# Patient Record
Sex: FEMALE | Race: WHITE | Employment: OTHER | URBAN - METROPOLITAN AREA
[De-identification: names, ages, dates, MRNs, and addresses within clinical notes are randomized per-mention and may not be internally consistent; named-entity substitution may affect disease eponyms.]

---

## 2017-01-30 ENCOUNTER — OFFICE VISIT (OUTPATIENT)
Dept: CARDIOLOGY CLINIC | Age: 82
End: 2017-01-30

## 2017-01-30 VITALS
WEIGHT: 174 LBS | HEART RATE: 70 BPM | DIASTOLIC BLOOD PRESSURE: 74 MMHG | BODY MASS INDEX: 27.97 KG/M2 | SYSTOLIC BLOOD PRESSURE: 140 MMHG | HEIGHT: 66 IN | OXYGEN SATURATION: 97 %

## 2017-01-30 DIAGNOSIS — M79.604 LOW BACK PAIN RADIATING TO BOTH LEGS: ICD-10-CM

## 2017-01-30 DIAGNOSIS — I21.19 ACUTE INFERIOR MYOCARDIAL INFARCTION (HCC): ICD-10-CM

## 2017-01-30 DIAGNOSIS — N93.9 VAGINAL BLEEDING: ICD-10-CM

## 2017-01-30 DIAGNOSIS — M54.50 LOW BACK PAIN RADIATING TO BOTH LEGS: ICD-10-CM

## 2017-01-30 DIAGNOSIS — I10 ESSENTIAL HYPERTENSION: ICD-10-CM

## 2017-01-30 DIAGNOSIS — E78.5 HYPERLIPIDEMIA LDL GOAL <70: ICD-10-CM

## 2017-01-30 DIAGNOSIS — I21.19 ACUTE INFERIOR MYOCARDIAL INFARCTION (HCC): Primary | ICD-10-CM

## 2017-01-30 DIAGNOSIS — R58 RETROPERITONEAL HEMORRHAGE: ICD-10-CM

## 2017-01-30 DIAGNOSIS — M79.605 LOW BACK PAIN RADIATING TO BOTH LEGS: ICD-10-CM

## 2017-01-30 LAB
BASOPHILS ABSOLUTE: 0 K/UL (ref 0–0.2)
BASOPHILS RELATIVE PERCENT: 0.7 %
EOSINOPHILS ABSOLUTE: 0.1 K/UL (ref 0–0.6)
EOSINOPHILS RELATIVE PERCENT: 2.1 %
HCT VFR BLD CALC: 32.6 % (ref 36–48)
HEMOGLOBIN: 10.9 G/DL (ref 12–16)
LYMPHOCYTES ABSOLUTE: 1.4 K/UL (ref 1–5.1)
LYMPHOCYTES RELATIVE PERCENT: 20.4 %
MCH RBC QN AUTO: 29 PG (ref 26–34)
MCHC RBC AUTO-ENTMCNC: 33.4 G/DL (ref 31–36)
MCV RBC AUTO: 86.8 FL (ref 80–100)
MONOCYTES ABSOLUTE: 0.5 K/UL (ref 0–1.3)
MONOCYTES RELATIVE PERCENT: 7.7 %
NEUTROPHILS ABSOLUTE: 4.6 K/UL (ref 1.7–7.7)
NEUTROPHILS RELATIVE PERCENT: 69.1 %
PDW BLD-RTO: 13.2 % (ref 12.4–15.4)
PLATELET # BLD: 286 K/UL (ref 135–450)
PMV BLD AUTO: 9.2 FL (ref 5–10.5)
RBC # BLD: 3.76 M/UL (ref 4–5.2)
WBC # BLD: 6.7 K/UL (ref 4–11)

## 2017-01-30 PROCEDURE — 93000 ELECTROCARDIOGRAM COMPLETE: CPT | Performed by: NURSE PRACTITIONER

## 2017-01-30 PROCEDURE — 99214 OFFICE O/P EST MOD 30 MIN: CPT | Performed by: NURSE PRACTITIONER

## 2017-02-06 ENCOUNTER — OFFICE VISIT (OUTPATIENT)
Dept: CARDIOLOGY CLINIC | Age: 82
End: 2017-02-06

## 2017-02-06 VITALS
SYSTOLIC BLOOD PRESSURE: 122 MMHG | BODY MASS INDEX: 28.99 KG/M2 | OXYGEN SATURATION: 98 % | HEIGHT: 65 IN | HEART RATE: 72 BPM | WEIGHT: 174 LBS | DIASTOLIC BLOOD PRESSURE: 70 MMHG

## 2017-02-06 DIAGNOSIS — R58 RETROPERITONEAL HEMORRHAGE: ICD-10-CM

## 2017-02-06 DIAGNOSIS — E78.5 HYPERLIPIDEMIA LDL GOAL <70: ICD-10-CM

## 2017-02-06 DIAGNOSIS — I10 ESSENTIAL HYPERTENSION: ICD-10-CM

## 2017-02-06 DIAGNOSIS — D64.9 ANEMIA, UNSPECIFIED TYPE: ICD-10-CM

## 2017-02-06 DIAGNOSIS — I21.19 ACUTE INFERIOR MYOCARDIAL INFARCTION (HCC): Primary | ICD-10-CM

## 2017-02-06 PROCEDURE — 99213 OFFICE O/P EST LOW 20 MIN: CPT | Performed by: NURSE PRACTITIONER

## 2017-02-15 ENCOUNTER — OFFICE VISIT (OUTPATIENT)
Dept: ORTHOPEDIC SURGERY | Age: 82
End: 2017-02-15

## 2017-02-15 VITALS
HEIGHT: 65 IN | SYSTOLIC BLOOD PRESSURE: 142 MMHG | BODY MASS INDEX: 29.49 KG/M2 | WEIGHT: 177 LBS | HEART RATE: 68 BPM | DIASTOLIC BLOOD PRESSURE: 68 MMHG

## 2017-02-15 DIAGNOSIS — M17.11 ARTHRITIS OF RIGHT KNEE: Primary | ICD-10-CM

## 2017-02-15 PROCEDURE — 20610 DRAIN/INJ JOINT/BURSA W/O US: CPT | Performed by: PHYSICIAN ASSISTANT

## 2017-02-16 ENCOUNTER — TELEPHONE (OUTPATIENT)
Dept: FAMILY MEDICINE CLINIC | Age: 82
End: 2017-02-16

## 2017-02-16 DIAGNOSIS — E03.4 HYPOTHYROIDISM DUE TO ACQUIRED ATROPHY OF THYROID: Primary | ICD-10-CM

## 2017-02-28 ENCOUNTER — NURSE ONLY (OUTPATIENT)
Dept: FAMILY MEDICINE CLINIC | Age: 82
End: 2017-02-28

## 2017-02-28 DIAGNOSIS — Z00.00 EXAMINATION, MEDICAL, GENERAL: Primary | ICD-10-CM

## 2017-02-28 LAB — TSH SERPL DL<=0.05 MIU/L-ACNC: 1.96 UIU/ML (ref 0.27–4.2)

## 2017-02-28 PROCEDURE — 36415 COLL VENOUS BLD VENIPUNCTURE: CPT | Performed by: FAMILY MEDICINE

## 2017-03-02 ENCOUNTER — OFFICE VISIT (OUTPATIENT)
Dept: FAMILY MEDICINE CLINIC | Age: 82
End: 2017-03-02

## 2017-03-02 VITALS
HEIGHT: 66 IN | DIASTOLIC BLOOD PRESSURE: 72 MMHG | SYSTOLIC BLOOD PRESSURE: 138 MMHG | BODY MASS INDEX: 26.36 KG/M2 | WEIGHT: 164 LBS

## 2017-03-02 DIAGNOSIS — Z00.00 WELL ADULT EXAM: Primary | ICD-10-CM

## 2017-03-02 PROCEDURE — 99397 PER PM REEVAL EST PAT 65+ YR: CPT | Performed by: FAMILY MEDICINE

## 2017-03-02 RX ORDER — TIZANIDINE 4 MG/1
4 TABLET ORAL 2 TIMES DAILY PRN
Qty: 40 TABLET | Refills: 2 | Status: SHIPPED | OUTPATIENT
Start: 2017-03-02 | End: 2017-05-16

## 2017-03-02 RX ORDER — LEVOTHYROXINE AND LIOTHYRONINE 19; 4.5 UG/1; UG/1
TABLET ORAL
Qty: 90 TABLET | Refills: 1 | Status: SHIPPED | OUTPATIENT
Start: 2017-03-02 | End: 2017-09-05 | Stop reason: SDUPTHER

## 2017-03-02 ASSESSMENT — ENCOUNTER SYMPTOMS
RESPIRATORY NEGATIVE: 1
GASTROINTESTINAL NEGATIVE: 1

## 2017-05-16 ENCOUNTER — OFFICE VISIT (OUTPATIENT)
Dept: CARDIOLOGY CLINIC | Age: 82
End: 2017-05-16

## 2017-05-16 VITALS
WEIGHT: 162 LBS | BODY MASS INDEX: 26.99 KG/M2 | HEART RATE: 72 BPM | SYSTOLIC BLOOD PRESSURE: 134 MMHG | OXYGEN SATURATION: 96 % | HEIGHT: 65 IN | DIASTOLIC BLOOD PRESSURE: 78 MMHG

## 2017-05-16 DIAGNOSIS — D50.0 IRON DEFICIENCY ANEMIA DUE TO CHRONIC BLOOD LOSS: ICD-10-CM

## 2017-05-16 DIAGNOSIS — Z01.810 PREOPERATIVE CARDIOVASCULAR EXAMINATION: Primary | ICD-10-CM

## 2017-05-16 DIAGNOSIS — I25.10 CORONARY ARTERY DISEASE INVOLVING NATIVE CORONARY ARTERY OF NATIVE HEART WITHOUT ANGINA PECTORIS: ICD-10-CM

## 2017-05-16 DIAGNOSIS — I10 ESSENTIAL HYPERTENSION: ICD-10-CM

## 2017-05-16 DIAGNOSIS — E78.2 MIXED HYPERLIPIDEMIA: ICD-10-CM

## 2017-05-16 PROCEDURE — 99214 OFFICE O/P EST MOD 30 MIN: CPT | Performed by: INTERNAL MEDICINE

## 2017-05-16 RX ORDER — ATENOLOL 25 MG/1
12.5 TABLET ORAL DAILY
Qty: 30 TABLET | Refills: 6 | Status: SHIPPED | OUTPATIENT
Start: 2017-05-16 | End: 2017-09-05 | Stop reason: SDUPTHER

## 2017-05-16 RX ORDER — AMLODIPINE BESYLATE 2.5 MG/1
2.5 TABLET ORAL DAILY
COMMUNITY
End: 2017-05-16 | Stop reason: ALTCHOICE

## 2017-05-17 ENCOUNTER — TELEPHONE (OUTPATIENT)
Dept: ORTHOPEDIC SURGERY | Age: 82
End: 2017-05-17

## 2017-05-17 DIAGNOSIS — D50.0 IRON DEFICIENCY ANEMIA DUE TO CHRONIC BLOOD LOSS: ICD-10-CM

## 2017-05-17 DIAGNOSIS — I25.10 CORONARY ARTERY DISEASE INVOLVING NATIVE CORONARY ARTERY OF NATIVE HEART WITHOUT ANGINA PECTORIS: ICD-10-CM

## 2017-05-17 DIAGNOSIS — E78.2 MIXED HYPERLIPIDEMIA: ICD-10-CM

## 2017-05-17 LAB
ALBUMIN SERPL-MCNC: 4.8 G/DL (ref 3.4–5)
ALP BLD-CCNC: 82 U/L (ref 40–129)
ALT SERPL-CCNC: 25 U/L (ref 10–40)
AST SERPL-CCNC: 23 U/L (ref 15–37)
BILIRUB SERPL-MCNC: 0.7 MG/DL (ref 0–1)
BILIRUBIN DIRECT: <0.2 MG/DL (ref 0–0.3)
BILIRUBIN, INDIRECT: NORMAL MG/DL (ref 0–1)
CHOLESTEROL, TOTAL: 153 MG/DL (ref 0–199)
HCT VFR BLD CALC: 40.2 % (ref 36–48)
HDLC SERPL-MCNC: 62 MG/DL (ref 40–60)
HEMOGLOBIN: 13.1 G/DL (ref 12–16)
IRON SATURATION: 30 % (ref 15–50)
IRON: 113 UG/DL (ref 37–145)
LDL CHOLESTEROL CALCULATED: 72 MG/DL
MCH RBC QN AUTO: 28.8 PG (ref 26–34)
MCHC RBC AUTO-ENTMCNC: 32.5 G/DL (ref 31–36)
MCV RBC AUTO: 88.6 FL (ref 80–100)
PDW BLD-RTO: 14.3 % (ref 12.4–15.4)
PLATELET # BLD: 248 K/UL (ref 135–450)
PMV BLD AUTO: 8.7 FL (ref 5–10.5)
RBC # BLD: 4.54 M/UL (ref 4–5.2)
TOTAL IRON BINDING CAPACITY: 379 UG/DL (ref 260–445)
TOTAL PROTEIN: 6.9 G/DL (ref 6.4–8.2)
TRIGL SERPL-MCNC: 96 MG/DL (ref 0–150)
VLDLC SERPL CALC-MCNC: 19 MG/DL
WBC # BLD: 4 K/UL (ref 4–11)

## 2017-05-18 ENCOUNTER — OFFICE VISIT (OUTPATIENT)
Dept: ORTHOPEDIC SURGERY | Age: 82
End: 2017-05-18

## 2017-05-18 VITALS
SYSTOLIC BLOOD PRESSURE: 172 MMHG | HEART RATE: 76 BPM | DIASTOLIC BLOOD PRESSURE: 75 MMHG | BODY MASS INDEX: 26.03 KG/M2 | HEIGHT: 66 IN | WEIGHT: 162 LBS

## 2017-05-18 DIAGNOSIS — M17.11 ARTHRITIS OF RIGHT KNEE: Primary | ICD-10-CM

## 2017-05-18 PROCEDURE — 99212 OFFICE O/P EST SF 10 MIN: CPT | Performed by: PHYSICIAN ASSISTANT

## 2017-05-18 PROCEDURE — 20610 DRAIN/INJ JOINT/BURSA W/O US: CPT | Performed by: PHYSICIAN ASSISTANT

## 2017-08-17 RX ORDER — ATORVASTATIN CALCIUM 40 MG/1
TABLET, FILM COATED ORAL
Qty: 30 TABLET | Refills: 4 | Status: SHIPPED | OUTPATIENT
Start: 2017-08-17 | End: 2017-09-05 | Stop reason: SDUPTHER

## 2017-08-22 ENCOUNTER — OFFICE VISIT (OUTPATIENT)
Dept: ORTHOPEDIC SURGERY | Age: 82
End: 2017-08-22

## 2017-08-22 VITALS
DIASTOLIC BLOOD PRESSURE: 75 MMHG | BODY MASS INDEX: 26.03 KG/M2 | SYSTOLIC BLOOD PRESSURE: 182 MMHG | WEIGHT: 162 LBS | HEIGHT: 66 IN | TEMPERATURE: 99 F | HEART RATE: 68 BPM

## 2017-08-22 DIAGNOSIS — M17.11 ARTHRITIS OF RIGHT KNEE: Primary | ICD-10-CM

## 2017-08-22 PROCEDURE — 20610 DRAIN/INJ JOINT/BURSA W/O US: CPT | Performed by: PHYSICIAN ASSISTANT

## 2017-09-05 ENCOUNTER — OFFICE VISIT (OUTPATIENT)
Dept: FAMILY MEDICINE CLINIC | Age: 82
End: 2017-09-05

## 2017-09-05 VITALS
DIASTOLIC BLOOD PRESSURE: 70 MMHG | HEIGHT: 65 IN | BODY MASS INDEX: 25.66 KG/M2 | WEIGHT: 154 LBS | SYSTOLIC BLOOD PRESSURE: 138 MMHG

## 2017-09-05 DIAGNOSIS — E78.5 HYPERLIPIDEMIA LDL GOAL <70: ICD-10-CM

## 2017-09-05 DIAGNOSIS — I25.10 CORONARY ARTERY DISEASE INVOLVING NATIVE CORONARY ARTERY OF NATIVE HEART WITHOUT ANGINA PECTORIS: ICD-10-CM

## 2017-09-05 DIAGNOSIS — I10 ESSENTIAL HYPERTENSION: Primary | ICD-10-CM

## 2017-09-05 DIAGNOSIS — E03.4 HYPOTHYROIDISM DUE TO ACQUIRED ATROPHY OF THYROID: ICD-10-CM

## 2017-09-05 PROCEDURE — 99213 OFFICE O/P EST LOW 20 MIN: CPT | Performed by: FAMILY MEDICINE

## 2017-09-05 PROCEDURE — G0009 ADMIN PNEUMOCOCCAL VACCINE: HCPCS | Performed by: FAMILY MEDICINE

## 2017-09-05 PROCEDURE — 90732 PPSV23 VACC 2 YRS+ SUBQ/IM: CPT | Performed by: FAMILY MEDICINE

## 2017-09-05 RX ORDER — ATENOLOL 25 MG/1
12.5 TABLET ORAL DAILY
Qty: 90 TABLET | Refills: 3 | Status: SHIPPED | OUTPATIENT
Start: 2017-09-05 | End: 2018-02-06 | Stop reason: SINTOL

## 2017-09-05 RX ORDER — LEVOTHYROXINE AND LIOTHYRONINE 19; 4.5 UG/1; UG/1
TABLET ORAL
Qty: 90 TABLET | Refills: 1 | Status: SHIPPED | OUTPATIENT
Start: 2017-09-05 | End: 2017-11-13 | Stop reason: SDUPTHER

## 2017-09-05 RX ORDER — ATORVASTATIN CALCIUM 40 MG/1
TABLET, FILM COATED ORAL
Qty: 90 TABLET | Refills: 3 | Status: SHIPPED | OUTPATIENT
Start: 2017-09-05 | End: 2018-10-01 | Stop reason: SDUPTHER

## 2017-09-05 ASSESSMENT — ENCOUNTER SYMPTOMS
BLURRED VISION: 0
ORTHOPNEA: 0
SHORTNESS OF BREATH: 0

## 2017-09-20 ENCOUNTER — TELEPHONE (OUTPATIENT)
Dept: CARDIOLOGY CLINIC | Age: 82
End: 2017-09-20

## 2017-09-20 DIAGNOSIS — I21.19 ACUTE INFERIOR MYOCARDIAL INFARCTION (HCC): ICD-10-CM

## 2017-10-25 RX ORDER — THYROID 30 MG/1
TABLET ORAL
Qty: 90 TABLET | Refills: 1 | Status: SHIPPED | OUTPATIENT
Start: 2017-10-25 | End: 2018-05-21 | Stop reason: SDUPTHER

## 2017-11-13 ENCOUNTER — OFFICE VISIT (OUTPATIENT)
Dept: CARDIOLOGY CLINIC | Age: 82
End: 2017-11-13

## 2017-11-13 VITALS
HEART RATE: 73 BPM | OXYGEN SATURATION: 96 % | SYSTOLIC BLOOD PRESSURE: 126 MMHG | HEIGHT: 65 IN | BODY MASS INDEX: 25.49 KG/M2 | WEIGHT: 153 LBS | DIASTOLIC BLOOD PRESSURE: 78 MMHG

## 2017-11-13 DIAGNOSIS — I25.10 CORONARY ARTERY DISEASE INVOLVING NATIVE CORONARY ARTERY OF NATIVE HEART WITHOUT ANGINA PECTORIS: ICD-10-CM

## 2017-11-13 DIAGNOSIS — Z01.810 PREOPERATIVE CARDIOVASCULAR EXAMINATION: Primary | ICD-10-CM

## 2017-11-13 DIAGNOSIS — E78.5 HYPERLIPIDEMIA LDL GOAL <70: ICD-10-CM

## 2017-11-13 DIAGNOSIS — I10 ESSENTIAL HYPERTENSION: ICD-10-CM

## 2017-11-13 PROCEDURE — 99215 OFFICE O/P EST HI 40 MIN: CPT | Performed by: INTERNAL MEDICINE

## 2017-11-13 NOTE — PROGRESS NOTES
nosebleeds,nasal congestion. Respiratory: Denies new or change in SOB, PND, orthopnea or cough. Cardiovascular: see HPI  GI: Denies N/V, diarrhea, constipation, abdominal pain, change in bowel habits, melena or hematochezia  : Denies urinary frequency, urgency, incontinence, hematuria or dysuria. Skin: Denies rash, hives, or cyanosis  Musculoskeletal: + chronic bilateral leg and back pain  Neurological: Denies syncope or TIA-like symptoms. Psychiatric: Denies anxiety, insomnia or depression     Past Medical History:   Diagnosis Date    CAD (coronary artery disease)     Inferior STEMI; CATRACHITA to RCA    DDD (degenerative disc disease), lumbosacral 4/10/2013    Hyperlipidemia     Hypertension     Incontinence     resolved    Lumbar disc disease     Pain     legs    Sleep apnea     uses mouth piece; bringing DOS 7/26/13    Thyroid disease     hypo         Social History   Substance Use Topics    Smoking status: Never Smoker    Smokeless tobacco: Never Used      Comment: encouraged to never smoke     Alcohol use No       Allergies   Allergen Reactions    Amoxicillin Hives    Prednisone Itching    Coreg [Carvedilol] Diarrhea     Current Outpatient Prescriptions   Medication Sig Dispense Refill    ARMOUR THYROID 30 MG tablet TAKE 1 TABLET DAILY 90 tablet 1    ticagrelor (BRILINTA) 90 MG TABS tablet Take 1 tablet by mouth 2 times daily 180 tablet 1    atorvastatin (LIPITOR) 40 MG tablet TAKE ONE TABLET BY MOUTH DAILY 90 tablet 3    atenolol (TENORMIN) 25 MG tablet Take 0.5 tablets by mouth daily 90 tablet 3    Acetaminophen (TYLENOL EXTRA STRENGTH PO) Take 2 capsules by mouth 2 times daily Indications: 2 in AM and 2 in PM      aspirin EC 81 MG EC tablet Take 1 tablet by mouth daily 30 tablet 5    travoprost, benzalkonium, (TRAVATAN) 0.004 % ophthalmic solution Place 1 drop into the left eye nightly. No current facility-administered medications for this visit.         Physical Exam:   BP 126/78 (Site: Left Arm, Position: Sitting, Cuff Size: Large Adult)   Pulse 73   Ht 5' 4.5\" (1.638 m)   Wt 153 lb (69.4 kg)   SpO2 96%   BMI 25.86 kg/m²   Wt Readings from Last 2 Encounters:   11/13/17 153 lb (69.4 kg)   09/05/17 154 lb (69.9 kg)     Constitutional: She is oriented to person, place, and time. She appears well-developed and well-nourished. In no acute distress. HEENT: Normocephalic and atraumatic. Sclerae anicteric. No xanthelasmas. Neck: Neck supple. No JVD present. Carotids without bruits. No thyromegaly present. Cardiovascular: RRR, normal S1 and S2; no murmur/gallop or rub  Pulmonary/Chest: Effort normal and breath sounds normal. No respiratory distress. Lungs clear to auscultation. Abdominal: soft, nontender, nondistended. + bowel sounds; no organomegaly or bruits. Extremities: No edema, cyanosis, or clubbing. Pulses are 2+ radial/carotid/dorsalis pedis and posterior tibial bilaterally. Cap refill brisk. Neurological: No cranial nerve deficit. Skin: Skin is warm and dry. There is no rash or diaphoresis. Psychiatric: She has a normal mood and affect. Her speech is normal and behavior is normal.     Lab Review:   Lab Results   Component Value Date    TRIG 96 05/17/2017    HDL 62 05/17/2017    LDLCALC 72 05/17/2017    LABVLDL 19 05/17/2017     Lab Results   Component Value Date     01/24/2017    K 3.6 01/24/2017     01/24/2017    CO2 21 01/24/2017    BUN 13 01/24/2017    CREATININE <0.5 01/24/2017    GLUCOSE 97 01/24/2017    CALCIUM 8.8 01/24/2017      Lab Results   Component Value Date    WBC 4.0 05/17/2017    HGB 13.1 05/17/2017    HCT 40.2 05/17/2017    MCV 88.6 05/17/2017     05/17/2017       ECG 1/30/2017: Sinus rhythm with TW inversions in inferior leads (improved some from prior ECG on 1/23/2017)    Echo 1/23/2017:   Normal left ventricle size, wall thickness, and systolic function with an   estimated ejection fraction of 60-65%.  No regional wall motion abnormalities   are seen.   The right ventricle is normal in size and function.   Trivial mitral and tricuspid regurgitation. 1/23/2017 C:  1. Right dominant coronary arterial system with a 99% mid RCA lesion is the  culprit lesion. This was successfully intervened upon with a 3 mm x 18 mm  Xience Alpine drug-eluting stent dilated to post-dilated to 3.4 mm. This  resulted in CLAUDIA 3 flow and 0% residual stenosis. In the left system, there  is no left main, LAD or circumflex disease appreciated angiographically. 2. Normal left ventricular systolic function with an LV ejection fraction of  55%. There is mild inferior wall hypokinesis. 3. Normal left ventricular end-diastolic pressure. 4. No gradient across the aortic valve on pullback to suggest aortic Stenosis. CT abdomen/pelvis 1/23/2017: Moderate amount of right groin intramuscular/subcutaneous hemorrhage as well   as right retroperitoneal hemorrhage as described above. Assessment:    1) Preoperative Cardiovascular Examination  Will need R TKR once off of DAPT, okay to proceed if scheduled in the next few months as she will have complete 12 months of DAPT in January 2018. She is not endorsing any symptoms suggestive of angina. I have asked her to have Ortho contact our office depending on the plan. 2) CAD of native coronary arteries without angina  No angina   He angina was severe chest pressure and severe fatigue   STEMI and S/P CATRACHITA to RCA on 1/23/2017 (retroperitoneal bleed post procedure)  No other obstructive disease noted  Preserved LV function; LVEF 55% on cath; 60-65% on echo 1/23/2017  On DAPT (aspirin and Brilinta), atenolol and statin therapy  She does not exercise secondary to right knee arthritis-she will need a R TKR after she comes off of the DAPT. 3) Essential hypertension  BP controlled  Did not tolerate Coreg, resumed Amlodipine and then DC. At that time, atenolol 12.5mg nightly for her CAD and hypertension.    She has

## 2017-11-27 ENCOUNTER — OFFICE VISIT (OUTPATIENT)
Dept: ORTHOPEDIC SURGERY | Age: 82
End: 2017-11-27

## 2017-11-27 ENCOUNTER — TELEPHONE (OUTPATIENT)
Dept: CARDIOLOGY CLINIC | Age: 82
End: 2017-11-27

## 2017-11-27 VITALS
DIASTOLIC BLOOD PRESSURE: 79 MMHG | SYSTOLIC BLOOD PRESSURE: 175 MMHG | TEMPERATURE: 98.6 F | BODY MASS INDEX: 25.49 KG/M2 | WEIGHT: 153 LBS | HEIGHT: 65 IN | HEART RATE: 75 BPM

## 2017-11-27 DIAGNOSIS — M17.11 ARTHRITIS OF RIGHT KNEE: Primary | ICD-10-CM

## 2017-11-27 PROCEDURE — 20610 DRAIN/INJ JOINT/BURSA W/O US: CPT | Performed by: PHYSICIAN ASSISTANT

## 2017-11-27 PROCEDURE — 99214 OFFICE O/P EST MOD 30 MIN: CPT | Performed by: PHYSICIAN ASSISTANT

## 2017-11-27 NOTE — LETTER
ALLERGIES:Amoxicillin; Prednisone; and Coreg [carvedilol]                                                                      SURG       3-6                           JET     2-27 __________________________________________________________________  PRE-OP ORDERS:  ? CBC WITH DIFFERENTIAL                                                ? TYPE AND SCREEN                                                            ? HgB A1C                                                                               ? EKG                                                                                        ? NASAL CULUTRE MRSA  ? UAR/if positive repeat UAR on admission  ? BMP           ALBUMIN AND PREALBUMIN           VITAMIN D LEVELS  ? COAG PROFILE  ? SED RATE  ? PT/OT EVAL AND TEACHING  ? INSTRUCT PT TO STOP ALL NSAIDS, ASPIRIN, BLOOD THINNERS 7 DAYS PRIOR SURGERY  DAY OF SURGERY  ? CEFAZOLIN 2 GM IVPB; IF PATIENT WEIGHS > 80 KG AND SERUM CREATININE <2.5 mg/dl, GIVE 2 GM DOSE WITHIN 1 HOUR OF INCISION. ? IF THE PRE-OP NASAL CULTURE FOR MRSA WAS POSITIVE:   REPEAT NASAL SWAB ON ADMISSION AND ADMINISTER VANCOMYCIN 1 GM IVPB, REDUCE THE DOSE OF VANCOMYCIN  MG IVPB IF PT < 55 KG OR SERUM CREATININE > 2mg/dl; also to get Cefazolin 2 GM or wt based  ? All patients will receive preop Cefazolin 2 GM unless patient has a documented anaphylactic response to Cefazolin, if so then vancomycin wt based and clindamycin 900 mg IVPB  ? APPLY KNEE HIGH ANTI-EMBOLIC AND PNEUMO-BOOTS TO UNOPERATIVE  LEG  ? CELEBREX  200 MG  ORALLY  DAY OF SURGERY  ?  ROXICODONE 10MG  ORALLY DAY OF SURGERY  OTHER ORDERS:_______________________________________________________PHYSICIAN SIGNATURE: __ 11/27/17                                                                                                       10:42 AM  ________________________DATE:                          Supplemental Confidentiality & Payment Agreement & Supplemental HIPAA

## 2017-11-29 ENCOUNTER — TELEPHONE (OUTPATIENT)
Dept: ORTHOPEDIC SURGERY | Age: 82
End: 2017-11-29

## 2017-12-12 DIAGNOSIS — I21.19 ACUTE INFERIOR MYOCARDIAL INFARCTION (HCC): ICD-10-CM

## 2017-12-12 NOTE — TELEPHONE ENCOUNTER
Per Dr. Larisa Gold recent [address] note. She may d'c the brilinata after 1/23/18. Send enough refills to cover until then. Continue ASA.

## 2017-12-12 NOTE — TELEPHONE ENCOUNTER
Tried to call pt to let her know. Will not send Rx until our office speaks with her about when to stop Brilinta. Will try back later.

## 2017-12-27 ENCOUNTER — TELEPHONE (OUTPATIENT)
Dept: ORTHOPEDIC SURGERY | Age: 82
End: 2017-12-27

## 2018-02-01 ENCOUNTER — TELEPHONE (OUTPATIENT)
Dept: CARDIOLOGY CLINIC | Age: 83
End: 2018-02-01

## 2018-02-01 NOTE — TELEPHONE ENCOUNTER
Called and advised pt that Dr. Claudette Gentile gave pt clearance in Nov at her last ov. Okay to have R TKR now as pt has come off her DAPT. Pt voiced understanding.

## 2018-02-06 ENCOUNTER — OFFICE VISIT (OUTPATIENT)
Dept: FAMILY MEDICINE CLINIC | Age: 83
End: 2018-02-06

## 2018-02-06 VITALS
DIASTOLIC BLOOD PRESSURE: 82 MMHG | HEART RATE: 66 BPM | OXYGEN SATURATION: 98 % | HEIGHT: 65 IN | WEIGHT: 150 LBS | BODY MASS INDEX: 24.99 KG/M2 | TEMPERATURE: 98.2 F | SYSTOLIC BLOOD PRESSURE: 196 MMHG

## 2018-02-06 DIAGNOSIS — M17.11 PRIMARY OSTEOARTHRITIS OF RIGHT KNEE: ICD-10-CM

## 2018-02-06 DIAGNOSIS — Z01.811 PRE-OP CHEST EXAM: ICD-10-CM

## 2018-02-06 DIAGNOSIS — I10 ESSENTIAL HYPERTENSION: ICD-10-CM

## 2018-02-06 DIAGNOSIS — Z01.818 PREOP EXAMINATION: Primary | ICD-10-CM

## 2018-02-06 DIAGNOSIS — I21.19 ACUTE INFERIOR MYOCARDIAL INFARCTION (HCC): ICD-10-CM

## 2018-02-06 DIAGNOSIS — E03.4 HYPOTHYROIDISM DUE TO ACQUIRED ATROPHY OF THYROID: ICD-10-CM

## 2018-02-06 LAB
A/G RATIO: 2.1 (ref 1.1–2.2)
ALBUMIN SERPL-MCNC: 4.9 G/DL (ref 3.4–5)
ALP BLD-CCNC: 71 U/L (ref 40–129)
ALT SERPL-CCNC: 18 U/L (ref 10–40)
ANION GAP SERPL CALCULATED.3IONS-SCNC: 14 MMOL/L (ref 3–16)
AST SERPL-CCNC: 20 U/L (ref 15–37)
BILIRUB SERPL-MCNC: 0.6 MG/DL (ref 0–1)
BUN BLDV-MCNC: 10 MG/DL (ref 7–20)
CALCIUM SERPL-MCNC: 9.9 MG/DL (ref 8.3–10.6)
CHLORIDE BLD-SCNC: 98 MMOL/L (ref 99–110)
CHOLESTEROL, TOTAL: 168 MG/DL (ref 0–199)
CO2: 24 MMOL/L (ref 21–32)
CREAT SERPL-MCNC: <0.5 MG/DL (ref 0.6–1.2)
GFR AFRICAN AMERICAN: >60
GFR NON-AFRICAN AMERICAN: >60
GLOBULIN: 2.3 G/DL
GLUCOSE BLD-MCNC: 102 MG/DL (ref 70–99)
HCT VFR BLD CALC: 38.9 % (ref 36–48)
HDLC SERPL-MCNC: 75 MG/DL (ref 40–60)
HEMOGLOBIN: 13.1 G/DL (ref 12–16)
INR BLD: 0.96 (ref 0.85–1.15)
LDL CHOLESTEROL CALCULATED: 80 MG/DL
MCH RBC QN AUTO: 29.9 PG (ref 26–34)
MCHC RBC AUTO-ENTMCNC: 33.6 G/DL (ref 31–36)
MCV RBC AUTO: 89 FL (ref 80–100)
PDW BLD-RTO: 13.8 % (ref 12.4–15.4)
PLATELET # BLD: 228 K/UL (ref 135–450)
PMV BLD AUTO: 8.1 FL (ref 5–10.5)
POTASSIUM SERPL-SCNC: 4.9 MMOL/L (ref 3.5–5.1)
PROTHROMBIN TIME: 10.9 SEC (ref 9.6–13)
RBC # BLD: 4.37 M/UL (ref 4–5.2)
SODIUM BLD-SCNC: 136 MMOL/L (ref 136–145)
TOTAL PROTEIN: 7.2 G/DL (ref 6.4–8.2)
TRIGL SERPL-MCNC: 64 MG/DL (ref 0–150)
TSH SERPL DL<=0.05 MIU/L-ACNC: 1.56 UIU/ML (ref 0.27–4.2)
VLDLC SERPL CALC-MCNC: 13 MG/DL
WBC # BLD: 3.7 K/UL (ref 4–11)

## 2018-02-06 PROCEDURE — 99214 OFFICE O/P EST MOD 30 MIN: CPT | Performed by: FAMILY MEDICINE

## 2018-02-06 PROCEDURE — 93000 ELECTROCARDIOGRAM COMPLETE: CPT | Performed by: FAMILY MEDICINE

## 2018-02-06 PROCEDURE — 36415 COLL VENOUS BLD VENIPUNCTURE: CPT | Performed by: FAMILY MEDICINE

## 2018-02-06 ASSESSMENT — ENCOUNTER SYMPTOMS
GASTROINTESTINAL NEGATIVE: 1
RESPIRATORY NEGATIVE: 1

## 2018-02-27 ENCOUNTER — HOSPITAL ENCOUNTER (OUTPATIENT)
Dept: PREADMISSION TESTING | Age: 83
Discharge: HOME | End: 2018-02-27
Attending: ORTHOPAEDIC SURGERY | Admitting: ORTHOPAEDIC SURGERY

## 2018-02-27 DIAGNOSIS — Z01.818 ENCOUNTER FOR PREADMISSION TESTING: ICD-10-CM

## 2018-02-27 LAB
ABO/RH: NORMAL
ALBUMIN SERPL-MCNC: 4.6 G/DL (ref 3.4–5)
ANION GAP SERPL CALCULATED.3IONS-SCNC: 14 MMOL/L (ref 3–16)
ANTIBODY SCREEN: NORMAL
APTT: 32.8 SEC (ref 24.1–34.9)
BASOPHILS ABSOLUTE: 0 K/UL (ref 0–0.2)
BASOPHILS RELATIVE PERCENT: 0.9 %
BILIRUBIN URINE: NEGATIVE
BLOOD, URINE: NEGATIVE
BUN BLDV-MCNC: 10 MG/DL (ref 7–20)
CALCIUM SERPL-MCNC: 9.1 MG/DL (ref 8.3–10.6)
CHLORIDE BLD-SCNC: 99 MMOL/L (ref 99–110)
CLARITY: CLEAR
CO2: 23 MMOL/L (ref 21–32)
COLOR: YELLOW
CREAT SERPL-MCNC: <0.5 MG/DL (ref 0.6–1.2)
EOSINOPHILS ABSOLUTE: 0.1 K/UL (ref 0–0.6)
EOSINOPHILS RELATIVE PERCENT: 1.5 %
ESTIMATED AVERAGE GLUCOSE: 108.3 MG/DL
GFR AFRICAN AMERICAN: >60
GFR NON-AFRICAN AMERICAN: >60
GLUCOSE BLD-MCNC: 98 MG/DL (ref 70–99)
GLUCOSE URINE: NEGATIVE MG/DL
HBA1C MFR BLD: 5.4 %
HCT VFR BLD CALC: 37.8 % (ref 36–48)
HEMOGLOBIN: 12.8 G/DL (ref 12–16)
INR BLD: 0.98 (ref 0.85–1.15)
KETONES, URINE: NEGATIVE MG/DL
LEUKOCYTE ESTERASE, URINE: NEGATIVE
LYMPHOCYTES ABSOLUTE: 1.3 K/UL (ref 1–5.1)
LYMPHOCYTES RELATIVE PERCENT: 30 %
MCH RBC QN AUTO: 30.2 PG (ref 26–34)
MCHC RBC AUTO-ENTMCNC: 33.8 G/DL (ref 31–36)
MCV RBC AUTO: 89.4 FL (ref 80–100)
MICROSCOPIC EXAMINATION: NORMAL
MONOCYTES ABSOLUTE: 0.5 K/UL (ref 0–1.3)
MONOCYTES RELATIVE PERCENT: 11.4 %
NEUTROPHILS ABSOLUTE: 2.5 K/UL (ref 1.7–7.7)
NEUTROPHILS RELATIVE PERCENT: 56.2 %
NITRITE, URINE: NEGATIVE
PDW BLD-RTO: 13.8 % (ref 12.4–15.4)
PH UA: 7.5
PLATELET # BLD: 209 K/UL (ref 135–450)
PMV BLD AUTO: 8.6 FL (ref 5–10.5)
POTASSIUM SERPL-SCNC: 4 MMOL/L (ref 3.5–5.1)
PREALBUMIN: 23 MG/DL (ref 20–40)
PROTEIN UA: NEGATIVE MG/DL
PROTHROMBIN TIME: 11.1 SEC (ref 9.6–13)
RBC # BLD: 4.23 M/UL (ref 4–5.2)
SEDIMENTATION RATE, ERYTHROCYTE: 14 MM/HR (ref 0–30)
SODIUM BLD-SCNC: 136 MMOL/L (ref 136–145)
SPECIFIC GRAVITY UA: 1.01
URINE REFLEX TO CULTURE: NORMAL
URINE TYPE: NORMAL
UROBILINOGEN, URINE: 0.2 E.U./DL
VITAMIN D 25-HYDROXY: 34.2 NG/ML
WBC # BLD: 4.4 K/UL (ref 4–11)

## 2018-02-28 ENCOUNTER — PAT TELEPHONE (OUTPATIENT)
Dept: PREADMISSION TESTING | Age: 83
End: 2018-02-28

## 2018-02-28 ENCOUNTER — SURG/PROC ORDERS (OUTPATIENT)
Dept: ANESTHESIOLOGY | Age: 83
End: 2018-02-28

## 2018-02-28 LAB — MRSA SCREEN RT-PCR: NORMAL

## 2018-02-28 RX ORDER — CELECOXIB 200 MG/1
200 CAPSULE ORAL ONCE
Status: CANCELLED | OUTPATIENT
Start: 2018-02-28

## 2018-02-28 RX ORDER — SODIUM CHLORIDE 0.9 % (FLUSH) 0.9 %
10 SYRINGE (ML) INJECTION PRN
Status: CANCELLED | OUTPATIENT
Start: 2018-02-28

## 2018-02-28 RX ORDER — SODIUM CHLORIDE 9 MG/ML
INJECTION, SOLUTION INTRAVENOUS CONTINUOUS
Status: CANCELLED | OUTPATIENT
Start: 2018-02-28

## 2018-02-28 RX ORDER — SODIUM CHLORIDE 0.9 % (FLUSH) 0.9 %
10 SYRINGE (ML) INJECTION EVERY 12 HOURS SCHEDULED
Status: CANCELLED | OUTPATIENT
Start: 2018-02-28

## 2018-02-28 RX ORDER — UBIDECARENONE 200 MG
1 CAPSULE ORAL DAILY
COMMUNITY
End: 2018-05-22 | Stop reason: ALTCHOICE

## 2018-02-28 RX ORDER — ACETAMINOPHEN 160 MG/5ML
1 SUSPENSION, ORAL (FINAL DOSE FORM) ORAL DAILY
Status: ON HOLD | COMMUNITY
End: 2018-03-06 | Stop reason: HOSPADM

## 2018-02-28 RX ORDER — ATENOLOL 25 MG/1
25 TABLET ORAL DAILY
COMMUNITY
End: 2018-03-09 | Stop reason: SDUPTHER

## 2018-02-28 RX ORDER — MULTIVITAMIN WITH IRON
250 TABLET ORAL 2 TIMES DAILY
COMMUNITY
End: 2018-05-22 | Stop reason: ALTCHOICE

## 2018-02-28 RX ORDER — OXYCODONE HYDROCHLORIDE 5 MG/1
10 TABLET ORAL ONCE
Status: CANCELLED | OUTPATIENT
Start: 2018-02-28

## 2018-02-28 RX ORDER — LATANOPROST 50 UG/ML
1 SOLUTION/ DROPS OPHTHALMIC NIGHTLY
COMMUNITY
Start: 2018-02-01

## 2018-02-28 ASSESSMENT — PAIN DESCRIPTION - DESCRIPTORS: DESCRIPTORS: ACHING;DISCOMFORT

## 2018-02-28 ASSESSMENT — PAIN - FUNCTIONAL ASSESSMENT: PAIN_FUNCTIONAL_ASSESSMENT: 0-10

## 2018-02-28 ASSESSMENT — PAIN SCALES - GENERAL: PAINLEVEL_OUTOF10: 5

## 2018-02-28 ASSESSMENT — PAIN DESCRIPTION - PAIN TYPE: TYPE: CHRONIC PAIN

## 2018-03-01 ENCOUNTER — OFFICE VISIT (OUTPATIENT)
Dept: ORTHOPEDIC SURGERY | Age: 83
End: 2018-03-01

## 2018-03-01 DIAGNOSIS — M17.11 PRIMARY OSTEOARTHRITIS OF RIGHT KNEE: Primary | ICD-10-CM

## 2018-03-01 PROCEDURE — 99999 PR OFFICE/OUTPT VISIT,PROCEDURE ONLY: CPT | Performed by: ORTHOPAEDIC SURGERY

## 2018-03-06 PROBLEM — M17.11 ARTHRITIS OF RIGHT KNEE: Status: ACTIVE | Noted: 2018-03-06

## 2018-03-08 ENCOUNTER — TELEPHONE (OUTPATIENT)
Dept: PHARMACY | Facility: CLINIC | Age: 83
End: 2018-03-08

## 2018-03-08 DIAGNOSIS — M17.11 ARTHRITIS OF RIGHT KNEE: Primary | ICD-10-CM

## 2018-03-08 PROCEDURE — 1111F DSCHRG MED/CURRENT MED MERGE: CPT

## 2018-03-08 NOTE — TELEPHONE ENCOUNTER
Take 250 mg by mouth 2 times daily (Taking as prescribed)    latanoprost (XALATAN) 0.005 % ophthalmic solution (Taking as prescribed)    atenolol (TENORMIN) 25 MG tablet Take 25 mg by mouth daily (Taking as prescribed)    ARMOUR THYROID 30 MG tablet TAKE 1 TABLET DAILY (Taking as prescribed)    atorvastatin (LIPITOR) 40 MG tablet TAKE ONE TABLET BY MOUTH DAILY (Taking as prescribed)       Other Medication Notes:  - Patient uses Fleet Glycerin Suppository at home prn (discussed with pt that constipation is a side effect of oxycodone-APAP, she understands and is getting an OTC stool softener today incase the suppositories do not help)     Meds to Beds:Yes    CrCl cannot be calculated (This lab value cannot be used to calculate CrCl because it is not a number: <0.5). Assessment/Plan:  - Medication reconciliation completed. Number of medications reviewed: 14    - Pt is taking medications as directed by discharging physician. Number of discrepancies: 0. Instructions per discharge list provided except per below documentation. Identified medication discrepancies/issues:   · Category 1 (0):  1. N/A  · Category 2 (0):  1. N/A  · Category 3 (0):  1. N/A  · Category 4 (0):  1.  N/A    - [facility] active medication list updated:  · No changed made- already up-to-date    - Identified Potential Medication Interactions: No clinically significant interactions identified via Lexicomp Interaction Analysis as category D or higher.    - Renal Dosing: No renal adjustments necessary.    - Follow up appointment date (7 days for more severe illness, 14 days for others):  3/19/18 Post-Op with Dr. Evan Tenorio  · Patient reminded of upcoming appointment      Thank you,    Kayli Meehan, PharmD Candidate 1982  [address]   [phone] Option 7      CLINICAL PHARMACY NOTE   POST-DISCHARGE [address] Tracking Only    TCM Call Made?: Yes  [facility] Select Patient?: Yes  Total # of Interventions Recommended: 1 - Updated Order #: 1  Total # Interventions Accepted: 1  Intervention Severity:   - Level 1 Intervention Present?: No   - Level 2 #: 0   - Level 3 #: 0  Outreach Status: Review Complete  Care Coordinator Outreach to Patient?: No  Provider Contacted?: Yes - Note Routed, Routine  Time Spent (min): 15    Additional Documentation:

## 2018-03-09 ENCOUNTER — TELEPHONE (OUTPATIENT)
Dept: ORTHOPEDIC SURGERY | Age: 83
End: 2018-03-09

## 2018-03-09 RX ORDER — ATENOLOL 25 MG/1
25 TABLET ORAL DAILY
Qty: 30 TABLET | Refills: 2 | Status: SHIPPED | OUTPATIENT
Start: 2018-03-09 | End: 2018-03-15 | Stop reason: SDUPTHER

## 2018-03-09 NOTE — TELEPHONE ENCOUNTER
Pt called to let Dr Chana Ocampo know that her knee surgery is complete . She need a new rx for atenolol 25 mg for 90 day supply [phone].

## 2018-03-13 ENCOUNTER — TELEPHONE (OUTPATIENT)
Dept: ORTHOPEDIC SURGERY | Age: 83
End: 2018-03-13

## 2018-03-13 DIAGNOSIS — M17.11 ARTHRITIS OF RIGHT KNEE: ICD-10-CM

## 2018-03-13 NOTE — TELEPHONE ENCOUNTER
Patient states that she is out of the [address] filled 3/9/18 wants to know if this can be sent to her pharmacy ASAP     Preferred [address]- [phone]    Also  patient states that she has a rash on her chest- states is not that bad , but read that both eliquis and Percocet can cause this - wanted to let [facility] know    Patient can be reached at [phone]

## 2018-03-14 ENCOUNTER — OFFICE VISIT (OUTPATIENT)
Dept: ORTHOPEDIC SURGERY | Age: 83
End: 2018-03-14

## 2018-03-14 VITALS — HEIGHT: 65 IN | WEIGHT: 156 LBS | TEMPERATURE: 99.2 F | BODY MASS INDEX: 25.99 KG/M2

## 2018-03-14 DIAGNOSIS — Z96.651 HISTORY OF TOTAL KNEE ARTHROPLASTY, RIGHT: Primary | ICD-10-CM

## 2018-03-14 PROCEDURE — L1830 KO IMMOB CANVAS LONG PRE OTS: HCPCS | Performed by: PHYSICIAN ASSISTANT

## 2018-03-14 PROCEDURE — 99024 POSTOP FOLLOW-UP VISIT: CPT | Performed by: PHYSICIAN ASSISTANT

## 2018-03-14 RX ORDER — OXYCODONE HYDROCHLORIDE AND ACETAMINOPHEN 5; 325 MG/1; MG/1
1-2 TABLET ORAL EVERY 4 HOURS PRN
Qty: 60 TABLET | Refills: 0 | Status: SHIPPED | OUTPATIENT
Start: 2018-03-14 | End: 2018-03-19

## 2018-03-15 RX ORDER — ATENOLOL 25 MG/1
25 TABLET ORAL DAILY
Qty: 90 TABLET | Refills: 2 | Status: SHIPPED | OUTPATIENT
Start: 2018-03-15 | End: 2018-06-05 | Stop reason: DRUGHIGH

## 2018-03-19 ENCOUNTER — OFFICE VISIT (OUTPATIENT)
Dept: ORTHOPEDIC SURGERY | Age: 83
End: 2018-03-19

## 2018-03-19 VITALS
RESPIRATION RATE: 16 BRPM | SYSTOLIC BLOOD PRESSURE: 176 MMHG | DIASTOLIC BLOOD PRESSURE: 70 MMHG | HEART RATE: 66 BPM | TEMPERATURE: 99.4 F

## 2018-03-19 DIAGNOSIS — Z96.651 HISTORY OF TOTAL KNEE ARTHROPLASTY, RIGHT: Primary | ICD-10-CM

## 2018-03-19 PROCEDURE — 99024 POSTOP FOLLOW-UP VISIT: CPT | Performed by: PHYSICIAN ASSISTANT

## 2018-03-19 RX ORDER — HYDROCODONE BITARTRATE AND ACETAMINOPHEN 5; 325 MG/1; MG/1
1 TABLET ORAL EVERY 4 HOURS PRN
Qty: 60 TABLET | Refills: 0 | Status: ON HOLD | OUTPATIENT
Start: 2018-03-19 | End: 2018-03-28 | Stop reason: HOSPADM

## 2018-03-19 NOTE — PROGRESS NOTES
arthroplasty. I'm going to have her discontinue knee immobilizer and begin gentle range of motion exercises  She still having significant soft tissue swelling and several blisters about the anterior lateral aspect of her incision. A new dressing was applied today. A prescription for Paulo De La Rosa was prescribed to her pharmacy. One every 4 hours p.r.n. pain for postop pain control. Controlled substances monitoring: possible medication side effects, risk of tolerance and/or dependence, and alternative treatments discussed and no signs of potential drug abuse or diversion identified. Follow Up: 3 days with Dr Oscar Huron  Call or return to clinic prn if these symptoms worsen or fail to improve as anticipated.

## 2018-03-22 ENCOUNTER — OFFICE VISIT (OUTPATIENT)
Dept: ORTHOPEDIC SURGERY | Age: 83
End: 2018-03-22

## 2018-03-22 VITALS
HEART RATE: 74 BPM | SYSTOLIC BLOOD PRESSURE: 140 MMHG | RESPIRATION RATE: 16 BRPM | HEIGHT: 65 IN | TEMPERATURE: 97 F | BODY MASS INDEX: 25.99 KG/M2 | WEIGHT: 156 LBS | DIASTOLIC BLOOD PRESSURE: 58 MMHG

## 2018-03-22 DIAGNOSIS — Z96.651 HISTORY OF TOTAL KNEE ARTHROPLASTY, RIGHT: Primary | ICD-10-CM

## 2018-03-22 PROCEDURE — 99024 POSTOP FOLLOW-UP VISIT: CPT | Performed by: ORTHOPAEDIC SURGERY

## 2018-03-23 ENCOUNTER — TELEPHONE (OUTPATIENT)
Dept: ORTHOPEDIC SURGERY | Age: 83
End: 2018-03-23

## 2018-03-26 ENCOUNTER — PAT TELEPHONE (OUTPATIENT)
Dept: PREADMISSION TESTING | Age: 83
End: 2018-03-26

## 2018-03-26 ENCOUNTER — OFFICE VISIT (OUTPATIENT)
Dept: ORTHOPEDIC SURGERY | Age: 83
End: 2018-03-26

## 2018-03-26 VITALS
WEIGHT: 156 LBS | TEMPERATURE: 99.7 F | SYSTOLIC BLOOD PRESSURE: 165 MMHG | BODY MASS INDEX: 25.99 KG/M2 | HEIGHT: 65 IN | DIASTOLIC BLOOD PRESSURE: 63 MMHG | HEART RATE: 65 BPM

## 2018-03-26 VITALS — HEIGHT: 65 IN | WEIGHT: 151 LBS | BODY MASS INDEX: 25.16 KG/M2

## 2018-03-26 DIAGNOSIS — Z96.651 HISTORY OF TOTAL KNEE ARTHROPLASTY, RIGHT: Primary | ICD-10-CM

## 2018-03-26 PROCEDURE — 99024 POSTOP FOLLOW-UP VISIT: CPT | Performed by: ORTHOPAEDIC SURGERY

## 2018-03-26 NOTE — PROGRESS NOTES
C-Difficile admission screening and protocol:     * Admitted with diarrhea? YES____    NO__X___     *Prior history of C-Diff. In last 3 months? YES____   NO__X___     *Antibiotic use in the past 6-8 weeks? NO__X____YES______                 If yes which  ANTIBIOTIC AND REASON______     *Prior hospitalization or nursing home in the last month?  YES____   NO_X___

## 2018-03-26 NOTE — PRE-PROCEDURE INSTRUCTIONS
[address] [facility] time____1330________        Surgery time______1600______    Take the following medications with a sip of water:  Thyroid medications    Do not eat or drink anything after 12:00 midnight prior to your surgery. This includes water chewing gum, mints and ice chips. You may brush your teeth and gargle the morning of your surgery, but do not swallow the water     Please see your family doctor/pediatrician for a history and physical and/or concerning medications. Bring any test results/reports from your physicians office. If you are under the care of a heart doctor or specialist doctor, please be aware that you may be asked to them for clearance    You may be asked to stop blood thinners such as Coumadin, Plavix, Fragmin, Lovenox, etc., or any anti-inflammatories such as:  Aspirin, Ibuprofen, Advil, Naproxen prior to your surgery. We also ask that you stop any OTC medications such as fish oil, vitamin E, glucosamine, garlic, Multivitamins, COQ 10, etc.    We ask that you do not smoke 24 hours prior to surgery  We ask that you do not  drink any alcoholic beverages 24 hours prior to surgery     You must make arrangements for a responsible adult to take you home after your surgery. For your safety you will not be allowed to leave alone or drive yourself home. Your surgery will be cancelled if you do not have a ride home. Also for your safety, it is strongly suggested that someone stay with you the first 24 hours after your surgery. A parent or legal guardian must accompany a child scheduled for surgery and plan to stay at the hospital until the child is discharged. Please do not bring other children with you. For your comfort, please wear simple loose fitting clothing to the hospital.  Please do not bring valuables.     Do not wear any make-up or nail polish on your fingers or toes      For your safety, please do not wear any jewelry or body piercing's on the day of surgery. All jewelry must be removed. If you have dentures, they will be removed before going to operating room. For your convenience, we will provide you with a container. If you wear contact lenses or glasses, they will be removed, please bring a case for them. If you have a living will and a durable power of  for healthcare, please bring in a copy. As part of our patient safety program to minimize surgical site infections, we ask you to do the following:    · Please notify your surgeon if you develop any illness between         now and the  day of your surgery. · This includes a cough, cold, fever, sore throat, nausea,         or vomiting, and diarrhea, etc.  ·  Please notify your surgeon if you experience dizziness, shortness         of breath or blurred vision between now and the time of your surgery. Do not shave your operative site 96 hours prior to surgery. For face and neck surgery, men may use an electric razor 48 hours   prior to surgery. You may shower the night before surgery or the morning of   your surgery with an antibacterial soap. You will need to bring a photo ID and insurance card    [facility] has an onsite pharmacy, would you like to utilize our pharmacy     If you will be staying overnight and use a C-pap machine, please bring   your C-pap to hospital     Our goal is to provide you with excellent care, therefore, visitors will be limited to two(2) in the room at a time so that we may focus on providing this care for you. Please contact pre-admission testing if you have any further questions. [facility] phone number:  [address] [facility] fax number:  [phone]  Please note these are generalized instructions for all surgical cases, you may be provided with more specific instructions according to your surgery.

## 2018-03-27 PROBLEM — S80.01XD: Status: ACTIVE | Noted: 2018-03-27

## 2018-03-27 PROBLEM — S80.01XA TRAUMATIC HEMATOMA OF RIGHT KNEE: Status: ACTIVE | Noted: 2018-03-27

## 2018-03-29 ENCOUNTER — CARE COORDINATION (OUTPATIENT)
Dept: CASE MANAGEMENT | Age: 83
End: 2018-03-29

## 2018-03-29 ENCOUNTER — TELEPHONE (OUTPATIENT)
Dept: PHARMACY | Facility: CLINIC | Age: 83
End: 2018-03-29

## 2018-03-29 ENCOUNTER — TELEPHONE (OUTPATIENT)
Dept: INFECTIOUS DISEASES | Age: 83
End: 2018-03-29

## 2018-03-29 ENCOUNTER — OFFICE VISIT (OUTPATIENT)
Dept: ORTHOPEDIC SURGERY | Age: 83
End: 2018-03-29

## 2018-03-29 VITALS — BODY MASS INDEX: 24.83 KG/M2 | TEMPERATURE: 97.5 F | WEIGHT: 149 LBS | HEIGHT: 65 IN

## 2018-03-29 DIAGNOSIS — S80.01XD TRAUMATIC HEMATOMA OF KNEE, RIGHT, SUBSEQUENT ENCOUNTER: Primary | ICD-10-CM

## 2018-03-29 DIAGNOSIS — Z96.651 HISTORY OF TOTAL KNEE ARTHROPLASTY, RIGHT: Primary | ICD-10-CM

## 2018-03-29 PROCEDURE — 99024 POSTOP FOLLOW-UP VISIT: CPT | Performed by: ORTHOPAEDIC SURGERY

## 2018-03-29 PROCEDURE — 1111F DSCHRG MED/CURRENT MED MERGE: CPT

## 2018-03-29 RX ORDER — DOXYCYCLINE HYCLATE 100 MG
100 TABLET ORAL 2 TIMES DAILY
Qty: 28 TABLET | Refills: 0 | Status: SHIPPED | OUTPATIENT
Start: 2018-03-29 | End: 2018-04-12

## 2018-03-29 NOTE — TELEPHONE ENCOUNTER
Agree with PharmD candidate's assessment and plan. Will sign off at this time.      aCnelo Chatman PharmD, [address]  Phone: [phone] or [phone], option 7
Order #: 1  Total # Interventions Accepted: 1  Intervention Severity:   - Level 1 Intervention Present?: No   - Level 2 #: 0   - Level 3 #: 0  Outreach Status: Review Complete  Care Coordinator Outreach to Patient?: No  Provider Contacted?: Yes - Note Routed, Routine  Time Spent (min): 15    Additional Documentation:

## 2018-03-29 NOTE — PROGRESS NOTES
This dictation was done with [employer] dictation and may contain mechanical errors related to translation. Temperature 97.5 °F (36.4 °C), temperature source Temporal, height 5' 4.5\" (1.638 m), weight 149 lb (67.6 kg), not currently breastfeeding.

## 2018-03-30 ENCOUNTER — HOSPITAL ENCOUNTER (OUTPATIENT)
Dept: OTHER | Age: 83
Discharge: HOME | End: 2018-03-30
Attending: INTERNAL MEDICINE | Admitting: INTERNAL MEDICINE

## 2018-03-30 ENCOUNTER — TELEPHONE (OUTPATIENT)
Dept: INFECTIOUS DISEASES | Age: 83
End: 2018-03-30

## 2018-03-30 ENCOUNTER — TELEPHONE (OUTPATIENT)
Dept: ORTHOPEDIC SURGERY | Age: 83
End: 2018-03-30

## 2018-03-30 ENCOUNTER — HOSPITAL ENCOUNTER (OUTPATIENT)
Dept: INFUSION THERAPY | Age: 83
Discharge: HOME | End: 2018-03-31
Admitting: INTERNAL MEDICINE

## 2018-03-30 VITALS
TEMPERATURE: 98 F | RESPIRATION RATE: 17 BRPM | DIASTOLIC BLOOD PRESSURE: 73 MMHG | SYSTOLIC BLOOD PRESSURE: 192 MMHG | HEART RATE: 73 BPM | OXYGEN SATURATION: 99 %

## 2018-03-30 RX ORDER — SODIUM CHLORIDE 0.9 % (FLUSH) 0.9 %
10 SYRINGE (ML) INJECTION PRN
Status: DISCONTINUED | OUTPATIENT
Start: 2018-03-30 | End: 2018-03-31 | Stop reason: HOSPADM

## 2018-03-30 RX ORDER — LIDOCAINE HYDROCHLORIDE 10 MG/ML
5 INJECTION, SOLUTION EPIDURAL; INFILTRATION; INTRACAUDAL; PERINEURAL ONCE
Status: DISCONTINUED | OUTPATIENT
Start: 2018-03-30 | End: 2018-03-31 | Stop reason: HOSPADM

## 2018-03-30 RX ORDER — SODIUM CHLORIDE 0.9 % (FLUSH) 0.9 %
10 SYRINGE (ML) INJECTION EVERY 12 HOURS SCHEDULED
Status: DISCONTINUED | OUTPATIENT
Start: 2018-03-30 | End: 2018-03-31 | Stop reason: HOSPADM

## 2018-04-01 ENCOUNTER — HOSPITAL ENCOUNTER (OUTPATIENT)
Dept: INFUSION THERAPY | Age: 83
Discharge: HOME | End: 2018-04-30
Attending: INTERNAL MEDICINE | Admitting: INTERNAL MEDICINE

## 2018-04-02 ENCOUNTER — TELEPHONE (OUTPATIENT)
Dept: ORTHOPEDIC SURGERY | Age: 83
End: 2018-04-02

## 2018-04-02 LAB
ANION GAP SERPL CALCULATED.3IONS-SCNC: 16 MMOL/L (ref 3–16)
BASOPHILS ABSOLUTE: 0 K/UL (ref 0–0.2)
BASOPHILS RELATIVE PERCENT: 0.7 %
BUN BLDV-MCNC: 9 MG/DL (ref 7–20)
C-REACTIVE PROTEIN: 20.2 MG/L (ref 0–5.1)
CALCIUM SERPL-MCNC: 8.6 MG/DL (ref 8.3–10.6)
CHLORIDE BLD-SCNC: 96 MMOL/L (ref 99–110)
CO2: 23 MMOL/L (ref 21–32)
CREAT SERPL-MCNC: <0.5 MG/DL (ref 0.6–1.2)
EOSINOPHILS ABSOLUTE: 0.2 K/UL (ref 0–0.6)
EOSINOPHILS RELATIVE PERCENT: 3.2 %
GFR AFRICAN AMERICAN: >60
GFR NON-AFRICAN AMERICAN: >60
GLUCOSE BLD-MCNC: 82 MG/DL (ref 70–99)
HCT VFR BLD CALC: 27.7 % (ref 36–48)
HEMOGLOBIN: 9.3 G/DL (ref 12–16)
LYMPHOCYTES ABSOLUTE: 1.3 K/UL (ref 1–5.1)
LYMPHOCYTES RELATIVE PERCENT: 20.3 %
MCH RBC QN AUTO: 28.8 PG (ref 26–34)
MCHC RBC AUTO-ENTMCNC: 33.4 G/DL (ref 31–36)
MCV RBC AUTO: 86.2 FL (ref 80–100)
MONOCYTES ABSOLUTE: 0.8 K/UL (ref 0–1.3)
MONOCYTES RELATIVE PERCENT: 13.2 %
NEUTROPHILS ABSOLUTE: 4 K/UL (ref 1.7–7.7)
NEUTROPHILS RELATIVE PERCENT: 62.6 %
PDW BLD-RTO: 14.9 % (ref 12.4–15.4)
PLATELET # BLD: 270 K/UL (ref 135–450)
PMV BLD AUTO: 8.6 FL (ref 5–10.5)
POTASSIUM SERPL-SCNC: 4.4 MMOL/L (ref 3.5–5.1)
RBC # BLD: 3.21 M/UL (ref 4–5.2)
SEDIMENTATION RATE, ERYTHROCYTE: 33 MM/HR (ref 0–30)
SODIUM BLD-SCNC: 135 MMOL/L (ref 136–145)
VANCOMYCIN TROUGH: 4.8 UG/ML (ref 10–20)
WBC # BLD: 6.4 K/UL (ref 4–11)

## 2018-04-03 ENCOUNTER — CARE COORDINATION (OUTPATIENT)
Dept: CASE MANAGEMENT | Age: 83
End: 2018-04-03

## 2018-04-04 ENCOUNTER — OFFICE VISIT (OUTPATIENT)
Dept: INFECTIOUS DISEASES | Age: 83
End: 2018-04-04

## 2018-04-04 VITALS
HEIGHT: 65 IN | SYSTOLIC BLOOD PRESSURE: 138 MMHG | TEMPERATURE: 98.5 F | OXYGEN SATURATION: 97 % | DIASTOLIC BLOOD PRESSURE: 68 MMHG | HEART RATE: 62 BPM | WEIGHT: 150 LBS | BODY MASS INDEX: 24.99 KG/M2

## 2018-04-04 DIAGNOSIS — B95.2 ENTEROCOCCUS FAECALIS INFECTION: ICD-10-CM

## 2018-04-04 DIAGNOSIS — T84.50XA INFECTION OF PROSTHETIC JOINT, INITIAL ENCOUNTER (HCC): Primary | ICD-10-CM

## 2018-04-04 PROCEDURE — 99204 OFFICE O/P NEW MOD 45 MIN: CPT | Performed by: INTERNAL MEDICINE

## 2018-04-05 ENCOUNTER — OFFICE VISIT (OUTPATIENT)
Dept: ORTHOPEDIC SURGERY | Age: 83
End: 2018-04-05

## 2018-04-05 VITALS — TEMPERATURE: 98.4 F | BODY MASS INDEX: 25.61 KG/M2 | WEIGHT: 150 LBS | RESPIRATION RATE: 16 BRPM | HEIGHT: 64 IN

## 2018-04-05 DIAGNOSIS — Z96.651 TOTAL KNEE REPLACEMENT STATUS, RIGHT: Primary | ICD-10-CM

## 2018-04-05 PROCEDURE — 99024 POSTOP FOLLOW-UP VISIT: CPT | Performed by: ORTHOPAEDIC SURGERY

## 2018-04-06 LAB
ANION GAP SERPL CALCULATED.3IONS-SCNC: 15 MMOL/L (ref 3–16)
BUN BLDV-MCNC: 9 MG/DL (ref 7–20)
CALCIUM SERPL-MCNC: 8.9 MG/DL (ref 8.3–10.6)
CHLORIDE BLD-SCNC: 96 MMOL/L (ref 99–110)
CO2: 23 MMOL/L (ref 21–32)
CREAT SERPL-MCNC: <0.5 MG/DL (ref 0.6–1.2)
GFR AFRICAN AMERICAN: >60
GFR NON-AFRICAN AMERICAN: >60
GLUCOSE BLD-MCNC: 91 MG/DL (ref 70–99)
POTASSIUM SERPL-SCNC: 3.9 MMOL/L (ref 3.5–5.1)
SODIUM BLD-SCNC: 134 MMOL/L (ref 136–145)
VANCOMYCIN TROUGH: 6.9 UG/ML (ref 10–20)

## 2018-04-09 ENCOUNTER — TELEPHONE (OUTPATIENT)
Dept: INFECTIOUS DISEASES | Age: 83
End: 2018-04-09

## 2018-04-09 LAB
ANION GAP SERPL CALCULATED.3IONS-SCNC: 15 MMOL/L (ref 3–16)
BASOPHILS ABSOLUTE: 0.1 K/UL (ref 0–0.2)
BASOPHILS RELATIVE PERCENT: 0.6 %
BUN BLDV-MCNC: 22 MG/DL (ref 7–20)
C-REACTIVE PROTEIN: 92.3 MG/L (ref 0–5.1)
CALCIUM SERPL-MCNC: 8 MG/DL (ref 8.3–10.6)
CHLORIDE BLD-SCNC: 101 MMOL/L (ref 99–110)
CO2: 21 MMOL/L (ref 21–32)
CREAT SERPL-MCNC: 1.8 MG/DL (ref 0.6–1.2)
EOSINOPHILS ABSOLUTE: 0.1 K/UL (ref 0–0.6)
EOSINOPHILS RELATIVE PERCENT: 1.2 %
GFR AFRICAN AMERICAN: 33
GFR NON-AFRICAN AMERICAN: 27
GLUCOSE BLD-MCNC: 146 MG/DL (ref 70–99)
HCT VFR BLD CALC: 29.1 % (ref 36–48)
HEMOGLOBIN: 9.8 G/DL (ref 12–16)
LYMPHOCYTES ABSOLUTE: 0.8 K/UL (ref 1–5.1)
LYMPHOCYTES RELATIVE PERCENT: 7.3 %
MCH RBC QN AUTO: 28.2 PG (ref 26–34)
MCHC RBC AUTO-ENTMCNC: 33.6 G/DL (ref 31–36)
MCV RBC AUTO: 83.8 FL (ref 80–100)
MONOCYTES ABSOLUTE: 0.9 K/UL (ref 0–1.3)
MONOCYTES RELATIVE PERCENT: 8.9 %
NEUTROPHILS ABSOLUTE: 8.6 K/UL (ref 1.7–7.7)
NEUTROPHILS RELATIVE PERCENT: 82 %
PDW BLD-RTO: 14.9 % (ref 12.4–15.4)
PLATELET # BLD: 310 K/UL (ref 135–450)
PMV BLD AUTO: 8.4 FL (ref 5–10.5)
POTASSIUM SERPL-SCNC: 4.3 MMOL/L (ref 3.5–5.1)
RBC # BLD: 3.48 M/UL (ref 4–5.2)
SODIUM BLD-SCNC: 137 MMOL/L (ref 136–145)
VANCOMYCIN TROUGH: 53.5 UG/ML (ref 10–20)
WBC # BLD: 10.4 K/UL (ref 4–11)

## 2018-04-10 ENCOUNTER — TELEPHONE (OUTPATIENT)
Dept: INFECTIOUS DISEASES | Age: 83
End: 2018-04-10

## 2018-04-10 LAB — SEDIMENTATION RATE, ERYTHROCYTE: 58 MM/HR (ref 0–30)

## 2018-04-11 ENCOUNTER — TELEPHONE (OUTPATIENT)
Dept: INFECTIOUS DISEASES | Age: 83
End: 2018-04-11

## 2018-04-11 ENCOUNTER — CARE COORDINATION (OUTPATIENT)
Dept: CASE MANAGEMENT | Age: 83
End: 2018-04-11

## 2018-04-11 LAB
ANION GAP SERPL CALCULATED.3IONS-SCNC: 16 MMOL/L (ref 3–16)
BUN BLDV-MCNC: 27 MG/DL (ref 7–20)
CALCIUM SERPL-MCNC: 8.2 MG/DL (ref 8.3–10.6)
CHLORIDE BLD-SCNC: 98 MMOL/L (ref 99–110)
CO2: 19 MMOL/L (ref 21–32)
CREAT SERPL-MCNC: 2 MG/DL (ref 0.6–1.2)
GFR AFRICAN AMERICAN: 29
GFR NON-AFRICAN AMERICAN: 24
GLUCOSE BLD-MCNC: 161 MG/DL (ref 70–99)
POTASSIUM SERPL-SCNC: 3.6 MMOL/L (ref 3.5–5.1)
SODIUM BLD-SCNC: 133 MMOL/L (ref 136–145)
VANCOMYCIN RANDOM: 36.8 UG/ML

## 2018-04-12 ENCOUNTER — TELEPHONE (OUTPATIENT)
Dept: INFECTIOUS DISEASES | Age: 83
End: 2018-04-12

## 2018-04-12 PROBLEM — T84.50XA PROSTHETIC JOINT INFECTION (HCC): Status: ACTIVE | Noted: 2018-04-12

## 2018-04-12 LAB
ANION GAP SERPL CALCULATED.3IONS-SCNC: 15 MMOL/L (ref 3–16)
BUN BLDV-MCNC: 28 MG/DL (ref 7–20)
CALCIUM SERPL-MCNC: 8 MG/DL (ref 8.3–10.6)
CHLORIDE BLD-SCNC: 99 MMOL/L (ref 99–110)
CO2: 20 MMOL/L (ref 21–32)
CREAT SERPL-MCNC: 1.9 MG/DL (ref 0.6–1.2)
GFR AFRICAN AMERICAN: 31
GFR NON-AFRICAN AMERICAN: 25
GLUCOSE BLD-MCNC: 133 MG/DL (ref 70–99)
POTASSIUM SERPL-SCNC: 3.9 MMOL/L (ref 3.5–5.1)
SODIUM BLD-SCNC: 134 MMOL/L (ref 136–145)
VANCOMYCIN RANDOM: 26.8 UG/ML

## 2018-04-13 ENCOUNTER — TELEPHONE (OUTPATIENT)
Dept: INFECTIOUS DISEASES | Age: 83
End: 2018-04-13

## 2018-04-13 ENCOUNTER — TELEPHONE (OUTPATIENT)
Dept: ORTHOPEDIC SURGERY | Age: 83
End: 2018-04-13

## 2018-04-13 LAB
ANION GAP SERPL CALCULATED.3IONS-SCNC: 14 MMOL/L (ref 3–16)
BUN BLDV-MCNC: 28 MG/DL (ref 7–20)
CALCIUM SERPL-MCNC: 8 MG/DL (ref 8.3–10.6)
CHLORIDE BLD-SCNC: 98 MMOL/L (ref 99–110)
CO2: 21 MMOL/L (ref 21–32)
CREAT SERPL-MCNC: 1.8 MG/DL (ref 0.6–1.2)
GFR AFRICAN AMERICAN: 33
GFR NON-AFRICAN AMERICAN: 27
GLUCOSE BLD-MCNC: 110 MG/DL (ref 70–99)
POTASSIUM SERPL-SCNC: 3.6 MMOL/L (ref 3.5–5.1)
SODIUM BLD-SCNC: 133 MMOL/L (ref 136–145)
VANCOMYCIN RANDOM: 21.9 UG/ML

## 2018-04-16 ENCOUNTER — TELEPHONE (OUTPATIENT)
Dept: FAMILY MEDICINE CLINIC | Age: 83
End: 2018-04-16

## 2018-04-16 DIAGNOSIS — T84.50XD INFECTION OF PROSTHETIC JOINT, SUBSEQUENT ENCOUNTER: Primary | ICD-10-CM

## 2018-04-16 LAB
ANION GAP SERPL CALCULATED.3IONS-SCNC: 16 MMOL/L (ref 3–16)
BASOPHILS ABSOLUTE: 0.1 K/UL (ref 0–0.2)
BASOPHILS RELATIVE PERCENT: 0.9 %
BUN BLDV-MCNC: 23 MG/DL (ref 7–20)
C-REACTIVE PROTEIN: 26.4 MG/L (ref 0–5.1)
CALCIUM SERPL-MCNC: 8.3 MG/DL (ref 8.3–10.6)
CHLORIDE BLD-SCNC: 101 MMOL/L (ref 99–110)
CO2: 21 MMOL/L (ref 21–32)
CREAT SERPL-MCNC: 1.5 MG/DL (ref 0.6–1.2)
EOSINOPHILS ABSOLUTE: 0.3 K/UL (ref 0–0.6)
EOSINOPHILS RELATIVE PERCENT: 4.6 %
GFR AFRICAN AMERICAN: 40
GFR NON-AFRICAN AMERICAN: 33
GLUCOSE BLD-MCNC: 101 MG/DL (ref 70–99)
HCT VFR BLD CALC: 27.2 % (ref 36–48)
HEMOGLOBIN: 9 G/DL (ref 12–16)
LYMPHOCYTES ABSOLUTE: 0.7 K/UL (ref 1–5.1)
LYMPHOCYTES RELATIVE PERCENT: 11.4 %
MCH RBC QN AUTO: 27.5 PG (ref 26–34)
MCHC RBC AUTO-ENTMCNC: 33.2 G/DL (ref 31–36)
MCV RBC AUTO: 82.7 FL (ref 80–100)
MONOCYTES ABSOLUTE: 0.8 K/UL (ref 0–1.3)
MONOCYTES RELATIVE PERCENT: 12.2 %
NEUTROPHILS ABSOLUTE: 4.5 K/UL (ref 1.7–7.7)
NEUTROPHILS RELATIVE PERCENT: 70.9 %
PDW BLD-RTO: 15.8 % (ref 12.4–15.4)
PLATELET # BLD: 311 K/UL (ref 135–450)
PMV BLD AUTO: 8.5 FL (ref 5–10.5)
POTASSIUM SERPL-SCNC: 3.9 MMOL/L (ref 3.5–5.1)
RBC # BLD: 3.29 M/UL (ref 4–5.2)
SEDIMENTATION RATE, ERYTHROCYTE: 35 MM/HR (ref 0–30)
SODIUM BLD-SCNC: 138 MMOL/L (ref 136–145)
VANCOMYCIN TROUGH: 11.5 UG/ML (ref 10–20)
WBC # BLD: 6.4 K/UL (ref 4–11)

## 2018-04-16 RX ORDER — LINEZOLID 600 MG/1
600 TABLET, FILM COATED ORAL 2 TIMES DAILY
Qty: 56 TABLET | Refills: 0 | Status: SHIPPED | OUTPATIENT
Start: 2018-04-16 | End: 2018-05-14

## 2018-04-17 ENCOUNTER — TELEPHONE (OUTPATIENT)
Dept: INFECTIOUS DISEASES | Age: 83
End: 2018-04-17

## 2018-04-19 ENCOUNTER — OFFICE VISIT (OUTPATIENT)
Dept: ORTHOPEDIC SURGERY | Age: 83
End: 2018-04-19

## 2018-04-19 VITALS
SYSTOLIC BLOOD PRESSURE: 174 MMHG | WEIGHT: 150 LBS | TEMPERATURE: 98.4 F | DIASTOLIC BLOOD PRESSURE: 59 MMHG | HEART RATE: 65 BPM | RESPIRATION RATE: 16 BRPM | HEIGHT: 64 IN | BODY MASS INDEX: 25.61 KG/M2

## 2018-04-19 DIAGNOSIS — Z96.651 HISTORY OF TOTAL KNEE ARTHROPLASTY, RIGHT: Primary | ICD-10-CM

## 2018-04-19 PROCEDURE — 99024 POSTOP FOLLOW-UP VISIT: CPT | Performed by: PHYSICIAN ASSISTANT

## 2018-04-19 PROCEDURE — APPSS15 APP SPLIT SHARED TIME 0-15 MINUTES: Performed by: PHYSICIAN ASSISTANT

## 2018-04-20 ENCOUNTER — TELEPHONE (OUTPATIENT)
Dept: INFECTIOUS DISEASES | Age: 83
End: 2018-04-20

## 2018-04-20 DIAGNOSIS — T84.50XD INFECTION OF PROSTHETIC JOINT, SUBSEQUENT ENCOUNTER: Primary | ICD-10-CM

## 2018-04-23 DIAGNOSIS — T84.50XD INFECTION OF PROSTHETIC JOINT, SUBSEQUENT ENCOUNTER: ICD-10-CM

## 2018-04-23 LAB
ANION GAP SERPL CALCULATED.3IONS-SCNC: 24 MMOL/L (ref 3–16)
BASOPHILS ABSOLUTE: 0.2 K/UL (ref 0–0.2)
BASOPHILS RELATIVE PERCENT: 1.4 %
BUN BLDV-MCNC: 17 MG/DL (ref 7–20)
C-REACTIVE PROTEIN: 15.5 MG/L (ref 0–5.1)
CALCIUM SERPL-MCNC: 9.3 MG/DL (ref 8.3–10.6)
CHLORIDE BLD-SCNC: 97 MMOL/L (ref 99–110)
CO2: 19 MMOL/L (ref 21–32)
CREAT SERPL-MCNC: 1.5 MG/DL (ref 0.6–1.2)
EOSINOPHILS ABSOLUTE: 0.2 K/UL (ref 0–0.6)
EOSINOPHILS RELATIVE PERCENT: 1.4 %
GFR AFRICAN AMERICAN: 40
GFR NON-AFRICAN AMERICAN: 33
GLUCOSE BLD-MCNC: 103 MG/DL (ref 70–99)
HCT VFR BLD CALC: 32.7 % (ref 36–48)
HEMOGLOBIN: 11 G/DL (ref 12–16)
LYMPHOCYTES ABSOLUTE: 1.6 K/UL (ref 1–5.1)
LYMPHOCYTES RELATIVE PERCENT: 13.2 %
MCH RBC QN AUTO: 26.8 PG (ref 26–34)
MCHC RBC AUTO-ENTMCNC: 33.6 G/DL (ref 31–36)
MCV RBC AUTO: 79.5 FL (ref 80–100)
MONOCYTES ABSOLUTE: 0.4 K/UL (ref 0–1.3)
MONOCYTES RELATIVE PERCENT: 3.2 %
NEUTROPHILS ABSOLUTE: 9.8 K/UL (ref 1.7–7.7)
NEUTROPHILS RELATIVE PERCENT: 80.8 %
PDW BLD-RTO: 16.6 % (ref 12.4–15.4)
PLATELET # BLD: 312 K/UL (ref 135–450)
PMV BLD AUTO: 7.8 FL (ref 5–10.5)
POTASSIUM SERPL-SCNC: 3.9 MMOL/L (ref 3.5–5.1)
RBC # BLD: 4.11 M/UL (ref 4–5.2)
SEDIMENTATION RATE, ERYTHROCYTE: 22 MM/HR (ref 0–30)
SODIUM BLD-SCNC: 140 MMOL/L (ref 136–145)
WBC # BLD: 12.1 K/UL (ref 4–11)

## 2018-04-27 ENCOUNTER — TELEPHONE (OUTPATIENT)
Dept: ORTHOPEDIC SURGERY | Age: 83
End: 2018-04-27

## 2018-05-02 ENCOUNTER — HOSPITAL ENCOUNTER (OUTPATIENT)
Dept: OTHER | Age: 83
Discharge: HOME | End: 2018-05-09
Attending: ORTHOPAEDIC SURGERY | Admitting: ORTHOPAEDIC SURGERY

## 2018-05-18 ENCOUNTER — TELEPHONE (OUTPATIENT)
Dept: FAMILY MEDICINE CLINIC | Age: 83
End: 2018-05-18

## 2018-05-21 ENCOUNTER — TELEPHONE (OUTPATIENT)
Dept: FAMILY MEDICINE CLINIC | Age: 83
End: 2018-05-21

## 2018-05-21 RX ORDER — LEVOTHYROXINE AND LIOTHYRONINE 19; 4.5 UG/1; UG/1
TABLET ORAL
Qty: 90 TABLET | Refills: 1 | Status: SHIPPED | OUTPATIENT
Start: 2018-05-21 | End: 2018-11-13 | Stop reason: SDUPTHER

## 2018-05-22 ENCOUNTER — OFFICE VISIT (OUTPATIENT)
Dept: FAMILY MEDICINE CLINIC | Age: 83
End: 2018-05-22

## 2018-05-22 VITALS
BODY MASS INDEX: 25.1 KG/M2 | DIASTOLIC BLOOD PRESSURE: 58 MMHG | WEIGHT: 147 LBS | SYSTOLIC BLOOD PRESSURE: 124 MMHG | HEIGHT: 64 IN

## 2018-05-22 DIAGNOSIS — I10 ESSENTIAL HYPERTENSION: ICD-10-CM

## 2018-05-22 DIAGNOSIS — R56.9 SEIZURE (HCC): ICD-10-CM

## 2018-05-22 DIAGNOSIS — R60.9 EDEMA, UNSPECIFIED TYPE: Primary | ICD-10-CM

## 2018-05-22 DIAGNOSIS — R53.83 FATIGUE, UNSPECIFIED TYPE: ICD-10-CM

## 2018-05-22 DIAGNOSIS — M47.816 SPONDYLOSIS OF LUMBAR REGION WITHOUT MYELOPATHY OR RADICULOPATHY: ICD-10-CM

## 2018-05-22 LAB
A/G RATIO: 1.7 (ref 1.1–2.2)
ALBUMIN SERPL-MCNC: 4.3 G/DL (ref 3.4–5)
ALP BLD-CCNC: 89 U/L (ref 40–129)
ALT SERPL-CCNC: 12 U/L (ref 10–40)
ANION GAP SERPL CALCULATED.3IONS-SCNC: 18 MMOL/L (ref 3–16)
AST SERPL-CCNC: 15 U/L (ref 15–37)
BILIRUB SERPL-MCNC: 0.4 MG/DL (ref 0–1)
BUN BLDV-MCNC: 28 MG/DL (ref 7–20)
CALCIUM SERPL-MCNC: 10 MG/DL (ref 8.3–10.6)
CHLORIDE BLD-SCNC: 105 MMOL/L (ref 99–110)
CO2: 23 MMOL/L (ref 21–32)
CREAT SERPL-MCNC: 1.1 MG/DL (ref 0.6–1.2)
GFR AFRICAN AMERICAN: 58
GFR NON-AFRICAN AMERICAN: 48
GLOBULIN: 2.6 G/DL
GLUCOSE BLD-MCNC: 98 MG/DL (ref 70–99)
HCT VFR BLD CALC: 25.8 % (ref 36–48)
HEMOGLOBIN: 8.8 G/DL (ref 12–16)
MCH RBC QN AUTO: 27.5 PG (ref 26–34)
MCHC RBC AUTO-ENTMCNC: 33.9 G/DL (ref 31–36)
MCV RBC AUTO: 81.2 FL (ref 80–100)
PDW BLD-RTO: 22.3 % (ref 12.4–15.4)
PLATELET # BLD: 184 K/UL (ref 135–450)
PMV BLD AUTO: 9 FL (ref 5–10.5)
POTASSIUM SERPL-SCNC: 4.3 MMOL/L (ref 3.5–5.1)
RBC # BLD: 3.18 M/UL (ref 4–5.2)
SODIUM BLD-SCNC: 146 MMOL/L (ref 136–145)
TOTAL PROTEIN: 6.9 G/DL (ref 6.4–8.2)
WBC # BLD: 5.1 K/UL (ref 4–11)

## 2018-05-22 PROCEDURE — 36415 COLL VENOUS BLD VENIPUNCTURE: CPT | Performed by: FAMILY MEDICINE

## 2018-05-22 PROCEDURE — 99214 OFFICE O/P EST MOD 30 MIN: CPT | Performed by: FAMILY MEDICINE

## 2018-05-22 RX ORDER — AMLODIPINE BESYLATE 10 MG/1
10 TABLET ORAL
COMMUNITY
Start: 2018-05-10 | End: 2019-04-09 | Stop reason: ALTCHOICE

## 2018-05-22 RX ORDER — LEVETIRACETAM 500 MG/1
500 TABLET ORAL
COMMUNITY
Start: 2018-05-10 | End: 2018-06-05 | Stop reason: DRUGHIGH

## 2018-05-22 RX ORDER — DOXYCYCLINE HYCLATE 100 MG/1
100 CAPSULE ORAL
COMMUNITY
Start: 2018-05-10 | End: 2018-06-25 | Stop reason: ALTCHOICE

## 2018-05-22 RX ORDER — TERAZOSIN 2 MG/1
CAPSULE ORAL
COMMUNITY
Start: 2018-05-17 | End: 2018-05-30 | Stop reason: ALTCHOICE

## 2018-05-22 ASSESSMENT — PATIENT HEALTH QUESTIONNAIRE - PHQ9
1. LITTLE INTEREST OR PLEASURE IN DOING THINGS: 0
SUM OF ALL RESPONSES TO PHQ QUESTIONS 1-9: 1
SUM OF ALL RESPONSES TO PHQ9 QUESTIONS 1 & 2: 1
2. FEELING DOWN, DEPRESSED OR HOPELESS: 1

## 2018-05-22 ASSESSMENT — ENCOUNTER SYMPTOMS
BOWEL INCONTINENCE: 0
BACK PAIN: 1
ABDOMINAL PAIN: 0

## 2018-05-29 ENCOUNTER — TELEPHONE (OUTPATIENT)
Dept: CARDIOLOGY CLINIC | Age: 83
End: 2018-05-29

## 2018-05-29 ENCOUNTER — OFFICE VISIT (OUTPATIENT)
Dept: ORTHOPEDIC SURGERY | Age: 83
End: 2018-05-29

## 2018-05-29 VITALS — WEIGHT: 147 LBS | TEMPERATURE: 96.6 F | BODY MASS INDEX: 25.1 KG/M2 | RESPIRATION RATE: 16 BRPM | HEIGHT: 64 IN

## 2018-05-29 DIAGNOSIS — Z96.651 HISTORY OF TOTAL KNEE ARTHROPLASTY, RIGHT: Primary | ICD-10-CM

## 2018-05-29 PROCEDURE — APPSS15 APP SPLIT SHARED TIME 0-15 MINUTES: Performed by: PHYSICIAN ASSISTANT

## 2018-05-29 PROCEDURE — 99024 POSTOP FOLLOW-UP VISIT: CPT | Performed by: PHYSICIAN ASSISTANT

## 2018-05-30 ENCOUNTER — TELEPHONE (OUTPATIENT)
Dept: INTERNAL MEDICINE CLINIC | Age: 83
End: 2018-05-30

## 2018-05-30 ENCOUNTER — TELEPHONE (OUTPATIENT)
Dept: CARDIOLOGY CLINIC | Age: 83
End: 2018-05-30

## 2018-05-30 ENCOUNTER — OFFICE VISIT (OUTPATIENT)
Dept: INFECTIOUS DISEASES | Age: 83
End: 2018-05-30

## 2018-05-30 VITALS
TEMPERATURE: 98.3 F | HEIGHT: 64 IN | HEART RATE: 60 BPM | WEIGHT: 153 LBS | BODY MASS INDEX: 26.12 KG/M2 | OXYGEN SATURATION: 98 % | DIASTOLIC BLOOD PRESSURE: 64 MMHG | RESPIRATION RATE: 16 BRPM | SYSTOLIC BLOOD PRESSURE: 128 MMHG

## 2018-05-30 DIAGNOSIS — T84.50XD INFECTION OF PROSTHETIC JOINT, SUBSEQUENT ENCOUNTER: Primary | ICD-10-CM

## 2018-05-30 PROCEDURE — 99213 OFFICE O/P EST LOW 20 MIN: CPT | Performed by: INTERNAL MEDICINE

## 2018-05-31 ENCOUNTER — TELEPHONE (OUTPATIENT)
Dept: FAMILY MEDICINE CLINIC | Age: 83
End: 2018-05-31

## 2018-06-01 NOTE — PATIENT INSTRUCTIONS
Follow-up with your primary care doctor.  You'll also need to follow-up with Dr. Georges  of neurosurgery.  Return to the emergency department for worsening or new one sided weakness, difficulty speaking, or other concern.   Use ice on the affected joint and decrease activity level for 48 hours post injection.

## 2018-06-05 ENCOUNTER — TELEPHONE (OUTPATIENT)
Dept: PHARMACY | Facility: CLINIC | Age: 83
End: 2018-06-05

## 2018-06-05 ENCOUNTER — TELEPHONE (OUTPATIENT)
Dept: FAMILY MEDICINE CLINIC | Age: 83
End: 2018-06-05

## 2018-06-05 DIAGNOSIS — Z71.89 ENCOUNTER FOR MEDICATION REVIEW AND COUNSELING: Primary | ICD-10-CM

## 2018-06-05 PROCEDURE — 1111F DSCHRG MED/CURRENT MED MERGE: CPT | Performed by: PHARMACIST

## 2018-06-05 RX ORDER — HYDROCODONE BITARTRATE AND ACETAMINOPHEN 5; 325 MG/1; MG/1
1 TABLET ORAL EVERY 6 HOURS PRN
COMMUNITY
End: 2019-12-09 | Stop reason: ALTCHOICE

## 2018-06-05 RX ORDER — LEVETIRACETAM 500 MG/1
500 TABLET ORAL 2 TIMES DAILY
Qty: 60 TABLET | Refills: 5 | Status: SHIPPED | OUTPATIENT
Start: 2018-06-05 | End: 2019-05-29 | Stop reason: SDUPTHER

## 2018-06-05 RX ORDER — ATENOLOL 50 MG/1
50 TABLET ORAL DAILY
Qty: 30 TABLET | Refills: 5 | Status: SHIPPED | OUTPATIENT
Start: 2018-06-05 | End: 2018-06-25

## 2018-06-05 RX ORDER — FERROUS SULFATE 325(65) MG
325 TABLET ORAL DAILY
COMMUNITY
End: 2021-08-31 | Stop reason: ALTCHOICE

## 2018-06-05 RX ORDER — TERAZOSIN 2 MG/1
2 CAPSULE ORAL NIGHTLY
COMMUNITY
End: 2018-06-25 | Stop reason: ALTCHOICE

## 2018-06-05 RX ORDER — WITCH HAZEL 50 %
1 PADS, MEDICATED (EA) TOPICAL 2 TIMES DAILY
Status: ON HOLD | COMMUNITY
End: 2022-07-13 | Stop reason: HOSPADM

## 2018-06-07 ENCOUNTER — TELEPHONE (OUTPATIENT)
Dept: FAMILY MEDICINE CLINIC | Age: 83
End: 2018-06-07

## 2018-06-07 RX ORDER — AMLODIPINE BESYLATE 10 MG/1
10 TABLET ORAL DAILY
Qty: 30 TABLET | Refills: 3 | Status: SHIPPED | OUTPATIENT
Start: 2018-06-07 | End: 2018-06-15 | Stop reason: SINTOL

## 2018-06-12 ENCOUNTER — TELEPHONE (OUTPATIENT)
Dept: ORTHOPEDIC SURGERY | Age: 83
End: 2018-06-12

## 2018-06-12 DIAGNOSIS — Z96.651 STATUS POST TOTAL RIGHT KNEE REPLACEMENT: Primary | ICD-10-CM

## 2018-06-15 ENCOUNTER — TELEPHONE (OUTPATIENT)
Dept: FAMILY MEDICINE CLINIC | Age: 83
End: 2018-06-15

## 2018-06-15 DIAGNOSIS — I10 ESSENTIAL HYPERTENSION: Primary | ICD-10-CM

## 2018-06-15 RX ORDER — LISINOPRIL 10 MG/1
10 TABLET ORAL DAILY
Qty: 30 TABLET | Refills: 3 | Status: SHIPPED | OUTPATIENT
Start: 2018-06-15 | End: 2018-06-25 | Stop reason: SDUPTHER

## 2018-06-19 ENCOUNTER — NURSE ONLY (OUTPATIENT)
Dept: FAMILY MEDICINE CLINIC | Age: 83
End: 2018-06-19

## 2018-06-19 DIAGNOSIS — E61.1 IRON DEFICIENCY: Primary | ICD-10-CM

## 2018-06-19 LAB — IRON: 85 UG/DL (ref 37–145)

## 2018-06-19 PROCEDURE — 36415 COLL VENOUS BLD VENIPUNCTURE: CPT | Performed by: FAMILY MEDICINE

## 2018-06-22 ENCOUNTER — TELEPHONE (OUTPATIENT)
Dept: FAMILY MEDICINE CLINIC | Age: 83
End: 2018-06-22

## 2018-06-25 ENCOUNTER — OFFICE VISIT (OUTPATIENT)
Dept: FAMILY MEDICINE CLINIC | Age: 83
End: 2018-06-25

## 2018-06-25 VITALS
HEIGHT: 64 IN | WEIGHT: 149 LBS | SYSTOLIC BLOOD PRESSURE: 168 MMHG | BODY MASS INDEX: 25.44 KG/M2 | DIASTOLIC BLOOD PRESSURE: 70 MMHG

## 2018-06-25 DIAGNOSIS — R60.1 GENERALIZED EDEMA: ICD-10-CM

## 2018-06-25 DIAGNOSIS — I10 ESSENTIAL HYPERTENSION: ICD-10-CM

## 2018-06-25 DIAGNOSIS — I50.9 ACUTE CONGESTIVE HEART FAILURE, UNSPECIFIED CONGESTIVE HEART FAILURE TYPE: ICD-10-CM

## 2018-06-25 DIAGNOSIS — R53.83 FATIGUE, UNSPECIFIED TYPE: ICD-10-CM

## 2018-06-25 DIAGNOSIS — G40.909 SEIZURE DISORDER (HCC): ICD-10-CM

## 2018-06-25 DIAGNOSIS — G40.909 SEIZURE DISORDER (HCC): Primary | ICD-10-CM

## 2018-06-25 LAB
A/G RATIO: 1.6 (ref 1.1–2.2)
ALBUMIN SERPL-MCNC: 4.6 G/DL (ref 3.4–5)
ALP BLD-CCNC: 89 U/L (ref 40–129)
ALT SERPL-CCNC: 17 U/L (ref 10–40)
ANION GAP SERPL CALCULATED.3IONS-SCNC: 16 MMOL/L (ref 3–16)
AST SERPL-CCNC: 24 U/L (ref 15–37)
BILIRUB SERPL-MCNC: 0.5 MG/DL (ref 0–1)
BUN BLDV-MCNC: 25 MG/DL (ref 7–20)
CALCIUM SERPL-MCNC: 9.9 MG/DL (ref 8.3–10.6)
CHLORIDE BLD-SCNC: 106 MMOL/L (ref 99–110)
CO2: 21 MMOL/L (ref 21–32)
CREAT SERPL-MCNC: 0.8 MG/DL (ref 0.6–1.2)
GFR AFRICAN AMERICAN: >60
GFR NON-AFRICAN AMERICAN: >60
GLOBULIN: 2.8 G/DL
GLUCOSE BLD-MCNC: 89 MG/DL (ref 70–99)
HCT VFR BLD CALC: 33.2 % (ref 36–48)
HEMOGLOBIN: 11.2 G/DL (ref 12–16)
MCH RBC QN AUTO: 27.9 PG (ref 26–34)
MCHC RBC AUTO-ENTMCNC: 33.8 G/DL (ref 31–36)
MCV RBC AUTO: 82.7 FL (ref 80–100)
PDW BLD-RTO: 18.4 % (ref 12.4–15.4)
PLATELET # BLD: 204 K/UL (ref 135–450)
PMV BLD AUTO: 8.4 FL (ref 5–10.5)
POTASSIUM SERPL-SCNC: 4.1 MMOL/L (ref 3.5–5.1)
PRO-BNP: 1495 PG/ML (ref 0–449)
RBC # BLD: 4.01 M/UL (ref 4–5.2)
REASON FOR REJECTION: NORMAL
REJECTED TEST: NORMAL
SODIUM BLD-SCNC: 143 MMOL/L (ref 136–145)
TOTAL PROTEIN: 7.4 G/DL (ref 6.4–8.2)
TSH SERPL DL<=0.05 MIU/L-ACNC: 2.59 UIU/ML (ref 0.27–4.2)
WBC # BLD: 6.4 K/UL (ref 4–11)

## 2018-06-25 PROCEDURE — 99214 OFFICE O/P EST MOD 30 MIN: CPT | Performed by: FAMILY MEDICINE

## 2018-06-25 RX ORDER — LISINOPRIL 20 MG/1
20 TABLET ORAL DAILY
Qty: 30 TABLET | Refills: 3 | Status: SHIPPED | OUTPATIENT
Start: 2018-06-25 | End: 2018-10-25 | Stop reason: SDUPTHER

## 2018-06-25 RX ORDER — FUROSEMIDE 20 MG/1
20 TABLET ORAL DAILY
Qty: 30 TABLET | Refills: 3 | Status: SHIPPED | OUTPATIENT
Start: 2018-06-25 | End: 2019-04-09 | Stop reason: ALTCHOICE

## 2018-06-25 RX ORDER — POTASSIUM CHLORIDE 750 MG/1
10 TABLET, EXTENDED RELEASE ORAL DAILY
Qty: 30 TABLET | Refills: 3 | Status: SHIPPED | OUTPATIENT
Start: 2018-06-25 | End: 2018-10-25 | Stop reason: SDUPTHER

## 2018-06-25 ASSESSMENT — ENCOUNTER SYMPTOMS
CHANGE IN BOWEL HABIT: 0
ABDOMINAL PAIN: 0
NAUSEA: 1
COUGH: 0

## 2018-06-26 ENCOUNTER — HOSPITAL ENCOUNTER (OUTPATIENT)
Dept: OTHER | Age: 83
Discharge: HOME | End: 2018-06-30
Attending: ORTHOPAEDIC SURGERY | Admitting: ORTHOPAEDIC SURGERY

## 2018-06-26 ENCOUNTER — TELEPHONE (OUTPATIENT)
Dept: FAMILY MEDICINE CLINIC | Age: 83
End: 2018-06-26

## 2018-06-26 NOTE — FLOWSHEET NOTE
Physical Therapy  Cancellation/No-show Note  Patient Name:  Justice Florez  :  1935   Date:  2018  Cancelled visits to date: 1  No-shows to date: 0    For today's appointment patient:  [x]  Cancelled  []  Rescheduled appointment  []  No-show     Reason given by patient:  []  Patient ill  []  Conflicting appointment  []  No transportation    []  Conflict with work  []  No reason given  [x]  Other:     Comments:  Patient actually came into dept as scheduled for initial PT evaluation and then according to registrar, patient stated \"I don't know why I am here. I don't need therapy. My knee is fine. \" After therapist learned of this, therapist called patient and left message on her voice mail, encouraging her to return and invited her to come back for her appt today. No return call from patient.    Electronically signed by:  Miguel Alonzo, PT

## 2018-07-01 ENCOUNTER — HOSPITAL ENCOUNTER (OUTPATIENT)
Dept: OTHER | Age: 83
Discharge: HOME | End: 2018-07-31
Attending: ORTHOPAEDIC SURGERY | Admitting: ORTHOPAEDIC SURGERY

## 2018-07-24 ENCOUNTER — NURSE ONLY (OUTPATIENT)
Dept: FAMILY MEDICINE CLINIC | Age: 83
End: 2018-07-24

## 2018-07-24 DIAGNOSIS — R79.89 ELEVATED BRAIN NATRIURETIC PEPTIDE (BNP) LEVEL: Primary | ICD-10-CM

## 2018-07-24 DIAGNOSIS — I21.19 ACUTE INFERIOR MYOCARDIAL INFARCTION (HCC): ICD-10-CM

## 2018-07-24 LAB
ANION GAP SERPL CALCULATED.3IONS-SCNC: 14 MMOL/L (ref 3–16)
BUN BLDV-MCNC: 24 MG/DL (ref 7–20)
CALCIUM SERPL-MCNC: 9.9 MG/DL (ref 8.3–10.6)
CHLORIDE BLD-SCNC: 99 MMOL/L (ref 99–110)
CO2: 24 MMOL/L (ref 21–32)
CREAT SERPL-MCNC: 0.9 MG/DL (ref 0.6–1.2)
GFR AFRICAN AMERICAN: >60
GFR NON-AFRICAN AMERICAN: >60
GLUCOSE BLD-MCNC: 94 MG/DL (ref 70–99)
POTASSIUM SERPL-SCNC: 4.7 MMOL/L (ref 3.5–5.1)
PRO-BNP: 428 PG/ML (ref 0–449)
SODIUM BLD-SCNC: 137 MMOL/L (ref 136–145)

## 2018-07-24 PROCEDURE — 36415 COLL VENOUS BLD VENIPUNCTURE: CPT | Performed by: FAMILY MEDICINE

## 2018-10-02 RX ORDER — ATORVASTATIN CALCIUM 40 MG/1
TABLET, FILM COATED ORAL
Qty: 30 TABLET | Refills: 0 | Status: SHIPPED | OUTPATIENT
Start: 2018-10-02 | End: 2018-10-31 | Stop reason: SDUPTHER

## 2018-10-25 DIAGNOSIS — I10 ESSENTIAL HYPERTENSION: ICD-10-CM

## 2018-10-25 DIAGNOSIS — R60.1 GENERALIZED EDEMA: ICD-10-CM

## 2018-10-25 RX ORDER — LISINOPRIL 20 MG/1
TABLET ORAL
Qty: 30 TABLET | Refills: 2 | Status: SHIPPED | OUTPATIENT
Start: 2018-10-25 | End: 2019-02-04 | Stop reason: SDUPTHER

## 2018-10-25 RX ORDER — POTASSIUM CHLORIDE 750 MG/1
TABLET, EXTENDED RELEASE ORAL
Qty: 30 TABLET | Refills: 2 | Status: SHIPPED | OUTPATIENT
Start: 2018-10-25 | End: 2018-12-27 | Stop reason: SDUPTHER

## 2018-11-01 RX ORDER — ATORVASTATIN CALCIUM 40 MG/1
TABLET, FILM COATED ORAL
Qty: 30 TABLET | Refills: 2 | Status: SHIPPED | OUTPATIENT
Start: 2018-11-01 | End: 2019-02-04 | Stop reason: SDUPTHER

## 2018-11-13 RX ORDER — LEVOTHYROXINE, LIOTHYRONINE 19; 4.5 UG/1; UG/1
TABLET ORAL
Qty: 90 TABLET | Refills: 0 | Status: SHIPPED | OUTPATIENT
Start: 2018-11-13 | End: 2019-02-13 | Stop reason: SDUPTHER

## 2018-12-04 ENCOUNTER — OFFICE VISIT (OUTPATIENT)
Dept: CARDIOLOGY CLINIC | Age: 83
End: 2018-12-04

## 2018-12-04 VITALS
BODY MASS INDEX: 27.31 KG/M2 | OXYGEN SATURATION: 93 % | DIASTOLIC BLOOD PRESSURE: 78 MMHG | HEART RATE: 86 BPM | HEIGHT: 64 IN | SYSTOLIC BLOOD PRESSURE: 136 MMHG | WEIGHT: 160 LBS

## 2018-12-04 DIAGNOSIS — I25.10 CORONARY ARTERY DISEASE INVOLVING NATIVE CORONARY ARTERY OF NATIVE HEART WITHOUT ANGINA PECTORIS: Primary | ICD-10-CM

## 2018-12-04 DIAGNOSIS — E78.5 HYPERLIPIDEMIA LDL GOAL <70: ICD-10-CM

## 2018-12-04 DIAGNOSIS — D64.9 ANEMIA, UNSPECIFIED TYPE: ICD-10-CM

## 2018-12-04 DIAGNOSIS — I10 ESSENTIAL HYPERTENSION: ICD-10-CM

## 2018-12-04 PROCEDURE — 99213 OFFICE O/P EST LOW 20 MIN: CPT | Performed by: INTERNAL MEDICINE

## 2018-12-04 PROCEDURE — 93000 ELECTROCARDIOGRAM COMPLETE: CPT | Performed by: INTERNAL MEDICINE

## 2018-12-27 ENCOUNTER — TELEPHONE (OUTPATIENT)
Dept: FAMILY MEDICINE CLINIC | Age: 83
End: 2018-12-27

## 2018-12-27 DIAGNOSIS — R60.1 GENERALIZED EDEMA: ICD-10-CM

## 2018-12-27 RX ORDER — POTASSIUM CHLORIDE 750 MG/1
TABLET, EXTENDED RELEASE ORAL
Qty: 90 TABLET | Refills: 1 | Status: SHIPPED | OUTPATIENT
Start: 2018-12-27 | End: 2019-07-23 | Stop reason: SDUPTHER

## 2019-02-04 ENCOUNTER — TELEPHONE (OUTPATIENT)
Dept: FAMILY MEDICINE CLINIC | Age: 84
End: 2019-02-04

## 2019-02-04 DIAGNOSIS — I10 ESSENTIAL HYPERTENSION: ICD-10-CM

## 2019-02-04 RX ORDER — LISINOPRIL 20 MG/1
TABLET ORAL
Qty: 90 TABLET | Refills: 1 | Status: SHIPPED | OUTPATIENT
Start: 2019-02-04 | End: 2019-07-29 | Stop reason: SDUPTHER

## 2019-02-04 RX ORDER — ATORVASTATIN CALCIUM 40 MG/1
TABLET, FILM COATED ORAL
Qty: 90 TABLET | Refills: 1 | Status: SHIPPED | OUTPATIENT
Start: 2019-02-04 | End: 2019-08-04 | Stop reason: SDUPTHER

## 2019-02-13 RX ORDER — LEVOTHYROXINE, LIOTHYRONINE 19; 4.5 UG/1; UG/1
TABLET ORAL
Qty: 90 TABLET | Refills: 0 | Status: SHIPPED | OUTPATIENT
Start: 2019-02-13 | End: 2019-05-15 | Stop reason: SDUPTHER

## 2019-04-09 ENCOUNTER — OFFICE VISIT (OUTPATIENT)
Dept: FAMILY MEDICINE CLINIC | Age: 84
End: 2019-04-09
Payer: MEDICARE

## 2019-04-09 VITALS
WEIGHT: 175 LBS | SYSTOLIC BLOOD PRESSURE: 156 MMHG | BODY MASS INDEX: 29.88 KG/M2 | HEIGHT: 64 IN | DIASTOLIC BLOOD PRESSURE: 84 MMHG

## 2019-04-09 DIAGNOSIS — R41.0 DISORIENTATION: ICD-10-CM

## 2019-04-09 DIAGNOSIS — R56.9 SEIZURE (HCC): Primary | ICD-10-CM

## 2019-04-09 PROCEDURE — 99214 OFFICE O/P EST MOD 30 MIN: CPT | Performed by: FAMILY MEDICINE

## 2019-04-09 PROCEDURE — G8510 SCR DEP NEG, NO PLAN REQD: HCPCS | Performed by: FAMILY MEDICINE

## 2019-04-09 ASSESSMENT — PATIENT HEALTH QUESTIONNAIRE - PHQ9
SUM OF ALL RESPONSES TO PHQ9 QUESTIONS 1 & 2: 0
1. LITTLE INTEREST OR PLEASURE IN DOING THINGS: 0
2. FEELING DOWN, DEPRESSED OR HOPELESS: 0
SUM OF ALL RESPONSES TO PHQ QUESTIONS 1-9: 0
SUM OF ALL RESPONSES TO PHQ QUESTIONS 1-9: 0

## 2019-04-09 ASSESSMENT — ENCOUNTER SYMPTOMS
GASTROINTESTINAL NEGATIVE: 1
RESPIRATORY NEGATIVE: 1

## 2019-04-09 NOTE — PROGRESS NOTES
Subjective:      Patient ID: Ernesto Miller is a 82 y.o. female. HPI  Memory issues    Pt is here with her niece  She is concerned because she is becoming more confused   Has noticed her short term memory is not as good   She forgets how to get places she has driven to  Some days worse than others  She takes notes on everything and even takes note cards when she goes out  Feels like it all started a year ago when she had one seizure and she was put on Keppra  She feel and hit her head and had a knee infection in her knee replacement   She did go to the neurologist after this   She told her she could stop the medicine if she has a repeat eeg which is normal  She is ready to do this now  Feels this [address] is making her memory worse  Cant think clearly since taking  Wants to discuss how to do this    Review of Systems   Constitutional: Negative. Respiratory: Negative. Cardiovascular: Negative. Gastrointestinal: Negative. Skin: Negative. Neurological: Negative. Psychiatric/Behavioral: Positive for confusion and decreased concentration. YOB: 1935    Date of Visit:  4/9/2019    Allergies   Allergen Reactions    Amoxicillin Hives and Rash    Linezolid Other (See Comments)     While taking linezolid, pt developed progressively altered mentation & ultimately had a witnessed seizure 4/26/18. Uncertain whether these symptoms were d/t linezolid, but possible.     Prednisone Itching    Vancomycin      Rising SCr during 5/2018 admission     Coreg [Carvedilol] Diarrhea    Oxycodone-Acetaminophen Rash       Outpatient Medications Marked as Taking for the 4/9/19 encounter (Office Visit) with Wilfred Larios, DO   Medication Sig Dispense Refill    NP THYROID 30 MG tablet TAKE ONE TABLET BY MOUTH DAILY 90 tablet 0    atorvastatin (LIPITOR) 40 MG tablet TAKE ONE TABLET BY MOUTH DAILY 90 tablet 1    lisinopril (PRINIVIL;ZESTRIL) 20 MG tablet TAKE ONE TABLET BY MOUTH DAILY 90 tablet 1    potassium chloride (KLOR-CON M) 10 MEQ extended release tablet TAKE ONE TABLET BY MOUTH DAILY 90 tablet 1    levETIRAcetam (KEPPRA) 500 MG tablet Take 1 tablet by mouth 2 times daily 60 tablet 5    Lactobacillus (ACIDOPHILUS) TABS Take 1 tablet by mouth 2 times daily      ferrous sulfate 325 (65 Fe) MG tablet Take 325 mg by mouth daily      vitamin D (CHOLECALCIFEROL) 1000 UNIT TABS tablet Take 1,000 Units by mouth daily      Multiple Vitamins-Minerals (DAILY SHERIE MAXIMUM MULTIVITAMIN PO) Take 1 packet by mouth daily      aspirin 81 MG tablet Take 81 mg by mouth daily      Probiotic Product (PROBIOMAX PLUS DF PO) Take 1 capsule by mouth daily      latanoprost (XALATAN) 0.005 % ophthalmic solution Place 1 drop into the left eye nightly          Vitals:    04/09/19 1312   BP: (!) 156/84   Weight: 175 lb (79.4 kg)   Height: 5' 4\" (1.626 m)     Body mass index is 30.04 kg/m². Wt Readings from Last 3 Encounters:   04/09/19 175 lb (79.4 kg)   12/04/18 160 lb (72.6 kg)   06/25/18 149 lb (67.6 kg)     BP Readings from Last 3 Encounters:   04/09/19 (!) 156/84   12/04/18 136/78   06/25/18 (!) 168/70       Objective:   Physical Exam   Constitutional: She is oriented to person, place, and time. She appears well-developed and well-nourished. HENT:   Head: Normocephalic. Neck: No thyromegaly present. Cardiovascular: Normal rate, regular rhythm and normal heart sounds. Pulmonary/Chest: Effort normal and breath sounds normal.   Lymphadenopathy:     She has no cervical adenopathy. Neurological: She is alert and oriented to person, place, and time. Psychiatric: She has a normal mood and affect. Her behavior is normal.   Alert and oriented x3   Nursing note and vitals reviewed. Assessment:      Assessment/plan;  Suleman Daniel was seen today for memory loss and discuss medications. Diagnoses and all orders for this visit:    Seizure ([address]  -     EEG;  Future    Disorientation      Spent 25 minutes with pt and her niece  Will schedule eeg and hold her meds for two weeks before the eeg  If normal can stop the keppra permanently and see how her mental status does off the meds   Pieter Rey DO

## 2019-05-07 ENCOUNTER — HOSPITAL ENCOUNTER (OUTPATIENT)
Dept: NEUROLOGY | Age: 84
Discharge: HOME | End: 2019-05-07
Payer: MEDICARE

## 2019-05-07 PROCEDURE — 95819 EEG AWAKE AND ASLEEP: CPT

## 2019-05-08 NOTE — PROCEDURES
[address] [place], [place] [address]                          ELECTROENCEPHALOGRAM REPORT    PATIENT NAME: Radha Wiley                    :        1935  MED REC NO:   [id number]                          ROOM:  ACCOUNT NO:   [de-identified]                           ADMIT DATE: 2019  PROVIDER:     Sadi Anderson DO    DATE OF EE2019    REFERRING PROVIDER:  Yuliya Soto DO    REASON FOR STUDY:  Seizures. BRIEF HISTORY AND NEUROLOGIC FINDINGS:  The patient is an 62-year-old  female being evaluated for reported history of seizure. MEDICATIONS:  The patient's centrally-acting medications listed include  Keppra. EEG FINDINGS:  This is a 20-channel digital EEG performed utilizing  bipolar and referential montages. Wakefulness and drowsiness were  obtained during the recording. During maximum wakefulness, there is a  moderate-voltage, symmetric, fairly well-regulated and reactive 9-Hz  posterior dominant background rhythm. The anterior background consists  of low-voltage fast frequencies. Drowsiness is manifested by slow  lateral eye movements and attenuation of the waking background rhythms. Occasional left temporal slowing was noted during the recording. There  was also some sharply contoured theta without clear sharp waves noted. Photic stimulation was performed at various flash frequencies and evoked  a symmetric posterior driving response at a few isolated frequencies. Hyperventilation exercise was not performed due to the patient's age. A 1-channel EKG rhythm strip was reviewed and showed occasional PVCs  though the overall rhythm appeared sinus. EEG DIAGNOSIS:  This EEG is mildly abnormal due to the presence of  occasional left temporal slowing and disorganization.     CLINICAL INTERPRETATION:  The focal slowing and disorganization is  nonspecific and consistent with focal neuronal dysfunction in the  involved region. There is no definite evidence of cortical irritability  though this could not be entirely excluded. If seizure remain a  clinical concern, then a repeat routine and/or prolonged ambulatory  electroencephalogram may be of further benefit. Clinical correlation is  advised.         NATHANIEL GLEZ DO    D: 05/07/2019 17:18:48       T: 05/07/2019 20:43:49     [id number]  Job#: [id number]     Doc#: [id number]    CC:  <>

## 2019-05-09 ENCOUNTER — TELEPHONE (OUTPATIENT)
Dept: FAMILY MEDICINE CLINIC | Age: 84
End: 2019-05-09

## 2019-05-09 NOTE — TELEPHONE ENCOUNTER
The eeg was not normal  She should go back on her seizure medicine and recheck the eeg in six months

## 2019-05-09 NOTE — TELEPHONE ENCOUNTER
Pt need a return call about her EEG she had done on 5/7/19. She is off her seizure meds until hearing from Dr Sabine Estes.  Please give pt a call [phone]

## 2019-05-15 RX ORDER — LEVOTHYROXINE, LIOTHYRONINE 19; 4.5 UG/1; UG/1
TABLET ORAL
Qty: 30 TABLET | Refills: 0 | Status: SHIPPED | OUTPATIENT
Start: 2019-05-15 | End: 2019-06-17 | Stop reason: SDUPTHER

## 2019-05-29 ENCOUNTER — OFFICE VISIT (OUTPATIENT)
Dept: FAMILY MEDICINE CLINIC | Age: 84
End: 2019-05-29
Payer: MEDICARE

## 2019-05-29 VITALS
SYSTOLIC BLOOD PRESSURE: 138 MMHG | WEIGHT: 176 LBS | DIASTOLIC BLOOD PRESSURE: 84 MMHG | BODY MASS INDEX: 30.05 KG/M2 | HEIGHT: 64 IN

## 2019-05-29 DIAGNOSIS — G40.909 SEIZURE DISORDER (HCC): Primary | ICD-10-CM

## 2019-05-29 PROCEDURE — 99213 OFFICE O/P EST LOW 20 MIN: CPT | Performed by: FAMILY MEDICINE

## 2019-05-29 RX ORDER — LEVETIRACETAM 500 MG/1
500 TABLET ORAL 2 TIMES DAILY
Qty: 180 TABLET | Refills: 1 | Status: SHIPPED | OUTPATIENT
Start: 2019-05-29 | End: 2019-12-20

## 2019-05-29 ASSESSMENT — ENCOUNTER SYMPTOMS
GASTROINTESTINAL NEGATIVE: 1
RESPIRATORY NEGATIVE: 1

## 2019-05-29 NOTE — PROGRESS NOTES
Subjective:      Patient ID: Melinda Ennis is a 82 y.o. female. HPI  Seizures  She had eeg  Here to discuss if she needs to stay on her Keppra  No side effects with it   She is driving   Admits she does not think her memory is as good since she had the seizure    Review of Systems   Constitutional: Negative. Respiratory: Negative. Cardiovascular: Negative. Gastrointestinal: Negative. Skin: Negative. Neurological: Positive for seizures. YOB: 1935    Date of Visit:  5/29/2019    Allergies   Allergen Reactions    Amoxicillin Hives and Rash    Linezolid Other (See Comments)     While taking linezolid, pt developed progressively altered mentation & ultimately had a witnessed seizure 4/26/18. Uncertain whether these symptoms were d/t linezolid, but possible.     Prednisone Itching    Vancomycin      Rising SCr during 5/2018 admission     Coreg [Carvedilol] Diarrhea    Oxycodone-Acetaminophen Rash       Outpatient Medications Marked as Taking for the 5/29/19 encounter (Office Visit) with Teena Carvalho,    Medication Sig Dispense Refill    levETIRAcetam (KEPPRA) 500 MG tablet Take 1 tablet by mouth 2 times daily 180 tablet 1    NP THYROID 30 MG tablet TAKE ONE TABLET BY MOUTH DAILY 30 tablet 0    atorvastatin (LIPITOR) 40 MG tablet TAKE ONE TABLET BY MOUTH DAILY 90 tablet 1    lisinopril (PRINIVIL;ZESTRIL) 20 MG tablet TAKE ONE TABLET BY MOUTH DAILY 90 tablet 1    potassium chloride (KLOR-CON M) 10 MEQ extended release tablet TAKE ONE TABLET BY MOUTH DAILY 90 tablet 1    Lactobacillus (ACIDOPHILUS) TABS Take 1 tablet by mouth 2 times daily      ferrous sulfate 325 (65 Fe) MG tablet Take 325 mg by mouth daily      vitamin D (CHOLECALCIFEROL) 1000 UNIT TABS tablet Take 1,000 Units by mouth daily      Multiple Vitamins-Minerals (DAILY SHERIE MAXIMUM MULTIVITAMIN PO) Take 1 packet by mouth daily      HYDROcodone-acetaminophen (NORCO) 5-325 MG per tablet Take 1 tablet by mouth every 6 hours as needed for Pain. [place] Stephane aspirin 81 MG tablet Take 81 mg by mouth daily      Probiotic Product (PROBIOMAX PLUS DF PO) Take 1 capsule by mouth daily      latanoprost (XALATAN) 0.005 % ophthalmic solution Place 1 drop into the left eye nightly          Vitals:    05/29/19 1319   BP: 138/84   Weight: 176 lb (79.8 kg)   Height: 5' 4\" (1.626 m)     Body mass index is 30.21 kg/m². Wt Readings from Last 3 Encounters:   05/29/19 176 lb (79.8 kg)   04/09/19 175 lb (79.4 kg)   12/04/18 160 lb (72.6 kg)     BP Readings from Last 3 Encounters:   05/29/19 138/84   04/09/19 (!) 156/84   12/04/18 136/78       Objective:   Physical Exam   Constitutional: She is oriented to person, place, and time. She appears well-developed and well-nourished. No distress. HENT:   Head: Normocephalic. Neurological: She is alert and oriented to person, place, and time. Psychiatric: She has a normal mood and affect. Her behavior is normal. Judgment and thought content normal.       Assessment:       Assessment/plan;  Basil Pacheco was seen today for back pain and 2 week follow-up. Diagnoses and all orders for this visit:    Seizure disorder ([address]    Other orders  -     levETIRAcetam (KEPPRA) 500 MG tablet;  Take 1 tablet by mouth 2 times daily      Discussed with pt the safest option is for her to stay on her meds for the rest of her life  Sent in prescription  [address] [place] [address],

## 2019-06-17 RX ORDER — LEVOTHYROXINE, LIOTHYRONINE 19; 4.5 UG/1; UG/1
TABLET ORAL
Qty: 30 TABLET | Refills: 0 | Status: SHIPPED | OUTPATIENT
Start: 2019-06-17 | End: 2019-07-24 | Stop reason: SDUPTHER

## 2019-07-23 DIAGNOSIS — R60.1 GENERALIZED EDEMA: ICD-10-CM

## 2019-07-23 RX ORDER — POTASSIUM CHLORIDE 750 MG/1
TABLET, EXTENDED RELEASE ORAL
Qty: 90 TABLET | Refills: 0 | Status: SHIPPED | OUTPATIENT
Start: 2019-07-23 | End: 2019-10-21 | Stop reason: SDUPTHER

## 2019-07-29 DIAGNOSIS — I10 ESSENTIAL HYPERTENSION: ICD-10-CM

## 2019-07-30 RX ORDER — LISINOPRIL 20 MG/1
TABLET ORAL
Qty: 90 TABLET | Refills: 0 | Status: SHIPPED | OUTPATIENT
Start: 2019-07-30 | End: 2019-10-28 | Stop reason: SDUPTHER

## 2019-08-04 RX ORDER — ATORVASTATIN CALCIUM 40 MG/1
TABLET, FILM COATED ORAL
Qty: 90 TABLET | Refills: 0 | Status: SHIPPED | OUTPATIENT
Start: 2019-08-04 | End: 2019-10-28 | Stop reason: SDUPTHER

## 2019-10-03 ENCOUNTER — TELEPHONE (OUTPATIENT)
Dept: FAMILY MEDICINE CLINIC | Age: 84
End: 2019-10-03

## 2019-10-03 RX ORDER — DICLOFENAC SODIUM 75 MG/1
75 TABLET, DELAYED RELEASE ORAL 2 TIMES DAILY WITH MEALS
Qty: 60 TABLET | Refills: 3 | Status: SHIPPED | OUTPATIENT
Start: 2019-10-03 | End: 2020-01-07 | Stop reason: DRUGHIGH

## 2019-10-21 DIAGNOSIS — R60.1 GENERALIZED EDEMA: ICD-10-CM

## 2019-10-21 RX ORDER — POTASSIUM CHLORIDE 750 MG/1
TABLET, EXTENDED RELEASE ORAL
Qty: 90 TABLET | Refills: 0 | Status: SHIPPED | OUTPATIENT
Start: 2019-10-21 | End: 2020-01-20

## 2019-10-28 DIAGNOSIS — I10 ESSENTIAL HYPERTENSION: ICD-10-CM

## 2019-10-28 RX ORDER — LEVOTHYROXINE, LIOTHYRONINE 19; 4.5 UG/1; UG/1
TABLET ORAL
Qty: 30 TABLET | Refills: 1 | Status: SHIPPED | OUTPATIENT
Start: 2019-10-28 | End: 2020-01-02

## 2019-10-29 RX ORDER — LISINOPRIL 20 MG/1
TABLET ORAL
Qty: 90 TABLET | Refills: 0 | Status: SHIPPED | OUTPATIENT
Start: 2019-10-29 | End: 2020-01-24

## 2019-10-29 RX ORDER — ATORVASTATIN CALCIUM 40 MG/1
TABLET, FILM COATED ORAL
Qty: 90 TABLET | Refills: 0 | Status: SHIPPED | OUTPATIENT
Start: 2019-10-29 | End: 2020-01-07

## 2019-12-09 ENCOUNTER — OFFICE VISIT (OUTPATIENT)
Dept: FAMILY MEDICINE CLINIC | Age: 84
End: 2019-12-09
Payer: MEDICARE

## 2019-12-09 VITALS
HEIGHT: 64 IN | WEIGHT: 192 LBS | BODY MASS INDEX: 32.78 KG/M2 | DIASTOLIC BLOOD PRESSURE: 60 MMHG | SYSTOLIC BLOOD PRESSURE: 130 MMHG

## 2019-12-09 DIAGNOSIS — G40.909 SEIZURE DISORDER (HCC): ICD-10-CM

## 2019-12-09 DIAGNOSIS — E03.9 HYPOTHYROIDISM, UNSPECIFIED TYPE: ICD-10-CM

## 2019-12-09 DIAGNOSIS — E78.5 HYPERLIPIDEMIA LDL GOAL <70: Primary | ICD-10-CM

## 2019-12-09 DIAGNOSIS — Z23 NEED FOR INFLUENZA VACCINATION: ICD-10-CM

## 2019-12-09 DIAGNOSIS — R60.1 GENERALIZED EDEMA: ICD-10-CM

## 2019-12-09 DIAGNOSIS — M17.11 ARTHRITIS OF RIGHT KNEE: ICD-10-CM

## 2019-12-09 PROCEDURE — 99214 OFFICE O/P EST MOD 30 MIN: CPT | Performed by: FAMILY MEDICINE

## 2019-12-09 ASSESSMENT — ENCOUNTER SYMPTOMS
RESPIRATORY NEGATIVE: 1
GASTROINTESTINAL NEGATIVE: 1

## 2019-12-10 DIAGNOSIS — R60.1 GENERALIZED EDEMA: ICD-10-CM

## 2019-12-10 DIAGNOSIS — E78.5 HYPERLIPIDEMIA LDL GOAL <70: ICD-10-CM

## 2019-12-10 DIAGNOSIS — E03.9 HYPOTHYROIDISM, UNSPECIFIED TYPE: ICD-10-CM

## 2019-12-10 LAB
A/G RATIO: 1.6 (ref 1.1–2.2)
ALBUMIN SERPL-MCNC: 4.6 G/DL (ref 3.4–5)
ALP BLD-CCNC: 85 U/L (ref 40–129)
ALT SERPL-CCNC: 21 U/L (ref 10–40)
ANION GAP SERPL CALCULATED.3IONS-SCNC: 18 MMOL/L (ref 3–16)
AST SERPL-CCNC: 22 U/L (ref 15–37)
BILIRUB SERPL-MCNC: 0.5 MG/DL (ref 0–1)
BUN BLDV-MCNC: 31 MG/DL (ref 7–20)
CALCIUM SERPL-MCNC: 9.6 MG/DL (ref 8.3–10.6)
CHLORIDE BLD-SCNC: 104 MMOL/L (ref 99–110)
CHOLESTEROL, TOTAL: 157 MG/DL (ref 0–199)
CO2: 18 MMOL/L (ref 21–32)
CREAT SERPL-MCNC: 0.9 MG/DL (ref 0.6–1.2)
GFR AFRICAN AMERICAN: >60
GFR NON-AFRICAN AMERICAN: 60
GLOBULIN: 2.9 G/DL
GLUCOSE BLD-MCNC: 101 MG/DL (ref 70–99)
HCT VFR BLD CALC: 36.5 % (ref 36–48)
HDLC SERPL-MCNC: 46 MG/DL (ref 40–60)
HEMOGLOBIN: 12 G/DL (ref 12–16)
LDL CHOLESTEROL CALCULATED: 85 MG/DL
MCH RBC QN AUTO: 27.8 PG (ref 26–34)
MCHC RBC AUTO-ENTMCNC: 32.8 G/DL (ref 31–36)
MCV RBC AUTO: 84.7 FL (ref 80–100)
PDW BLD-RTO: 16.9 % (ref 12.4–15.4)
PLATELET # BLD: 249 K/UL (ref 135–450)
PMV BLD AUTO: 8.7 FL (ref 5–10.5)
POTASSIUM SERPL-SCNC: 5 MMOL/L (ref 3.5–5.1)
RBC # BLD: 4.31 M/UL (ref 4–5.2)
SODIUM BLD-SCNC: 140 MMOL/L (ref 136–145)
TOTAL PROTEIN: 7.5 G/DL (ref 6.4–8.2)
TRIGL SERPL-MCNC: 131 MG/DL (ref 0–150)
TSH SERPL DL<=0.05 MIU/L-ACNC: 2.73 UIU/ML (ref 0.27–4.2)
VLDLC SERPL CALC-MCNC: 26 MG/DL
WBC # BLD: 7.9 K/UL (ref 4–11)

## 2019-12-11 PROCEDURE — G0008 ADMIN INFLUENZA VIRUS VAC: HCPCS | Performed by: FAMILY MEDICINE

## 2019-12-11 PROCEDURE — 90653 IIV ADJUVANT VACCINE IM: CPT | Performed by: FAMILY MEDICINE

## 2019-12-20 RX ORDER — LEVETIRACETAM 500 MG/1
TABLET ORAL
Qty: 180 TABLET | Refills: 0 | Status: SHIPPED | OUTPATIENT
Start: 2019-12-20 | End: 2020-03-18

## 2020-01-07 ENCOUNTER — OFFICE VISIT (OUTPATIENT)
Dept: CARDIOLOGY CLINIC | Age: 85
End: 2020-01-07
Payer: MEDICARE

## 2020-01-07 VITALS
DIASTOLIC BLOOD PRESSURE: 64 MMHG | HEIGHT: 64 IN | HEART RATE: 87 BPM | SYSTOLIC BLOOD PRESSURE: 122 MMHG | WEIGHT: 190 LBS | BODY MASS INDEX: 32.44 KG/M2 | OXYGEN SATURATION: 97 %

## 2020-01-07 PROCEDURE — 99214 OFFICE O/P EST MOD 30 MIN: CPT | Performed by: INTERNAL MEDICINE

## 2020-01-07 PROCEDURE — 93000 ELECTROCARDIOGRAM COMPLETE: CPT | Performed by: INTERNAL MEDICINE

## 2020-01-07 RX ORDER — DICLOFENAC SODIUM 75 MG/1
75 TABLET, DELAYED RELEASE ORAL 2 TIMES DAILY
COMMUNITY
End: 2020-01-20

## 2020-01-07 RX ORDER — ROSUVASTATIN CALCIUM 40 MG/1
40 TABLET, COATED ORAL DAILY
Qty: 90 TABLET | Refills: 1 | Status: SHIPPED | OUTPATIENT
Start: 2020-01-07 | End: 2020-07-01

## 2020-01-07 NOTE — PROGRESS NOTES
[address]  Cardiology Progress Note     Jenne Brunner  1935 January 7, 2020    Reason for referral: CAD  Referring provider: Sindy Velásquez DO    CC: \"Feeling good. \"     HPI:  The patient is 82 y.o. female with a past medical history significant for HTN who recently presented to [facility] with week H/O chest pain. She was found in ED to have an acute inferior MI. She underwent LHC 1/23/2017 resulting in CATRACHITA to her 99% mid RCA. The day after the procedure she was noted to have abdominal pain and R groin pain with a decrease in her Hgb. CT of abd/pelvis showed retroperitoneal bleed with anterior bleeding. She remained stable and was discharged on 1/25/2017 with stable Hgb (9.9). She did not tolerate Coreg secondary to diarrhea. She returns to the office today in follow-up. Since we last saw her she has been \"good. \" She denies any chest pains or exertional symptoms. She reports compliance with her CPAP at night and denies any worsening shortness of breath. She reports compliance with her medications and tolerating. She states she does not follow a formal exercise program and is typically sedentary. She denies any chest pain with rest or exertion and her breathing has been well. She does use a walker to aid in ambulation. She denies any recent falls. She denies any abnormal bruising or bleeding. She reports a seizure several years ago and short term memory loss. Review of Systems:  Constitutional: Denies night sweats or fever. HEENT: Denies new visual changes, nosebleeds,nasal congestion. Respiratory: Denies new or change in SOB, PND, orthopnea or cough. Cardiovascular: see HPI  GI: Denies N/V, diarrhea, constipation, abdominal pain, change in bowel habits, melena or hematochezia  : Denies urinary frequency, urgency, incontinence, hematuria or dysuria.   Skin: Denies rash, hives, or cyanosis  Musculoskeletal: + chronic bilateral leg and back pain  Neurological: UNIT TABS tablet Take 1,000 Units by mouth daily      Multiple Vitamins-Minerals (DAILY SHERIE MAXIMUM MULTIVITAMIN PO) Take 1 packet by mouth daily      latanoprost (XALATAN) 0.005 % ophthalmic solution Place 1 drop into the left eye nightly       aspirin 81 MG tablet Take 81 mg by mouth daily       No current facility-administered medications for this visit. Physical Exam:   /64   Pulse 87   Ht 5' 3.5\" (1.613 m)   Wt 190 lb (86.2 kg)   SpO2 97%   BMI 33.13 kg/m²   Wt Readings from Last 2 Encounters:   01/07/20 190 lb (86.2 kg)   12/09/19 192 lb (87.1 kg)     Constitutional: She is oriented to person, place, and time. She appears well-developed and well-nourished. In no acute distress. Elderly, using a walker. HEENT: Normocephalic and atraumatic. Sclerae anicteric. No xanthelasmas. Neck: Neck supple. No JVD present. Carotids without bruits. No thyromegaly present. Cardiovascular: RRR, normal S1 and S2; no murmur/gallop or rub  Pulmonary/Chest: Effort normal and breath sounds normal. No respiratory distress. Lungs clear to auscultation. Abdominal: soft, nontender, nondistended. + bowel sounds; no organomegaly or bruits. Extremities: No edema, cyanosis, or clubbing. Pulses are 2+ radial/carotid/dorsalis pedis and posterior tibial bilaterally. Cap refill brisk. Neurological: No cranial nerve deficit. Skin: Skin is warm and dry. There is no rash or diaphoresis. Psychiatric: She has a normal mood and affect. Her speech is normal and behavior is normal.     ECG 1/7/20: sinus rhythm     Procedures:     1/23/2017 LHC:  1. Right dominant coronary arterial system with a 99% mid RCA lesion is the  culprit lesion. This was successfully intervened upon with a 3 mm x 18 mm  Xience Alpine drug-eluting stent dilated to post-dilated to 3.4 mm. This  resulted in CLAUDIA 3 flow and 0% residual stenosis.  In the left system, there  is no left main, LAD or circumflex disease appreciated angiographically. 2. Normal left ventricular systolic function with an LV ejection fraction of  55%. There is mild inferior wall hypokinesis. 3. Normal left ventricular end-diastolic pressure. 4. No gradient across the aortic valve on pullback to suggest aortic Stenosis. Imaging:     Echo 1/7/20 Normal sinus rhythm     CT abdomen/pelvis 1/23/2017: Moderate amount of right groin intramuscular/subcutaneous hemorrhage as well   as right retroperitoneal hemorrhage as described above. Lab Review:   Lab Results   Component Value Date    TRIG 131 12/10/2019    HDL 46 12/10/2019    LDLCALC 85 12/10/2019    LABVLDL 26 12/10/2019     Lab Results   Component Value Date     12/10/2019    K 5.0 12/10/2019    K 4.4 03/27/2018     12/10/2019    CO2 18 12/10/2019    BUN 31 12/10/2019    CREATININE 0.9 12/10/2019    GLUCOSE 101 12/10/2019    CALCIUM 9.6 12/10/2019      Lab Results   Component Value Date    WBC 7.9 12/10/2019    HGB 12.0 12/10/2019    HCT 36.5 12/10/2019    MCV 84.7 12/10/2019     12/10/2019     Assessment:  1. CAD of native coronary arteries without angina  2. Essential hypertension  3. Hyperlipidemia LDL goal <70mg/dL  4. Short term memory loss     Plan:   I think that Ms. Ivette George  is entirely stable from a cardiovascular standpoint. Her LDL of 85 not at goal so I would like to switch her to Crestor 40 mg daily. I will have her repeat a fasting lipid profile in 3 months. Her blood pressure is well controlled. I will have her continue her other medications. I have asked her to increase her daily activity and call with any worsening symptoms. I will see her in office for follow up in 1 year. This note was scribed in the presence of Dr Bacilio Quezada MD by Emma Rucker RN. Physician Attestation:  The scribes documentation has been prepared under my direction and personally reviewed by me in its entirety.      I, Dr. Bacilio Quezada personally performed the services described in this documentation as scribed by my RN,  Dwain Baez in my presence, and I confirm that the note above accurately reflects all work, treatment, procedures, and medical decision making performed by me.

## 2020-01-07 NOTE — PATIENT INSTRUCTIONS
sodium\" may still have too much sodium. Be sure to read the label to see how much sodium you are getting. · Buy fresh vegetables, or frozen vegetables without added sauces. Buy low-sodium versions of canned vegetables, soups, and other canned goods. Prepare low-sodium meals  · Cut back on the amount of salt you use in cooking. This will help you adjust to the taste. Do not add salt after cooking. One teaspoon of salt has about 2,300 mg of sodium. · Take the salt shaker off the table. · Flavor your food with garlic, lemon juice, onion, vinegar, herbs, and spices. Do not use soy sauce, lite soy sauce, steak sauce, onion salt, garlic salt, celery salt, mustard, or ketchup on your food. · Use low-sodium salad dressings, sauces, and ketchup. Or make your own salad dressings and sauces without adding salt. · Use less salt (or none) when recipes call for it. You can often use half the salt a recipe calls for without losing flavor. Other foods such as rice, pasta, and grains do not need added salt. · Rinse canned vegetables, and cook them in fresh water. This removes some--but not all--of the salt. · Avoid water that is naturally high in sodium or that has been treated with water softeners, which add sodium. Call your local water company to find out the sodium content of your water supply. If you buy bottled water, read the label and choose a sodium-free brand. Avoid high-sodium foods  · Avoid eating:  ? Smoked, cured, salted, and canned meat, fish, and poultry. ? Ham, vasquez, hot dogs, and luncheon meats. ? Regular, hard, and processed cheese and regular peanut butter. ? Crackers with salted tops, and other salted snack foods such as pretzels, chips, and salted popcorn. ? Frozen prepared meals, unless labeled low-sodium. ? Canned and dried soups, broths, and bouillon, unless labeled sodium-free or low-sodium. ? Canned vegetables, unless labeled sodium-free or low-sodium. ?  [place] fries, pizza, tacos, and

## 2020-01-20 RX ORDER — DICLOFENAC SODIUM 75 MG/1
TABLET, DELAYED RELEASE ORAL
Qty: 180 TABLET | Refills: 2 | Status: SHIPPED | OUTPATIENT
Start: 2020-01-20 | End: 2021-03-31 | Stop reason: SDUPTHER

## 2020-01-20 RX ORDER — POTASSIUM CHLORIDE 750 MG/1
TABLET, EXTENDED RELEASE ORAL
Qty: 90 TABLET | Refills: 0 | Status: SHIPPED | OUTPATIENT
Start: 2020-01-20 | End: 2020-04-16

## 2020-01-24 RX ORDER — LISINOPRIL 20 MG/1
TABLET ORAL
Qty: 90 TABLET | Refills: 0 | Status: SHIPPED | OUTPATIENT
Start: 2020-01-24 | End: 2020-04-23

## 2020-03-18 RX ORDER — LEVETIRACETAM 500 MG/1
TABLET ORAL
Qty: 180 TABLET | Refills: 0 | Status: SHIPPED | OUTPATIENT
Start: 2020-03-18 | End: 2020-03-19

## 2020-03-19 RX ORDER — LEVETIRACETAM 500 MG/1
TABLET ORAL
Qty: 180 TABLET | Refills: 0 | Status: SHIPPED | OUTPATIENT
Start: 2020-03-19 | End: 2020-06-15

## 2020-04-16 RX ORDER — POTASSIUM CHLORIDE 750 MG/1
TABLET, EXTENDED RELEASE ORAL
Qty: 90 TABLET | Refills: 0 | Status: SHIPPED | OUTPATIENT
Start: 2020-04-16 | End: 2020-07-23

## 2020-05-08 RX ORDER — LEVOTHYROXINE, LIOTHYRONINE 19; 4.5 UG/1; UG/1
TABLET ORAL
Qty: 30 TABLET | Refills: 0 | Status: SHIPPED | OUTPATIENT
Start: 2020-05-08 | End: 2020-05-11

## 2020-05-11 RX ORDER — LEVOTHYROXINE, LIOTHYRONINE 19; 4.5 UG/1; UG/1
TABLET ORAL
Qty: 30 TABLET | Refills: 0 | Status: SHIPPED | OUTPATIENT
Start: 2020-05-11 | End: 2020-07-08

## 2020-06-15 RX ORDER — LEVETIRACETAM 500 MG/1
TABLET ORAL
Qty: 180 TABLET | Refills: 0 | Status: SHIPPED | OUTPATIENT
Start: 2020-06-15 | End: 2020-09-13

## 2020-07-01 RX ORDER — ROSUVASTATIN CALCIUM 40 MG/1
TABLET, COATED ORAL
Qty: 90 TABLET | Refills: 0 | Status: SHIPPED | OUTPATIENT
Start: 2020-07-01 | End: 2020-09-28

## 2020-07-08 RX ORDER — LEVOTHYROXINE, LIOTHYRONINE 19; 4.5 UG/1; UG/1
TABLET ORAL
Qty: 30 TABLET | Refills: 0 | Status: SHIPPED | OUTPATIENT
Start: 2020-07-08 | End: 2020-08-07

## 2020-07-23 RX ORDER — LISINOPRIL 20 MG/1
TABLET ORAL
Qty: 90 TABLET | Refills: 0 | Status: SHIPPED | OUTPATIENT
Start: 2020-07-23 | End: 2020-10-27

## 2020-07-23 RX ORDER — POTASSIUM CHLORIDE 750 MG/1
TABLET, EXTENDED RELEASE ORAL
Qty: 90 TABLET | Refills: 0 | Status: SHIPPED | OUTPATIENT
Start: 2020-07-23 | End: 2020-10-27

## 2020-08-16 RX ORDER — LEVOTHYROXINE, LIOTHYRONINE 19; 4.5 UG/1; UG/1
TABLET ORAL
Qty: 30 TABLET | Refills: 0 | Status: SHIPPED | OUTPATIENT
Start: 2020-08-16 | End: 2021-02-19

## 2020-09-13 RX ORDER — LEVETIRACETAM 500 MG/1
TABLET ORAL
Qty: 180 TABLET | Refills: 0 | Status: SHIPPED | OUTPATIENT
Start: 2020-09-13 | End: 2020-12-22

## 2020-09-28 RX ORDER — ROSUVASTATIN CALCIUM 40 MG/1
TABLET, COATED ORAL
Qty: 90 TABLET | Refills: 0 | Status: SHIPPED | OUTPATIENT
Start: 2020-09-28 | End: 2020-12-30

## 2020-10-08 ENCOUNTER — TELEPHONE (OUTPATIENT)
Dept: FAMILY MEDICINE CLINIC | Age: 85
End: 2020-10-08

## 2020-10-08 NOTE — TELEPHONE ENCOUNTER
Pt daughter called to let Dr Low Kirkland know that the pt fell possibly on Wednesday. She has been disoriented, dehydrated, urine brown and right ankle swollen. Per Caprice Vega go to the ER. She states that her medicine log has not been updated since Tuesday so she not have taken her seizure med's.  If any questions call Romie Aragon [phone]

## 2020-10-29 ENCOUNTER — TELEPHONE (OUTPATIENT)
Dept: FAMILY MEDICINE CLINIC | Age: 85
End: 2020-10-29

## 2020-10-29 RX ORDER — LISINOPRIL 20 MG/1
TABLET ORAL
Qty: 90 TABLET | Refills: 0 | Status: SHIPPED | OUTPATIENT
Start: 2020-10-29 | End: 2021-04-20

## 2020-12-22 RX ORDER — LEVETIRACETAM 500 MG/1
TABLET ORAL
Qty: 180 TABLET | Refills: 0 | Status: SHIPPED | OUTPATIENT
Start: 2020-12-22 | End: 2021-03-19

## 2020-12-30 RX ORDER — ROSUVASTATIN CALCIUM 40 MG/1
TABLET, COATED ORAL
Qty: 90 TABLET | Refills: 0 | Status: SHIPPED | OUTPATIENT
Start: 2020-12-30 | End: 2021-04-01

## 2021-01-11 NOTE — PROGRESS NOTES
[address]  Cardiology Progress Note     Prashant Enamorado  1935 January 11, 2021    Reason for referral: CAD  Referring provider: Gaston Mon DO    CC: \"Feeling good. \"     HPI:  The patient is 82 y.o. female with a past medical history significant for HTN who recently presented to [facility] with week H/O chest pain. She was found in ED to have an acute inferior MI. She underwent LHC 1/23/2017 resulting in CATRACHITA to her 99% mid RCA. The day after the procedure she was noted to have abdominal pain and R groin pain with a decrease in her Hgb. CT of abd/pelvis showed retroperitoneal bleed with anterior bleeding. She remained stable and was discharged on 1/25/2017 with stable Hgb (9.9). She did not tolerate Coreg secondary to diarrhea. She returns to the office today in follow-up. Since we last saw her she has been \"good. \" She denies any chest pains or exertional symptoms. She reports compliance with her CPAP at night and denies any worsening shortness of breath. She reports compliance with her medications and tolerating. She states she does not follow a formal exercise program and is typically sedentary. She denies any chest pain with rest or exertion and her breathing has been well. She does use a walker to aid in ambulation. She denies any recent falls. She denies any abnormal bruising or bleeding. She reports a seizure several years ago and short term memory loss. Review of Systems:  Constitutional: Denies night sweats or fever. HEENT: Denies new visual changes, nosebleeds,nasal congestion. Respiratory: Denies new or change in SOB, PND, orthopnea or cough. Cardiovascular: see HPI  GI: Denies N/V, diarrhea, constipation, abdominal pain, change in bowel habits, melena or hematochezia  : Denies urinary frequency, urgency, incontinence, hematuria or dysuria.   Skin: Denies rash, hives, or cyanosis  Musculoskeletal: + chronic bilateral leg and back pain  ferrous sulfate 325 (65 Fe) MG tablet Take 325 mg by mouth daily      vitamin D (CHOLECALCIFEROL) 1000 UNIT TABS tablet Take 1,000 Units by mouth daily      Multiple Vitamins-Minerals (DAILY SHEREI MAXIMUM MULTIVITAMIN PO) Take 1 packet by mouth daily      aspirin 81 MG tablet Take 81 mg by mouth daily      latanoprost (XALATAN) 0.005 % ophthalmic solution Place 1 drop into the left eye nightly        No current facility-administered medications for this visit. Physical Exam:   There were no vitals taken for this visit. Wt Readings from Last 2 Encounters:   01/07/20 190 lb (86.2 kg)   12/09/19 192 lb (87.1 kg)     Constitutional: She is oriented to person, place, and time. She appears well-developed and well-nourished. In no acute distress. Elderly, using a walker. HEENT: Normocephalic and atraumatic. Sclerae anicteric. No xanthelasmas. Neck: Neck supple. No JVD present. Carotids without bruits. No thyromegaly present. Cardiovascular: RRR, normal S1 and S2; no murmur/gallop or rub  Pulmonary/Chest: Effort normal and breath sounds normal. No respiratory distress. Lungs clear to auscultation. Abdominal: soft, nontender, nondistended. + bowel sounds; no organomegaly or bruits. Extremities: No edema, cyanosis, or clubbing. Pulses are 2+ radial/carotid/dorsalis pedis and posterior tibial bilaterally. Cap refill brisk. Neurological: No cranial nerve deficit. Skin: Skin is warm and dry. There is no rash or diaphoresis. Psychiatric: She has a normal mood and affect. Her speech is normal and behavior is normal.     ECG 1/7/20: sinus rhythm       Procedures:     [facility] 1/23/2017   1. Right dominant coronary arterial system with a 99% mid RCA lesion is the  culprit lesion. This was successfully intervened upon with a 3 mm x 18 mm  Xience Alpine drug-eluting stent dilated to post-dilated to 3.4 mm. This  resulted in CLAUDIA 3 flow and 0% residual stenosis.  In the left system, there

## 2021-01-12 ENCOUNTER — OFFICE VISIT (OUTPATIENT)
Dept: CARDIOLOGY CLINIC | Age: 86
End: 2021-01-12
Payer: MEDICARE

## 2021-01-12 VITALS
TEMPERATURE: 96.9 F | DIASTOLIC BLOOD PRESSURE: 78 MMHG | BODY MASS INDEX: 33.49 KG/M2 | WEIGHT: 196.2 LBS | HEIGHT: 64 IN | SYSTOLIC BLOOD PRESSURE: 130 MMHG | OXYGEN SATURATION: 95 % | HEART RATE: 100 BPM

## 2021-01-12 DIAGNOSIS — I25.10 CORONARY ARTERY DISEASE INVOLVING NATIVE CORONARY ARTERY OF NATIVE HEART WITHOUT ANGINA PECTORIS: Primary | ICD-10-CM

## 2021-01-12 DIAGNOSIS — R41.3 SHORT-TERM MEMORY LOSS: ICD-10-CM

## 2021-01-12 DIAGNOSIS — I25.10 CORONARY ARTERY DISEASE INVOLVING NATIVE CORONARY ARTERY OF NATIVE HEART WITHOUT ANGINA PECTORIS: ICD-10-CM

## 2021-01-12 DIAGNOSIS — G47.33 OSA (OBSTRUCTIVE SLEEP APNEA): ICD-10-CM

## 2021-01-12 DIAGNOSIS — E78.5 HYPERLIPIDEMIA LDL GOAL <70: ICD-10-CM

## 2021-01-12 DIAGNOSIS — I10 ESSENTIAL HYPERTENSION: ICD-10-CM

## 2021-01-12 LAB
ALT SERPL-CCNC: 9 U/L (ref 10–40)
AST SERPL-CCNC: 18 U/L (ref 15–37)
CHOLESTEROL, TOTAL: 161 MG/DL (ref 0–199)
HDLC SERPL-MCNC: 37 MG/DL (ref 40–60)
LDL CHOLESTEROL CALCULATED: 77 MG/DL
LDL CHOLESTEROL DIRECT: 84 MG/DL
TRIGL SERPL-MCNC: 233 MG/DL (ref 0–150)
VLDLC SERPL CALC-MCNC: 47 MG/DL

## 2021-01-12 PROCEDURE — 93000 ELECTROCARDIOGRAM COMPLETE: CPT | Performed by: INTERNAL MEDICINE

## 2021-01-12 PROCEDURE — 99214 OFFICE O/P EST MOD 30 MIN: CPT | Performed by: INTERNAL MEDICINE

## 2021-01-12 NOTE — PATIENT INSTRUCTIONS
Patient Education        Learning About Coronary Artery Disease (CAD)  What is coronary artery disease? Coronary artery disease (CAD) occurs when plaque builds up in the arteries that bring oxygen-rich blood to your heart. Plaque is a fatty substance made of cholesterol, calcium, and other substances in the blood. This process is called hardening of the arteries, or atherosclerosis. What happens when you have coronary artery disease? · Plaque may narrow the coronary arteries. Narrowed arteries cause poor blood flow. This can lead to angina symptoms such as chest pain or discomfort. If blood flow is completely blocked, you could have a heart attack. · You can slow CAD and reduce the risk of future problems by making changes in your lifestyle. These include quitting smoking and eating heart-healthy foods. · Treatments for CAD, along with changes in your lifestyle, can help you live a longer and healthier life. How can you prevent coronary artery disease? · Do not smoke. It may be the best thing you can do to prevent heart disease. If you need help quitting, talk to your doctor about stop-smoking programs and medicines. These can increase your chances of quitting for good. · Be active. Get at least 30 minutes of exercise on most days of the week. Walking is a good choice. You also may want to do other activities, such as running, swimming, cycling, or playing tennis or team sports. · Eat heart-healthy foods. Eat more fruits and vegetables and less foods that contain saturated and trans fats. Limit alcohol, sodium, and sweets. · Stay at a healthy weight. Lose weight if you need to. · Manage other health problems such as diabetes, high blood pressure, and high cholesterol. How is coronary artery disease treated? · Your doctor will suggest that you make lifestyle changes. For example, your doctor may ask you to eat healthy foods, quit smoking, lose extra weight, and be more active. · You will have to take medicines. · Your doctor may suggest a procedure to open narrowed or blocked arteries. This is called angioplasty. Or your doctor may suggest using healthy blood vessels to create detours around narrowed or blocked arteries. This is called bypass surgery. Follow-up care is a key part of your treatment and safety. Be sure to make and go to all appointments, and call your doctor if you are having problems. It's also a good idea to know your test results and keep a list of the medicines you take. Where can you learn more? Go to https://[employer]. org and sign in to your [employer] account. Enter ([phone] [phone] in the [facility] box to learn more about \"Learning About Coronary Artery Disease (CAD). \"     If you do not have an account, please click on the \"Sign Up Now\" link. Current as of: December 16, 2019               Content Version: 12.6  © 7194-5539 [employer]. Care instructions adapted under license by [facility] ([facility]). If you have questions about a medical condition or this instruction, always ask your healthcare professional. [place] [address] any warranty or liability for your use of this information.

## 2021-01-12 NOTE — PROGRESS NOTES
[address]  Cardiology Progress Note     Ernestina Tang  1935 January 12, 2021    Reason for referral: CAD  Referring provider: Ilda Munson DO    CC: \"I am hanging in there but my breathing is stable. \"     HPI:  The patient is 82 y.o. female with a past medical history significant for HTN who recently presented to [facility] with week H/O chest pain. She was found in ED to have an acute inferior MI. She underwent LHC 1/23/2017 resulting in CATRACHITA to her 99% mid RCA. The day after the procedure she was noted to have abdominal pain and R groin pain with a decrease in her Hgb. CT of abd/pelvis showed retroperitoneal bleed with anterior bleeding. She remained stable and was discharged on 1/25/2017 with stable Hgb (9.9). She did not tolerate Coreg secondary to diarrhea. She returns to the office today in follow-up. Since we last saw her she denies any new or recurrent cardiac sounding complaints. The patient feels her breathing is stable. She has also quit using her CPAP therapy. She also continues to use a walker for aid. Patient specifically denies exertional chest pain/pressure, dyspnea at rest, [place], PND, orthopnea, palpitations, lightheadedness, weight changes, changes in LE edema, and syncope. She admits to a sedentary lifestyle and is not participating in any exercise program.        She reports a seizure several years ago and short term memory loss. Review of Systems:  Constitutional: Denies night sweats or fever. HEENT: Denies new visual changes, nosebleeds,nasal congestion. Respiratory: Denies new or change in SOB, PND, orthopnea or cough. Cardiovascular: see HPI  GI: Denies N/V, diarrhea, constipation, abdominal pain, change in bowel habits, melena or hematochezia  : Denies urinary frequency, urgency, incontinence, hematuria or dysuria. Skin: Denies rash, hives, or cyanosis  Musculoskeletal: + chronic bilateral leg and back pain, use of walker. Neurological: Denies syncope or TIA-like symptoms. Psychiatric: Denies anxiety, insomnia or depression     Past Medical History:   Diagnosis Date    CAD (coronary artery disease)     Inferior STEMI; CATRACHITA to RCA    DDD (degenerative disc disease), lumbosacral 4/10/2013    Hearing aid worn     bilateral    [place] (hard of hearing)     Hyperlipidemia     Hypertension     MI, old 2017    Neuropathy     bilateral lower extremities    Sleep apnea     uses mouth piece; bringing DOS 3/26/18    Thyroid disease     hypo    Wears glasses          Social History     Tobacco Use    Smoking status: Never Smoker    Smokeless tobacco: Never Used    Tobacco comment: encouraged to never smoke    Substance Use Topics    Alcohol use: No    Drug use: No       Allergies   Allergen Reactions    Amoxicillin Hives and Rash    Linezolid Other (See Comments)     While taking linezolid, pt developed progressively altered mentation & ultimately had a witnessed seizure 4/26/18. Uncertain whether these symptoms were d/t linezolid, but possible.     Prednisone Itching    Vancomycin      Rising SCr during 5/2018 admission     Coreg [Carvedilol] Diarrhea    Oxycodone-Acetaminophen Rash     Current Outpatient Medications   Medication Sig Dispense Refill    rosuvastatin (CRESTOR) 40 MG tablet TAKE ONE TABLET BY MOUTH DAILY 90 tablet 0    levETIRAcetam (KEPPRA) 500 MG tablet TAKE ONE TABLET BY MOUTH TWICE A  tablet 0    lisinopril (PRINIVIL;ZESTRIL) 20 MG tablet TAKE ONE TABLET BY MOUTH DAILY 90 tablet 0    potassium chloride (KLOR-CON M) 10 MEQ extended release tablet TAKE ONE TABLET BY MOUTH DAILY 90 tablet 0    NP THYROID 30 MG tablet TAKE ONE TABLET BY MOUTH DAILY 30 tablet 0    ferrous sulfate 325 (65 Fe) MG tablet Take 325 mg by mouth daily      Multiple Vitamins-Minerals (DAILY SHERIE MAXIMUM MULTIVITAMIN PO) Take 1 packet by mouth daily      aspirin 81 MG tablet Take 81 mg by mouth daily      latanoprost (XALATAN) 0.005 % ophthalmic solution Place 1 drop into the left eye nightly       diclofenac (VOLTAREN) 75 MG EC tablet TAKE ONE TABLET BY MOUTH TWICE A DAY WITH MEALS (Patient not taking: Reported on 1/12/2021) 180 tablet 2    Lactobacillus (ACIDOPHILUS) TABS Take 1 tablet by mouth 2 times daily       No current facility-administered medications for this visit. Physical Exam:   /78   Pulse 100   Temp 96.9 °F (36.1 °C)   Ht 5' 3.5\" (1.613 m)   Wt 196 lb 3.2 oz (89 kg)   SpO2 95%   BMI 34.21 kg/m²   Wt Readings from Last 2 Encounters:   01/12/21 196 lb 3.2 oz (89 kg)   01/07/20 190 lb (86.2 kg)     Constitutional: She is oriented to person, place, and time. She appears well-developed and well-nourished. In no acute distress. Elderly, using a walker. HEENT: Normocephalic and atraumatic. Sclerae anicteric. No xanthelasmas. Neck: Neck supple. No JVD present. Carotids without bruits. No thyromegaly present. Cardiovascular: RRR, normal S1 and S2; no murmur/gallop or rub  Pulmonary/Chest: Effort normal and breath sounds normal. No respiratory distress. Lungs clear to auscultation. Abdominal: soft, nontender, nondistended. + bowel sounds; no organomegaly or bruits. Extremities: No edema, cyanosis, or clubbing. Pulses are 2+ radial/carotid/dorsalis pedis and posterior tibial bilaterally. Cap refill brisk. Neurological: No cranial nerve deficit. Skin: Skin is warm and dry. There is no rash or diaphoresis. Psychiatric: She has a normal mood and affect. Her speech is normal and behavior is normal.     ECG 1/7/20: sinus rhythm   1/12/21: sinus rhythms with PVC's    Procedures:     1/23/2017 [facility]:  1. Right dominant coronary arterial system with a 99% mid RCA lesion is the  culprit lesion. This was successfully intervened upon with a 3 mm x 18 mm  Xience Alpine drug-eluting stent dilated to post-dilated to 3.4 mm. This  resulted in CLAUDIA 3 flow and 0% residual stenosis.  In the left system, there  is no left main, LAD or circumflex disease appreciated angiographically. 2. Normal left ventricular systolic function with an LV ejection fraction of  55%. There is mild inferior wall hypokinesis. 3. Normal left ventricular end-diastolic pressure. 4. No gradient across the aortic valve on pullback to suggest aortic Stenosis. Imaging:     CT abdomen/pelvis 1/23/2017: Moderate amount of right groin intramuscular/subcutaneous hemorrhage as well   as right retroperitoneal hemorrhage as described above. Lab Review:   Lab Results   Component Value Date    TRIG 131 12/10/2019    HDL 46 12/10/2019    LDLCALC 85 12/10/2019    LABVLDL 26 12/10/2019     Lab Results   Component Value Date     12/10/2019    K 5.0 12/10/2019    K 4.4 03/27/2018     12/10/2019    CO2 18 12/10/2019    BUN 31 12/10/2019    CREATININE 0.9 12/10/2019    GLUCOSE 101 12/10/2019    CALCIUM 9.6 12/10/2019      Lab Results   Component Value Date    WBC 7.9 12/10/2019    HGB 12.0 12/10/2019    HCT 36.5 12/10/2019    MCV 84.7 12/10/2019     12/10/2019     Assessment:  1. CAD of native coronary arteries without angina  2. Essential hypertension  3. Hyperlipidemia LDL goal <70mg/dL  4. Short term memory loss   5. PRINCE, untreated    Plan:   I think that Ms. Michela Villatoro  is entirely stable from a cardiovascular standpoint. Her EKG today is SR with PVC's. She did not repeat her lipid profile prior to today's visit after I switched her to Crestor 40 mg daily. Her last LDL of 85 12/2019 was not at goal.  I will have her repeat lipid profile, LDL direct, AST, ALT now. Her blood pressure remains well controlled. I will have her continue her current medical therapy and not pursue any testing at this time. I have again asked her to increase her daily activity and call with any worsening symptoms. We also discussed resuming CPAP therapy and follow up with sleep medicine as needed. I will see her in office for follow up in 1 year. This note was scribed in the presence of Dr. Chidi Rodriguez MD by China Mcginnis, MICHELLE. Physician Attestation:  The scribes documentation has been prepared under my direction and personally reviewed by me in its entirety. I, Dr. Barney Singh personally performed the services described in this documentation as scribed by my RN in my presence, and I confirm that the note above accurately reflects all work, treatment, procedures, and medical decision making performed by me.

## 2021-02-22 DIAGNOSIS — R60.1 GENERALIZED EDEMA: ICD-10-CM

## 2021-02-22 RX ORDER — POTASSIUM CHLORIDE 750 MG/1
TABLET, EXTENDED RELEASE ORAL
Qty: 90 TABLET | Refills: 0 | Status: SHIPPED | OUTPATIENT
Start: 2021-02-22 | End: 2021-04-20

## 2021-03-19 RX ORDER — LEVETIRACETAM 500 MG/1
TABLET ORAL
Qty: 180 TABLET | Refills: 0 | Status: SHIPPED | OUTPATIENT
Start: 2021-03-19 | End: 2021-04-20

## 2021-03-26 ENCOUNTER — TELEPHONE (OUTPATIENT)
Dept: FAMILY MEDICINE CLINIC | Age: 86
End: 2021-03-26

## 2021-03-26 NOTE — TELEPHONE ENCOUNTER
Patient is calling stating she thought  mentioned to her awhile back that she was supposed to have a brain scan I asked her why she needed the scan she said she couldn't remember why.  She seemed a little confused so she wanted me to send a message back to see if  was the doctor that wanted her to have this brain scan please advise

## 2021-03-31 ENCOUNTER — OFFICE VISIT (OUTPATIENT)
Dept: FAMILY MEDICINE CLINIC | Age: 86
End: 2021-03-31
Payer: MEDICARE

## 2021-03-31 VITALS
HEIGHT: 64 IN | DIASTOLIC BLOOD PRESSURE: 70 MMHG | BODY MASS INDEX: 32.47 KG/M2 | WEIGHT: 190.2 LBS | TEMPERATURE: 97.2 F | SYSTOLIC BLOOD PRESSURE: 132 MMHG

## 2021-03-31 DIAGNOSIS — E78.2 MIXED HYPERLIPIDEMIA: ICD-10-CM

## 2021-03-31 DIAGNOSIS — I10 ESSENTIAL HYPERTENSION: ICD-10-CM

## 2021-03-31 DIAGNOSIS — R41.3 MEMORY LOSS: Primary | ICD-10-CM

## 2021-03-31 PROCEDURE — 3288F FALL RISK ASSESSMENT DOCD: CPT | Performed by: FAMILY MEDICINE

## 2021-03-31 PROCEDURE — 99214 OFFICE O/P EST MOD 30 MIN: CPT | Performed by: FAMILY MEDICINE

## 2021-03-31 RX ORDER — DICLOFENAC SODIUM 75 MG/1
TABLET, DELAYED RELEASE ORAL
Qty: 180 TABLET | Refills: 2 | Status: SHIPPED | OUTPATIENT
Start: 2021-03-31 | End: 2021-12-31

## 2021-03-31 ASSESSMENT — ENCOUNTER SYMPTOMS
RESPIRATORY NEGATIVE: 1
GASTROINTESTINAL NEGATIVE: 1

## 2021-03-31 NOTE — PROGRESS NOTES
OUTPATIENT PROGRESS NOTE  Date of Service:  3/31/2021  Address: [address] [place] [address] [address] [facility] [address]  Dept: [phone]  Loc: [phone]    Subjective:      Patient ID:  <[id number]>  Jamison Hooper is a 82 y.o. female     Hypertension  This is a chronic problem. The current episode started more than 1 year ago. The problem is unchanged. The problem is controlled. Associated symptoms include anxiety and malaise/fatigue. Risk factors for coronary artery disease include dyslipidemia and family history. Past treatments include ACE inhibitors. The current treatment provides moderate improvement. There are no compliance problems. Hyperlipidemia  This is a chronic problem. The current episode started more than 1 year ago. The problem is controlled. Current antihyperlipidemic treatment includes diet change, exercise and statins. The current treatment provides mild improvement of lipids. Seizure disorder   After stroke she had a seizure  Has been on meds since   Her memory is worsening   Short term   No driving much anymore   Review of Systems   Constitutional: Positive for malaise/fatigue. HENT: Negative. Respiratory: Negative. Cardiovascular: Negative. Gastrointestinal: Negative. Neurological: Negative. Psychiatric/Behavioral: Positive for confusion and decreased concentration. Objective:   YOB: 1935    Date of Visit:  3/31/2021       Allergies   Allergen Reactions    Amoxicillin Hives and Rash    Linezolid Other (See Comments)     While taking linezolid, pt developed progressively altered mentation & ultimately had a witnessed seizure 4/26/18. Uncertain whether these symptoms were d/t linezolid, but possible.     Prednisone Itching    Vancomycin      Rising SCr during 5/2018 admission     Coreg [Carvedilol] Diarrhea    Oxycodone-Acetaminophen Rash       Outpatient Medications Marked as Taking for the 3/31/21 encounter (Office Visit) with Lazaro Bain, DO   Medication Sig Dispense Refill    levETIRAcetam (KEPPRA) 500 MG tablet TAKE ONE TABLET BY MOUTH TWICE A  tablet 0    potassium chloride (KLOR-CON M) 10 MEQ extended release tablet TAKE ONE TABLET BY MOUTH DAILY 90 tablet 0    NP THYROID 30 MG tablet TAKE ONE TABLET BY MOUTH DAILY 30 tablet 0    rosuvastatin (CRESTOR) 40 MG tablet TAKE ONE TABLET BY MOUTH DAILY 90 tablet 0    lisinopril (PRINIVIL;ZESTRIL) 20 MG tablet TAKE ONE TABLET BY MOUTH DAILY 90 tablet 0    diclofenac (VOLTAREN) 75 MG EC tablet TAKE ONE TABLET BY MOUTH TWICE A DAY WITH MEALS 180 tablet 2    ferrous sulfate 325 (65 Fe) MG tablet Take 325 mg by mouth daily         Vitals:    03/31/21 1140   BP: 132/70   Temp: 97.2 °F (36.2 °C)   Weight: 190 lb 3.2 oz (86.3 kg)   Height: 5' 3.5\" (1.613 m)     Body mass index is 33.16 kg/m². Wt Readings from Last 3 Encounters:   03/31/21 190 lb 3.2 oz (86.3 kg)   01/12/21 196 lb 3.2 oz (89 kg)   01/07/20 190 lb (86.2 kg)     BP Readings from Last 3 Encounters:   03/31/21 132/70   01/12/21 130/78   01/07/20 122/64       Physical Exam  Vitals signs and nursing note reviewed. Constitutional:       Appearance: She is well-developed. HENT:      Head: Normocephalic. Neck:      Thyroid: No thyromegaly. Cardiovascular:      Rate and Rhythm: Normal rate and regular rhythm. Heart sounds: Normal heart sounds. Pulmonary:      Effort: Pulmonary effort is normal.      Breath sounds: Normal breath sounds. Lymphadenopathy:      Cervical: No cervical adenopathy. Neurological:      Mental Status: She is alert. Comments: Pt confused   Poor short term memory   Psychiatric:         Behavior: Behavior normal.         Thought Content: Thought content normal.         Judgment: Judgment normal.            Assessment/Plan       Assessment/plan;  Yves Shaw was seen today for follow-up.     Diagnoses and all orders for this

## 2021-04-01 RX ORDER — ROSUVASTATIN CALCIUM 40 MG/1
TABLET, COATED ORAL
Qty: 90 TABLET | Refills: 3 | Status: SHIPPED | OUTPATIENT
Start: 2021-04-01

## 2021-04-06 ENCOUNTER — HOSPITAL ENCOUNTER (OUTPATIENT)
Dept: CT IMAGING | Age: 86
Discharge: HOME | End: 2021-04-06
Payer: MEDICARE

## 2021-04-06 DIAGNOSIS — R41.3 MEMORY LOSS: ICD-10-CM

## 2021-04-06 LAB
GFR AFRICAN AMERICAN: 52
GFR NON-AFRICAN AMERICAN: 43
PERFORMED ON: ABNORMAL
POC CREATININE: 1.2 MG/DL (ref 0.6–1.2)
POC SAMPLE TYPE: ABNORMAL

## 2021-04-06 PROCEDURE — 82565 ASSAY OF CREATININE: CPT

## 2021-04-06 PROCEDURE — 6360000004 HC RX CONTRAST MEDICATION: Performed by: FAMILY MEDICINE

## 2021-04-06 PROCEDURE — 70470 CT HEAD/BRAIN W/O & W/DYE: CPT

## 2021-04-06 RX ADMIN — IOPAMIDOL 75 ML: 755 INJECTION, SOLUTION INTRAVENOUS at 15:16

## 2021-04-20 DIAGNOSIS — R60.1 GENERALIZED EDEMA: ICD-10-CM

## 2021-04-20 DIAGNOSIS — I10 ESSENTIAL HYPERTENSION: ICD-10-CM

## 2021-04-20 RX ORDER — POTASSIUM CHLORIDE 750 MG/1
TABLET, EXTENDED RELEASE ORAL
Qty: 90 TABLET | Refills: 0 | Status: SHIPPED | OUTPATIENT
Start: 2021-04-20 | End: 2021-09-02

## 2021-04-20 RX ORDER — LEVETIRACETAM 500 MG/1
TABLET ORAL
Qty: 180 TABLET | Refills: 0 | Status: SHIPPED | OUTPATIENT
Start: 2021-04-20 | End: 2021-08-31 | Stop reason: SDUPTHER

## 2021-04-20 RX ORDER — LEVOTHYROXINE, LIOTHYRONINE 19; 4.5 UG/1; UG/1
TABLET ORAL
Qty: 30 TABLET | Refills: 0 | Status: SHIPPED | OUTPATIENT
Start: 2021-04-20 | End: 2021-08-23

## 2021-04-20 RX ORDER — LISINOPRIL 20 MG/1
TABLET ORAL
Qty: 90 TABLET | Refills: 0 | Status: SHIPPED | OUTPATIENT
Start: 2021-04-20 | End: 2021-10-18

## 2021-05-03 ENCOUNTER — TELEPHONE (OUTPATIENT)
Dept: FAMILY MEDICINE CLINIC | Age: 86
End: 2021-05-03

## 2021-05-04 ENCOUNTER — TELEPHONE (OUTPATIENT)
Dept: PRIMARY CARE CLINIC | Age: 86
End: 2021-05-04

## 2021-05-14 ENCOUNTER — OFFICE VISIT (OUTPATIENT)
Dept: FAMILY MEDICINE CLINIC | Age: 86
End: 2021-05-14
Payer: MEDICARE

## 2021-05-14 VITALS
SYSTOLIC BLOOD PRESSURE: 114 MMHG | HEART RATE: 76 BPM | BODY MASS INDEX: 31.82 KG/M2 | WEIGHT: 186.4 LBS | HEIGHT: 64 IN | DIASTOLIC BLOOD PRESSURE: 70 MMHG

## 2021-05-14 DIAGNOSIS — Z00.00 ROUTINE GENERAL MEDICAL EXAMINATION AT A HEALTH CARE FACILITY: Primary | ICD-10-CM

## 2021-05-14 PROCEDURE — G0438 PPPS, INITIAL VISIT: HCPCS | Performed by: FAMILY MEDICINE

## 2021-05-14 ASSESSMENT — PATIENT HEALTH QUESTIONNAIRE - PHQ9
SUM OF ALL RESPONSES TO PHQ QUESTIONS 1-9: 0
1. LITTLE INTEREST OR PLEASURE IN DOING THINGS: 0

## 2021-05-14 NOTE — PROGRESS NOTES
Medicare Annual Wellness Visit  Name: Massiel Arias Date: 2021   MRN: <[id number]> Sex: Female   Age: 82 y.o. Ethnicity: Non-/Non    : 1935 Race: Shahnaz Belle is here for Medicare AWV    Screenings for behavioral, psychosocial and functional/safety risks, and cognitive dysfunction are all negative except as indicated below. These results, as well as other patient data from the [address] form, are documented in Flowsheets linked to this Encounter. Allergies   Allergen Reactions    Amoxicillin Hives and Rash    Linezolid Other (See Comments)     While taking linezolid, pt developed progressively altered mentation & ultimately had a witnessed seizure 18. Uncertain whether these symptoms were d/t linezolid, but possible.  Prednisone Itching    Vancomycin      Rising SCr during 2018 admission     Coreg [Carvedilol] Diarrhea    Oxycodone-Acetaminophen Rash       Prior to Visit Medications    Medication Sig Taking?  Authorizing Provider   NP THYROID 30 MG tablet TAKE ONE TABLET BY MOUTH DAILY  Serge Davenport DO   lisinopril (PRINIVIL;ZESTRIL) 20 MG tablet TAKE ONE TABLET BY MOUTH DAILY  Serge Davenport DO   potassium chloride (KLOR-CON M) 10 MEQ extended release tablet TAKE ONE TABLET BY MOUTH DAILY  Serge Davenport DO   levETIRAcetam (KEPPRA) 500 MG tablet TAKE ONE TABLET BY MOUTH TWICE A DAY  Serge Davenport DO   rosuvastatin (CRESTOR) 40 MG tablet TAKE ONE TABLET BY MOUTH DAILY  Dea Steve MD   diclofenac (VOLTAREN) 75 MG EC tablet TAKE ONE TABLET BY MOUTH TWICE A DAY WITH MEALS  Serge Davenport DO   Lactobacillus (ACIDOPHILUS) TABS Take 1 tablet by mouth 2 times daily  Historical Provider, MD   ferrous sulfate 325 (65 Fe) MG tablet Take 325 mg by mouth daily  Historical Provider, MD   Multiple Vitamins-Minerals (DAILY SHERIE MAXIMUM MULTIVITAMIN PO) Take 1 packet by mouth daily  Historical Provider, MD   aspirin 81 MG tablet Take 81 mg by mouth daily  Historical Provider, MD   latanoprost (XALATAN) 0.005 % ophthalmic solution Place 1 drop into the left eye nightly   Historical Provider, MD       Past Medical History:   Diagnosis Date    CAD (coronary artery disease)     Inferior STEMI; CATRACHITA to RCA    DDD (degenerative disc disease), lumbosacral 4/10/2013    Hearing aid worn     bilateral    [place] (hard of hearing)     Hyperlipidemia     Hypertension     MI, old 2017    Neuropathy     bilateral lower extremities    Sleep apnea     uses mouth piece; bringing DOS 3/26/18    Thyroid disease     hypo    Wears glasses        Past Surgical History:   Procedure Laterality Date    ABSCESS DRAINAGE Right 03/27/2018    evacuation hematoma right knee    APPENDECTOMY  1963    BACK SURGERY  [id number]    MILD PROCEDURE L2-3 BILATERAL     BREAST LUMPECTOMY  1986    BUNIONECTOMY  10/2003    CATARACT REMOVAL  03/23/2010 and 04/14/2010    COLONOSCOPY  6/2014    repeat 2019    CORONARY ANGIOPLASTY WITH STENT PLACEMENT  01/23/2017    INF STEMI with CATRACHITA to RCA    DILATION AND CURETTAGE OF UTERUS      INCONTINENCE SURGERY      JOINT REPLACEMENT Right 2018    right total knee replacment   [facility]    right    TONSILLECTOMY AND ADENOIDECTOMY  1942       Family History   Problem Relation Age of Onset    Cancer Mother     Heart Disease Mother     Arthritis Sister         rheumatoid    Cancer Brother     Cancer Sister     Diabetes Sister     Heart Disease Brother     High Blood Pressure Brother     High Blood Pressure Sister     Diabetes Brother        CareTeam (Including outside providers/suppliers regularly involved in providing care):   Patient Care Team:  Luisa Clark DO as PCP - General  Luisa Clark DO as PCP - [place] Last Provider    Wt Readings from Last 3 Encounters:   05/14/21 186 lb 6.4 oz (84.6 kg)   03/31/21 190 lb 3.2 oz (86.3 kg)   01/12/21 196 lb 3.2 oz (89 kg)     Vitals:    05/14/21 1438 05/14/21 1504 BP: (!) 150/79 114/70   Pulse: 73 76   Weight: 186 lb 6.4 oz (84.6 kg)    Height: 5' 3.5\" (1.613 m)      Body mass index is 32.5 kg/m². Based upon direct observation of the patient, evaluation of cognition reveals global memory impairment noted. Patient's complete Health Risk Assessment and screening values have been reviewed and are found in Flowsheets. The following problems were reviewed today and where indicated follow up appointments were made and/or referrals ordered. Positive Risk Factor Screenings with Interventions:     Fall Risk:  2 or more falls in past year?: (!) yes(pt tripped over rug)  Fall with injury in past year?: no  Fall Risk Interventions:    · Home safety tips provided        General Health and ACP:  General  In general, how would you say your health is?: Good  In the past 7 days, have you experienced any of the following?  New or Increased Pain, New or Increased Fatigue, Loneliness, Social Isolation, Stress or Anger?: (!) Social Isolation  Do you get the social and emotional support that you need?: Yes  Do you have a Living Will?: Yes  Advance Directives     Power of  Living Will ACP-Advance Directive ACP-Power of     Not on File [place] on 07/26/13 Filed Not on File      General Health Risk Interventions:  · Social isolation: patient's comments regarding inadequate social support: Pt sees her sister but hasnt been out of her condo much lately     Health Habits/Nutrition:  Health Habits/Nutrition  Do you exercise for at least 20 minutes 2-3 times per week?: (!) No  Have you lost any weight without trying in the past 3 months?: No  Do you eat only one meal per day?: No  Have you seen the dentist within the past year?: (!) No  Body mass index: (!) 32.5  Health Habits/Nutrition Interventions:  · Inadequate physical activity:  educational materials provided to promote increased physical activity  · Dental exam overdue:  patient encouraged to make appointment with his/her dentist    Hearing/Vision:  No exam data present  Hearing/Vision  Do you or your family notice any trouble with your hearing that hasn't been managed with hearing aids?: No  Do you have difficulty driving, watching TV, or doing any of your daily activities because of your eyesight?: No  Have you had an eye exam within the past year?: (!) No  Hearing/Vision Interventions:  · Vision concerns:  patient encouraged to make appointment with his/her eye specialist     ADL:  ADLs  In the past 7 days, did you need help from others to perform any of the following everyday activities? Eating, dressing, grooming, bathing, toileting, or walking/balance?: None(pt uses walker)  In the past 7 days, did you need help from others to take care of any of the following?  Laundry, housekeeping, banking/finances, shopping, telephone use, food preparation, transportation, or taking medications?: (!) Shopping  ADL Interventions:  · Patient declines any further evaluation/treatment for this issue  · Pt sister sometimes picks up groceries for pt     Personalized Preventive Plan   Current Health Maintenance Status  Immunization History   Administered Date(s) Administered    COVID-19, Moderna, PF, 100mcg/0.5mL 03/31/2021, 04/28/2021    Influenza Virus Vaccine 10/22/2002    Influenza, Triv, inactivated, subunit, adjuvanted, IM (Fluad 65 yrs and older) 12/11/2019    Pneumococcal Conjugate 13-valent ([id number]) 09/29/2015    Pneumococcal Polysaccharide ([id number]) 09/05/2017    Tdap (Boostrix, Adacel) 09/19/2017        Health Maintenance   Topic Date Due    Shingles Vaccine (1 of 2) Never done   [employer] Visit (AWV)  Never done    Potassium monitoring  12/10/2020    Creatinine monitoring  12/10/2020    Flu vaccine (Season Ended) 09/01/2021    Lipid screen  01/12/2022    DTaP/Tdap/Td vaccine (2 - Td) 09/19/2027    Pneumococcal 65+ years Vaccine  Completed    COVID-19 Vaccine  Completed    DEXA (modify frequency per FRAX score)  Addressed    Hepatitis A vaccine  Aged Out    Hepatitis B vaccine  Aged Out    Hib vaccine  Aged Out    Meningococcal (ACWY) vaccine  Aged Out   Pt educated on vaccines that are due   Recommendations for Preventive Services Due: see orders and patient instructions/AVS.  . Recommended screening schedule for the next 5-10 years is provided to the patient in written form: see Patient Instructions/AVS.    James Soares LPN, 6/74/8222, performed the documented evaluation under the direct supervision of the attending physician.

## 2021-05-14 NOTE — PATIENT INSTRUCTIONS
Personalized Preventive Plan for Cheo Pritchard - 5/14/2021  Medicare offers a range of preventive health benefits. Some of the tests and screenings are paid in full while other may be subject to a deductible, co-insurance, and/or copay. Some of these benefits include a comprehensive review of your medical history including lifestyle, illnesses that may run in your family, and various assessments and screenings as appropriate. After reviewing your medical record and screening and assessments performed today your provider may have ordered immunizations, labs, imaging, and/or referrals for you. A list of these orders (if applicable) as well as your Preventive Care list are included within your After Visit Summary for your review. Other Preventive Recommendations:    · A preventive eye exam performed by an eye specialist is recommended every 1-2 years to screen for glaucoma; cataracts, macular degeneration, and other eye disorders. · A preventive dental visit is recommended every 6 months. · Try to get at least 150 minutes of exercise per week or 10,000 steps per day on a pedometer . · Order or download the FREE \"Exercise & Physical Activity: Your Everyday Guide\" from [employer]. Call 5-786.808.6429 or search [employer] online. · You need 6569-1830 mg of calcium and 2988-7089 IU of vitamin D per day. It is possible to meet your calcium requirement with diet alone, but a vitamin D supplement is usually necessary to meet this goal.  · When exposed to the sun, use a sunscreen that protects against both UVA and UVB radiation with an SPF of 30 or greater. Reapply every 2 to 3 hours or after sweating, drying off with a towel, or swimming. · Always wear a seat belt when traveling in a car. Always wear a helmet when riding a bicycle or motorcycle. Heart-Healthy Diet   Sodium, Fat, and Cholesterol Controlled Diet       What Is a Heart Healthy Diet?    A heart-healthy cholesterol, this type of cholesterol actually carries cholesterol away from your arteries and may, therefore, help lower your risk of having a heart attack. You want this level to be high (ideally greater than 60). It is a risk to have a level less than 40. You can raise this good cholesterol by eating olive oil, canola oil, avocados, or nuts. Exercise raises this level, too. Fat    Fat is calorie dense and packs a lot of calories into a small amount of food. Even though fats should be limited due to their high calorie content, not all fats are bad. In fact, some fats are quite healthful. Fat can be broken down into four main types. The good-for-you fats are:   Monounsaturated fat  found in oils such as olive and canola, avocados, and nuts and natural nut butters; can decrease cholesterol levels, while keeping levels of HDL cholesterol high   Polyunsaturated fat  found in oils such as safflower, sunflower, soybean, corn, and sesame; can decrease total cholesterol and LDL cholesterol   Omega-3 fatty acids  particularly those found in fatty fish (such as salmon, trout, tuna, mackerel, herring, and sardines); can decrease risk of arrhythmias, decrease triglyceride levels, and slightly lower blood pressure   The fats that you want to limit are:   Saturated fat  found in animal products, many fast foods, and a few vegetables; increases total blood cholesterol, including LDL levels   Animal fats that are saturated include: butter, lard, whole-milk dairy products, meat fat, and poultry skin   Vegetable fats that are saturated include: hydrogenated shortening, palm oil, coconut oil, cocoa butter   Hydrogenated or trans fat  found in margarine and vegetable shortening, most shelf stable snack foods, and fried foods; increases LDL and decreases HDL     It is generally recommended that you limit your total fat for the day to less than 30% of your total calories.  If you follow an 1800-calorie heart healthy diet, for example, this would mean 60 grams of fat or less per day. Saturated fat and trans fat in your diet raises your blood cholesterol the most, much more than dietary cholesterol does. For this reason, on a heart-healthy diet, less than 7% of your calories should come from saturated fat and ideally 0% from trans fat. On an 1800-calorie diet, this translates into less than 14 grams of saturated fat per day, leaving 46 grams of fat to come from mono- and polyunsaturated fats.    Food Choices on a Heart Healthy Diet   Food Category   Foods Recommended   Foods to Avoid   Grains   Breads and rolls without salted tops Most dry and cooked cereals Unsalted crackers and breadsticks Low-sodium or homemade breadcrumbs or stuffing All rice and pastas   Breads, rolls, and crackers with salted tops High-fat baked goods (eg, muffins, donuts, pastries) Quick breads, self-rising flour, and biscuit mixes Regular bread crumbs Instant hot cereals Commercially prepared rice, pasta, or stuffing mixes   Vegetables   Most fresh, frozen, and low-sodium canned vegetables Low-sodium and salt-free vegetable juices Canned vegetables if unsalted or rinsed   Regular canned vegetables and juices, including sauerkraut and pickled vegetables Frozen vegetables with sauces Commercially prepared potato and vegetable mixes   Fruits   Most fresh, frozen, and canned fruits All fruit juices   Fruits processed with salt or sodium   Milk   Nonfat or low-fat (1%) milk Nonfat or low-fat yogurt Cottage cheese, low-fat ricotta, cheeses labeled as low-fat and low-sodium   Whole milk Reduced-fat (2%) milk Malted and chocolate milk Full fat yogurt Most cheeses (unless low-fat and low salt) Buttermilk (no more than 1 cup per week)   Meats and Beans   Lean cuts of fresh or frozen beef, veal, lamb, or pork (look for the word loin) Fresh or frozen poultry without the skin Fresh or frozen fish and some shellfish Egg whites and egg substitutes (Limit whole eggs to three per week) Tofu Nuts or seeds (unsalted, dry-roasted), low-sodium peanut butter Dried peas, beans, and lentils   Any smoked, cured, salted, or canned meat, fish, or poultry (including vasquez, chipped beef, cold cuts, hot dogs, sausages, sardines, and anchovies) Poultry skins Breaded and/or fried fish or meats Canned peas, beans, and lentils Salted nuts   Fats and Oils   Olive oil and canola oil Low-sodium, low-fat salad dressings and mayonnaise   Butter, margarine, coconut and palm oils, vasquez fat   Snacks, Sweets, and Condiments   Low-sodium or unsalted versions of broths, soups, soy sauce, and condiments Pepper, herbs, and spices; vinegar, lemon, or lime juice Low-fat frozen desserts (yogurt, sherbet, fruit bars) Sugar, cocoa powder, honey, syrup, jam, and preserves Low-fat, trans-fat free cookies, cakes, and pies Brooks and animal crackers, fig bars, iglesia snaps   High-fat desserts Broth, soups, gravies, and sauces, made from instant mixes or other high-sodium ingredients Salted snack foods Canned olives Meat tenderizers, seasoning salt, and most flavored vinegars   Beverages   Low-sodium carbonated beverages Tea and coffee in moderation Soy milk   Commercially softened water   Suggestions   Make whole grains, fruits, and vegetables the base of your diet. Choose heart-healthy fats such as canola, olive, and flaxseed oil, and foods high in heart-healthy fats, such as nuts, seeds, soybeans, tofu, and fish. Eat fish at least twice per week; the fish highest in omega-3 fatty acids and lowest in mercury include salmon, herring, mackerel, sardines, and canned chunk light tuna. If you eat fish less than twice per week or have high triglycerides, talk to your doctor about taking fish oil supplements. Read food labels.    For products low in fat and cholesterol, look for fat free, low-fat, cholesterol free, saturated fat free, and trans fat freeAlso scan the Nutrition Facts Label, which lists saturated fat, trans fat, and cholesterol amounts. For products low in sodium, look for sodium free, very low sodium, low sodium, no added salt, and unsalted   Skip the salt when cooking or at the table; if food needs more flavor, get creative and try out different herbs and spices. Garlic and onion also add substantial flavor to foods. Trim any visible fat off meat and poultry before cooking, and drain the fat off after haywood. Use cooking methods that require little or no added fat, such as grilling, boiling, baking, poaching, broiling, roasting, steaming, stir-frying, and sauting. Avoid fast food and convenience food. They tend to be high in saturated and trans fat and have a lot of added salt. Talk to a registered dietitian for individualized diet advice. Last Reviewed: March 2011 Carmen Kaur MS, MPH, RD   Updated: 3/29/2011   ·     Keep Your Memory [facility]       Many factors can affect your ability to remembera hectic lifestyle, aging, stress, chronic disease, and certain medicines. But, there are steps you can take to sharpen your mind and help preserve your memory. Challenge Your Brain   Regularly challenging your mind may help keeps it in top shape. Good mental exercises include:   Crossword puzzlesUse a dictionary if you need it; you will learn more that way. Brainteasers Try some! Crafts, such as wood working and sewing   Hobbies, such as gardening and building model airplanes   SocializingVisit old friends or join groups to meet new ones. Reading   Learning a new language   Taking a class, whether it be art history or radha chi   TravelingExperience the food, history, and culture of your destination   Learning to use a computer   Going to museums, the theater, or thought-provoking movies   Changing things in your daily life, such as reversing your pattern in the grocery store or brushing your teeth using your nondominant hand   Use Memory Aids   There is no need to remember every detail on your own.  These relaxation. Choose activities that calm you down, and make it routine. Manage Chronic Conditions    Side effects of high blood pressure , diabetes, and heart disease can interfere with mental function. Many of the lifestyle steps discussed here can help manage these conditions. Strive to eat a healthy diet, exercise regularly, get stress under control, and follow your doctor's advice for your condition. Minimize Medications    Talk to your doctor about the medicines that you take. Some may be unnecessary. Also, healthy lifestyle habits may lower the need for certain drugs. Last Reviewed: April 2010 Jerry Magallon MD   Updated: 4/13/2010   ·     [address]       As we get older, changes in balance, gait, strength, vision, hearing, and cognition make even the most youthful senior more prone to accidents. Falls are one of the leading health risks for older people. This increased risk of falling is related to:   Aging process (eg, decreased muscle strength, slowed reflexes)   Higher incidence of chronic health problems (eg, arthritis, diabetes) that may limit mobility, agility or sensory awareness   Side effects of medicine (eg, dizziness, blurred vision)especially medicines like prescription pain medicines and drugs used to treat mental health conditions   Depending on the brittleness of your bones, the consequences of a fall can be serious and long lasting. Home Life   Research by the Association of Aging [facility]) shows that some home accidents among older adults can be prevented by making simple lifestyle changes and basic modifications and repairs to the home environment. Here are some lifestyle changes that experts recommend:   Have your hearing and vision checked regularly. Be sure to wear prescription glasses that are right for you. Speak to your doctor or pharmacist about the possible side effects of your medicines. A number of medicines can cause dizziness.    If you have problems with sleep, talk to your doctor. Limit your intake of alcohol. If necessary, use a cane or walker to help maintain your balance. Wear supportive, rubber-soled shoes, even at home. If you live in a region that gets wintry weather, you may want to put special cleats on your shoes to prevent you from slipping on the snow and ice. Exercise regularly to help maintain muscle tone, agility, and balance. Always hold the banister when going up or down stairs. Also, use  bars when getting in or out of the bath or shower, or using the toilet. To avoid dizziness, get up slowly from a lying down position. Sit up first, dangling your legs for a minute or two before rising to a standing position. Overall Home Safety Check   According to the Consumer Product Safety Commision's \"Older Consumer Home Safety Checklist,\" it is important to check for potential hazards in each room. And remember, proper lighting is an essential factor in home safety. If you cannot see clearly, you are more likely to fall. Important questions to ask yourself include:   Are lamp, electric, extension, and telephone cords placed out of the flow of traffic and maintained in good condition? Have frayed cords been replaced? Are all small rugs and runners slip resistant? If not, you can secure them to the floor with a special double-sided carpet tape. Are smoke detectors properly locatedone on every floor of your home and one outside of every sleeping area? Are they in good working order? Are batteries replaced at least once a year? Do you have a well-maintained carbon monoxide detector outside every sleeping are in your home? Does your furniture layout leave plenty of space to maneuver between and around chairs, tables, beds, and sofas? Are hallways, stairs and passages between rooms well lit? Can you reach a lamp without getting out of bed? Are floor surfaces well maintained?  Shag rugs, high-pile carpeting, tile floors, and polished wood floors can be particularly slippery. Stairs should always have handrails and be carpeted or fitted with a non-skid tread. Is your telephone easily reachable. Is the cord safely tucked away? Room by Room   According to the Association of Aging, bathrooms and conrado are the two most potentially hazardous rooms in your home. In the Kitchen    Be sure your stove is in proper working order and always make sure burners and the oven are off before you go out or go to sleep. Keep pots on the back burners, turn handles away from the front of the stove, and keep stove clean and free of grease build-up. Kitchen ventilation systems and range exhausts should be working properly. Keep flammable objects such as towels and pot holders away from the cooking area except when in use. Make sure kitchen curtains are tied back. Move cords and appliances away from the sink and hot surfaces. If extension cords are needed, install wiring guides so they do not hang over the sink, range, or working areas. Look for coffee pots, kettles and toaster ovens with automatic shut-offs. Keep a mop handy in the kitchen so you can wipe up spills instantly. You should also have a small fire extinguisher. Arrange your kitchen with frequently used items on lower shelves to avoid the need to stand on a stepstool to reach them. Make sure countertops are well-lit to avoid injuries while cutting and preparing food. In the Bathroom    Use a non-slip mat or decals in the tub and shower, since wet, soapy tile or porcelain surfaces are extremely slippery. Make sure bathroom rugs are non-skid or tape them firmly to the floor. Bathtubs should have at least one, preferably two, grab bars, firmly attached to structural supports in the wall. (Do not use built-in soap holders or glass shower doors as grab bars.)    Tub seats fitted with non-slip material on the legs allow you to wash sitting down.  For people with limited

## 2021-08-23 RX ORDER — LEVOTHYROXINE, LIOTHYRONINE 19; 4.5 UG/1; UG/1
TABLET ORAL
Qty: 30 TABLET | Refills: 0 | Status: SHIPPED | OUTPATIENT
Start: 2021-08-23 | End: 2021-08-31 | Stop reason: SDUPTHER

## 2021-08-24 ENCOUNTER — NURSE ONLY (OUTPATIENT)
Dept: FAMILY MEDICINE CLINIC | Age: 86
End: 2021-08-24
Payer: MEDICARE

## 2021-08-24 DIAGNOSIS — E03.9 HYPOTHYROIDISM, UNSPECIFIED TYPE: Primary | ICD-10-CM

## 2021-08-24 LAB — TSH SERPL DL<=0.05 MIU/L-ACNC: 4.06 UIU/ML (ref 0.27–4.2)

## 2021-08-24 PROCEDURE — 36415 COLL VENOUS BLD VENIPUNCTURE: CPT | Performed by: FAMILY MEDICINE

## 2021-08-31 ENCOUNTER — OFFICE VISIT (OUTPATIENT)
Dept: FAMILY MEDICINE CLINIC | Age: 86
End: 2021-08-31
Payer: MEDICARE

## 2021-08-31 VITALS
BODY MASS INDEX: 30.22 KG/M2 | SYSTOLIC BLOOD PRESSURE: 114 MMHG | DIASTOLIC BLOOD PRESSURE: 68 MMHG | WEIGHT: 177 LBS | HEIGHT: 64 IN

## 2021-08-31 DIAGNOSIS — G40.909 SEIZURE DISORDER (HCC): ICD-10-CM

## 2021-08-31 DIAGNOSIS — I10 ESSENTIAL HYPERTENSION: ICD-10-CM

## 2021-08-31 DIAGNOSIS — F32.9 REACTIVE DEPRESSION: ICD-10-CM

## 2021-08-31 DIAGNOSIS — E03.9 HYPOTHYROIDISM, UNSPECIFIED TYPE: Primary | ICD-10-CM

## 2021-08-31 DIAGNOSIS — R41.3 MEMORY LOSS: ICD-10-CM

## 2021-08-31 PROCEDURE — 99214 OFFICE O/P EST MOD 30 MIN: CPT | Performed by: FAMILY MEDICINE

## 2021-08-31 RX ORDER — LEVETIRACETAM 500 MG/1
TABLET ORAL
Qty: 180 TABLET | Refills: 1 | Status: ON HOLD | OUTPATIENT
Start: 2021-08-31 | End: 2022-01-01

## 2021-08-31 RX ORDER — LEVOTHYROXINE AND LIOTHYRONINE 19; 4.5 UG/1; UG/1
TABLET ORAL
Qty: 90 TABLET | Refills: 3 | Status: SHIPPED | OUTPATIENT
Start: 2021-08-31 | End: 2022-08-22

## 2021-08-31 NOTE — PROGRESS NOTES
OUTPATIENT PROGRESS NOTE  Date of Service:  8/31/2021  Address: [address] [place] [address] [address] [facility]First [facility] [address]  Dept: [phone]  Loc: [phone]    Subjective:      Patient ID:  <[id number]>  Rehan Doherty is a 82 y.o. female     Hypertension  This is a chronic problem. The current episode started more than 1 year ago. The problem is unchanged. The problem is controlled. Associated symptoms include malaise/fatigue. Risk factors for coronary artery disease include dyslipidemia. Past treatments include ACE inhibitors. The current treatment provides moderate improvement. There are no compliance problems. Hyperlipidemia  This is a chronic problem. The current episode started more than 1 year ago. The problem is controlled. Recent lipid tests were reviewed and are normal. Current antihyperlipidemic treatment includes diet change, exercise and statins. The current treatment provides moderate improvement of lipids. There are no compliance problems. Depression  Here with her sister who lives next door to her   She says that Alayna Kaiser has made comments about how depressed she is   Does not want to do anything   Does not get dressed most days  Pt denies   Admits t    Review of Systems   Constitutional: Positive for malaise/fatigue. Objective:   YOB: 1935    Date of Visit:  8/31/2021       Allergies   Allergen Reactions    Amoxicillin Hives and Rash    Linezolid Other (See Comments)     While taking linezolid, pt developed progressively altered mentation & ultimately had a witnessed seizure 4/26/18. Uncertain whether these symptoms were d/t linezolid, but possible.     Prednisone Itching    Vancomycin      Rising SCr during 5/2018 admission     Coreg [Carvedilol] Diarrhea    Oxycodone-Acetaminophen Rash       Outpatient Medications Marked as Taking for the 8/31/21 encounter (Office Visit) with Jayjay Silva, DO Medication Sig Dispense Refill    thyroid (NP THYROID) 30 MG tablet TAKE ONE TABLET BY MOUTH DAILY 90 tablet 3    levETIRAcetam (KEPPRA) 500 MG tablet TAKE ONE TABLET BY MOUTH TWICE A  tablet 1    sertraline (ZOLOFT) 50 MG tablet Take 1 tablet by mouth daily 90 tablet 1    lisinopril (PRINIVIL;ZESTRIL) 20 MG tablet TAKE ONE TABLET BY MOUTH DAILY 90 tablet 0    potassium chloride (KLOR-CON M) 10 MEQ extended release tablet TAKE ONE TABLET BY MOUTH DAILY 90 tablet 0    rosuvastatin (CRESTOR) 40 MG tablet TAKE ONE TABLET BY MOUTH DAILY 90 tablet 3    diclofenac (VOLTAREN) 75 MG EC tablet TAKE ONE TABLET BY MOUTH TWICE A DAY WITH MEALS 180 tablet 2    Multiple Vitamins-Minerals (DAILY SHERIE MAXIMUM MULTIVITAMIN PO) Take 1 packet by mouth daily      aspirin 81 MG tablet Take 81 mg by mouth daily      latanoprost (XALATAN) 0.005 % ophthalmic solution Place 1 drop into the left eye nightly          Vitals:    08/31/21 1344   BP: 114/68   Weight: 177 lb (80.3 kg)   Height: 5' 3.5\" (1.613 m)     Body mass index is 30.86 kg/m². Wt Readings from Last 3 Encounters:   08/31/21 177 lb (80.3 kg)   05/14/21 186 lb 6.4 oz (84.6 kg)   03/31/21 190 lb 3.2 oz (86.3 kg)     BP Readings from Last 3 Encounters:   08/31/21 114/68   05/14/21 114/70   03/31/21 132/70       Physical Exam  Vitals and nursing note reviewed. Constitutional:       Appearance: She is well-developed. HENT:      Head: Normocephalic. Neck:      Thyroid: No thyromegaly. Cardiovascular:      Rate and Rhythm: Normal rate and regular rhythm. Heart sounds: Normal heart sounds. Pulmonary:      Effort: Pulmonary effort is normal.      Breath sounds: Normal breath sounds. Lymphadenopathy:      Cervical: No cervical adenopathy. Neurological:      Mental Status: She is alert and oriented to person, place, and time.    Psychiatric:      Comments: Confused             Assessment/Plan               Assessment/plan;  Lucius Naranjo was seen today for depression, medication refill and medication check. Diagnoses and all orders for this visit:    Hypothyroidism, unspecified type  -     thyroid (NP THYROID) 30 MG tablet; TAKE ONE TABLET BY MOUTH DAILY    Seizure disorder ([address]  -     levETIRAcetam (KEPPRA) 500 MG tablet; TAKE ONE TABLET BY MOUTH TWICE A DAY    Essential hypertension  Stable  No changes to meds  Memory loss  Stable    Reactive depression  -     sertraline (ZOLOFT) 50 MG tablet; Take 1 tablet by mouth daily      Return in about 3 months (around 11/30/2021).              Nic Gustafson, DO

## 2021-09-02 DIAGNOSIS — R60.1 GENERALIZED EDEMA: ICD-10-CM

## 2021-09-02 RX ORDER — POTASSIUM CHLORIDE 750 MG/1
TABLET, EXTENDED RELEASE ORAL
Qty: 90 TABLET | Refills: 0 | Status: SHIPPED | OUTPATIENT
Start: 2021-09-02

## 2021-09-14 ENCOUNTER — TELEPHONE (OUTPATIENT)
Dept: FAMILY MEDICINE CLINIC | Age: 86
End: 2021-09-14

## 2021-09-14 NOTE — TELEPHONE ENCOUNTER
Talked to Vanessa Cardenas (niece) and relayed information. Evidently Venegas Canal (Agueda's sister) is trying to intervene in [facility] medical care. According to Juan Ates was discontinued at 3/31 visit and patient has not been on. Darlyn Goldstein is trying to start Lucius Naranjo back on the medication and she wanted to confirm it should be discontinued. Sophia Iglesias she should forward Medical POA to us if she has one and this would allow her to obtain Proxy for [facility] access. She will proceed with doing so. Please call Vanessa Cardenas back to confirm patient should not be on Keppra.

## 2021-09-14 NOTE — TELEPHONE ENCOUNTER
Information relayed to [facility]. She will not deliver this medication to [facility].

## 2021-09-14 NOTE — TELEPHONE ENCOUNTER
Here sister was in with her at her last appointment and said she has been on Keppra   If she has been off she should stay off  no seizure activity off of she can stay off the [address]

## 2021-09-14 NOTE — TELEPHONE ENCOUNTER
Olayinka Laura (on hippa) calling to get a further explanation of patient's recent scan. States there is some confusion.

## 2021-09-16 ENCOUNTER — TELEPHONE (OUTPATIENT)
Dept: FAMILY MEDICINE CLINIC | Age: 86
End: 2021-09-16

## 2021-09-16 NOTE — TELEPHONE ENCOUNTER
----- Message from Ronen Ashley sent at 9/16/2021  8:33 AM EDT -----  Subject: Message to Provider    QUESTIONS  Information for Provider? Patient was in on the 31st with Sister and was   told to contiue levETIRAcetam (KEPPRA) 500 MG tablet but Janna Carson was   told not to say on the medication when picked it up from [facility] but   sister was told on 31st to contiue. Sister Luis Fernando Carson preps medication and   wants to know if to stop it or contiue.  ---------------------------------------------------------------------------  --------------  CALL BACK INFO  What is the best way for the office to contact you? OK to leave message on   voicemail  Preferred Call Back Phone Number? [phone]  ---------------------------------------------------------------------------  --------------  SCRIPT ANSWERS  Relationship to Patient? Other  Representative Name? Luis Fernando Carson  Is the Representative on the appropriate HIPAA document in Epic?  Yes

## 2021-09-16 NOTE — TELEPHONE ENCOUNTER
There was some confusion at her appointment  if she has not been on it and no seizure activity she can stay off of the medication

## 2021-09-16 NOTE — TELEPHONE ENCOUNTER
Spoke to Diana Kulkarni, informed her of message  She stated that at pt's last dr appointment Dr Tyler Baig said that she saw some strange things going on with her brain and to stay on medication.   She just wants to know if she should have pt start taking again or ok to stay off

## 2021-09-20 NOTE — TELEPHONE ENCOUNTER
Called Zachariah Lynn back in regards to pt. Per Dr. Luzma Toribio informed Zachariah Lynn, that the patient is able to stay off of the Rx as long as she hasn't currently experienced a seizure.

## 2021-10-18 DIAGNOSIS — I10 ESSENTIAL HYPERTENSION: ICD-10-CM

## 2021-10-18 RX ORDER — LISINOPRIL 20 MG/1
TABLET ORAL
Qty: 90 TABLET | Refills: 0 | Status: SHIPPED | OUTPATIENT
Start: 2021-10-18 | End: 2022-01-23

## 2021-12-31 ENCOUNTER — HOSPITAL ENCOUNTER (INPATIENT)
Age: 86
LOS: 3 days | Discharge: HOME HEALTH | DRG: 872 | End: 2022-01-03
Attending: EMERGENCY MEDICINE | Admitting: INTERNAL MEDICINE
Payer: MEDICARE

## 2021-12-31 ENCOUNTER — APPOINTMENT (OUTPATIENT)
Dept: GENERAL RADIOLOGY | Age: 86
DRG: 872 | End: 2021-12-31
Payer: MEDICARE

## 2021-12-31 DIAGNOSIS — R55 SYNCOPE AND COLLAPSE: Primary | ICD-10-CM

## 2021-12-31 DIAGNOSIS — E86.0 DEHYDRATION: ICD-10-CM

## 2021-12-31 DIAGNOSIS — M62.82 NON-TRAUMATIC RHABDOMYOLYSIS: ICD-10-CM

## 2021-12-31 DIAGNOSIS — N17.9 ACUTE RENAL FAILURE, UNSPECIFIED ACUTE RENAL FAILURE TYPE (HCC): ICD-10-CM
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
## 2021-12-31 LAB
A/G RATIO: 1 (ref 1.1–2.2)
ALBUMIN SERPL-MCNC: 4 G/DL (ref 3.4–5)
ALP BLD-CCNC: 92 U/L (ref 40–129)
ALT SERPL-CCNC: 14 U/L (ref 10–40)
ANION GAP SERPL CALCULATED.3IONS-SCNC: 20 MMOL/L (ref 3–16)
AST SERPL-CCNC: 39 U/L (ref 15–37)
BASOPHILS ABSOLUTE: 0 K/UL (ref 0–0.2)
BASOPHILS RELATIVE PERCENT: 0.1 %
BILIRUB SERPL-MCNC: 0.8 MG/DL (ref 0–1)
BUN BLDV-MCNC: 41 MG/DL (ref 7–20)
CALCIUM SERPL-MCNC: 10.8 MG/DL (ref 8.3–10.6)
CHLORIDE BLD-SCNC: 105 MMOL/L (ref 99–110)
CO2: 13 MMOL/L (ref 21–32)
CREAT SERPL-MCNC: 1.2 MG/DL (ref 0.6–1.2)
EOSINOPHILS ABSOLUTE: 0 K/UL (ref 0–0.6)
EOSINOPHILS RELATIVE PERCENT: 0 %
GFR AFRICAN AMERICAN: 52
GFR NON-AFRICAN AMERICAN: 43
GLUCOSE BLD-MCNC: 157 MG/DL (ref 70–99)
HCT VFR BLD CALC: 38.2 % (ref 36–48)
HEMOGLOBIN: 12 G/DL (ref 12–16)
LYMPHOCYTES ABSOLUTE: 1.6 K/UL (ref 1–5.1)
LYMPHOCYTES RELATIVE PERCENT: 11.3 %
MCH RBC QN AUTO: 26.6 PG (ref 26–34)
MCHC RBC AUTO-ENTMCNC: 31.3 G/DL (ref 31–36)
MCV RBC AUTO: 84.9 FL (ref 80–100)
MONOCYTES ABSOLUTE: 1.2 K/UL (ref 0–1.3)
MONOCYTES RELATIVE PERCENT: 8.3 %
NEUTROPHILS ABSOLUTE: 11.6 K/UL (ref 1.7–7.7)
NEUTROPHILS RELATIVE PERCENT: 80.3 %
PDW BLD-RTO: 15.5 % (ref 12.4–15.4)
PLATELET # BLD: 241 K/UL (ref 135–450)
PMV BLD AUTO: 8.8 FL (ref 5–10.5)
POTASSIUM REFLEX MAGNESIUM: 3.8 MMOL/L (ref 3.5–5.1)
PRO-BNP: 1169 PG/ML (ref 0–449)
RBC # BLD: 4.5 M/UL (ref 4–5.2)
SODIUM BLD-SCNC: 138 MMOL/L (ref 136–145)
TOTAL CK: 1173 U/L (ref 26–192)
TOTAL PROTEIN: 8.1 G/DL (ref 6.4–8.2)
TROPONIN: <0.01 NG/ML
WBC # BLD: 14.4 K/UL (ref 4–11)

## 2021-12-31 PROCEDURE — 6370000000 HC RX 637 (ALT 250 FOR IP): Performed by: INTERNAL MEDICINE

## 2021-12-31 PROCEDURE — 85025 COMPLETE CBC W/AUTO DIFF WBC: CPT

## 2021-12-31 PROCEDURE — G0378 HOSPITAL OBSERVATION PER HR: HCPCS

## 2021-12-31 PROCEDURE — 99285 EMERGENCY DEPT VISIT HI MDM: CPT

## 2021-12-31 PROCEDURE — 93005 ELECTROCARDIOGRAM TRACING: CPT | Performed by: EMERGENCY MEDICINE

## 2021-12-31 PROCEDURE — 2580000003 HC RX 258: Performed by: EMERGENCY MEDICINE

## 2021-12-31 PROCEDURE — 71045 X-RAY EXAM CHEST 1 VIEW: CPT

## 2021-12-31 PROCEDURE — 82550 ASSAY OF CK (CPK): CPT

## 2021-12-31 PROCEDURE — 84484 ASSAY OF TROPONIN QUANT: CPT

## 2021-12-31 PROCEDURE — 80053 COMPREHEN METABOLIC PANEL: CPT

## 2021-12-31 PROCEDURE — 2580000003 HC RX 258: Performed by: INTERNAL MEDICINE

## 2021-12-31 PROCEDURE — 96361 HYDRATE IV INFUSION ADD-ON: CPT

## 2021-12-31 PROCEDURE — 96360 HYDRATION IV INFUSION INIT: CPT

## 2021-12-31 PROCEDURE — 83880 ASSAY OF NATRIURETIC PEPTIDE: CPT

## 2021-12-31 PROCEDURE — 1200000000 HC SEMI PRIVATE

## 2021-12-31 RX ORDER — SODIUM CHLORIDE 9 MG/ML
25 INJECTION, SOLUTION INTRAVENOUS PRN
Status: DISCONTINUED | OUTPATIENT
Start: 2021-12-31 | End: 2022-01-03 | Stop reason: HOSPADM

## 2021-12-31 RX ORDER — SODIUM CHLORIDE 0.9 % (FLUSH) 0.9 %
10 SYRINGE (ML) INJECTION EVERY 12 HOURS SCHEDULED
Status: DISCONTINUED | OUTPATIENT
Start: 2021-12-31 | End: 2022-01-03 | Stop reason: HOSPADM

## 2021-12-31 RX ORDER — POTASSIUM CHLORIDE 20 MEQ/1
40 TABLET, EXTENDED RELEASE ORAL PRN
Status: DISCONTINUED | OUTPATIENT
Start: 2021-12-31 | End: 2021-12-31 | Stop reason: SDUPTHER

## 2021-12-31 RX ORDER — ASPIRIN 81 MG/1
81 TABLET, CHEWABLE ORAL DAILY
Status: DISCONTINUED | OUTPATIENT
Start: 2022-01-01 | End: 2022-01-03 | Stop reason: HOSPADM

## 2021-12-31 RX ORDER — 0.9 % SODIUM CHLORIDE 0.9 %
500 INTRAVENOUS SOLUTION INTRAVENOUS ONCE
Status: COMPLETED | OUTPATIENT
Start: 2021-12-31 | End: 2021-12-31

## 2021-12-31 RX ORDER — POTASSIUM CHLORIDE 7.45 MG/ML
10 INJECTION INTRAVENOUS PRN
Status: DISCONTINUED | OUTPATIENT
Start: 2021-12-31 | End: 2022-01-03 | Stop reason: HOSPADM

## 2021-12-31 RX ORDER — PROMETHAZINE HYDROCHLORIDE 25 MG/1
12.5 TABLET ORAL EVERY 6 HOURS PRN
Status: DISCONTINUED | OUTPATIENT
Start: 2021-12-31 | End: 2022-01-03 | Stop reason: HOSPADM

## 2021-12-31 RX ORDER — LACTOBACILLUS RHAMNOSUS GG 10B CELL
1 CAPSULE ORAL 2 TIMES DAILY
Status: DISCONTINUED | OUTPATIENT
Start: 2022-01-01 | End: 2022-01-03 | Stop reason: HOSPADM

## 2021-12-31 RX ORDER — SODIUM CHLORIDE 0.9 % (FLUSH) 0.9 %
10 SYRINGE (ML) INJECTION PRN
Status: DISCONTINUED | OUTPATIENT
Start: 2021-12-31 | End: 2022-01-03 | Stop reason: HOSPADM

## 2021-12-31 RX ORDER — LEVETIRACETAM 500 MG/1
500 TABLET ORAL 2 TIMES DAILY
Status: DISCONTINUED | OUTPATIENT
Start: 2021-12-31 | End: 2022-01-01

## 2021-12-31 RX ORDER — LATANOPROST 50 UG/ML
1 SOLUTION/ DROPS OPHTHALMIC NIGHTLY
Status: DISCONTINUED | OUTPATIENT
Start: 2021-12-31 | End: 2022-01-03 | Stop reason: HOSPADM

## 2021-12-31 RX ORDER — MAGNESIUM SULFATE IN WATER 40 MG/ML
2000 INJECTION, SOLUTION INTRAVENOUS PRN
Status: DISCONTINUED | OUTPATIENT
Start: 2021-12-31 | End: 2022-01-03 | Stop reason: HOSPADM

## 2021-12-31 RX ORDER — DICLOFENAC SODIUM 75 MG/1
TABLET, DELAYED RELEASE ORAL
Qty: 180 TABLET | Refills: 2 | Status: SHIPPED | OUTPATIENT
Start: 2021-12-31 | End: 2022-08-12

## 2021-12-31 RX ORDER — POTASSIUM CHLORIDE 7.45 MG/ML
10 INJECTION INTRAVENOUS PRN
Status: DISCONTINUED | OUTPATIENT
Start: 2021-12-31 | End: 2021-12-31 | Stop reason: SDUPTHER

## 2021-12-31 RX ORDER — ACETAMINOPHEN 325 MG/1
650 TABLET ORAL EVERY 6 HOURS PRN
Status: DISCONTINUED | OUTPATIENT
Start: 2021-12-31 | End: 2022-01-03 | Stop reason: HOSPADM

## 2021-12-31 RX ORDER — ONDANSETRON 2 MG/ML
4 INJECTION INTRAMUSCULAR; INTRAVENOUS EVERY 6 HOURS PRN
Status: DISCONTINUED | OUTPATIENT
Start: 2021-12-31 | End: 2022-01-03 | Stop reason: HOSPADM

## 2021-12-31 RX ORDER — ROSUVASTATIN CALCIUM 40 MG/1
40 TABLET, COATED ORAL DAILY
Status: DISCONTINUED | OUTPATIENT
Start: 2022-01-01 | End: 2022-01-03 | Stop reason: HOSPADM

## 2021-12-31 RX ORDER — 0.9 % SODIUM CHLORIDE 0.9 %
1000 INTRAVENOUS SOLUTION INTRAVENOUS ONCE
Status: COMPLETED | OUTPATIENT
Start: 2021-12-31 | End: 2021-12-31

## 2021-12-31 RX ORDER — SODIUM CHLORIDE 9 MG/ML
INJECTION, SOLUTION INTRAVENOUS CONTINUOUS
Status: DISCONTINUED | OUTPATIENT
Start: 2021-12-31 | End: 2022-01-02

## 2021-12-31 RX ORDER — ACETAMINOPHEN 650 MG/1
650 SUPPOSITORY RECTAL EVERY 6 HOURS PRN
Status: DISCONTINUED | OUTPATIENT
Start: 2021-12-31 | End: 2022-01-03 | Stop reason: HOSPADM

## 2021-12-31 RX ORDER — LEVOTHYROXINE AND LIOTHYRONINE 38; 9 UG/1; UG/1
30 TABLET ORAL DAILY
Status: DISCONTINUED | OUTPATIENT
Start: 2022-01-01 | End: 2022-01-03 | Stop reason: HOSPADM

## 2021-12-31 RX ADMIN — SODIUM CHLORIDE 500 ML: 9 INJECTION, SOLUTION INTRAVENOUS at 12:29

## 2021-12-31 RX ADMIN — SODIUM CHLORIDE: 9 INJECTION, SOLUTION INTRAVENOUS at 23:20

## 2021-12-31 RX ADMIN — SODIUM CHLORIDE, PRESERVATIVE FREE 10 ML: 5 INJECTION INTRAVENOUS at 23:21

## 2021-12-31 RX ADMIN — LEVETIRACETAM 500 MG: 500 TABLET, FILM COATED ORAL at 23:21

## 2021-12-31 RX ADMIN — SODIUM CHLORIDE 1000 ML: 9 INJECTION, SOLUTION INTRAVENOUS at 14:27

## 2021-12-31 ASSESSMENT — PAIN SCALES - GENERAL: PAINLEVEL_OUTOF10: 0

## 2021-12-31 NOTE — ED NOTES
Pt resting in bed at this time, laying in a supine position with head of bed elevated . Call light remains in reach instructed pt how to use, and encouraged pt to call if needed assistance, no distress noted. RR even and unlabored, skin warm and dry. No needs at this time. Will continue to monitor closely.          Jose Schneider RN  12/31/21 0686

## 2021-12-31 NOTE — ED PROVIDER NOTES
[address] [facility]      Pt Name: Wily Vega  MRN: [id number]  [place] 1935  Date of evaluation: 12/31/2021  Provider: Tory Martinez MD    [address] [place] [address]       Chief Complaint   Patient presents with    Altered Mental Status         HISTORY OF PRESENT ILLNESS   (Location/Symptom, Timing/Onset, Context/Setting, Quality, Duration, Modifying Factors, Severity)  Note limiting factors. Wily Vega is a 82 y.o. female who presents to the emergency department for syncopal episode. HPI     This is an 72-year-old  female with noncontributory past medical history who suffered a syncopal episode to her recollection but she does not really quite know when. She lives by herself but her sister lives next door and they apparently check on each other intermittently. Her sister came in essentially found her on the ground so she called EMS. On squad arrival they noticed low-grade fever some mild tachycardia but otherwise relatively stable vitals. Patient feels globally weak but no focal fevers chills cough urgency dysuria frequency, she had no preceding chest pain or shortness of breath. She apparently lives by herself and sits watching TV for an extended period of time. Nursing Notes were reviewed. REVIEW OF SYSTEMS    (2-9 systems for level 4, 10 or more for level 5)     Review of Systems   Constitutional: Positive for fatigue. Negative for chills and fever. Respiratory: Negative for cough and shortness of breath. Cardiovascular: Negative for chest pain and palpitations. Gastrointestinal: Negative for abdominal pain, nausea and vomiting. Genitourinary: Negative for dysuria. Neurological: Positive for syncope and weakness. All other systems reviewed and are negative. Except as noted above the remainder of the review of systems was reviewed and negative.        PAST MEDICAL HISTORY     Past Medical History: daily    THYROID (NP THYROID) 30 MG TABLET    TAKE ONE TABLET BY MOUTH DAILY       ALLERGIES     Amoxicillin, Linezolid, Prednisone, Vancomycin, Coreg [carvedilol], and Oxycodone-acetaminophen    FAMILY HISTORY       Family History   Problem Relation Age of Onset   [facility] Cancer Mother     Heart Disease Mother     Arthritis Sister         rheumatoid    Cancer Brother     Cancer Sister     Diabetes Sister     Heart Disease Brother     High Blood Pressure Brother     High Blood Pressure Sister     Diabetes Brother           SOCIAL HISTORY       Social History     Socioeconomic History    Marital status: Single     Spouse name: Not on file    Number of children: Not on file    Years of education: Not on file    Highest education level: Not on file   Occupational History    Occupation: formerly secreteary   Tobacco Use    Smoking status: Never Smoker    Smokeless tobacco: Never Used    Tobacco comment: encouraged to never smoke    Vaping Use    Vaping Use: Never used   Substance and Sexual Activity    Alcohol use: No    Drug use: No    Sexual activity: Not Currently   Other Topics Concern    Not on file   Social History Narrative    Not on file     Social Determinants of Health     Financial Resource Strain:     Difficulty of Paying Living Expenses: Not on file   Food Insecurity:     Worried About Running Out of Food in the Last Year: Not on file    Jose Luis of Food in the Last Year: Not on file   Transportation Needs:     Lack of Transportation (Medical): Not on file    Lack of Transportation (Non-Medical):  Not on file   Physical Activity:     Days of Exercise per Week: Not on file    Minutes of Exercise per Session: Not on file   Stress:     Feeling of Stress : Not on file   Social Connections:     Frequency of Communication with Friends and Family: Not on file    Frequency of Social Gatherings with Friends and Family: Not on file    Attends Sabianist Services: Not on file    Active Member of Clubs or Organizations: Not on file    Attends Club or Organization Meetings: Not on file    Marital Status: Not on file   Intimate Partner Violence:     Fear of Current or Ex-Partner: Not on file    Emotionally Abused: Not on file    Physically Abused: Not on file    Sexually Abused: Not on file   Housing Stability:     Unable to Pay for Housing in the Last Year: Not on file    Number of [place] in the Last Year: Not on file    Unstable Housing in the Last Year: Not on file       SCREENINGS                               CIWA Assessment  BP: (!) 140/58  Pulse: 101                 PHYSICAL EXAM    (up to 7 for level 4, 8 or more for level 5)     ED Triage Vitals [12/31/21 1200]   BP Temp Temp Source Pulse Resp SpO2 Height Weight   (!) 140/58 99 °F (37.2 °C) Oral 101 28 100 % -- 174 lb 6.1 oz (79.1 kg)       Physical Exam  Constitutional:       General: She is not in acute distress. Appearance: She is well-developed. She is ill-appearing. She is not toxic-appearing. HENT:      Head: Normocephalic and atraumatic. Eyes:      Extraocular Movements: Extraocular movements intact. Pupils: Pupils are equal, round, and reactive to light. Cardiovascular:      Rate and Rhythm: Tachycardia present. Heart sounds: Murmur heard. No friction rub. No gallop. Pulmonary:      Effort: Pulmonary effort is normal.      Breath sounds: Normal breath sounds. Abdominal:      General: Bowel sounds are normal.      Palpations: Abdomen is soft. Tenderness: There is no abdominal tenderness. Musculoskeletal:         General: Normal range of motion. Arms:       Cervical back: Normal range of motion and neck supple. Skin:     General: Skin is warm and dry. Capillary Refill: Capillary refill takes 2 to 3 seconds. Neurological:      Mental Status: She is alert and oriented to person, place, and time. GCS: GCS eye subscore is 4. GCS verbal subscore is 5. GCS motor subscore is 6. Psychiatric:         Mood and Affect: Mood normal.         DIAGNOSTIC RESULTS     EKG: All EKG's are interpreted by the Emergency Department Physician who either signs or Co-signs this chart in the absence of a cardiologist.    EKG Interpretation    Interpreted by emergency department physician    Rhythm: normal sinus   Rate: normal  Axis: normal  Ectopy: none  Conduction: normal  ST Segments: no acute change  T Waves: no acute change  Q Waves: nonspecific    Clinical Impression: no acute changes    Davonte Atkinson MD    RADIOLOGY:   Non-plain film images such as CT, Ultrasound and MRI are read by the radiologist. Plain radiographic images are visualized and preliminarily interpreted by the emergency physician with the below findings:        Interpretation per the Radiologist below, if available at the time of this note:    XR CHEST PORTABLE   Final Result   No radiographic evidence of acute cardiopulmonary disease.                ED BEDSIDE ULTRASOUND:   Performed by ED Physician - none    LABS:  Labs Reviewed   CBC WITH AUTO DIFFERENTIAL - Abnormal; Notable for the following components:       Result Value    WBC 14.4 (*)     RDW 15.5 (*)     Neutrophils Absolute 11.6 (*)     All other components within normal limits    Narrative:     Performed at:  [place] [address]   Phone [phone]   COMPREHENSIVE METABOLIC PANEL W/ REFLEX TO MG FOR LOW K - Abnormal; Notable for the following components:    CO2 13 (*)     Anion Gap 20 (*)     Glucose 157 (*)     BUN 41 (*)     GFR Non- 43 (*)     GFR  52 (*)     Calcium 10.8 (*)     Albumin/Globulin Ratio 1.0 (*)     AST 39 (*)     All other components within normal limits    Narrative:     Aliyah Hurtado tel. [phone],  Chemistry results called to and read back by Suzi Wright RN, 12/31/2021 13:54,  by Lilia Ferraro  Performed at:  [facility]  [address] [facility] [address]   Phone [phone]   BRAIN NATRIURETIC PEPTIDE - Abnormal; Notable for the following components:    Pro-BNP 1,169 (*)     All other components within normal limits    Narrative:     Scott Avlaos tel. [phone],  Chemistry results called to and read back by Regi Toscano RN, 12/31/2021 13:54,  by [facility]  Performed at:  [facility]  [address] [place], [place] [address]   Phone [phone]   CK - Abnormal; Notable for the following components: Total CK 1,173 (*)     All other components within normal limits    Narrative:     Scott Avalos tel. [phone],  Chemistry results called to and read back by Regi Toscano RN, 12/31/2021 13:54,  by [facility]  Performed at:  [facility]  [address] [facility] [address]   Phone [phone], RAPID   RAPID INFLUENZA A/B ANTIGENS   TROPONIN    Narrative:     Scott Avalos tel. [phone],  Chemistry results called to and read back by Regi Toscano RN, 12/31/2021 13:54,  by [facility]  Performed at:  [address] [address]   Phone [phone]   URINALYSIS   LACTATE, SEPSIS   LACTATE, SEPSIS       All other labs were within normal range or not returned as of this dictation. EMERGENCY DEPARTMENT COURSE and DIFFERENTIAL DIAGNOSIS/MDM:   Vitals:    Vitals:    12/31/21 1200   BP: (!) 140/58   Pulse: 101   Resp: 28   Temp: 99 °F (37.2 °C)   TempSrc: Oral   SpO2: 100%   Weight: 174 lb 6.1 oz (79.1 kg)       Of history and physical exam were performed. I reviewed her past medical past surgical social family history. A 12 point review of systems was performed and is negative unless otherwise noted. She received IV hydration in the emergency department.   Labs are significant for acute renal failure dehydration and mild rhabdomyolysis consistent with her being on the ground for some time.    MDM   To the diagnosis cardiogenic syncope however she had no chest pain no palpitations no cardiogenic signs or symptoms prior to this. She has no significant signs of infection as well. However she is markedly dehydrated which I think is the etiology, and given that she has a decub it is unlikely  that she is unable to care for herself at home. He merits admission for further work-up and evaluation. REASSESSMENT          CRITICAL CARE TIME   Total Critical Care time was 24 minutes, excluding separately reportable procedures. There was a high probability of clinically significant/life threatening deterioration in the patient's condition which required my urgent intervention. Syncope and dehydration    CONSULTS:  None    PROCEDURES:  Unless otherwise noted below, none     Procedures        FINAL IMPRESSION      1. Syncope and collapse    2. Dehydration    3. Non-traumatic rhabdomyolysis    4. Acute renal failure, unspecified acute renal failure type [facility])          DISPOSITION/PLAN   DISPOSITION Decision To Admit 12/31/2021 02:16:02 PM      PATIENT REFERRED TO:  No follow-up provider specified. DISCHARGE MEDICATIONS:  New Prescriptions    No medications on file     Controlled Substances Monitoring:     RX Monitoring 3/19/2018   Attestation The Prescription Monitoring Report for this patient was reviewed today. Acute Pain Prescriptions Prescription exceeds daily limit for a specific reason. See comments or note. Periodic Controlled Substance Monitoring Possible medication side effects, risk of tolerance/dependence & alternative treatments discussed. ;No signs of potential drug abuse or diversion identified.        (Please note that portions of this note were completed with a voice recognition program.  Efforts were made to edit the dictations but occasionally words are mis-transcribed.)    Miryam Alvarez MD (electronically signed)  Attending Emergency Physician         Miryam Alvarez MD  12/31/21 [address] [place] [address]

## 2021-12-31 NOTE — ED NOTES
Pt resting in bed at this time, laying in a supine position with head of bed elevated . Call light remains in reach instructed pt how to use, and encouraged pt to call if needed assistance, no distress noted. RR even and unlabored, skin warm and dry. No needs at this time. Will continue to monitor closely.          Aye Irwin RN  12/31/21 4762

## 2021-12-31 NOTE — ED NOTES
Report called to   Graeme Wilkins      RN   To Room  5823  Cardiac monitor on during transfer  Pt's pain level   denies  VSS, Afebrile   IV site is clean, dry and intact, Normal saline locked   Family updated on transfer            Peter Galdamez RN  12/31/21 5347

## 2021-12-31 NOTE — ED NOTES
Bed: B-33  Expected date: 12/31/21  Expected time: 11:51 AM  Means of arrival:   Comments:  86F DAGOBERTO, trish Arceo RN  12/31/21 8813

## 2021-12-31 NOTE — ED NOTES
Pt resting in bed at this time, laying in a supine position with head of bed elevated . Call light remains in reach instructed pt how to use, and encouraged pt to call if needed assistance, no distress noted. RR even and unlabored, skin warm and dry. No needs at this time. Will continue to monitor closely.          Mikala Matthews RN  12/31/21 2171

## 2021-12-31 NOTE — ED NOTES
Pt resting in bed at this time, laying in a supine position with head of bed elevated . Call light remains in reach instructed pt how to use, and encouraged pt to call if needed assistance, no distress noted. RR even and unlabored, skin warm and dry. No needs at this time. Will continue to monitor closely.          Umu Haro RN  12/31/21 8285

## 2022-01-01 LAB
ANION GAP SERPL CALCULATED.3IONS-SCNC: 12 MMOL/L (ref 3–16)
BACTERIA: ABNORMAL /HPF
BILIRUBIN URINE: ABNORMAL
BLOOD, URINE: ABNORMAL
BUN BLDV-MCNC: 38 MG/DL (ref 7–20)
CALCIUM SERPL-MCNC: 9.7 MG/DL (ref 8.3–10.6)
CHLORIDE BLD-SCNC: 112 MMOL/L (ref 99–110)
CLARITY: ABNORMAL
CO2: 18 MMOL/L (ref 21–32)
COLOR: ABNORMAL
COMMENT UA: ABNORMAL
CREAT SERPL-MCNC: 0.8 MG/DL (ref 0.6–1.2)
EKG ATRIAL RATE: 89 BPM
EKG DIAGNOSIS: NORMAL
EKG P AXIS: 43 DEGREES
EKG P-R INTERVAL: 128 MS
EKG Q-T INTERVAL: 366 MS
EKG QRS DURATION: 88 MS
EKG QTC CALCULATION (BAZETT): 445 MS
EKG R AXIS: -7 DEGREES
EKG T AXIS: 21 DEGREES
EKG VENTRICULAR RATE: 89 BPM
EPITHELIAL CELLS, UA: 16 /HPF (ref 0–5)
GFR AFRICAN AMERICAN: >60
GFR NON-AFRICAN AMERICAN: >60
GLUCOSE BLD-MCNC: 103 MG/DL (ref 70–99)
GLUCOSE BLD-MCNC: 104 MG/DL (ref 70–99)
GLUCOSE URINE: NEGATIVE MG/DL
HCT VFR BLD CALC: 34.8 % (ref 36–48)
HEMOGLOBIN: 11.2 G/DL (ref 12–16)
KETONES, URINE: 15 MG/DL
LEUKOCYTE ESTERASE, URINE: ABNORMAL
MCH RBC QN AUTO: 27 PG (ref 26–34)
MCHC RBC AUTO-ENTMCNC: 32.2 G/DL (ref 31–36)
MCV RBC AUTO: 83.9 FL (ref 80–100)
MICROSCOPIC EXAMINATION: YES
NITRITE, URINE: POSITIVE
PDW BLD-RTO: 15.3 % (ref 12.4–15.4)
PERFORMED ON: ABNORMAL
PH UA: 6.5 (ref 5–8)
PLATELET # BLD: 220 K/UL (ref 135–450)
PMV BLD AUTO: 8.3 FL (ref 5–10.5)
POTASSIUM REFLEX MAGNESIUM: 3.6 MMOL/L (ref 3.5–5.1)
PROCALCITONIN: 0.2 NG/ML (ref 0–0.15)
PROTEIN UA: >=300 MG/DL
RAPID INFLUENZA  B AGN: NEGATIVE
RAPID INFLUENZA A AGN: NEGATIVE
RBC # BLD: 4.15 M/UL (ref 4–5.2)
RBC UA: 42 /HPF (ref 0–4)
SARS-COV-2, NAAT: NOT DETECTED
SODIUM BLD-SCNC: 142 MMOL/L (ref 136–145)
SPECIFIC GRAVITY UA: >1.03 (ref 1–1.03)
TOTAL CK: 738 U/L (ref 26–192)
URINE TYPE: ABNORMAL
UROBILINOGEN, URINE: 1 E.U./DL
WBC # BLD: 11.1 K/UL (ref 4–11)
WBC UA: 18 /HPF (ref 0–5)

## 2022-01-01 PROCEDURE — 97116 GAIT TRAINING THERAPY: CPT

## 2022-01-01 PROCEDURE — 93010 ELECTROCARDIOGRAM REPORT: CPT | Performed by: INTERNAL MEDICINE

## 2022-01-01 PROCEDURE — 87635 SARS-COV-2 COVID-19 AMP PRB: CPT

## 2022-01-01 PROCEDURE — 97535 SELF CARE MNGMENT TRAINING: CPT

## 2022-01-01 PROCEDURE — 85027 COMPLETE CBC AUTOMATED: CPT

## 2022-01-01 PROCEDURE — 2700000000 HC OXYGEN THERAPY PER DAY

## 2022-01-01 PROCEDURE — 6370000000 HC RX 637 (ALT 250 FOR IP): Performed by: INTERNAL MEDICINE

## 2022-01-01 PROCEDURE — 97166 OT EVAL MOD COMPLEX 45 MIN: CPT

## 2022-01-01 PROCEDURE — 97530 THERAPEUTIC ACTIVITIES: CPT

## 2022-01-01 PROCEDURE — 6360000002 HC RX W HCPCS: Performed by: INTERNAL MEDICINE

## 2022-01-01 PROCEDURE — 97162 PT EVAL MOD COMPLEX 30 MIN: CPT

## 2022-01-01 PROCEDURE — 87804 INFLUENZA ASSAY W/OPTIC: CPT

## 2022-01-01 PROCEDURE — G0378 HOSPITAL OBSERVATION PER HR: HCPCS

## 2022-01-01 PROCEDURE — 94761 N-INVAS EAR/PLS OXIMETRY MLT: CPT

## 2022-01-01 PROCEDURE — 2580000003 HC RX 258: Performed by: INTERNAL MEDICINE

## 2022-01-01 PROCEDURE — 1200000000 HC SEMI PRIVATE

## 2022-01-01 PROCEDURE — 82550 ASSAY OF CK (CPK): CPT

## 2022-01-01 PROCEDURE — 84145 PROCALCITONIN (PCT): CPT

## 2022-01-01 PROCEDURE — 80048 BASIC METABOLIC PNL TOTAL CA: CPT

## 2022-01-01 PROCEDURE — 96372 THER/PROPH/DIAG INJ SC/IM: CPT

## 2022-01-01 PROCEDURE — 36415 COLL VENOUS BLD VENIPUNCTURE: CPT

## 2022-01-01 PROCEDURE — 81001 URINALYSIS AUTO W/SCOPE: CPT

## 2022-01-01 RX ADMIN — LEVOTHYROXINE, LIOTHYRONINE 30 MG: 38; 9 TABLET ORAL at 08:51

## 2022-01-01 RX ADMIN — ROSUVASTATIN CALCIUM 40 MG: 40 TABLET, FILM COATED ORAL at 08:51

## 2022-01-01 RX ADMIN — SERTRALINE HYDROCHLORIDE 50 MG: 50 TABLET ORAL at 08:50

## 2022-01-01 RX ADMIN — Medication 1 CAPSULE: at 20:15

## 2022-01-01 RX ADMIN — SODIUM CHLORIDE, PRESERVATIVE FREE 10 ML: 5 INJECTION INTRAVENOUS at 11:25

## 2022-01-01 RX ADMIN — ASPIRIN 81 MG 81 MG: 81 TABLET ORAL at 08:50

## 2022-01-01 RX ADMIN — SODIUM CHLORIDE, PRESERVATIVE FREE 10 ML: 5 INJECTION INTRAVENOUS at 20:16

## 2022-01-01 RX ADMIN — LATANOPROST 1 DROP: 50 SOLUTION OPHTHALMIC at 00:00

## 2022-01-01 RX ADMIN — ENOXAPARIN SODIUM 40 MG: 100 INJECTION SUBCUTANEOUS at 08:51

## 2022-01-01 RX ADMIN — LATANOPROST 1 DROP: 50 SOLUTION OPHTHALMIC at 20:15

## 2022-01-01 RX ADMIN — SODIUM CHLORIDE: 9 INJECTION, SOLUTION INTRAVENOUS at 12:43

## 2022-01-01 RX ADMIN — Medication 1 CAPSULE: at 08:50

## 2022-01-01 RX ADMIN — LEVETIRACETAM 500 MG: 500 TABLET, FILM COATED ORAL at 08:50

## 2022-01-01 ASSESSMENT — PAIN SCALES - GENERAL: PAINLEVEL_OUTOF10: 0

## 2022-01-01 NOTE — PROGRESS NOTES
Hospitalist Progress Note      PCP: Cali Lamar DO    Chief Complaint. Patient is a 62-year-old female with past medical history of CAD hypertension hyperlipidemia who presented to hospital due to change in mental status. Reportedly patient was found on the ground, patient does not remember how it happened however mentions she has been on the ground for a long period of time. Patient otherwise denied chest pain nausea vomiting diarrhea constipation. Patient mentions she was not able to stand up and walk. Date of Admission: 12/31/2021    Subjective:   denies chest pain, nausea, vomiting, shortness of breath, fever or chills. mention feels overall better    Medications:  Reviewed    Infusion Medications    sodium chloride      sodium chloride 75 mL/hr at 01/01/22 1243     Scheduled Medications    sodium chloride flush  10 mL IntraVENous 2 times per day    enoxaparin  40 mg SubCUTAneous Daily    aspirin  81 mg Oral Daily    lactobacillus  1 capsule Oral BID    latanoprost  1 drop Left Eye Nightly    rosuvastatin  40 mg Oral Daily    sertraline  50 mg Oral Daily    thyroid  30 mg Oral Daily     PRN Meds: sodium chloride flush, sodium chloride, potassium chloride, magnesium sulfate, promethazine **OR** ondansetron, magnesium hydroxide, acetaminophen **OR** acetaminophen      Intake/Output Summary (Last 24 hours) at 1/1/2022 1804  Last data filed at 1/1/2022 1333  Gross per 24 hour   Intake 240 ml   Output --   Net 240 ml       Physical Exam Performed:    /60   Pulse 81   Temp 99 °F (37.2 °C) (Oral)   Resp 18   Wt 164 lb 0.4 oz (74.4 kg)   SpO2 93%   BMI 28.60 kg/m²     General appearance: NAD  HEENT:  Conjunctivae/corneas clear. Neck: Supple, with full range of motion. Respiratory:  Normal respiratory effort. Clear to auscultation, bilaterally without Rales/Wheezes/Rhonchi.   Cardiovascular: Regular rate and rhythm with normal S1/S2 without murmurs or rubs  Abdomen: Soft, UA-suggestive of hematuria and UTI, will start IV Rocephin  DVT prophylaxis-Lovenox  Resume home medications, hold nephrotoxic medications  Diet: ADULT DIET;  Regular  Code Status: Full Code    PT/OT Eval Status: ordered    Dispo -likely discharge tomorrow after repeat CPK level, and orthostatic vitals, continue Rocephin    Jaylan Nicholson MD

## 2022-01-01 NOTE — PROGRESS NOTES
Occupational Therapy   Occupational Therapy Initial Assessment  Date: 2022   Patient Name: Jordy Ocampo  MRN: [id number]     : 1935    Date of Service: 2022    Discharge Recommendations:  Continue to assess pending progress,Patient would benefit from continued therapy after discharge,24 hour supervision or assist,Home with Home health OT       Assessment   Performance deficits / Impairments: Decreased functional mobility ; Decreased ADL status; Decreased endurance;Decreased cognition;Decreased safe awareness;Decreased balance;Decreased high-level IADLs  Assessment: Pt is an 82 y.o. female admitted with Syncope and collapse, Dehydration, Nontraumatic rhabdomyolysis, ANTONIO. At baseline, pt lives alone in [facility] and independent ADLs and fxl mobility with RW. Pt currently functioning below baseline d/t the above deficits, today requiring CGA fxl transfers/mobility with RW, max A/total A toileting and LB dressing, and anticipate pt would require overall mod A for ADLs. Pt did desaturate to 50's on RA and RN placed pt on 2-3 L O2 for O2 sats >90%. Will cont to see on acute to address the above limitations and maximize pt's safety and independence. As pt presents today, recommend 24 hour assist and home OT at d/c. Prognosis: Good  Decision Making: Medium Complexity  OT Education: OT Role;Plan of Care;ADL Adaptive Strategies;Transfer Training;Orientation  Barriers to Learning: cognition, hearing  REQUIRES OT FOLLOW UP: Yes  Activity Tolerance  Activity Tolerance: Patient limited by fatigue  Activity Tolerance: SpO2 on RA 50's with activity. RN placed pt on 3 L O2 and O2 sats increased to 100% once in bed. RN left pt on 2 L O2. Safety Devices  Safety Devices in place: Yes  Type of devices: Call light within reach; Bed alarm in place; Left in bed;Gait belt           Patient Diagnosis(es): The primary encounter diagnosis was Syncope and collapse.  Diagnoses of Dehydration, Non-traumatic rhabdomyolysis, and Acute renal failure, unspecified acute renal failure type [facility]) were also pertinent to this visit. has a past medical history of CAD (coronary artery disease), DDD (degenerative disc disease), lumbosacral, Hearing aid worn, [place] (hard of hearing), Hyperlipidemia, Hypertension, MI, old, Neuropathy, Sleep apnea, Thyroid disease, and Wears glasses. has a past surgical history that includes Cataract removal (03/23/2010 and 04/14/2010); Bunionectomy (10/2003); Dilation and curettage of uterus; Appendectomy (1963); Ovary removal (1963); Tonsillectomy and adenoidectomy (1942); Breast lumpectomy (1986); Incontinence surgery; Colonoscopy (6/2014); Coronary angioplasty with stent (01/23/2017); back surgery ([id number]); joint replacement (Right, 2018); and Abscess Drainage (Right, 03/27/2018). Restrictions  Restrictions/Precautions  Restrictions/Precautions: Fall Risk,Isolation  Position Activity Restriction  Other position/activity restrictions: COVID r/o    Subjective   General  Chart Reviewed: Yes  Patient assessed for rehabilitation services?: Yes  Additional Pertinent Hx: Per Tova Ruiz MD's H&P: \"Patient is a 65-year-old female with past medical history of CAD hypertension hyperlipidemia who presented to hospital due to change in mental status. Reportedly patient was found on the ground, patient does not remember how it happened however mentions she has been on the ground for a long period of time. Patient otherwise denied chest pain nausea vomiting diarrhea constipation. Patient mentions she was not able to stand up and walk. \"  Family / Caregiver Present: No  Referring Practitioner: Tova Ruiz MD  Diagnosis: Syncope and collapse, Dehydration, Nontraumatic rhabdomyolysis, ANTONIO  Subjective  Subjective: Pt met b/s for OT eval/tx. Pt in bed sleeping on arrival, awakens to call of name and agreeable to participate in therapy. Pt denies pain.     Social/Functional History  Social/Functional History  Lives With: Alone (sister lives next door)  Type of Home:  (condo)  Home Layout: One level  Home Access: Ramped entrance,Stairs to enter with rails  Entrance Stairs - Number of Steps: 7 DAMIR ; pt uses ramp  Bathroom Shower/Tub: Walk-in shower,Doors  Bathroom Toilet: Handicap height  Bathroom Equipment: Built-in shower seat,Grab bars in shower,Hand-held shower  Bathroom Accessibility: Wheelchair accessible  Home Equipment: Rolling walker  ADL Assistance: Independent  Homemaking Assistance:  (Pt does own cleaning, light meal prep, laundry. Sister does grocery shopping)  Ambulation Assistance: Independent (with RW)  Transfer Assistance: Independent  Active : Yes  Occupation: Retired  IADL Comments: pt sleeps in regular bed       Objective   Vision: Within [address] Exceptions: Wears glasses for reading  Hearing: Exceptions to [facility]  Hearing Exceptions: Hard of hearing/hearing concerns;Bilateral hearing aid (has hearing aids, but doesn't wear)      Orientation  Overall Orientation Status: Impaired  Orientation Level: Oriented to person;Oriented to place; Disoriented to time;Oriented to situation     Balance  Sitting Balance: Stand by assistance  Standing Balance: Contact guard assistance  Functional Mobility  Functional - Mobility Device: Rolling Walker  Assist Level: Contact guard assistance  Functional Mobility Comments: Pt completed fxl mobility ~10 ft with RW and CGA. While seated on toilet, pt began shaking and SpO2 on RA 50's. RN In room and placed pt on 3 L O2. Satish Abler lidia used to transfer pt back to bed d/t low O2 sats. ADL  LE Dressing: Max/Dependent/Total (to change depends, don blas socks)  Toileting: Max/Dependent/Total (to void urine/BM at toilet, assist to manage depends and hygiene. While seated on toilet, pt began shaking. SpO2 on RA in 50's.  RN in room and put pt on 3 L O2)  Additional Comments: Anticipate pt is independent feeding, CGA grooming, mod A bathing and dressing based on ROM/strength, balance, endurance. Bed mobility  Supine to Sit: Stand by assistance (HOB elevated, use of rail, increased time)  Sit to Supine: Stand by assistance  Scooting: Stand by assistance     Transfers  Sit to stand: Contact guard assistance (EOB>RW, toilet>cordelia stedy)  Stand to sit: Contact guard assistance (RW>toilet, cordelia stedy>EOB)  Transfer Comments: Toilet Transfers  Toilet Transfer: Contact guard assistance (RW>comfort height toilet CGA, toilet>cordelia stedy CGA)    Cognition  Overall Cognitive Status: Exceptions  Arousal/Alertness: Appropriate responses to stimuli  Following Commands: Follows one step commands with increased time  Attention Span: Attends with cues to redirect  Memory: Decreased recall of recent events;Decreased short term memory  Safety Judgement: Decreased awareness of need for safety  Problem Solving: Decreased awareness of errors;Assistance required to identify errors made  Insights: Decreased awareness of deficits  Initiation: Does not require cues  Sequencing: Does not require cues     Sensation  Overall Sensation Status:  (unable to test this morning)       Plan   Plan  Times per week: 3-5  Current Treatment Recommendations: Balance Training,Functional Mobility Training,Safety Education & Training,Strengthening,Endurance Training,Self-Care / ADL,Cognitive Reorientation,Cognitive/Perceptual Training      AM-PAC Score        AM-[facility] Inpatient Daily Activity Raw Score: 16 (01/01/22 0908)  AM-PAC Inpatient ADL T-Scale Score : 35.96 (01/01/22 0908)  ADL Inpatient CMS 0-100% Score: 53.32 (01/01/22 0908)  ADL Inpatient CMS G-Code Modifier : CK (01/01/22 0908)    Goals  Short term goals  Time Frame for Short term goals: Prior to d/c:  Short term goal 1: Pt will bathe with supervision. Short term goal 2: Pt will dress with supervision. Short term goal 3: Pt will toilet with supervision.   Short term goal 4: Pt will complete fxl mobility and fxl transfers to/from ADL surfaces with supervision using AD.  Short term goal 5: Pt will increase activity tolerance to achieve the above goals. Long term goals  Time Frame for Long term goals : STGs=LTGs  Patient Goals   Patient goals : \"to survive\"       Therapy Time   Individual Concurrent Group Co-treatment   Time In [phone]         Time Out 0907         Minutes 46         Timed Code Treatment Minutes: 31 Minutes      This note to serve as OT d/c summary if pt is d/c-ed prior to next therapy session.       Doreen Ryan, OTR/L 3230

## 2022-01-01 NOTE — PROGRESS NOTES
Physical Therapy    Facility/Department: [address] MED SURG  Initial Assessment    NAME: Davida Pascual  : 1935  MRN: [id number]    Date of Service: 2022    Discharge Recommendations:  24 hour supervision or assist,S Level 1,Home with Home health PT   PT Equipment Recommendations  Equipment Needed: No  Other: pt owns RW  Davida Sessions scored a 18/24 on the AM-PAC short mobility form. Current research shows that an AM-PAC score of 18 or greater is typically associated with a discharge to the patient's home setting. Based on the patient's AM-PAC score and their current functional mobility deficits, it is recommended that the patient have 2-3 sessions per week of Physical Therapy at d/c to increase the patient's independence. At this time, this patient demonstrates the endurance and safety to discharge home with level 1 home PT (home vs OP services) and a follow up treatment frequency of 2-3x/wk. Please see assessment section for further patient specific details. If patient discharges prior to next session this note will serve as a discharge summary. Please see below for the latest assessment towards goals. HOME HEALTH CARE: LEVEL 1 STANDARD     -Initial home health evaluation to occur within 24-48 hours, in patient home    -Home health agency to establish plan of care for patient over 60 day period    -Medication Reconciliation    -PCP Visit scheduled within seven days of discharge    -PT/OT to evaluate with goal of regaining prior level of functioning    -OT to evaluate if patient has [address] needs for personal care       Assessment   Body structures, Functions, Activity limitations: Decreased functional mobility ; Decreased endurance;Decreased safe awareness  Assessment: Pt 83 y/o female admitted due to syncope and fall at home, continued medical assessment at this time for r/o of COVID 19. Pt denies any previous falls prior to admission, independent with all functional mobility tasks prior to admission with use of RW for ambulation at all time. Pt sister supported and lives next door to patient able to assist PRN at discharge. This date patient initially SBA-CGA for all functional mobility tasks with good participation, upon sitting on commode complains of feeling different with BUE shaking RN present for assessment of SPO2% desaturation. Use of steady to return to bed for medical safety. Pending medical progression predict patient should be able to return home with initial 24 hour assistance/supervision with home PT. Benefit from continued PT to promote increased activity tolerance, maintain strength, and promote independence with mobility tasks for safe transition to home. Treatment Diagnosis: Decreased activity tolerance  Prognosis: Good  Decision Making: Medium Complexity  History: see below  Exam: see below  Clinical Presentation: evolving  PT Education: Goals;PT Role;Plan of Care;Transfer Training;Energy Conservation; Adaptive Device Training;Equipment;Orientation;Disease Specific Education;Gait Training;Functional Mobility Training;General Safety  Patient Education: pursed lip breathing  Barriers to Learning: [place]  REQUIRES PT FOLLOW UP: Yes  Activity Tolerance  Activity Tolerance: Patient limited by endurance;Treatment limited secondary to medical complications (free text)  Activity Tolerance: limited due to desaturation on room air symptomatic       Patient Diagnosis(es): The primary encounter diagnosis was Syncope and collapse. Diagnoses of Dehydration, Non-traumatic rhabdomyolysis, and Acute renal failure, unspecified acute renal failure type [facility]) were also pertinent to this visit. has a past medical history of CAD (coronary artery disease), DDD (degenerative disc disease), lumbosacral, Hearing aid worn, [place] (hard of hearing), Hyperlipidemia, Hypertension, MI, old, Neuropathy, Sleep apnea, Thyroid disease, and Wears glasses.    has a past surgical history that includes Cataract removal (03/23/2010 and 04/14/2010); Bunionectomy (10/2003); Dilation and curettage of uterus; Appendectomy (1963); Ovary removal (1963); Tonsillectomy and adenoidectomy (1942); Breast lumpectomy (1986); Incontinence surgery; Colonoscopy (6/2014); Coronary angioplasty with stent (01/23/2017); back surgery ([id number]); joint replacement (Right, 2018); and Abscess Drainage (Right, 03/27/2018). Restrictions  Restrictions/Precautions  Restrictions/Precautions: Fall Risk,Isolation  Position Activity Restriction  Other position/activity restrictions: COVID r/o  Vision/Hearing  Vision: Within Functional Limits  Vision Exceptions: Wears glasses for reading  Hearing: Exceptions to [facility]  Hearing Exceptions: Hard of hearing/hearing concerns;Bilateral hearing aid (has hearing aids, but doesn't wear)     Subjective  General  Chart Reviewed: Yes  Patient assessed for rehabilitation services?: Yes  Additional Pertinent Hx: per MD H&P 12/31/21: \"Patient is a 62-year-old female with past medical history of CAD hypertension hyperlipidemia who presented to hospital due to change in mental status. Reportedly patient was found on the ground, patient does not remember how it happened however mentions she has been on the ground for a long period of time. Patient otherwise denied chest pain nausea vomiting diarrhea constipation. Patient mentions she was not able to stand up and walk. \"  Response To Previous Treatment: Not applicable  Family / Caregiver Present: No  Referring Practitioner: Sunny Garcia MD  Referral Date : 12/31/21  Diagnosis: syncope and collapse  Follows Commands: Within Functional Limits  Subjective  Subjective: Pt supine in bed sleeping upon arrival, easy to arouse and agreeable to PT evaluation.  Without complaints at beginning of session          Orientation  Orientation  Overall Orientation Status: Impaired  Orientation Level: Oriented to place;Oriented to situation;Oriented to person;Disoriented to time (pt able to guess year but states July 4th. Knows it is a holiday)  Social/Functional History  Social/Functional History  Lives With: Alone (sister lives next door)  Type of Home:  (condo)  Home Layout: One level  Home Access: Ramped entrance,Stairs to enter with rails  Entrance Stairs - Number of Steps: 7 DAMIR ; pt uses ramp  Bathroom Shower/Tub: Walk-in shower,Doors  Bathroom Toilet: Handicap height  Bathroom Equipment: Built-in shower seat,Grab bars in shower,Hand-held shower  Bathroom Accessibility: Wheelchair accessible  Home Equipment: Rolling walker  ADL Assistance: Independent  Homemaking Assistance:  (Pt does own cleaning, light meal prep, laundry. Sister does grocery shopping)  Ambulation Assistance: Independent (with RW)  Transfer Assistance: Independent  Active : Yes  Occupation: Retired  IADL Comments: pt sleeps in regular bed    Objective          AROM RLE (degrees)  RLE AROM: WFL  AROM LLE (degrees)  LLE AROM : WFL  AROM RUE (degrees)  RUE AROM : WFL  AROM LUE (degrees)  LUE AROM : WFL  Strength RLE  Strength RLE: WFL  Comment: not formally assessed at this time, functional for mobility tasks  Strength LLE  Strength LLE: WFL  Strength RUE  Strength RUE: WFL  Strength LUE  Strength LUE: WFL  Tone RLE  RLE Tone: Normotonic  Tone LLE  LLE Tone: Normotonic  Motor Control  Gross Motor?: WFL  Sensation  Overall Sensation Status:  (unable to test this morning)  Bed mobility  Supine to Sit: Stand by assistance (HOB elevated, use of rail, increased time)  Sit to Supine: Stand by assistance  Scooting: Stand by assistance  Comment: when allowed increased time able to perform without physical assistance  Transfers  Sit to Stand: Contact guard assistance  Stand to sit: Contact guard assistance  Comment: Initially CGA for transfers and ambulation, due to low SPO2% reading symtomatic dependent via cordelia murillo from commode back to bed due to safety concerns due to medical reasons at this time.   Ambulation  Ambulation?: Yes  Ambulation 1  Surface: level tile  Device: Rolling Walker  Other Apparatus:  (IV pole managed By therapy staff)  Assistance: Contact guard assistance  Quality of Gait: pushes RW too far forward, quickly impulsive ronan needing cues for safety but appears baseline for patient, cues for safety with body positioning within device  Gait Deviations: Shuffles; Deviated path  Distance: 22' with multiple turns into bathroom  Comments: BUE begin shaking when sitting on commode after functional mobility tasks, SPO2% per RN 53%. Requires use of stedy to return to bed at this time due to safety concerns. Placed on 3-4 L NC to recover up to 100% SPO2%, 2L SPO2% at completion supine in bed 98%  Stairs/Curb  Stairs?: No (not safe to perform at this time, does not need to perform to return home)     Balance  Posture: Fair  Sitting - Static: Good  Sitting - Dynamic: Good  Standing - Static: Fair;+  Standing - Dynamic: Fair;+  Comments: When feelings SBA on commode for sitting balance, CGA standing balance for initially ambulation progressing to min assist at stedy with low saturation levels. Plan   Plan  Times per week: 3-5X  Current Treatment Recommendations: Strengthening,Balance Training,Endurance Training,Functional Mobility Training,Gait Training,Transfer Training,Equipment Evaluation, Education, & procurement,Patient/Caregiver Education & Training,Safety Education & Training,Neuromuscular Re-education,Home Exercise Program  Safety Devices  Type of devices:  All fall risk precautions in place,Bed alarm in place,Call light within reach,Gait belt,Patient at risk for falls,Left in bed,Nurse notified (RN present in room to assist patient at completion of session)  Restraints  Initially in place: No    AM-PAC Score  AM-PAC Inpatient Mobility Raw Score : 18 (01/01/22 0910)  AM-PAC Inpatient T-Scale Score : 43.63 (01/01/22 0910)  Mobility Inpatient CMS 0-100% Score: 46.58 (01/01/22 0910)  Mobility Inpatient CMS G-Code Modifier : CK (01/01/22 0910)          Goals  Short term goals  Time Frame for Short term goals: Prior to return home  Short term goal 1: Bed mobility mod I  Short term goal 2: Transfer sit <-> stand supervision  Short term goal 3: Pt will ambulate 100' with LRAD supervision  Long term goals  Time Frame for Long term goals : STG=LTG  Patient Goals   Patient goals : \"to survive, return home\"       Therapy Time   Individual Concurrent Group Co-treatment   Time In 0821         Time Out 0908         Minutes 47         Timed Code Treatment Minutes: [address] [facility] [address], PT     Electronically signed by Roberto Carlos Kim PT on 1/1/22 at 9:12 AM EST

## 2022-01-01 NOTE — PROGRESS NOTES
Pt was in Bathroom with Pt was shaking while sitting on toilet. Pt O2 sat was 52. . This RN placed pt on 4L nc and decreased to 2L O2 sat 96. VSS. Call light and bedside table within reach. Bed alarm on. Pt resting comfortably in bed. No signs of distress. Will continue to monitor.

## 2022-01-01 NOTE — H&P
Hospital Medicine History & Physical      PCP: Andrea Casillas DO    Date of Admission: 12/31/2021    Chief Complaint:  AMS, fall    History Of Present Illness:  Patient is a 42-year-old female with past medical history of CAD hypertension hyperlipidemia who presented to hospital due to change in mental status. Reportedly patient was found on the ground, patient does not remember how it happened however mentions she has been on the ground for a long period of time. Patient otherwise denied chest pain nausea vomiting diarrhea constipation. Patient mentions she was not able to stand up and walk. Past Medical History:          Diagnosis Date    CAD (coronary artery disease)     Inferior STEMI; CATRACHITA to RCA    DDD (degenerative disc disease), lumbosacral 4/10/2013    Hearing aid worn     bilateral    [place] (hard of hearing)     Hyperlipidemia     Hypertension     MI, old 2017    Neuropathy     bilateral lower extremities    Sleep apnea     uses mouth piece; bringing DOS 3/26/18    Thyroid disease     hypo    Wears glasses        Past Surgical History:          Procedure Laterality Date    ABSCESS DRAINAGE Right 03/27/2018    evacuation hematoma right knee    APPENDECTOMY  1963    BACK SURGERY  [id number]    MILD PROCEDURE L2-3 BILATERAL     BREAST LUMPECTOMY  1986    BUNIONECTOMY  10/2003    CATARACT REMOVAL  03/23/2010 and 04/14/2010    COLONOSCOPY  6/2014    repeat 2019    CORONARY ANGIOPLASTY WITH STENT PLACEMENT  01/23/2017    INF STEMI with CATRACHITA to RCA    DILATION AND CURETTAGE OF UTERUS      INCONTINENCE SURGERY      JOINT REPLACEMENT Right 2018    right total knee replacment    OVARY REMOVAL  1963    right    TONSILLECTOMY AND ADENOIDECTOMY  1942       Medications Prior to Admission:      Prior to Admission medications    Medication Sig Start Date End Date Taking?  Authorizing Provider   diclofenac (VOLTAREN) 75 MG EC tablet TAKE ONE TABLET BY MOUTH TWICE A DAY WITH MEALS 12/31/21 Serge Davenport DO   lisinopril (PRINIVIL;ZESTRIL) 20 MG tablet TAKE ONE TABLET BY MOUTH DAILY 10/18/21   Serge Davenport DO   potassium chloride (KLOR-CON M) 10 MEQ extended release tablet TAKE ONE TABLET BY MOUTH DAILY 9/2/21   Otceleste Vidal DO   thyroid (NP THYROID) 30 MG tablet TAKE ONE TABLET BY MOUTH DAILY 8/31/21   Serge Davenport DO   levETIRAcetam (KEPPRA) 500 MG tablet TAKE ONE TABLET BY MOUTH TWICE A DAY 8/31/21   Serge Davenport DO   sertraline (ZOLOFT) 50 MG tablet Take 1 tablet by mouth daily 8/31/21   Serge Davenport DO   rosuvastatin (CRESTOR) 40 MG tablet TAKE ONE TABLET BY MOUTH DAILY 4/1/21   Lacy Win MD   Lactobacillus (ACIDOPHILUS) TABS Take 1 tablet by mouth 2 times daily    Historical Provider, MD   Multiple Vitamins-Minerals (DAILY SHERIE MAXIMUM MULTIVITAMIN PO) Take 1 packet by mouth daily    Historical Provider, MD   aspirin 81 MG tablet Take 81 mg by mouth daily    Historical Provider, MD   latanoprost (XALATAN) 0.005 % ophthalmic solution Place 1 drop into the left eye nightly  2/1/18   Historical Provider, MD       Allergies:  Amoxicillin, Linezolid, Prednisone, Vancomycin, Coreg [carvedilol], and Oxycodone-acetaminophen    Social History:      TOBACCO:   reports that she has never smoked. She has never used smokeless tobacco.  ETOH:   reports no history of alcohol use. Family History:       Reviewed in detail and non contributory          Problem Relation Age of Onset    Cancer Mother     Heart Disease Mother     Arthritis Sister         rheumatoid    Cancer Brother     Cancer Sister     Diabetes Sister     Heart Disease Brother     High Blood Pressure Brother     High Blood Pressure Sister     Diabetes Brother        REVIEW OF SYSTEMS:   Pertinent positives as noted in the HPI. All other systems reviewed and negative.     PHYSICAL EXAM PERFORMED:    /74   Pulse 90   Temp 98.8 °F (37.1 °C)   Resp 18   Wt 174 lb 6.1 oz (79.1 kg)   SpO2 97% BMI 30.41 kg/m²     General appearance:  No apparent distress, cooperative. HEENT:  Normal cephalic, atraumatic without obvious deformity. Conjunctivae/corneas clear. Neck: Supple, with full range of motion. No cervical lymphadenopathy  Respiratory:  Normal respiratory effort. Clear to auscultation, bilaterally without Rales/Wheezes/Rhonchi. Cardiovascular:  Regular rate and rhythm with normal S1/S2 without murmurs, rubs or gallops. Abdomen: Soft, non-tender, non-distended, normal bowel sounds. Musculoskeletal:  No edema noted bilaterally. No tenderness on palpation   Skin: no rash visible  Neurologic:  Neurologically intact without any focal sensory/motor deficits. grossly non-focal.  Psychiatric:  Alert and oriented, normal mood  Peripheral Pulses: +2 palpable, equal bilaterally       Labs:     Recent Labs     12/31/21  1254   WBC 14.4*   HGB 12.0   HCT 38.2        Recent Labs     12/31/21  1254      K 3.8      CO2 13*   BUN 41*   CREATININE 1.2   CALCIUM 10.8*     Recent Labs     12/31/21  1254   AST 39*   ALT 14   BILITOT 0.8   ALKPHOS 92     No results for input(s): INR in the last 72 hours. Recent Labs     12/31/21  1254   CKTOTAL 1,173*   TROPONINI <0.01       Urinalysis:      Lab Results   Component Value Date    NITRU Negative 02/27/2018    BLOODU Negative 02/27/2018    SPECGRAV 1.007 02/27/2018    GLUCOSEU Negative 02/27/2018       Radiology:       XR CHEST PORTABLE   Final Result   No radiographic evidence of acute cardiopulmonary disease. Active Hospital Problems    Diagnosis Date Noted    Fall against object [W18.00XA] 12/31/2021       Patient is a 82-year-old female with past medical history of CAD hypertension hyperlipidemia who presented to hospital due to change in mental status. Reportedly patient was found on the ground, patient does not remember how it happened however mentions she has been on the ground for a long period of time.   Patient otherwise denied chest pain nausea vomiting diarrhea constipation. Patient mentions she was not able to stand up and walk. Assessment  Syncope and collapse  Dehydration  Nontraumatic rhabdomyolysis  ANTONIO  CAD  Hypertension  Hyperlipidemia    Plan  Gentle IV fluid therapy with normal saline, check orthostatic vitals, monitor BMP, monitor CK, check UA  DVT prophylaxis-Lovenox  Resume home medications, hold nephrotoxic medications  Diet: No diet orders on file  Code Status: Prior    PT/OT Eval Status: ordered    Dispo - pending clinical improvement       Skyler Zapata MD    The note was completed using EMR and Dragon dictation system. Every effort was made to ensure accuracy; however, inadvertent computerized transcription errors may be present. Thank you Lance Colunga DO for the opportunity to be involved in this patient's care. If you have any questions or concerns please feel free to contact me at [phone].     Skyler Zapata MD

## 2022-01-01 NOTE — PROGRESS NOTES
Pt confused. Pt on waitlist for telecam.Wrapped IV with Coban. Bed alarm on, wheels locked. Bedside table and call light within reach. Will continue to monitor.

## 2022-01-01 NOTE — PROGRESS NOTES
Pharmacy Medication Reconciliation Note     List of medications patient is currently taking is complete. Source of information:   1. Ezequiel Humphrey [phone] (niece)  2. MD office visit    Notes regarding home medications:   1.   Jose F Keppra      Denies taking any other OTC or herbal medications    Catie Singleton, [facility], [facility] 1/1/2022 12:34 PM

## 2022-01-01 NOTE — PROGRESS NOTES
Pt admitted to [address] [place] [address]. Pt A/O to self and place only. Assessment and admission complete. Pt unable to recall home med list. Pt noted to have bruising to R knee, and Stage 1 Pressure ulcer to R buttock, mepilex applied. Pt oriented to room and call light. Pt denies any pain or discomfort at this time. Pt resting in bed.

## 2022-01-01 NOTE — PROGRESS NOTES
This RN called lab to check on Rapid Covid and Flu from 12/31 @ 3644. Lab stated that they never received samples. Orders placed and samples gathered and sent to Lab.

## 2022-01-02 PROBLEM — A41.9 SEPSIS (HCC): Status: ACTIVE | Noted: 2022-01-01

## 2022-01-02 PROBLEM — N39.0 UTI (URINARY TRACT INFECTION): Status: ACTIVE | Noted: 2022-01-01

## 2022-01-02 LAB
ANION GAP SERPL CALCULATED.3IONS-SCNC: 13 MMOL/L (ref 3–16)
BUN BLDV-MCNC: 31 MG/DL (ref 7–20)
CALCIUM SERPL-MCNC: 9 MG/DL (ref 8.3–10.6)
CHLORIDE BLD-SCNC: 114 MMOL/L (ref 99–110)
CO2: 18 MMOL/L (ref 21–32)
CREAT SERPL-MCNC: 0.7 MG/DL (ref 0.6–1.2)
GFR AFRICAN AMERICAN: >60
GFR NON-AFRICAN AMERICAN: >60
GLUCOSE BLD-MCNC: 159 MG/DL (ref 70–99)
HCT VFR BLD CALC: 32.4 % (ref 36–48)
HEMOGLOBIN: 10.6 G/DL (ref 12–16)
MAGNESIUM: 1.8 MG/DL (ref 1.8–2.4)
MCH RBC QN AUTO: 27.1 PG (ref 26–34)
MCHC RBC AUTO-ENTMCNC: 32.8 G/DL (ref 31–36)
MCV RBC AUTO: 82.7 FL (ref 80–100)
PDW BLD-RTO: 15.1 % (ref 12.4–15.4)
PLATELET # BLD: 237 K/UL (ref 135–450)
PMV BLD AUTO: 8.2 FL (ref 5–10.5)
POTASSIUM REFLEX MAGNESIUM: 2.9 MMOL/L (ref 3.5–5.1)
POTASSIUM SERPL-SCNC: 3.8 MMOL/L (ref 3.5–5.1)
RBC # BLD: 3.92 M/UL (ref 4–5.2)
SODIUM BLD-SCNC: 145 MMOL/L (ref 136–145)
WBC # BLD: 8.1 K/UL (ref 4–11)

## 2022-01-02 PROCEDURE — 83735 ASSAY OF MAGNESIUM: CPT

## 2022-01-02 PROCEDURE — 6370000000 HC RX 637 (ALT 250 FOR IP): Performed by: INTERNAL MEDICINE

## 2022-01-02 PROCEDURE — 6360000002 HC RX W HCPCS: Performed by: INTERNAL MEDICINE

## 2022-01-02 PROCEDURE — G0378 HOSPITAL OBSERVATION PER HR: HCPCS

## 2022-01-02 PROCEDURE — 96375 TX/PRO/DX INJ NEW DRUG ADDON: CPT

## 2022-01-02 PROCEDURE — 2580000003 HC RX 258: Performed by: INTERNAL MEDICINE

## 2022-01-02 PROCEDURE — 84132 ASSAY OF SERUM POTASSIUM: CPT

## 2022-01-02 PROCEDURE — 80048 BASIC METABOLIC PNL TOTAL CA: CPT

## 2022-01-02 PROCEDURE — 1200000000 HC SEMI PRIVATE

## 2022-01-02 PROCEDURE — 96376 TX/PRO/DX INJ SAME DRUG ADON: CPT

## 2022-01-02 PROCEDURE — 85027 COMPLETE CBC AUTOMATED: CPT

## 2022-01-02 PROCEDURE — 96372 THER/PROPH/DIAG INJ SC/IM: CPT

## 2022-01-02 PROCEDURE — 96366 THER/PROPH/DIAG IV INF ADDON: CPT

## 2022-01-02 PROCEDURE — 96365 THER/PROPH/DIAG IV INF INIT: CPT

## 2022-01-02 PROCEDURE — 36415 COLL VENOUS BLD VENIPUNCTURE: CPT

## 2022-01-02 RX ORDER — POTASSIUM CHLORIDE AND SODIUM CHLORIDE 900; 300 MG/100ML; MG/100ML
INJECTION, SOLUTION INTRAVENOUS CONTINUOUS
Status: DISCONTINUED | OUTPATIENT
Start: 2022-01-02 | End: 2022-01-03 | Stop reason: HOSPADM

## 2022-01-02 RX ORDER — CEFUROXIME AXETIL 250 MG/1
500 TABLET ORAL EVERY 12 HOURS SCHEDULED
Status: DISCONTINUED | OUTPATIENT
Start: 2022-01-02 | End: 2022-01-03 | Stop reason: HOSPADM

## 2022-01-02 RX ORDER — MAGNESIUM SULFATE IN WATER 40 MG/ML
2000 INJECTION, SOLUTION INTRAVENOUS ONCE
Status: COMPLETED | OUTPATIENT
Start: 2022-01-02 | End: 2022-01-02

## 2022-01-02 RX ADMIN — ENOXAPARIN SODIUM 40 MG: 100 INJECTION SUBCUTANEOUS at 08:30

## 2022-01-02 RX ADMIN — Medication 10 MEQ: at 11:36

## 2022-01-02 RX ADMIN — Medication 10 MEQ: at 16:46

## 2022-01-02 RX ADMIN — Medication 10 MEQ: at 13:22

## 2022-01-02 RX ADMIN — POTASSIUM CHLORIDE AND SODIUM CHLORIDE: 900; 300 INJECTION, SOLUTION INTRAVENOUS at 15:42

## 2022-01-02 RX ADMIN — SODIUM CHLORIDE, PRESERVATIVE FREE 10 ML: 5 INJECTION INTRAVENOUS at 08:30

## 2022-01-02 RX ADMIN — Medication 1 CAPSULE: at 20:05

## 2022-01-02 RX ADMIN — LATANOPROST 1 DROP: 50 SOLUTION OPHTHALMIC at 20:08

## 2022-01-02 RX ADMIN — Medication 10 MEQ: at 14:36

## 2022-01-02 RX ADMIN — MAGNESIUM SULFATE HEPTAHYDRATE 2000 MG: 40 INJECTION, SOLUTION INTRAVENOUS at 13:23

## 2022-01-02 RX ADMIN — ROSUVASTATIN CALCIUM 40 MG: 40 TABLET, FILM COATED ORAL at 08:30

## 2022-01-02 RX ADMIN — LEVOTHYROXINE, LIOTHYRONINE 30 MG: 38; 9 TABLET ORAL at 08:34

## 2022-01-02 RX ADMIN — ASPIRIN 81 MG 81 MG: 81 TABLET ORAL at 08:30

## 2022-01-02 RX ADMIN — SODIUM CHLORIDE: 9 INJECTION, SOLUTION INTRAVENOUS at 04:06

## 2022-01-02 RX ADMIN — Medication 1 CAPSULE: at 08:30

## 2022-01-02 RX ADMIN — Medication 10 MEQ: at 15:40

## 2022-01-02 RX ADMIN — Medication 10 MEQ: at 10:27

## 2022-01-02 RX ADMIN — CEFUROXIME AXETIL 500 MG: 250 TABLET ORAL at 20:05

## 2022-01-02 RX ADMIN — SERTRALINE HYDROCHLORIDE 50 MG: 50 TABLET ORAL at 08:30

## 2022-01-02 ASSESSMENT — PAIN SCALES - GENERAL
PAINLEVEL_OUTOF10: 0
PAINLEVEL_OUTOF10: 0

## 2022-01-02 NOTE — PROGRESS NOTES
Hospitalist Progress Note      PCP: Veronica Damon DO    Chief Complaint. Patient is a 28-year-old female with past medical history of CAD hypertension hyperlipidemia who presented to hospital due to change in mental status. patient was found on the ground, patient does not remember how it happened however mentions she has been on the ground for a long period of time. Patient otherwise denied chest pain nausea vomiting diarrhea constipation. Patient mentions she was not able to stand up and walk. Date of Admission: 12/31/2021    Subjective:   Feeling better. Less weak. Appetite ok. Medications:  Reviewed    Infusion Medications    0.9% NaCl with KCl 40 mEq 75 mL/hr at 01/02/22 1542    sodium chloride       Scheduled Medications    cefUROXime  500 mg Oral 2 times per day    sodium chloride flush  10 mL IntraVENous 2 times per day    enoxaparin  40 mg SubCUTAneous Daily    aspirin  81 mg Oral Daily    lactobacillus  1 capsule Oral BID    latanoprost  1 drop Left Eye Nightly    rosuvastatin  40 mg Oral Daily    sertraline  50 mg Oral Daily    thyroid  30 mg Oral Daily     PRN Meds: sodium chloride flush, sodium chloride, potassium chloride, magnesium sulfate, promethazine **OR** ondansetron, magnesium hydroxide, acetaminophen **OR** acetaminophen      Intake/Output Summary (Last 24 hours) at 1/2/2022 2008  Last data filed at 1/2/2022 0926  Gross per 24 hour   Intake 480 ml   Output 500 ml   Net -20 ml       Physical Exam Performed:    /64   Pulse 82   Temp 98.1 °F (36.7 °C) (Oral)   Resp 16   Wt 170 lb 6.7 oz (77.3 kg)   SpO2 99%   BMI 29.71 kg/m²     General appearance: NAD  HEENT:  Conjunctivae/corneas clear. Neck: Supple, with full range of motion. Respiratory:  Normal respiratory effort. Clear to auscultation, bilaterally without Rales/Wheezes/Rhonchi.   Cardiovascular: Regular rate and rhythm with normal S1/S2 without murmurs or rubs  Abdomen: Soft, non-tender, non-distended, normal bowel sounds. Musculoskeletal: No cyanosis or edema bilaterally  Neurologic:  without any focal sensory/motor deficits. grossly non-focal.  Psychiatric: Alert and oriented, Normal mood  Peripheral Pulses: +2 palpable, equal bilaterally       Labs:   Recent Labs     12/31/21  1254 01/01/22  0752 01/02/22  0854   WBC 14.4* 11.1* 8.1   HGB 12.0 11.2* 10.6*   HCT 38.2 34.8* 32.4*    220 237     Recent Labs     12/31/21  1254 12/31/21  1254 01/01/22  0752 01/02/22  0854 01/02/22  1937     --  142 145  --    K 3.8   < > 3.6 2.9* 3.8     --  112* 114*  --    CO2 13*  --  18* 18*  --    BUN 41*  --  38* 31*  --    CREATININE 1.2  --  0.8 0.7  --    CALCIUM 10.8*  --  9.7 9.0  --     < > = values in this interval not displayed. Recent Labs     12/31/21  1254   AST 39*   ALT 14   BILITOT 0.8   ALKPHOS 92     No results for input(s): INR in the last 72 hours. Recent Labs     12/31/21  1254 01/01/22  0752   CKTOTAL 1,173* 738*   TROPONINI <0.01  --        Urinalysis:      Lab Results   Component Value Date    NITRU POSITIVE 01/01/2022    WBCUA 18 01/01/2022    BACTERIA 4+ 01/01/2022    RBCUA 42 01/01/2022    BLOODU LARGE 01/01/2022    SPECGRAV >1.030 01/01/2022    GLUCOSEU Negative 01/01/2022       Radiology:  XR CHEST PORTABLE   Final Result   No radiographic evidence of acute cardiopulmonary disease. Assessment/Plan:    Active Hospital Problems    Diagnosis     UTI (urinary tract infection) [N39.0]     Sepsis ([address] [A41.9]     Fall against object [W18.00XA]     Essential hypertension [I10]        1  Syncope- no further episodes      Likely from dehydration      Ambulate      Weak. Fall risk      Continue therapies      DC planning for am.      [facility]    2  Dehydration-improved. Will stop fluids today    3  Hypokalemia-K low at 2.9. tele monitoring      Recheck level later today.   Start K oral replacement    4  UTI-change rocephin to ceftin.    5  ARF-resolved. Cr is normal    6  CAD-stable. Cp free. Continue antianginals        DVT prophylaxis-Lovenox  Resume home medications, hold nephrotoxic medications  Diet: ADULT DIET; Regular  Code Status: Full Code    PT/OT Eval Status: ordered    Dispo -likely discharge tomorrow after repeat CPK level, and orthostatic vitals, continue Rocephin    Anika Hernandez MD    Addendum to H& P/Progress note:  Pt seen,examined and evaluated. I have reviewed the current history, physical findings, labs and assessment and plan and agree with note as documented by Dr. Farrukh Reilly.     Problem list:    Hospital Problems           Last Modified POA    * (Principal) UTI (urinary tract infection) 1/2/2022 Yes    Essential hypertension 1/2/2022 Yes    Fall against object 12/31/2021 Yes    Sepsis ([address] 1/2/2022 Yes           In addition: low potassium today and low mag- CPK only mildly elevated - leukocytosis improving - no urine culture sent, however, she is responding to IV abx - pt had sepsis POA based on leukocytosis, fever, tachycardia, tachypnea and documented UTI    Electronically signed by Anika Hernandez MD on 1/2/22 at 8:04 PM EST

## 2022-01-02 NOTE — PLAN OF CARE
Problem: Falls - Risk of:  Goal: Will remain free from falls  Description: Will remain free from falls  1/2/2022 1119 by Danielle Clements RN  Outcome: Ongoing  1/2/2022 0210 by Lavinia oGnzales RN  Outcome: Ongoing  Goal: Absence of physical injury  Description: Absence of physical injury  1/2/2022 1119 by Danielle Clements RN  Outcome: Ongoing  1/2/2022 0210 by Lavinia Gonzales RN  Outcome: Ongoing     Problem: Skin Integrity:  Goal: Will show no infection signs and symptoms  Description: Will show no infection signs and symptoms  1/2/2022 1119 by Danielle Clements RN  Outcome: Ongoing  1/2/2022 0210 by Lavinia Gonzales RN  Outcome: Ongoing  Goal: Absence of new skin breakdown  Description: Absence of new skin breakdown  1/2/2022 1119 by Danielle Clmeents RN  Outcome: Ongoing  1/2/2022 0210 by Lavinia Gonzales RN  Outcome: Ongoing

## 2022-01-03 VITALS
RESPIRATION RATE: 16 BRPM | DIASTOLIC BLOOD PRESSURE: 62 MMHG | TEMPERATURE: 98.6 F | SYSTOLIC BLOOD PRESSURE: 159 MMHG | HEART RATE: 78 BPM | OXYGEN SATURATION: 96 % | BODY MASS INDEX: 29.71 KG/M2 | WEIGHT: 170.42 LBS

## 2022-01-03 LAB
ANION GAP SERPL CALCULATED.3IONS-SCNC: 10 MMOL/L (ref 3–16)
BUN BLDV-MCNC: 16 MG/DL (ref 7–20)
CALCIUM SERPL-MCNC: 9.1 MG/DL (ref 8.3–10.6)
CHLORIDE BLD-SCNC: 112 MMOL/L (ref 99–110)
CO2: 18 MMOL/L (ref 21–32)
CREAT SERPL-MCNC: 0.7 MG/DL (ref 0.6–1.2)
GFR AFRICAN AMERICAN: >60
GFR NON-AFRICAN AMERICAN: >60
GLUCOSE BLD-MCNC: 130 MG/DL (ref 70–99)
HCT VFR BLD CALC: 33.7 % (ref 36–48)
HEMOGLOBIN: 11 G/DL (ref 12–16)
MCH RBC QN AUTO: 27 PG (ref 26–34)
MCHC RBC AUTO-ENTMCNC: 32.8 G/DL (ref 31–36)
MCV RBC AUTO: 82.2 FL (ref 80–100)
PDW BLD-RTO: 15 % (ref 12.4–15.4)
PLATELET # BLD: 251 K/UL (ref 135–450)
PMV BLD AUTO: 8.6 FL (ref 5–10.5)
POTASSIUM REFLEX MAGNESIUM: 3.8 MMOL/L (ref 3.5–5.1)
RBC # BLD: 4.1 M/UL (ref 4–5.2)
SODIUM BLD-SCNC: 140 MMOL/L (ref 136–145)
WBC # BLD: 6.9 K/UL (ref 4–11)

## 2022-01-03 PROCEDURE — 96372 THER/PROPH/DIAG INJ SC/IM: CPT

## 2022-01-03 PROCEDURE — 6360000002 HC RX W HCPCS: Performed by: INTERNAL MEDICINE

## 2022-01-03 PROCEDURE — 6370000000 HC RX 637 (ALT 250 FOR IP): Performed by: INTERNAL MEDICINE

## 2022-01-03 PROCEDURE — G0378 HOSPITAL OBSERVATION PER HR: HCPCS

## 2022-01-03 PROCEDURE — 80048 BASIC METABOLIC PNL TOTAL CA: CPT

## 2022-01-03 PROCEDURE — 85027 COMPLETE CBC AUTOMATED: CPT

## 2022-01-03 PROCEDURE — 36415 COLL VENOUS BLD VENIPUNCTURE: CPT

## 2022-01-03 RX ORDER — CEFUROXIME AXETIL 250 MG/1
250 TABLET ORAL 2 TIMES DAILY
Qty: 10 TABLET | Refills: 0 | Status: SHIPPED | OUTPATIENT
Start: 2022-01-03 | End: 2022-01-08

## 2022-01-03 RX ORDER — CASTOR OIL AND BALSAM, PERU 788; 87 MG/G; MG/G
OINTMENT TOPICAL 2 TIMES DAILY
Qty: 30 G | Refills: 0 | Status: ON HOLD | OUTPATIENT
Start: 2022-01-03 | End: 2022-07-12

## 2022-01-03 RX ADMIN — LEVOTHYROXINE, LIOTHYRONINE 30 MG: 38; 9 TABLET ORAL at 08:16

## 2022-01-03 RX ADMIN — ASPIRIN 81 MG 81 MG: 81 TABLET ORAL at 08:10

## 2022-01-03 RX ADMIN — POTASSIUM CHLORIDE AND SODIUM CHLORIDE: 900; 300 INJECTION, SOLUTION INTRAVENOUS at 06:59

## 2022-01-03 RX ADMIN — ENOXAPARIN SODIUM 40 MG: 100 INJECTION SUBCUTANEOUS at 08:09

## 2022-01-03 RX ADMIN — ROSUVASTATIN CALCIUM 40 MG: 40 TABLET, FILM COATED ORAL at 08:09

## 2022-01-03 RX ADMIN — SERTRALINE HYDROCHLORIDE 50 MG: 50 TABLET ORAL at 08:09

## 2022-01-03 RX ADMIN — Medication 1 CAPSULE: at 08:10

## 2022-01-03 RX ADMIN — CEFUROXIME AXETIL 500 MG: 250 TABLET ORAL at 08:09

## 2022-01-03 ASSESSMENT — PAIN SCALES - GENERAL
PAINLEVEL_OUTOF10: 0

## 2022-01-03 NOTE — CARE COORDINATION
Case Management Assessment           Initial Evaluation                Date / Time of Evaluation: 1/3/2022 11:24 AM                 Assessment Completed by: Roger Jin RN     Met with pt at the bedside to complete initial assessment. He was nikolay in the bed watching television. She is alert and oriented x 4, pleasant, easy to engage in conversation. She stated she lives at home alone in her own apartment. Her sister lives next door. There is a ramp to enter. She is independent with all aspects of her care and an active . She has a rolling walker and grab bars in the bathroom at home. At discharge, she will need Lyft transport arranged, Meds to Beds, and potentially a referral for home PT/OT.       Patient Name: Kristina Valenzuela     YOB: 1935  Diagnosis: Syncope and collapse [R55]  Dehydration [E86.0]  Fall against object [W18.00XA]  Non-traumatic rhabdomyolysis [M62.82]  Acute renal failure, unspecified acute renal failure type ([facility] [address] [N17.9]     Date / Time: 12/31/2021 11:55 AM    Patient Admission Status: Inpatient    Current PCP: Giana Lindsey DO     Chart Reviewed: Yes  Patient/ Family Interviewed: Yes    Emergency Contacts:  Extended Emergency Contact Information  Primary Emergency Contact: [address] Phone: [phone]  Work Phone: [phone]  Mobile Phone: [phone]  Relation: Niece/Nephew  Secondary Emergency Contact: [address] Phone: [phone]  Relation: Niece/Nephew    Advance Directives:   Code Status: Full Code    Financial  Payor: Oleksandr Bear / Plan: David Burkett PPO / Product Type: Medicare /     Pharmacy    [address], [address] [phone] [facility] [phone]  [phone]  Phone: [phone] Fax: [phone]    ADLS Independent prior to admission and active   Support Systems:  family    PT AM-PAC: 18 /24  OT AM-PAC: 16 /24    [place]: apartment  Steps: there is a ramp    Plans to RETURN to current housing: Yes    Home Care Information  Currently ACTIVE with Home Health Care: No    Durable Medical Equipment  Equipment: walker and grab bars in the bathroom    Home Oxygen and Respiratory Equipment  Has HOME OXYGEN prior to admission: No    DISCHARGE PLAN:  Disposition: Home with [address]: TBD. Pt is open to [facility]. She will need LYFT transportation home. Meds to [facility] for discharge: CM will arrange Lyft transport   The Patient and/or patient representative Salas Curry and her family were provided with a choice of provider and agrees with the discharge plan Yes    Freedom of choice list was provided with basic dialogue that supports the patient's individualized plan of care/goals and shares the quality data associated with the providers.  Yes    Care Transition patient: Yes    Cely Tamayo RN  Case Management  [phone]

## 2022-01-03 NOTE — DISCHARGE INSTR - COC
Continuity of Care Form    Patient Name: Noam Maher   :  1935  MRN:  [id number]    Admit date:  2021  Discharge date:  1/3/22      Code Status Order: Full Code   Advance Directives:      Admitting Physician:  Kevin Harris MD  PCP: Brii Goss DO    [facility] Unit/Room[facility] [address]  Unit Phone Number: [phone]    Emergency Contact:   Extended Emergency Contact Information  Primary Emergency Contact: [address] Phone: [phone]  Work Phone: [phone]  Mobile Phone: [phone]  Relation: Niece/Nephew  Secondary Emergency Contact: [address] Phone: [phone]  Relation: Niece/Nephew    Past Surgical History:  Past Surgical History:   Procedure Laterality Date    ABSCESS DRAINAGE Right 2018    evacuation hematoma right knee    [address]  [id number]    MILD PROCEDURE L2-3 BILATERAL     BREAST LUMPECTOMY  1986    BUNIONECTOMY  10/2003    CATARACT REMOVAL  2010 and 2010    COLONOSCOPY  2014    repeat 2019    CORONARY ANGIOPLASTY WITH STENT PLACEMENT  2017    INF STEMI with CATRACHITA to [address] Right 2018    right total knee replacment    [address]    right    TONSILLECTOMY AND ADENOIDECTOMY  194       Immunization History:   Immunization History   Administered Date(s) Administered    DANNIE, Stanley Montes, Primary or Immunocompromised, PF, 100mcg/0.5mL 2021, 2021    Influenza Virus Vaccine 10/22/2002    Influenza, Triv, inactivated, subunit, adjuvanted, IM (Fluad 65 yrs and older) 2019    Pneumococcal Conjugate 13-valent (Bcczcnn94) 2015    Pneumococcal Polysaccharide (Claevhisw34) 2017    Tdap (Boostrix, Adacel) 2017       Active Problems:  Patient Active Problem List   Diagnosis Code    Low back pain radiating to both legs M54.50, M79.604, M79.605 Lumbar spinal stenosis M48.061    DDD (degenerative disc disease), lumbosacral M51.37    Stenosis, spinal, lumbar M48.061    Right knee pain M25.561    Acute inferior myocardial infarction (HCC) I21.19    Hyperlipidemia LDL goal <70 E78.5    Retroperitoneal hemorrhage R58    Essential hypertension I10    Arthritis of right knee M17.11    History of total knee arthroplasty, right-3. 6.2018 Z96.651    Traumatic hematoma of knee, right, subsequent encounter S80. 01XD    Traumatic hematoma of right knee S80. 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.50XA    Seizure disorder ([address] G40.909    Fall against object N66.47BG    UTI (urinary tract infection) N39.0    Sepsis ([address] A41.9       Isolation/Infection:   Isolation            No Isolation          Patient Infection Status       Infection Onset Added Last Indicated Last Indicated By Review Planned Expiration Resolved Resolved By    None active    Resolved    COVID-19 (Rule Out) 12/31/21 12/31/21 01/01/22 COVID-19, Rapid (Ordered)   01/01/22 Rule-Out Test Resulted            Nurse Assessment:  Last Vital Signs: BP (!) 159/62   Pulse 78   Temp 98.6 °F (37 °C) (Oral)   Resp 16   Wt 170 lb 6.7 oz (77.3 kg)   SpO2 96%   BMI 29.71 kg/m²     Last documented pain score (0-10 scale): Pain Level: 0  Last Weight:   Wt Readings from Last 1 Encounters:   01/02/22 170 lb 6.7 oz (77.3 kg)     Mental Status:  oriented and alert    IV Access:  - None    Nursing Mobility/ADLs:  Walking   Independent  Transfer  Independent  Bathing  Independent with set up  [address] Delivery   whole    Wound Care Documentation and Therapy:        Elimination:  Continence: Bowel: Yes  Bladder: Yes  Urinary Catheter: None   Colostomy/Ileostomy/Ileal Conduit: No       Date of Last BM:   No intake or output data in the 24 hours ending 01/03/22 1136  I/O last 3 completed shifts:   In: 360 [P.O.:360]  Out: -     Safety Concerns: At Risk for Falls    Impairments/Disabilities:      None    Nutrition Therapy:  Current Nutrition Therapy:   - Oral Diet:  General    Routes of Feeding: Oral  Liquids: No Restrictions  Daily Fluid Restriction: no  Last Modified Barium Swallow with Video (Video Swallowing Test): not done    Treatments at the Time of Hospital Discharge:   Respiratory Treatments: none  Oxygen Therapy:  is not on home oxygen therapy. Ventilator:    - No ventilator support    Rehab Therapies: Physical Therapy and Occupational Therapy  Weight Bearing Status/Restrictions: No weight bearing restirctions  Other Medical Equipment (for information only, NOT a DME order):  walker  Other Treatments:     Patient's personal belongings (please select all that are sent with patient):  None    RN SIGNATURE:  Electronically signed by Gretchen Alicia on 1/3/22 at 12:50 PM EST    CASE MANAGEMENT/SOCIAL WORK SECTION    Inpatient Status Date: ***    Readmission Risk Assessment Score:  Readmission Risk              Risk of Unplanned Readmission:  10           Discharging to Facility/ Agency   Name: [facility]  Address:  [address] [place] [address]   Phone:  [phone]  Fax:  [phone]      / signature: Electronically signed by Mayito Maldonado RN on 1/3/22 at 2:02 PM EST    PHYSICIAN SECTION    Prognosis: Good    Condition at Discharge: Stable    Rehab Potential (if transferring to Rehab): Good    Recommended Labs or Other Treatments After Discharge: PT OT    Physician Certification: I certify the above information and transfer of [facility]  is necessary for the continuing treatment of the diagnosis listed and that she requires Home Care for less 30 days.      Update Admission H&P: Changes in H&P as follows - see dc summary    PHYSICIAN SIGNATURE:  Electronically signed by Hernesto Summers MD on 1/3/22 at 11:47 AM EST

## 2022-01-03 NOTE — ACP (ADVANCE CARE PLANNING)
Advance Care Planning     Advance Care Planning Activator (Inpatient)  Conversation Note      Date of ACP Conversation: 1/3/2022     Conversation Conducted with: Patient with Decision Making Capacity    ACP Activator: Geovanny Harper RN    Health Care Decision Maker:     Current Designated Health Care Decision Maker:   Magaly Jaya (sister) [phone]  Click here to complete Healthcare Decision Makers including section of the Healthcare Decision Maker Relationship (ie \"Primary\")  Today we discussed advanced directive, living will, HCPOA, and code status. Pt does not have advanced directives or a HCPOA. She is interested in a consult from spiritual services to complete the documents to name her sister JOSE. Explained the [place]. Pt's closest relatives are multiple living siblings. Her code status is FULL CODE but she does not want to be on long-term lifesupport. Care Preferences    Ventilation: \"If you were in your present state of health and suddenly became very ill and were unable to breathe on your own, what would your preference be about the use of a ventilator (breathing machine) if it were available to you? \"      Would the patient desire the use of ventilator (breathing machine)?: yes    \"If your health worsens and it becomes clear that your chance of recovery is unlikely, what would your preference be about the use of a ventilator (breathing machine) if it were available to you? \"     Would the patient desire the use of ventilator (breathing machine)?: No      Resuscitation  \"CPR works best to restart the heart when there is a sudden event, like a heart attack, in someone who is otherwise healthy. Unfortunately, CPR does not typically restart the heart for people who have serious health conditions or who are very sick. \"    \"In the event your heart stopped as a result of an underlying serious health condition, would you want attempts to be made to restart your heart (answer \"yes\" for attempt to resuscitate) or would you prefer a natural death (answer \"no\" for do not attempt to resuscitate)? \" yes       [x] Yes   [] No   Educated Patient / Alexa Crook regarding differences between Advance Directives and portable DNR orders.     Length of ACP Conversation in minutes:      Conversation Outcomes:  [x] ACP discussion completed  [] Existing advance directive reviewed with patient; no changes to patient's previously recorded wishes  [] New Advance Directive completed  [] Portable Do Not Rescitate prepared for Provider review and signature  [] POLST/POST/MOLST/MOST prepared for Provider review and signature      Follow-up plan:    [] Schedule follow-up conversation to continue planning  [] Referred individual to Provider for additional questions/concerns   [] Advised patient/agent/surrogate to review completed ACP document and update if needed with changes in condition, patient preferences or care setting    [x] This note routed to one or more involved healthcare providers    Electronically signed by Louann Stephens MSN RN-BC [facility]

## 2022-01-03 NOTE — CARE COORDINATION
[facility] Wound Ostomy Continence Nurse  Consult Note       NAME:  Landon Clements  MEDICAL RECORD NUMBER:  [id number]  AGE: 82 y.o. GENDER: female  : 1935  TODAY'S DATE:  1/3/2022    Subjective   Reason for WOCN Evaluation and Assessment: Stage 1 to coccyx. (DTI to L buttock POA)      Landon Clements is a 82 y.o. female referred by:   [] Physician  [x] Nursing  [] Other:     Wound Identification:  Wound Type: pressure  Contributing Factors: chronic pressure and decreased mobility    Wound History: From home, found down. Wound POA.   Current Wound Care Treatment:  Foam    Patient Goal of Care:  [x] Wound Healing  [] Odor Control  [] Palliative Care  [] Pain Control   [] Other:         PAST MEDICAL HISTORY        Diagnosis Date    CAD (coronary artery disease)     Inferior STEMI; CATRACHITA to RCA    DDD (degenerative disc disease), lumbosacral 4/10/2013    Hearing aid worn     bilateral    [place] (hard of hearing)     Hyperlipidemia     Hypertension     MI, old 2017    Neuropathy     bilateral lower extremities    Sleep apnea     uses mouth piece; bringing DOS 3/26/18    Thyroid disease     hypo    Wears glasses        PAST SURGICAL HISTORY    Past Surgical History:   Procedure Laterality Date    ABSCESS DRAINAGE Right 2018    evacuation hematoma right knee    APPENDECTOMY  1963    BACK SURGERY  [id number]    MILD PROCEDURE L2-3 BILATERAL     BREAST LUMPECTOMY  1986    BUNIONECTOMY  10/2003    CATARACT REMOVAL  2010 and 2010    COLONOSCOPY  2014    repeat 2019    CORONARY ANGIOPLASTY WITH STENT PLACEMENT  2017    INF STEMI with CATRACHITA to RCA   [address]      JOINT REPLACEMENT Right 2018    right total knee replacment    OVARY REMOVAL  1963    right    TONSILLECTOMY AND ADENOIDECTOMY  194       FAMILY HISTORY    Family History   Problem Relation Age of Onset    Cancer Mother     Heart Disease Mother     Arthritis Sister rheumatoid    Cancer Brother     Cancer Sister     Diabetes Sister     Heart Disease Brother     High Blood Pressure Brother     High Blood Pressure Sister     Diabetes Brother        SOCIAL HISTORY    Social History     Tobacco Use    Smoking status: Never Smoker    Smokeless tobacco: Never Used    Tobacco comment: encouraged to never smoke    Vaping Use    Vaping Use: Never used   Substance Use Topics    Alcohol use: No    Drug use: No       ALLERGIES    Allergies   Allergen Reactions    Amoxicillin Hives and Rash    Linezolid Other (See Comments)     While taking linezolid, pt developed progressively altered mentation & ultimately had a witnessed seizure 4/26/18. Uncertain whether these symptoms were d/t linezolid, but possible.  Prednisone Itching    Vancomycin      Rising SCr during 5/2018 admission     Coreg [Carvedilol] Diarrhea    Oxycodone-Acetaminophen Rash       MEDICATIONS    No current facility-administered medications on file prior to encounter.      Current Outpatient Medications on File Prior to Encounter   Medication Sig Dispense Refill    diclofenac (VOLTAREN) 75 MG EC tablet TAKE ONE TABLET BY MOUTH TWICE A DAY WITH MEALS (Patient taking differently: Take 75 mg by mouth 2 times daily as needed TAKE ONE TABLET BY MOUTH TWICE A DAY WITH MEALS) 180 tablet 2    lisinopril (PRINIVIL;ZESTRIL) 20 MG tablet TAKE ONE TABLET BY MOUTH DAILY 90 tablet 0    potassium chloride (KLOR-CON M) 10 MEQ extended release tablet TAKE ONE TABLET BY MOUTH DAILY 90 tablet 0    thyroid (NP THYROID) 30 MG tablet TAKE ONE TABLET BY MOUTH DAILY 90 tablet 3    rosuvastatin (CRESTOR) 40 MG tablet TAKE ONE TABLET BY MOUTH DAILY 90 tablet 3    Lactobacillus (ACIDOPHILUS) TABS Take 1 tablet by mouth 2 times daily      Multiple Vitamins-Minerals (DAILY SHERIE MAXIMUM MULTIVITAMIN PO) Take 1 packet by mouth daily      aspirin 81 MG tablet Take 81 mg by mouth daily      latanoprost (XALATAN) 0.005 % ophthalmic solution Place 1 drop into the left eye nightly       sertraline (ZOLOFT) 50 MG tablet Take 1 tablet by mouth daily 90 tablet 1       Objective    BP (!) 159/62   Pulse 78   Temp 98.6 °F (37 °C) (Oral)   Resp 16   Wt 170 lb 6.7 oz (77.3 kg)   SpO2 96%   BMI 29.71 kg/m²     LABS:  WBC:    Lab Results   Component Value Date    WBC 6.9 01/03/2022     H/H:    Lab Results   Component Value Date    HGB 11.0 01/03/2022    HCT 33.7 01/03/2022     PTT:    Lab Results   Component Value Date    APTT 32.8 02/27/2018   [APTT}  PT/INR:    Lab Results   Component Value Date    PROTIME 13.1 03/28/2018    INR 1.16 03/28/2018     HgBA1c:    Lab Results   Component Value Date    LABA1C 5.3 03/07/2018       Assessment   Av Risk Score: Av Scale Score: 17    Patient Active Problem List   Diagnosis Code    Low back pain radiating to both legs M54.50, M79.604, M79.605    Lumbar spinal stenosis M48.061    DDD (degenerative disc disease), lumbosacral M51.37    Stenosis, spinal, lumbar M48.061    Right knee pain M25.561    Acute inferior myocardial infarction (HCC) I21.19    Hyperlipidemia LDL goal <70 E78.5    Retroperitoneal hemorrhage R58    Essential hypertension I10    Arthritis of right knee M17.11    History of total knee arthroplasty, right-3. 6.2018 Z96.651    Traumatic hematoma of knee, right, subsequent encounter S80. 01XD    Traumatic hematoma of right knee S80. 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 Pt skin assessed. Pt has DTI to L buttock. Purple, non blanchable. No other skin issues noted. Response to treatment:  Well tolerated by patient. Plan   Plan of Care:    Pt discharging today. Would recommend venelex. Specialty Bed Required : Yes   [] Low Air Loss   [] Pressure Redistribution  [] Fluid Immersion  [] Bariatric  [] Total Pressure Relief  [] Other:     Current Diet: ADULT DIET;  Regular  Dietician consult:  N/A    Discharge Plan:  Placement for patient upon discharge: home with support    Patient appropriate for Outpatient [address]: No    Referrals:  []   [] [address] [facility]  [] Supplies  [] Other    Patient/Caregiver Teaching:  Level of patient/caregiver understanding able to:   [] Indicates understanding       [] Needs reinforcement  [] Unsuccessful      [x] Verbal Understanding  [] Demonstrated understanding       [] No evidence of learning  [] Refused teaching         [] N/A       Electronically signed by Olaf Spencer RN, on 1/3/2022 at 12:35 PM

## 2022-01-03 NOTE — CARE COORDINATION
1230:    Spoke with great-niece Laquita Stratton re: dc today & pt declining [facility]. Verified with pt that she feels safe to go home with help from her sister. Laquita Stratton is going to call pt & talk with her. 1315:  Received call back from Laquita Stratton, pt is now agreeable to [facility]. Verified with pt; pt declined [facility] list, has used [facility] in the past & wants to use them again. Call to [facility], checking staffing & will call me back.     Nguyen Villegas RN, BSN,   [phone]  Electronically signed by Nguyen Villegas RN on 1/3/2022 at 1:34 PM

## 2022-01-03 NOTE — PLAN OF CARE
Problem: Falls - Risk of:  Goal: Will remain free from falls  Description: Will remain free from falls  1/3/2022 1115 by Fabi Bullard RN  Outcome: Ongoing  1/3/2022 0337 by Shankar Salcedo RN  Outcome: Ongoing  Goal: Absence of physical injury  Description: Absence of physical injury  1/3/2022 1115 by Fabi Bullard RN  Outcome: Ongoing  1/3/2022 0337 by Shankar Salcedo RN  Outcome: Ongoing     Problem: Skin Integrity:  Goal: Will show no infection signs and symptoms  Description: Will show no infection signs and symptoms  1/3/2022 1115 by Fabi Bullard RN  Outcome: Ongoing  1/3/2022 0337 by Shankar Salcedo RN  Outcome: Ongoing  Goal: Absence of new skin breakdown  Description: Absence of new skin breakdown  1/3/2022 1115 by Fabi Bullard RN  Outcome: Ongoing  1/3/2022 0337 by Shankar Salcedo RN  Outcome: Ongoing

## 2022-01-03 NOTE — DISCHARGE SUMMARY
Hospital Medicine Discharge Summary    Patient ID: Ketan Coleman      Patient's PCP: Lance Colunga DO    Admit Date: 12/31/2021     Discharge Date: 1/3/2022      Admitting Provider: Skyler Zapata MD     Discharge Provider: Eleanor Narayan MD     Discharge Diagnoses:      1 sepsis POA  2 syncope due to dehydration  3 hypokalemia  4 UTI  5 ambulatory dysfunction  6 generalized weakness  7  Hypokalemia  8  HTN  9  hyperlipidemia     Active Hospital Problems    Diagnosis     UTI (urinary tract infection) [N39.0]     Sepsis ([address] [A41.9]     Fall against object [W18.00XA]     Essential hypertension [I10]        The patient was seen and examined on day of discharge and this discharge summary is in conjunction with any daily progress note from day of discharge. Hospital Course: 81 yo female found down at home. Admitted for change in mental status and syncope  Workup revealed severe dehydration with some ARF. Also treated for sepsis due to UTI with rocephin blood cx were negative  Had quick recovery although some weakness and unsteadiness for which therapy was ordered. At dc was non toxic. Close to her baseline. Dc home w [facility]          Physical Exam Performed:     BP (!) 159/62   Pulse 78   Temp 98.6 °F (37 °C) (Oral)   Resp 16   Wt 170 lb 6.7 oz (77.3 kg)   SpO2 96%   BMI 29.71 kg/m²       General appearance:  No apparent distress, appears stated age and cooperative. HEENT:  Normal cephalic, atraumatic without obvious deformity. Pupils equal, round, and reactive to light. Extra ocular muscles intact. Conjunctivae/corneas clear. Neck: Supple, with full range of motion. No jugular venous distention. Trachea midline. Respiratory:  Normal respiratory effort. Clear to auscultation, bilaterally without Rales/Wheezes/Rhonchi. Cardiovascular:  Regular rate and rhythm with normal S1/S2 without murmurs, rubs or gallops. Abdomen: Soft, non-tender, non-distended with normal bowel sounds.   Musculoskeletal:  No clubbing, cyanosis or edema bilaterally. Full range of motion without deformity. Skin: Skin color, texture, turgor normal.  No rashes or lesions. Neurologic:  Neurovascularly intact without any focal sensory/motor deficits. Cranial nerves: II-XII intact, grossly non-focal.  Psychiatric:  Alert and oriented, thought content appropriate, normal insight  Capillary Refill: Brisk,< 3 seconds   Peripheral Pulses: +2 palpable, equal bilaterally       Labs: For convenience and continuity at follow-up the following most recent labs are provided:      CBC:    Lab Results   Component Value Date    WBC 6.9 01/03/2022    HGB 11.0 01/03/2022    HCT 33.7 01/03/2022     01/03/2022       Renal:    Lab Results   Component Value Date     01/03/2022    K 3.8 01/03/2022     01/03/2022    CO2 18 01/03/2022    BUN 16 01/03/2022    CREATININE 0.7 01/03/2022    CALCIUM 9.1 01/03/2022         Significant Diagnostic Studies    Radiology:   XR CHEST PORTABLE   Final Result   No radiographic evidence of acute cardiopulmonary disease.                 Consults:     IP CONSULT TO HOSPITALIST  IP CONSULT TO SPIRITUAL SERVICES  IP CONSULT TO HOME CARE NEEDS    Disposition:  Home with [facility]    Condition at Discharge: Stable    Discharge Instructions/Follow-up:  Jolly Thomson DO      Code Status:  Full Code     Activity: activity as tolerated    Diet: cardiac diet      Discharge Medications:     Discharge Medication List as of 1/3/2022 12:51 PM           Details   Balsam [place]-Castor Oil (VENELEX) OINT ointment Apply topically 2 times daily, Topical, 2 TIMES DAILY Starting Mon 1/3/2022, Disp-30 g, R-0, Normal      cefUROXime (CEFTIN) 250 MG tablet Take 1 tablet by mouth 2 times daily for 5 days, Disp-10 tablet, R-0Normal              Details   diclofenac (VOLTAREN) 75 MG EC tablet TAKE ONE TABLET BY MOUTH TWICE A DAY WITH MEALS, Disp-180 tablet, R-2Normal      lisinopril (PRINIVIL;ZESTRIL) 20 MG tablet TAKE ONE TABLET BY MOUTH DAILY, Disp-90 tablet, R-0Normal      potassium chloride (KLOR-CON M) 10 MEQ extended release tablet TAKE ONE TABLET BY MOUTH DAILY, Disp-90 tablet, R-0Normal      thyroid (NP THYROID) 30 MG tablet TAKE ONE TABLET BY MOUTH DAILY, Disp-90 tablet, R-3Normal      sertraline (ZOLOFT) 50 MG tablet Take 1 tablet by mouth daily, Disp-90 tablet, R-1Normal      rosuvastatin (CRESTOR) 40 MG tablet TAKE ONE TABLET BY MOUTH DAILY, Disp-90 tablet, R-3Normal      Lactobacillus (ACIDOPHILUS) TABS Take 1 tablet by mouth 2 times dailyHistorical Med      Multiple Vitamins-Minerals (DAILY SHERIE MAXIMUM MULTIVITAMIN PO) Take 1 packet by mouth dailyHistorical Med      aspirin 81 MG tablet Take 81 mg by mouth dailyHistorical Med      latanoprost (XALATAN) 0.005 % ophthalmic solution Place 1 drop into the left eye nightly Historical Med             Time Spent on discharge is more than 45 minutes in the examination, evaluation, counseling and review of medications and discharge plan. Signed:    Jomar Sheikh MD   1/3/2022      Thank you Flora Mora DO for the opportunity to be involved in this patient's care. If you have any questions or concerns please feel free to contact me at [phone].

## 2022-01-04 ENCOUNTER — TELEPHONE (OUTPATIENT)
Dept: FAMILY MEDICINE CLINIC | Age: 87
End: 2022-01-04

## 2022-01-04 NOTE — CARE COORDINATION
CASE MANAGEMENT DISCHARGE SUMMARY:    DISCHARGE DATE: 1/3/22    DISCHARGED TO: home     Discharging to Facility/ Agency   · Name: [facility]  · Address:  [address] [place] [address]   · Phone:  [phone]  · Fax:  [phone]    TRANSPORTATION: [facility]             TIME: on call    Lorie Cesar RN, BSN, Case Management  [phone]    Electronically signed by Lorie Cesar RN on 1/4/2022 at 5:15 PM

## 2022-01-04 NOTE — TELEPHONE ENCOUNTER
Left VM for Joana with [facility] informing her that Dr. Radha Angela will follow and sign future orders. Advised Joana to call back with any further questions or concerns.

## 2022-01-04 NOTE — TELEPHONE ENCOUNTER
This was routed to Rolly Yoon originally but is a patient of yours. Sending to you for clarification.

## 2022-01-04 NOTE — TELEPHONE ENCOUNTER
Upon recent discharge from [address] [place] [address]  was called in for Nursing/PT/OT. Will you follow and sign future orders?

## 2022-01-05 ENCOUNTER — CARE COORDINATION (OUTPATIENT)
Dept: CASE MANAGEMENT | Age: 87
End: 2022-01-05
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
## 2022-01-05 PROCEDURE — 1111F DSCHRG MED/CURRENT MED MERGE: CPT

## 2022-01-05 NOTE — CARE COORDINATION
[facility] Transitions Initial Follow Up Call    Call within 2 business days of discharge: Yes    Patient: Brandon Villarreal Patient : 1935   MRN: [id number]  Reason for Admission: Syncope & Collapse  Discharge Date: 1/3/22 RARS: Readmission Risk Score: 12.1 ( )      Last Discharge [facility]       Complaint Diagnosis Description Type Department Provider    21 Altered Mental Status Syncope and collapse . .. ED to Hosp-Admission (Discharged) (ADMITTED) Peter Mcelroy MD; Sobeida Stephens MD; ... Transitions of Care Initial Call    Was this an external facility discharge? No Discharge Facility: N/A    Challenges to be reviewed by the provider   Additional needs identified to be addressed with provider: No               Method of communication with provider : none      Advance Care Planning   Healthcare Decision Maker:    Primary Decision Maker: Emily Mills - Brother/Sister - [phone]    Secondary Decision Maker: Jocelyne Mahoney - Niece/Nephew - [phone]    Maria Fernanda Munson states she is not sure if she has a Living Will or [address]. Maria Fernanda Munson seems confused in conversation. Was this a readmission? No  Patient stated reason for admission: Syncope & Collapse    Care Transition Nurse (CTN) contacted the patient by telephone to perform post hospital discharge assessment. Verified name and  with patient as identifiers. Provided introduction to self, and explanation of the CTN role. CTN reviewed discharge instructions, medical action plan and red flags with patient who verbalized understanding. Patient given an opportunity to ask questions and does not have any further questions or concerns at this time. Were discharge instructions available to patient? Yes. Reviewed appropriate site of care based on symptoms and resources available to patient including: PCP and Home health. The patient agrees to contact the PCP office for questions related to their healthcare.      Medication reconciliation was performed with family, who verbalizes understanding of administration of home medications. Discussed COVID vaccination status: Yes. Solange Barrett states she did receive the COVID vaccine. CTN provided contact information. Non-face-to-face services provided:  Obtained and reviewed discharge summary and/or continuity of care documents  Assessment and support for treatment adherence and medication management-medication list reviewed & 1111f completed. Care Transitions 24 Hour Call    Do you have any ongoing symptoms?: No  Do you have a copy of your discharge instructions?: Yes  Do you have all of your prescriptions and are they filled?: Yes  Have you been contacted by a [address]?: No  Have you scheduled your follow up appointment?: No  Were you discharged with any Home Care or Post Acute Services: Yes  Post Acute Services: [facility] Todd Jolynn (Comment: [address] )  Do you feel like you have everything you need to keep you well at home?: Yes  Care Transitions Interventions       Initial attempt at CT discharge phone call. Solange Barrett states she is \"okay\". She is unsure if [facility] has contacted her. Solange Barrett declines assistance in scheduling her hospital follow up - she states she needs to obtain transportation. Solange Barrett states she uses a walker for ambulation. She states she is urinating without difficulty. She denies any pain, discoloration, odor , or sediment with urination. Solange Barrett denies any fever, falls, or LOC since she has returned home. She denies any needs from writer. She did ask that writer review her medications with her sister who was present. Writer spoke with Roselia Barnes - reviewed medication list & discussed Agueda's confusion. Roselia Barnes states Solange Barrett spoke to someone yesterday - Solange Barrett was not able to confirm who it was and would not let Roselia Barnes answer the phone. Roselia Barnes states Solange Barrett needs assistance with bathing.  Writer discussed looking into options available through the insurance or through the [employer]. Екатерина Ahmadi asked that writer contact Vi Bingham - phillip niece and discuss the options with her. Writer contacted [employer]. Discussed Agueda's confusion. Provided Екатерина Ahmadi with the resources to reach out to the insurer and the COA for assistance. Екатерина Ahmadi verbalized understanding and gratitude for the info. Екатерина Ahmadi states she just had a baby on 12/15/2021 - she states she will line up some transportation for [place] and then will schedule a hospital follow up with the PCP. Writer placed call to [address]. Spoke with Sage Hubbard from intake. She states they have the patient referral.    Follow Up  No future appointments.     Zoey Gupta RN BSN  Care Transition Nurse  [phone]

## 2022-01-10 ENCOUNTER — TELEPHONE (OUTPATIENT)
Dept: FAMILY MEDICINE CLINIC | Age: 87
End: 2022-01-10

## 2022-01-10 NOTE — TELEPHONE ENCOUNTER
[facility] called stating the patient had an OT evaluation and will be having OT 2x week for 3 weeks.  [facility] will send over evaluation and request for order

## 2022-01-13 ENCOUNTER — CARE COORDINATION (OUTPATIENT)
Dept: CASE MANAGEMENT | Age: 87
End: 2022-01-13

## 2022-01-13 NOTE — CARE COORDINATION
LM for clinical manager Scarlett Sandy to return call, was transferred to her from intake. Asked for return call regarding SW to involve COA if not enrolled, pt is starting to have cognitive deficits.

## 2022-01-13 NOTE — CARE COORDINATION
Lawanda Lewis called CTN back regarding SW for pt. Orders placed for SW to see pt regarding COA and/or other services available for pt. Will let Dr. Delorse Boas office know in case orders come to office. Otilio La RN, BSN  Care Coordinator  Cell [phone]  Email [email or web address] Pt called & is requesting an order to be sent to his pharmacy for the Lancets & strips.

## 2022-01-13 NOTE — CARE COORDINATION
[facility] Transitions Follow Up Call    2022    Patient: Noam Maher  Patient : 1935   MRN: [id number]  Reason for Admission: Syncope & Collapse  Discharge Date: 1/3/22 RARS: Readmission Risk Score: 12.1 ( )         Spoke with: [address] Transitions Follow Up Call    Doing well, PT comes to the home that pt can remember, states her memory is not good, sister lives next door, lives in an apartment. No falls that she can remember. Needs to be reviewed by the provider   Additional needs identified to be addressed with provider: No  none             Method of communication with provider : none      Care Transition Nurse (CTN) contacted the patient by telephone to follow up after admission on 2021. Verified name and  with patient as identifiers. Addressed changes since last contact: home health care-pt thinks but can't be sure. Discussed follow-up appointments. If no appointment was previously scheduled, appointment scheduling offered: No.   Is follow up appointment scheduled within 7 days of discharge? No and has upcoming appt and HHC has been present. CTN reviewed discharge instructions, medical action plan and red flags with patient and discussed any barriers to care and/or understanding of plan of care after discharge. Discussed appropriate site of care based on symptoms and resources available to patient including: PCP, Specialist and When to call 911. The patient agrees to contact the PCP office for questions related to their healthcare. Patients top risk factors for readmission: medical condition-Syncope & Collapse  Interventions to address risk factors: Obtained and reviewed discharge summary and/or continuity of care documents      Non-[facility] follow up appointment(s): teodoro    CTN provided contact information for future needs. Plan for follow-up call in 5-7 days based on severity of symptoms and risk factors.   Plan for next call: symptom management-any falls/dizziness  follow up appointment-made one with PCP? referral to ambulatory care manager-when CTNs complete  did [facility] SW meet with pt. Care Transitions Subsequent and Final Call    Subsequent and Final Calls  Do you have any ongoing symptoms?: No  Have your medications changed?: No  Do you have any questions related to your medications?: No  Do you currently have any active services?: No  Are you currently active with any services?: Home Health  Do you have any needs or concerns that I can assist you with?: No  Identified Barriers: None  Care Transitions Interventions    Social Work: Completed    Other Interventions:            Follow Up  Future Appointments   Date Time Provider Catracho Gutierrez   2/2/2022  1:00 PM Reji Fitzpatrick MD [facility]       Niranjan Reed RN

## 2022-01-19 ENCOUNTER — CARE COORDINATION (OUTPATIENT)
Dept: CASE MANAGEMENT | Age: 87
End: 2022-01-19

## 2022-01-19 NOTE — CARE COORDINATION
Follow Up Call    Challenges to be reviewed by the provider   Additional needs identified to be addressed with provider: No  none                 Encounter was not routed to provider for escalation. Method of communication with provider:  none. Contacted the patient by telephone to follow up after hospital visit. Status: improved  Interventions to address identified needs: Education of patient/family/caregiver/guardian to support self-management-on importanceof making f/u with PCP    [facility] follow up appointment(s):   Future Appointments   Date Time Provider Catracho Gutierrez   2/2/2022  1:00 PM Fran Melvin MD [facility]     Non-[facility] follow up appointment(s): none   Follow up appointment completed? No.    Provided contact information for future needs. Plan for follow-up call in 7-10 days based on severity of symptoms and risk factors. Plan for next call: symptom management-sepsis     Pt states she is doing fine. She is getting around with a walker and [facility] is coming out. Reviewed upcoming cardiology appt. Refused appt with her PCP . She thinks it is unnecessary despite providing education on need. She does not think \"you should go to office if not sick\". She is agreeable to go to upcoming cardiologist appt. Per her. Doing well.        Claudeen Scheuermann, ITAN, RN   [address] [place] [address] Transition Nurse  [phone]

## 2022-01-23 DIAGNOSIS — I10 ESSENTIAL HYPERTENSION: ICD-10-CM

## 2022-01-23 RX ORDER — LISINOPRIL 20 MG/1
TABLET ORAL
Qty: 90 TABLET | Refills: 0 | Status: SHIPPED | OUTPATIENT
Start: 2022-01-23

## 2022-01-24 ENCOUNTER — TELEPHONE (OUTPATIENT)
Dept: FAMILY MEDICINE CLINIC | Age: 87
End: 2022-01-24

## 2022-01-24 NOTE — TELEPHONE ENCOUNTER
Spoke with Kelley Hernandez with [facility]. Gave verbal for nurse to go and evaluate sore. Kelley Hernandez states sore is very large will have nurse call us if patient needs to be seen.

## 2022-01-24 NOTE — TELEPHONE ENCOUNTER
Yanni Link the OT with [facility] called to let Dr Ghanshyam Morgan know that the pt has a sore on her left buttock. It is draining. She is requesting a order for a nurse to go out to see the pt.  Please give pt a call [phone]

## 2022-01-25 ENCOUNTER — CARE COORDINATION (OUTPATIENT)
Dept: CASE MANAGEMENT | Age: 87
End: 2022-01-25

## 2022-01-25 NOTE — CARE COORDINATION
Follow Up Call    Challenges to be reviewed by the provider   Additional needs identified to be addressed with provider: No  none                 Encounter was not routed to provider for escalation. Method of communication with provider:  none. Contacted the patient by telephone to follow up after hospital visit. Status: improved  Interventions to address identified needs: Assessment and support for treatment adherence and medication management-denied medication changes    [facility] follow up appointment(s):   Future Appointments   Date Time Provider Catracho Brenda   2/2/2022  1:00 PM Hamida Mendez MD [facility] follow up appointment(s): NA   Follow up appointment completed? Yes and No.    States she is well. Ambulating with walker. Declines f/u with PCP. Plans to see cardiology 2/2. Provided contact information for future needs. Plan for follow-up call in 7-10 days based on severity of symptoms and risk factors.   Plan for next call: symptom management-weakness  self management-HC, ambulation  follow up appointment-cardiology  medication management-new or changed    Angelique Knox LPN

## 2022-02-01 ENCOUNTER — CARE COORDINATION (OUTPATIENT)
Dept: CASE MANAGEMENT | Age: 87
End: 2022-02-01

## 2022-02-01 PROBLEM — N39.0 UTI (URINARY TRACT INFECTION): Status: RESOLVED | Noted: 2022-01-01 | Resolved: 2022-01-01

## 2022-02-01 NOTE — CARE COORDINATION
[facility] Transitions Follow Up Call    2022    Patient: Bandar Pal  Patient : 1935   MRN: [id number]  Reason for Admission: Syncope and [place]  Discharge Date: 1/3/22 RARS: Readmission Risk Score: 12.1 ( )    Brittanie Mullen states she has a very poor memory. States she is doing fine as far as she is concerned. Denies sob, pain, dizziness, falls or urinary concerns. States her bottom is sore from sitting watching television because there is nothing else to do. Appetite okay. She is trying to drink more water. States she uses her walker inside if she is going far distances. Sometimes she does not use it if not going far. She thinks someone from therapy is making visit. State again her memory is very poor. States she lives next door to her sister and their apartments are attached. She thanked this writer for calling. Spoke with: Brittanie Farnsworth/patient    Care Transitions Subsequent and Final Call    Subsequent and Final Calls  Care Transitions Interventions  Other Interventions: Follow Up  Future Appointments   Date Time Provider Catracho Gutierrez   2022  1:00 PM Chuck Garcia MD [facility]   2022 10:45 AM Collette Staley, APRN - [facility]     Care Transitions Follow Up Call    Needs to be reviewed by the provider   Additional needs identified to be addressed with provider: No  none             Method of communication with provider : none      Care Transition Nurse (CTN) contacted the patient by telephone to follow up after admission on . Verified name and  with patient as identifiers. Addressed changes since last contact: none  Discussed follow-up appointments. If no appointment was previously scheduled, appointment scheduling offered: No     agrees to contact the PCP office for questions related to their healthcare.      Patients top risk factors for readmission: functional cognitive ability  medical condition-UTI  Interventions to address risk factors: No interventions need to address    CTN provided contact information for future needs. Plan for follow-up call in 7-10 days based on severity of symptoms and risk factors.   Plan for next call: symptom management-UTI/new concerns        Rama Sharif RN

## 2022-02-07 ENCOUNTER — HOSPITAL ENCOUNTER (OUTPATIENT)
Dept: WOUND CARE | Age: 87
Discharge: HOME | End: 2022-02-07

## 2022-02-08 ENCOUNTER — CARE COORDINATION (OUTPATIENT)
Dept: CASE MANAGEMENT | Age: 87
End: 2022-02-08

## 2022-02-08 NOTE — CARE COORDINATION
[facility] Trans[facility] Follow Up Call    2022    Patient: Brandon Cherelle  Patient : 1935   MRN: [id number]  Reason for Admission: UTI, fall  Discharge Date: 1/3/22 RARS: Readmission Risk Score: 12.1 ( )         Spoke with: phillip herrera, Karen Sanders, HIPAA verified    Care Transitions Follow Up Call    Needs to be reviewed by the provider   Additional needs identified to be addressed with provider: Yes  none             Method of communication with provider : phone      Care Transition Nurse (CTN) contacted the family by telephone to follow up after admission. Verified name and  with family as identifiers. Addressed changes since last contact: home health care-active  transportation-sister to assist  Discussed follow-up appointments. If no appointment was previously scheduled, appointment scheduling offered: Yes. Is follow up appointment scheduled within 7 days of discharge? Yes. Advance Care Planning:   Does patient have an Advance Directive: decision maker updated. Advance Care Planning   Healthcare Decision Maker:    Primary Decision Maker: Emily Mills - Brother/Sister - [phone]    Secondary Decision Maker: Marsha Jolly - [phone]    CTN reviewed discharge instructions, medical action plan and red flags with family and discussed any barriers to care and/or understanding of plan of care after discharge. Discussed appropriate site of care based on symptoms and resources available to patient including: PCP, Specialist and Home health. The family agrees to contact the PCP office for questions related to their healthcare. Patients top risk factors for readmission: medical condition-UTI, s/p fall    Spoke with patient and phillip herrera. Patient is fairly confused and loses track of appt times and dates. LPN CC encouraged great niece to set up PCP appt, discussed wound care appt, and cardiology appt.  Great niece has a  and is trying to limit her own exposure to patient and medical settings, states her grandmother (patient's sister) can assist with visits. HC active. Denied medication changes. F/u in 3 days to check status of f/u appts, wound care, and patient status. Denies any acute needs at present time. Agreeable to f/u calls. Educated on the use of urgent care or physicians 24 hr access line if assistance is needed after hours. CTN provided contact information for future needs. Plan for follow-up call in 3-5 days based on severity of symptoms and risk factors. Lana Ponce LPN [facility]  Care Transitions  [phone]    Care Transitions Subsequent and Final Call    Subsequent and Final Calls  Care Transitions Interventions  Other Interventions:            Follow Up  Future Appointments   Date Time Provider Catracho Gutierrez   2/16/2022 11:00 AM MICHELLE Jarrett - CNP [facility] DEEJAY Ponce, [place]

## 2022-02-09 ENCOUNTER — HOSPITAL ENCOUNTER (OUTPATIENT)
Dept: WOUND CARE | Age: 87
Discharge: HOME | End: 2022-02-09

## 2022-02-11 ENCOUNTER — CARE COORDINATION (OUTPATIENT)
Dept: CASE MANAGEMENT | Age: 87
End: 2022-02-11

## 2022-02-11 NOTE — CARE COORDINATION
Spoke with Michael Guerra, niece, HIPAA verified. Aleepolina Priti states that patient is not bathing, is refusing assistance from family. Patient has a body odor that is either from poor hygiene and/or lack of wound care. Unsure of wound status. Declining assistance from family regarding ADLs. Sister helps with household chores. Patient often sitting in her chair on her wound. F/u are not until mid to late next week. Tong Marcos states that patient has hallucinated recently about feeding Amberly's cat and losing her purse after having hid it. Tong Marcos concerned about patient status, but unsure how to proceed as patient adamantly refuses help. LPN CC encouraged Tong Marcos to go to [facility] and call 911. Patient should likely be evaluated by ED to ensure she is not septic and to r/o encephalopathy r/t illness. Tong Marcos verbalized understanding. Will continue to monitor.    Salvador Colon [address] [place] [address]  [phone]

## 2022-02-14 ENCOUNTER — CARE COORDINATION (OUTPATIENT)
Dept: CASE MANAGEMENT | Age: 87
End: 2022-02-14

## 2022-02-14 NOTE — CARE COORDINATION
[facility] Transitions Follow Up Call    2022    Patient: Harish Brito  Patient : 1935   MRN: [id number]  Reason for Admission: UTI, fall  Discharge Date: 1/3/22 RARS: Readmission Risk Score: 12.1 ( )         Spoke with: Snow herrera HIPAA verified    Care Transitions Follow Up Call    Needs to be reviewed by the provider   Additional needs identified to be addressed with provider: No  none             Method of communication with provider : phone      Care Transition Nurse (CTN) contacted the family by telephone to follow up after admission. Verified name and  with family as identifiers. Addressed changes since last contact: none  Discussed follow-up appointments. If no appointment was previously scheduled, appointment scheduling offered: Yes. Is follow up appointment scheduled within 7 days of discharge? Yes. Advance Care Planning:   Does patient have an Advance Directive: decision maker updated. Advance Care Planning   Healthcare Decision Maker:    Primary Decision Maker: Denise Jack - Brother/Sister - [phone]    Secondary Decision Maker: Rock Arias - [phone]    CTN reviewed discharge instructions, medical action plan and red flags with family and discussed any barriers to care and/or understanding of plan of care after discharge. Discussed appropriate site of care based on symptoms and resources available to patient including: PCP, Specialist and Home health. The family agrees to contact the PCP office for questions related to their healthcare. Patients top risk factors for readmission: medical condition-UTI, fall    Spoke with patient and Snow herrera HIPAA verified. States patient is still confused. Patient was unsure as to when her last dressing change was. Niece states HC was in today and took care of patient wound. Niece states patient having difficulty with self care, but is very inaccepting of assistance from family.

## 2022-02-15 NOTE — PROGRESS NOTES
[address]  Cardiology Progress Note     [facility]  1935 February 18, 2022    Referring provider: Arturo Paez DO    CC: \"I feel fine\"     HPI:  The patient is 82 y.o. female with a past medical history significant for CAD, hyperlipidemia and hypertension. She presented with chest pain and found to have an acute inferior MI. She underwent LHC on 1/23/2017 resulting in CATRACHITA to her 99% mid RCA. The day after the procedure she was noted to have abdominal pain and R groin pain with a decrease in her Hgb. CT of abd/pelvis showed retroperitoneal bleed with anterior bleeding. She remained stable and was discharged on 1/25/2017 with stable Hgb (9.9). She did not tolerate Coreg secondary to diarrhea. She presents today for follow up accompanied her great niece. She has been experiencing worsening memory loss. She denies chest pain with rest or exertion. Her breathing has been comfortable without shortness of breath. She presents with a walker today to aid in ambulation and balance issues. Patient denies exertional chest pain/pressure, dyspnea at rest, [place], PND, orthopnea, palpitations, lightheadedness, weight changes, changes in LE edema, and syncope. She reports medication compliance and is tolerating. She reports a seizure several years ago and short term memory loss. Review of Systems:  Constitutional: Denies night sweats or fever. HEENT: Denies new visual changes, nosebleeds,nasal congestion. Respiratory: Denies new or change in SOB, PND, orthopnea or cough. Cardiovascular: see HPI  GI: Denies N/V, diarrhea, constipation, abdominal pain, change in bowel habits, melena or hematochezia  : Denies urinary frequency, urgency, incontinence, hematuria or dysuria. Skin: Denies rash, hives, or cyanosis  Musculoskeletal: + chronic bilateral leg and back pain, use of walker. Neurological: Denies syncope or TIA-like symptoms.   Psychiatric: Denies anxiety, insomnia or depression     Past Medical History:   Diagnosis Date    CAD (coronary artery disease)     Inferior STEMI; CATRACHITA to RCA    DDD (degenerative disc disease), lumbosacral 4/10/2013    Hearing aid worn     bilateral    [place] (hard of hearing)     Hyperlipidemia     Hypertension     MI, old 2017    Neuropathy     bilateral lower extremities    Pressure ulcer of left buttock, stage 3 ([address] 2/16/2022    Sleep apnea     uses mouth piece; bringing DOS 3/26/18    Thyroid disease     hypo    Wears glasses          Social History     Tobacco Use    Smoking status: Never Smoker    Smokeless tobacco: Never Used    Tobacco comment: encouraged to never smoke    Vaping Use    Vaping Use: Never used   Substance Use Topics    Alcohol use: No    Drug use: No       Allergies   Allergen Reactions    Amoxicillin Hives and Rash    Linezolid Other (See Comments)     While taking linezolid, pt developed progressively altered mentation & ultimately had a witnessed seizure 4/26/18. Uncertain whether these symptoms were d/t linezolid, but possible.     Prednisone Itching    Vancomycin      Rising SCr during 5/2018 admission     Coreg [Carvedilol] Diarrhea    Oxycodone-Acetaminophen Rash     Current Outpatient Medications   Medication Sig Dispense Refill    lisinopril (PRINIVIL;ZESTRIL) 20 MG tablet TAKE ONE TABLET BY MOUTH DAILY 90 tablet 0    Balsam [place]-Castor Oil (VENELEX) OINT ointment Apply topically 2 times daily 30 g 0    diclofenac (VOLTAREN) 75 MG EC tablet TAKE ONE TABLET BY MOUTH TWICE A DAY WITH MEALS (Patient taking differently: Take 75 mg by mouth 2 times daily as needed TAKE ONE TABLET BY MOUTH TWICE A DAY WITH MEALS) 180 tablet 2    potassium chloride (KLOR-CON M) 10 MEQ extended release tablet TAKE ONE TABLET BY MOUTH DAILY 90 tablet 0    thyroid (NP THYROID) 30 MG tablet TAKE ONE TABLET BY MOUTH DAILY 90 tablet 3    rosuvastatin (CRESTOR) 40 MG tablet TAKE ONE TABLET BY MOUTH DAILY 90 tablet 3    Lactobacillus (ACIDOPHILUS) TABS Take 1 tablet by mouth 2 times daily       Multiple Vitamins-Minerals (DAILY SHERIE MAXIMUM MULTIVITAMIN PO) Take 1 packet by mouth daily      aspirin 81 MG tablet Take 81 mg by mouth daily      latanoprost (XALATAN) 0.005 % ophthalmic solution Place 1 drop into the left eye nightly        No current facility-administered medications for this visit. Physical Exam:   /64   Pulse 80   Ht 5' 5\" (1.651 m)   Wt 156 lb 3.2 oz (70.9 kg)   SpO2 97%   BMI 25.99 kg/m²   Wt Readings from Last 2 Encounters:   02/18/22 156 lb 3.2 oz (70.9 kg)   02/18/22 154 lb (69.9 kg)     Constitutional: She is oriented to person, place, and time. She appears well-developed and well-nourished. In no acute distress. Elderly, using a walker. HEENT: Normocephalic and atraumatic. Sclerae anicteric. No xanthelasmas. Neck: Neck supple. No JVD present. Carotids without bruits. No thyromegaly present. Cardiovascular: RRR, normal S1 and S2; no murmur/gallop or rub  Pulmonary/Chest: Effort normal and breath sounds normal. No respiratory distress. Lungs clear to auscultation. Abdominal: soft, nontender, nondistended. + bowel sounds; no organomegaly or bruits. Extremities: No edema, cyanosis, or clubbing. Pulses are 2+ radial/carotid/dorsalis pedis and posterior tibial bilaterally. Cap refill brisk. Neurological: No cranial nerve deficit. Skin: Skin is warm and dry. There is no rash or diaphoresis. Psychiatric: She has a normal mood and affect. Her speech is normal and behavior is normal.     Personally reviewed and interpreted   ECG 1/7/20: sinus rhythm   ECG 1/12/21: sinus rhythms with PVC's      Procedures:     [facility] [place] 1/23/2017  1. Right dominant coronary arterial system with a 99% mid RCA lesion is the  culprit lesion. This was successfully intervened upon with a 3 mm x 18 mm  Xience Alpine drug-eluting stent dilated to post-dilated to 3.4 mm.  This  resulted in CLAUDIA 3 flow and 0% residual stenosis. In the left system, there  is no left main, LAD or circumflex disease appreciated angiographically. 2. Normal left ventricular systolic function with an LV ejection fraction of  55%. There is mild inferior wall hypokinesis. 3. Normal left ventricular end-diastolic pressure. 4. No gradient across the aortic valve on pullback to suggest aortic Stenosis. Imaging:     CT abdomen/pelvis 1/23/2017  Moderate amount of right groin intramuscular/subcutaneous hemorrhage as well   as right retroperitoneal hemorrhage as described above. Lab Review:   Lab Results   Component Value Date    TRIG 233 01/12/2021    HDL 37 01/12/2021    LDLCALC 77 01/12/2021    LDLDIRECT 84 01/12/2021    LABVLDL 47 01/12/2021     Lab Results   Component Value Date     01/03/2022    K 3.8 01/03/2022     01/03/2022    CO2 18 01/03/2022    BUN 16 01/03/2022    CREATININE 0.7 01/03/2022    GLUCOSE 130 01/03/2022    CALCIUM 9.1 01/03/2022      Lab Results   Component Value Date    WBC 6.9 01/03/2022    HGB 11.0 (L) 01/03/2022    HCT 33.7 (L) 01/03/2022    MCV 82.2 01/03/2022     01/03/2022       Assessment:  1. CAD of native coronary arteries without angina  2. Essential hypertension  3. Hyperlipidemia LDL goal <70 mg/dL  4. Dementia, unspecified without behavioral disturbance  5. PRINCE, untreated    Plan:   I think that Ms. Emily Swenson  is stable from a cardiovascular standpoint. I see no need to make any changes currently in her medical regimen or pursue further testing. Her blood pressure is well controlled today in the office. She did complete a fasting lipid profile earlier today. She should remain on her statin therapy regardless of low LDL result or weight loss. I have personally reviewed all previous testing for this visit today including imaging, lab results and EKG as detailed above. I will see her in office for follow up in 1 year.        This note was scribed in the presence of Gwen Christina MD by Desiree Vergara, MICHELLE. Physician Attestation:  The scribes documentation has been prepared under my direction and personally reviewed by me in its entirety. I, Dr. Lacy Montaño personally performed the services described in this documentation as scribed by my RN in my presence, and I confirm that the note above accurately reflects all work, treatment, procedures, and medical decision making performed by me.

## 2022-02-16 ENCOUNTER — HOSPITAL ENCOUNTER (OUTPATIENT)
Dept: WOUND CARE | Age: 87
Discharge: HOME | End: 2022-02-16
Payer: MEDICARE

## 2022-02-16 VITALS
HEIGHT: 65 IN | RESPIRATION RATE: 20 BRPM | TEMPERATURE: 97 F | HEART RATE: 88 BPM | BODY MASS INDEX: 25.64 KG/M2 | DIASTOLIC BLOOD PRESSURE: 72 MMHG | SYSTOLIC BLOOD PRESSURE: 113 MMHG | WEIGHT: 153.88 LBS

## 2022-02-16 DIAGNOSIS — L89.323 PRESSURE ULCER OF LEFT BUTTOCK, STAGE 3 (HCC): Primary | ICD-10-CM

## 2022-02-16 PROCEDURE — 11042 DBRDMT SUBQ TIS 1ST 20SQCM/<: CPT | Performed by: NURSE PRACTITIONER

## 2022-02-16 PROCEDURE — 99213 OFFICE O/P EST LOW 20 MIN: CPT

## 2022-02-16 PROCEDURE — 11042 DBRDMT SUBQ TIS 1ST 20SQCM/<: CPT

## 2022-02-16 PROCEDURE — 99203 OFFICE O/P NEW LOW 30 MIN: CPT | Performed by: NURSE PRACTITIONER

## 2022-02-16 RX ORDER — BACITRACIN, NEOMYCIN, POLYMYXIN B 400; 3.5; 5 [USP'U]/G; MG/G; [USP'U]/G
OINTMENT TOPICAL ONCE
Status: CANCELLED | OUTPATIENT
Start: 2022-02-16 | End: 2022-02-16

## 2022-02-16 RX ORDER — LIDOCAINE HYDROCHLORIDE 20 MG/ML
JELLY TOPICAL ONCE
Status: CANCELLED | OUTPATIENT
Start: 2022-02-16 | End: 2022-02-16

## 2022-02-16 RX ORDER — BACITRACIN ZINC AND POLYMYXIN B SULFATE 500; 1000 [USP'U]/G; [USP'U]/G
OINTMENT TOPICAL ONCE
Status: CANCELLED | OUTPATIENT
Start: 2022-02-16 | End: 2022-02-16

## 2022-02-16 RX ORDER — LIDOCAINE 40 MG/G
CREAM TOPICAL ONCE
Status: CANCELLED | OUTPATIENT
Start: 2022-02-16 | End: 2022-02-16

## 2022-02-16 RX ORDER — LIDOCAINE 50 MG/G
OINTMENT TOPICAL ONCE
Status: CANCELLED | OUTPATIENT
Start: 2022-02-16 | End: 2022-02-16

## 2022-02-16 RX ORDER — GINSENG 100 MG
CAPSULE ORAL ONCE
Status: CANCELLED | OUTPATIENT
Start: 2022-02-16 | End: 2022-02-16

## 2022-02-16 RX ORDER — LIDOCAINE HYDROCHLORIDE 40 MG/ML
SOLUTION TOPICAL ONCE
Status: CANCELLED | OUTPATIENT
Start: 2022-02-16 | End: 2022-02-16

## 2022-02-16 RX ORDER — GENTAMICIN SULFATE 1 MG/G
OINTMENT TOPICAL ONCE
Status: CANCELLED | OUTPATIENT
Start: 2022-02-16 | End: 2022-02-16

## 2022-02-16 RX ORDER — LIDOCAINE HYDROCHLORIDE 40 MG/ML
SOLUTION TOPICAL ONCE
Status: COMPLETED | OUTPATIENT
Start: 2022-02-16 | End: 2022-02-16

## 2022-02-16 RX ADMIN — LIDOCAINE HYDROCHLORIDE 5 ML: 40 SOLUTION TOPICAL at 12:12

## 2022-02-16 ASSESSMENT — PAIN SCALES - GENERAL
PAINLEVEL_OUTOF10: 0
PAINLEVEL_OUTOF10: 0

## 2022-02-16 NOTE — PROGRESS NOTES
[address]   Progress Note and Procedure Note      [address] RECORD NUMBER:  [id number]  AGE: 82 y.o. GENDER: female  : 1935  EPISODE DATE:  2022    Subjective:     Chief Complaint   Patient presents with    Wound Check     Initial  visit - left buttock wound. Sister states pt possibly has had wound since about 12/15/2021. Then pt fell and had UTI end of 2021 and DTI noted to left buttock. Unsure of current wound care. HISTORY of PRESENT ILLNESS HPI     Krysten Mallory is a 82 y.o. female who presents today for wound/ulcer evaluation. Pt accompanied by her younger sister. They both live next door to one another in two condominiums. Pt reports not feeling  pain in wound area. Sits in a recliner most of the day and sleeps in bed at night. Pt has short term memory loss and doesn't recall instructions from OT or PT. Pt's appetite is poor with largest meal at supper of a microwave dinner. The sister has bought pt Ensure to boost her protein intake. History of Wound Context: started or noticed as a unstagable closed deep tissue injury 2021.    Wound/Ulcer Pain Timing/Severity: none  Quality of pain: N/A  Severity:  0 / 10   Modifying Factors: None  Associated Signs/Symptoms: drainage    Ulcer Identification:  Ulcer Type: pressure    Contributing Factors: chronic pressure, decreased mobility and malnutrition    Acute Wound: N/A not an acute wound    PAST MEDICAL HISTORY        Diagnosis Date    CAD (coronary artery disease)     Inferior STEMI; CATRACHITA to RCA    DDD (degenerative disc disease), lumbosacral 4/10/2013    Hearing aid worn     bilateral    [place] (hard of hearing)     Hyperlipidemia     Hypertension     MI, old 2017    Neuropathy     bilateral lower extremities    Pressure ulcer of left buttock, stage 3 ([address] 2022    Sleep apnea     uses mouth piece; bringing DOS 3/26/18    Thyroid disease     hypo    Wears glasses        PAST SURGICAL HISTORY    Past Surgical History:   Procedure Laterality Date    ABSCESS DRAINAGE Right 03/27/2018    evacuation hematoma right knee    APPENDECTOMY  1963    BACK SURGERY  [id number]    MILD PROCEDURE L2-3 BILATERAL     BREAST LUMPECTOMY  1986    BUNIONECTOMY  10/2003    CATARACT REMOVAL  03/23/2010 and 04/14/2010    COLONOSCOPY  6/2014    repeat 2019    CORONARY ANGIOPLASTY WITH STENT PLACEMENT  01/23/2017    INF STEMI with CATRACHITA to RCA    DILATION AND CURETTAGE OF UTERUS      INCONTINENCE SURGERY      JOINT REPLACEMENT Right 2018    right total knee replacment    OVARY REMOVAL  1963    right    TONSILLECTOMY AND ADENOIDECTOMY  1942       FAMILY HISTORY    Family History   Problem Relation Age of Onset   Joselin Dasilva Cancer Mother     Heart Disease Mother     Arthritis Sister         rheumatoid    Cancer Brother     Cancer Sister     Diabetes Sister     Heart Disease Brother     High Blood Pressure Brother     High Blood Pressure Sister     Diabetes Brother        SOCIAL HISTORY    Social History     Tobacco Use    Smoking status: Never Smoker    Smokeless tobacco: Never Used    Tobacco comment: encouraged to never smoke    Vaping Use    Vaping Use: Never used   Substance Use Topics    Alcohol use: No    Drug use: No       ALLERGIES    Allergies   Allergen Reactions    Amoxicillin Hives and Rash    Linezolid Other (See Comments)     While taking linezolid, pt developed progressively altered mentation & ultimately had a witnessed seizure 4/26/18. Uncertain whether these symptoms were d/t linezolid, but possible.     Prednisone Itching    Vancomycin      Rising SCr during 5/2018 admission     Coreg [Carvedilol] Diarrhea    Oxycodone-Acetaminophen Rash       MEDICATIONS    Current Outpatient Medications on File Prior to Encounter   Medication Sig Dispense Refill    lisinopril (PRINIVIL;ZESTRIL) 20 MG tablet TAKE ONE TABLET BY MOUTH DAILY 90 tablet 0    diclofenac (VOLTAREN) 75 MG EC tablet TAKE ONE TABLET BY MOUTH TWICE A DAY WITH MEALS (Patient taking differently: Take 75 mg by mouth 2 times daily as needed TAKE ONE TABLET BY MOUTH TWICE A DAY WITH MEALS) 180 tablet 2    potassium chloride (KLOR-CON M) 10 MEQ extended release tablet TAKE ONE TABLET BY MOUTH DAILY 90 tablet 0    thyroid (NP THYROID) 30 MG tablet TAKE ONE TABLET BY MOUTH DAILY 90 tablet 3    rosuvastatin (CRESTOR) 40 MG tablet TAKE ONE TABLET BY MOUTH DAILY 90 tablet 3    Multiple Vitamins-Minerals (DAILY SHERIE MAXIMUM MULTIVITAMIN PO) Take 1 packet by mouth daily      aspirin 81 MG tablet Take 81 mg by mouth daily      latanoprost (XALATAN) 0.005 % ophthalmic solution Place 1 drop into the left eye nightly       Balsam Peru-Castor Oil (VENELEX) OINT ointment Apply topically 2 times daily 30 g 0    sertraline (ZOLOFT) 50 MG tablet Take 1 tablet by mouth daily (Patient not taking: Reported on 1/5/2022) 90 tablet 1    Lactobacillus (ACIDOPHILUS) TABS Take 1 tablet by mouth 2 times daily (Patient not taking: Reported on 1/5/2022)       No current facility-administered medications on file prior to encounter. REVIEW OF SYSTEMS  Review of Systems    Pertinent items are noted in HPI.     Objective:      /72   Pulse 88   Temp 97 °F (36.1 °C) (Temporal)   Resp 20   Ht 5' 5\" (1.651 m)   Wt 153 lb 14.1 oz (69.8 kg)   BMI 25.61 kg/m²     Wt Readings from Last 3 Encounters:   02/16/22 153 lb 14.1 oz (69.8 kg)   01/02/22 170 lb 6.7 oz (77.3 kg)   08/31/21 177 lb (80.3 kg)       PHYSICAL EXAM  Physical Exam    General Appearance: alert and oriented to person, place and time, well-developed and well-nourished, in no acute distress and frail-appearing  Skin: warm and dry, no rash or erythema  Head: normocephalic and atraumatic  Eyes: pupils equal, round, and reactive to light  Pulmonary/Chest: clear to auscultation bilaterally- no wheezes, rales or rhonchi, normal air movement, no respiratory distress  Cardiovascular: normal rate, normal S1 and S2 and no carotid bruits      Assessment:        Problem List Items Addressed This Visit     Pressure ulcer of left buttock, stage 3 (HCC) - Primary    Relevant Orders    Initiate Outpatient Wound Care Protocol           Procedure Note  Indications:  Based on my examination of this patient's wound(s)/ulcer(s) today, debridement is required to promote healing and evaluate the wound base. Performed by: MICHELLE Warren CNP    Consent obtained:  Yes    Time out taken:  Yes    Pain Control: Anesthetic  Anesthetic: 4% Lidocaine Cream (5 ml)       Debridement: Excisional Debridement    Using curette the wound(s)/ulcer(s) was/were debrided down through and including the removal of epidermis, dermis and subcutaneous tissue. Devitalized Tissue Debrided:  fibrin, biofilm and slough    Pre Debridement Measurements:  Are located in the [place]  Documentation Flow Sheet    Diabetic/Pressure/Non Pressure Ulcers only:  Ulcer: Pressure ulcer, Stage 3     Wound/Ulcer #: 1    Post Debridement Measurements:  Wound/Ulcer Descriptions are Pre Debridement except measurements:    Wound 01/03/22 Buttocks Left (Active)   Number of days: 44       Wound 02/16/22 Buttocks Left #1 ( since about 12/15/2021) (Active)   Wound Image   02/16/22 1116   Wound Etiology Pressure Stage  3 02/16/22 1204   Wound Cleansed Cleansed with saline 02/16/22 1204   Dressing/Treatment Alginate with Ag;Gauze dressing/dressing sponge;Protective barrier; Other (comment) 02/16/22 1204   Wound Length (cm) 2.5 cm 02/16/22 1116   Wound Width (cm) 1.5 cm 02/16/22 1116   Wound Depth (cm) 7.2 cm 02/16/22 1116   Wound Surface Area (cm^2) 3.75 cm^2 02/16/22 1116   Wound Volume (cm^3) 27 cm^3 02/16/22 1116   Post-Procedure Length (cm) 2.6 cm 02/16/22 1155   Post-Procedure Width (cm) 1.6 cm 02/16/22 1155   Post-Procedure Depth (cm) 7.2 cm 02/16/22 1155   Post-Procedure Surface Area (cm^2) 4.16 cm^2 02/16/22 1155   Post-Procedure Volume (cm^3) 29.952 cm^3 02/16/22 1155   Wound Assessment Granulation tissue; Hyper granulation tissue;Slough 02/16/22 1116   Drainage Amount Moderate 02/16/22 1116   Drainage Description Yellow;Green 02/16/22 1116   Odor None 02/16/22 1116   Bianca-wound Assessment Blanchable erythema 02/16/22 1116   Margins Unattached edges 02/16/22 1116   Wound Thickness Description not for Pressure Injury Full thickness 02/16/22 1116   Number of days: 0          Total Surface Area Debrided:  4.16 sq cm     Estimated Blood Loss:  Minimal    Hemostasis Achieved:  by pressure    Procedural Pain:  0  / 10     Post Procedural Pain:  0 / 10     Response to treatment:  Well tolerated by patient. Plan:   Pt/sister education per provider related of current status of wound and plan of care with a silver alginate rope changed 3x week. Pt's sister reported she could never do that and reassured her that Home care would change dressing twice a week and WCC once a week. Treatment Note please see attached Discharge Instructions    Written patient dismissal instructions given to patient and signed by patient or POA. Discharge [address] [facility] Physician Orders and Discharge Instructions  [address] [place] [address], [address] [place] [address]  Telephone: [phone]  [address] 8:30 am - 4:30 pm and Friday 8:30 am - 1:00 pm.        NAME:  Angella Coles  YOB: 1935  MEDICAL RECORD NUMBER:  [id number]  DATE:  2/16/2022       Please wash hands with soap and water prior to and right after  every dressing change       Vashe wound cleanser:                                [] Vashe Soak  5 minutes in clinic today . DO NOT rinse after Vashe. WOUND LOCATION:   LEFT BUTTOCK    · May rinse wounds with 0.9% saline   · Do NOT SCRUB WOUND. · Keep wounds dry in the shower unless otherwise instructed by the physician.     Topical Treatments:              [x] Apply around the wound:      [x] No-Sting barrier film       PRIMARY DRESSING:        [x] ALGINATE AG ROPE - AQUACELL AG ROPE WITH STITCHING          SECONDARY DRESSING:               [x] Gauze                             [x] Cover Roll Tape                     HOW OFTEN TO CHANGE DRESSINGS:             [x] Three times per week:            _______________________________________________________________    _________________________________  PRESSURE RELIEF AND OFF-LOADING:    Off-Loading:   [x] Off-loading when       [x] in bed         [x] sitting                       [x] Turn every 2 hours when in bed. [x]  Avoid position directing pressure on wound site. Limit side lying to 30 degree tilt. Limit HOB elevation to 30 degrees. Specialty equipment ordered:       [] Wheelchair cushion    [] Specialty Bed/Mattress        _____________________________________________________________________________________________    Dietary:  Continue your diet as tolerated. ? Eat heart-healthy foods. These foods include vegetables, fruits, nuts, beans, lean meat, fish, and whole grains. Limit sodium, alcohol, and sugar. ? Protein is a key nutrient in helping to repair damaged tissue and promote new tissue growth. Good sources of protein are milk, yogurt, cheese, fish, lean meat, and beans. ? If you are a diabetic, having diabetes can make it hard for wounds to heal. So try to keep your blood sugar in its target range.  __________________________________________________________________________________________________    ACTIVITY:   Activity as tolerated  ________________________________________________________________________________________________________________    PAIN:    Please note, A small amount of pain, drainage and/or bleeding from this process might be expected, and is NORMAL. Elevate the affected limb.   Use over-the-counter medications you would normally use for pain as permitted by your family doctor. For persistent pain not relieved by the above interventions, please call your family doctor.  ________________________________________________________________________________  Call your doctor now or seek immediate medical care if:    · You have symptoms of infection, such as:  ? Increased pain, swelling, warmth, or redness. ? Red streaks leading from the area. ? Pus draining from the area. ? A fever. _______________________________________________________________________    Return Appointment:     · ECF or Home Healthcare: [facility]  · [address] [place] [address] is responsible to order your supplies. · Return Appointment: With Audrey Coelho CNP  in [address] [place] [address])                  [] Orders placed during your visit:       GET A THICK [facility]    ( [address] [place] [address])     Electronically signed by : Violetta Wood on 2/15/2022 at 2:46 PM       [facility] Information: Should you experience any significant changes in your wound(s) or have questions about your wound care, please contact the [address] [place] [address] at [address] 8:30 am - 4:30 pm and Friday 8:30 am - 1:00 pm.  If you need help with your wound outside these hours and cannot wait until we are again available, contact your PCP or go to the hospital emergency room. PLEASE NOTE: IF YOU ARE UNABLE TO OBTAIN WOUND SUPPLIES, CONTINUE TO USE THE SUPPLIES YOU HAVE AVAILABLE UNTIL YOU ARE ABLE TO REACH US. KEEP THE WOUND COVERED AT ALL TIMES.          Physician Signature:_______________________    Date: ___________ Time:  ____________        [x] Audrey Coelho CNP                  Electronically signed by MICHELLE Robin CNP on 2/16/2022 at 1:22 PM

## 2022-02-17 ENCOUNTER — CARE COORDINATION (OUTPATIENT)
Dept: CASE MANAGEMENT | Age: 87
End: 2022-02-17

## 2022-02-17 NOTE — CARE COORDINATION
[address] Transitions Follow Up Call    2022    Patient: Max Scale  Patient : 1935   MRN: [id number]  Reason for Admission:   Discharge Date: 1/3/22 RARS: Readmission Risk Score: 12.1 ( )       Spoke with patient. She reports she feels ok. Appetite and fluid intake is good. Patient states she doesn't cook. Her family will bring food or she heats something out of a can. No problems with bladder or bowel elimination. She has c, but patient confused as to when they come and what they do. I asked if it was ok to call Radha Church for more information she said yes. [employer]. No answer, left a voicemail message. Will continue to follow. Care Transitions Subsequent and Final Call    Subsequent and Final Calls  Have your medications changed?: No  Do you have any questions related to your medications?: No  Do you currently have any active services?: Yes  Are you currently active with any services?: Home Health  Do you have any needs or concerns that I can assist you with?: No  Identified Barriers: None  Care Transitions Interventions    Social Work: Completed    Other Interventions:            Follow Up  Future Appointments   Date Time Provider Catracho Gutierrez   2022  9:00 AM Lucia Frey DO [facility] - DYD   2022 11:15 AM Taiwo Rios MD [facility]   2022 11:00 AM [facility] Jefferson [facility]       Zak Olivia LPN

## 2022-02-18 ENCOUNTER — OFFICE VISIT (OUTPATIENT)
Dept: CARDIOLOGY CLINIC | Age: 87
End: 2022-02-18
Payer: MEDICARE

## 2022-02-18 ENCOUNTER — OFFICE VISIT (OUTPATIENT)
Dept: FAMILY MEDICINE CLINIC | Age: 87
End: 2022-02-18
Payer: MEDICARE

## 2022-02-18 VITALS
OXYGEN SATURATION: 97 % | SYSTOLIC BLOOD PRESSURE: 112 MMHG | DIASTOLIC BLOOD PRESSURE: 64 MMHG | HEART RATE: 80 BPM | HEIGHT: 65 IN | BODY MASS INDEX: 26.02 KG/M2 | WEIGHT: 156.2 LBS

## 2022-02-18 VITALS
DIASTOLIC BLOOD PRESSURE: 68 MMHG | SYSTOLIC BLOOD PRESSURE: 116 MMHG | TEMPERATURE: 97.4 F | WEIGHT: 154 LBS | BODY MASS INDEX: 25.66 KG/M2 | HEIGHT: 65 IN

## 2022-02-18 DIAGNOSIS — I25.10 CORONARY ARTERY DISEASE INVOLVING NATIVE CORONARY ARTERY OF NATIVE HEART WITHOUT ANGINA PECTORIS: Primary | ICD-10-CM

## 2022-02-18 DIAGNOSIS — I10 ESSENTIAL HYPERTENSION: ICD-10-CM

## 2022-02-18 DIAGNOSIS — R82.998 URINE WHITE BLOOD CELLS INCREASED: ICD-10-CM

## 2022-02-18 DIAGNOSIS — E78.5 HYPERLIPIDEMIA LDL GOAL <70: Primary | ICD-10-CM

## 2022-02-18 DIAGNOSIS — G47.33 OSA (OBSTRUCTIVE SLEEP APNEA): ICD-10-CM

## 2022-02-18 DIAGNOSIS — Z87.440 HISTORY OF UTI: ICD-10-CM

## 2022-02-18 DIAGNOSIS — F03.90 DEMENTIA WITHOUT BEHAVIORAL DISTURBANCE, UNSPECIFIED DEMENTIA TYPE: ICD-10-CM

## 2022-02-18 DIAGNOSIS — E78.5 HYPERLIPIDEMIA LDL GOAL <70: ICD-10-CM

## 2022-02-18 DIAGNOSIS — E03.9 HYPOTHYROIDISM, UNSPECIFIED TYPE: ICD-10-CM

## 2022-02-18 DIAGNOSIS — R60.1 GENERALIZED EDEMA: ICD-10-CM

## 2022-02-18 DIAGNOSIS — R82.4 URINE KETONE: ICD-10-CM

## 2022-02-18 LAB
A/G RATIO: 1.3 (ref 1.1–2.2)
ALBUMIN SERPL-MCNC: 4.1 G/DL (ref 3.4–5)
ALP BLD-CCNC: 90 U/L (ref 40–129)
ALT SERPL-CCNC: 10 U/L (ref 10–40)
ANION GAP SERPL CALCULATED.3IONS-SCNC: 18 MMOL/L (ref 3–16)
AST SERPL-CCNC: 16 U/L (ref 15–37)
BILIRUB SERPL-MCNC: 0.4 MG/DL (ref 0–1)
BILIRUBIN, POC: ABNORMAL
BLOOD URINE, POC: ABNORMAL
BUN BLDV-MCNC: 24 MG/DL (ref 7–20)
CALCIUM SERPL-MCNC: 10.4 MG/DL (ref 8.3–10.6)
CHLORIDE BLD-SCNC: 104 MMOL/L (ref 99–110)
CHOLESTEROL, TOTAL: 163 MG/DL (ref 0–199)
CLARITY, POC: ABNORMAL
CO2: 18 MMOL/L (ref 21–32)
COLOR, POC: ABNORMAL
CREAT SERPL-MCNC: 1 MG/DL (ref 0.6–1.2)
GFR AFRICAN AMERICAN: >60
GFR NON-AFRICAN AMERICAN: 53
GLUCOSE BLD-MCNC: 112 MG/DL (ref 70–99)
GLUCOSE URINE, POC: ABNORMAL
HCT VFR BLD CALC: 36.2 % (ref 36–48)
HDLC SERPL-MCNC: 37 MG/DL (ref 40–60)
HEMOGLOBIN: 11.7 G/DL (ref 12–16)
KETONES, POC: ABNORMAL
LDL CHOLESTEROL CALCULATED: 94 MG/DL
LEUKOCYTE EST, POC: ABNORMAL
MCH RBC QN AUTO: 27.1 PG (ref 26–34)
MCHC RBC AUTO-ENTMCNC: 32.4 G/DL (ref 31–36)
MCV RBC AUTO: 83.6 FL (ref 80–100)
NITRITE, POC: ABNORMAL
PDW BLD-RTO: 16.4 % (ref 12.4–15.4)
PH, POC: 5.5
PLATELET # BLD: 352 K/UL (ref 135–450)
PMV BLD AUTO: 8.8 FL (ref 5–10.5)
POTASSIUM SERPL-SCNC: 4.7 MMOL/L (ref 3.5–5.1)
PROTEIN, POC: ABNORMAL
RBC # BLD: 4.33 M/UL (ref 4–5.2)
SODIUM BLD-SCNC: 140 MMOL/L (ref 136–145)
SPECIFIC GRAVITY, POC: 1.02
TOTAL PROTEIN: 7.2 G/DL (ref 6.4–8.2)
TRIGL SERPL-MCNC: 162 MG/DL (ref 0–150)
UROBILINOGEN, POC: 0.2
VLDLC SERPL CALC-MCNC: 32 MG/DL
WBC # BLD: 6.8 K/UL (ref 4–11)

## 2022-02-18 PROCEDURE — 81002 URINALYSIS NONAUTO W/O SCOPE: CPT | Performed by: FAMILY MEDICINE

## 2022-02-18 PROCEDURE — 36415 COLL VENOUS BLD VENIPUNCTURE: CPT | Performed by: FAMILY MEDICINE

## 2022-02-18 PROCEDURE — 99214 OFFICE O/P EST MOD 30 MIN: CPT | Performed by: INTERNAL MEDICINE

## 2022-02-18 PROCEDURE — 99214 OFFICE O/P EST MOD 30 MIN: CPT | Performed by: FAMILY MEDICINE

## 2022-02-18 RX ORDER — LEVETIRACETAM 500 MG/1
TABLET ORAL
COMMUNITY
Start: 2022-02-14 | End: 2022-02-18

## 2022-02-18 NOTE — PROGRESS NOTES
OUTPATIENT PROGRESS NOTE  Date of Service:  2/18/2022  Address: [address] [place], [place] [address]re [facility] [address] [facility] [address]  Dept: [phone]  Loc: [phone]    Subjective:      Patient ID:  <[id number]>  Glenn Krishna is a 82 y.o. female     Hypertension  This is a chronic problem. The current episode started more than 1 year ago. The problem is unchanged. The problem is controlled. Associated symptoms include anxiety. There are no associated agents to hypertension. Risk factors for coronary artery disease include dyslipidemia and family history. Past treatments include ACE inhibitors. The current treatment provides significant improvement. There are no compliance problems. Hyperlipidemia  This is a chronic problem. The current episode started more than 1 year ago. The problem is controlled. Recent lipid tests were reviewed and are normal. Current antihyperlipidemic treatment includes statins and diet change. The current treatment provides significant improvement of lipids. There are no compliance problems. Risk factors for coronary artery disease include dyslipidemia, family history and hypertension. Hypothyroid  Recheck tsh  Energy ok  Memory is worsening     Review of Systems   Constitutional: Negative. HENT: Negative. Respiratory: Negative. Cardiovascular: Negative. Gastrointestinal: Negative. Neurological: Negative. Psychiatric/Behavioral: Positive for confusion and decreased concentration. The patient is nervous/anxious. Objective:   YOB: 1935    Date of Visit:  2/18/2022       Allergies   Allergen Reactions    Amoxicillin Hives and Rash    Linezolid Other (See Comments)     While taking linezolid, pt developed progressively altered mentation & ultimately had a witnessed seizure 4/26/18. Uncertain whether these symptoms were d/t linezolid, but possible.     Prednisone Itching    Vancomycin Rising SCr during 5/2018 admission     Coreg [Carvedilol] Diarrhea    Oxycodone-Acetaminophen Rash       Outpatient Medications Marked as Taking for the 2/18/22 encounter (Office Visit) with Jimena Munoz DO   Medication Sig Dispense Refill    lisinopril (PRINIVIL;ZESTRIL) 20 MG tablet TAKE ONE TABLET BY MOUTH DAILY 90 tablet 0    Balsam Peru-Castor Oil (VENELEX) OINT ointment Apply topically 2 times daily 30 g 0    diclofenac (VOLTAREN) 75 MG EC tablet TAKE ONE TABLET BY MOUTH TWICE A DAY WITH MEALS (Patient taking differently: Take 75 mg by mouth 2 times daily as needed TAKE ONE TABLET BY MOUTH TWICE A DAY WITH MEALS) 180 tablet 2    potassium chloride (KLOR-CON M) 10 MEQ extended release tablet TAKE ONE TABLET BY MOUTH DAILY 90 tablet 0    thyroid (NP THYROID) 30 MG tablet TAKE ONE TABLET BY MOUTH DAILY 90 tablet 3    rosuvastatin (CRESTOR) 40 MG tablet TAKE ONE TABLET BY MOUTH DAILY 90 tablet 3    Lactobacillus (ACIDOPHILUS) TABS Take 1 tablet by mouth 2 times daily       Multiple Vitamins-Minerals (DAILY SHERIE MAXIMUM MULTIVITAMIN PO) Take 1 packet by mouth daily      aspirin 81 MG tablet Take 81 mg by mouth daily      latanoprost (XALATAN) 0.005 % ophthalmic solution Place 1 drop into the left eye nightly          Vitals:    02/18/22 0850   BP: 116/68   Temp: 97.4 °F (36.3 °C)   Weight: 154 lb (69.9 kg)   Height: 5' 5\" (1.651 m)     Body mass index is 25.63 kg/m². Wt Readings from Last 3 Encounters:   02/18/22 154 lb (69.9 kg)   02/16/22 153 lb 14.1 oz (69.8 kg)   01/02/22 170 lb 6.7 oz (77.3 kg)     BP Readings from Last 3 Encounters:   02/18/22 116/68   02/16/22 113/72   01/03/22 (!) 159/62       Physical Exam  Vitals and nursing note reviewed. Constitutional:       Appearance: She is well-developed. HENT:      Head: Normocephalic. Neck:      Thyroid: No thyromegaly. Cardiovascular:      Rate and Rhythm: Normal rate and regular rhythm. Heart sounds: Normal heart sounds. Pulmonary:      Effort: Pulmonary effort is normal.      Breath sounds: Normal breath sounds. Lymphadenopathy:      Cervical: No cervical adenopathy. Neurological:      Mental Status: She is alert. Assessment/Plan       Assessment/plan;  Agueda Finley was seen today for follow-up from hospital.    Diagnoses and all orders for this visit:    Hyperlipidemia LDL goal <70  -     CBC  -     Comprehensive Metabolic Panel  -     Lipid Panel  -     POCT Urinalysis no Micro    Hypothyroidism, unspecified type  -     CBC  -     Comprehensive Metabolic Panel  -     Lipid Panel  -     POCT Urinalysis no Micro    Essential hypertension  -     CBC  -     Comprehensive Metabolic Panel  -     Lipid Panel  -     POCT Urinalysis no Micro    Generalized edema  -     CBC  -     Comprehensive Metabolic Panel  -     Lipid Panel  -     POCT Urinalysis no Micro  -     Culture, Urine    History of UTI  -     POCT Urinalysis no Micro  -     Culture, Urine    Urine white blood cells increased  -     Culture, Urine    Urine ketone  -     Culture, Urine      No follow-ups on file.                     Keyanna Read, DO

## 2022-02-19 LAB — URINE CULTURE, ROUTINE: NORMAL

## 2022-02-20 ASSESSMENT — ENCOUNTER SYMPTOMS
RESPIRATORY NEGATIVE: 1
GASTROINTESTINAL NEGATIVE: 1

## 2022-02-21 ENCOUNTER — CARE COORDINATION (OUTPATIENT)
Dept: CASE MANAGEMENT | Age: 87
End: 2022-02-21

## 2022-02-21 NOTE — CARE COORDINATION
[address] Transitions Follow Up Call    2022    Patient: Indu Wilson  Patient : 1935   MRN: [id number]  Reason for Admission:   Discharge Date: 1/3/22 RARS: Readmission Risk Score: 12.1 ( )    Care Transitions Follow Up Call    Needs to be reviewed by the provider   Additional needs identified to be addressed with provider: No  none             Method of communication with provider : none      Care Transition Nurse (CTN) contacted the family by telephone to follow up after admission on 2021. Verified name and  with family as identifiers. Addressed changes since last contact: none  Discussed follow-up appointments. If no appointment was previously scheduled, appointment scheduling offered: No.   Is follow up appointment scheduled within 7 days of discharge? No.    Advance Care Planning:   Does patient have an Advance Directive: decision maker updated. CTN reviewed discharge instructions, medical action plan and red flags with family and discussed any barriers to care and/or understanding of plan of care after discharge. Discussed appropriate site of care based on symptoms and resources available to patient including: PCP. The family agrees to contact the PCP office for questions related to their healthcare. Patients top risk factors for readmission: functional physical ability  falls  medical condition-UT  Interventions to address risk factors: Education of patient/family/caregiver/guardian to support self-management--      [facility] follow up appointment(s):     Spoke to Aakash Ferrara. She stated patient is okay, but remains confused. She has [facility] to address her wounds. Appetite and fluid intake is sufficient. No problems with bladder or bowel elimination. waiting on results of urine sample and blood work that was done at  appointment. No questions needs or concerns voiced. Will continue to follow. CTN provided contact information for future needs.  Plan for follow-up call in 5-7 days based on severity of symptoms and risk factors. Plan for next call: self management--          Care Transitions Subsequent and Final Call    Subsequent and Final Calls  Are you currently active with any services?: Home Health  Care Transitions Interventions    Social Work: Completed    Other Interventions:            Follow Up  Future Appointments   Date Time Provider Catracho Gutierrez   2/23/2022 11:00 AM [address], [place]

## 2022-02-23 ENCOUNTER — HOSPITAL ENCOUNTER (OUTPATIENT)
Dept: WOUND CARE | Age: 87
Discharge: HOME | End: 2022-02-23
Payer: MEDICARE

## 2022-02-23 VITALS
SYSTOLIC BLOOD PRESSURE: 161 MMHG | HEART RATE: 86 BPM | RESPIRATION RATE: 16 BRPM | TEMPERATURE: 97.1 F | DIASTOLIC BLOOD PRESSURE: 85 MMHG

## 2022-02-23 DIAGNOSIS — H91.93 BILATERAL HEARING LOSS, UNSPECIFIED HEARING LOSS TYPE: ICD-10-CM

## 2022-02-23 DIAGNOSIS — Z74.09 DECREASED MOBILITY AND ENDURANCE: ICD-10-CM

## 2022-02-23 DIAGNOSIS — L89.323 PRESSURE ULCER OF LEFT BUTTOCK, STAGE 3 (HCC): Primary | ICD-10-CM

## 2022-02-23 PROCEDURE — 11042 DBRDMT SUBQ TIS 1ST 20SQCM/<: CPT

## 2022-02-23 PROCEDURE — 11042 DBRDMT SUBQ TIS 1ST 20SQCM/<: CPT | Performed by: NURSE PRACTITIONER

## 2022-02-23 RX ORDER — GINSENG 100 MG
CAPSULE ORAL ONCE
Status: CANCELLED | OUTPATIENT
Start: 2022-02-23 | End: 2022-02-23

## 2022-02-23 RX ORDER — BACITRACIN, NEOMYCIN, POLYMYXIN B 400; 3.5; 5 [USP'U]/G; MG/G; [USP'U]/G
OINTMENT TOPICAL ONCE
Status: CANCELLED | OUTPATIENT
Start: 2022-02-23 | End: 2022-02-23

## 2022-02-23 RX ORDER — GENTAMICIN SULFATE 1 MG/G
OINTMENT TOPICAL ONCE
Status: CANCELLED | OUTPATIENT
Start: 2022-02-23 | End: 2022-02-23

## 2022-02-23 RX ORDER — LIDOCAINE HYDROCHLORIDE 20 MG/ML
JELLY TOPICAL ONCE
Status: CANCELLED | OUTPATIENT
Start: 2022-02-23 | End: 2022-02-23

## 2022-02-23 RX ORDER — LIDOCAINE 50 MG/G
OINTMENT TOPICAL ONCE
Status: CANCELLED | OUTPATIENT
Start: 2022-02-23 | End: 2022-02-23

## 2022-02-23 RX ORDER — LIDOCAINE HYDROCHLORIDE 40 MG/ML
SOLUTION TOPICAL ONCE
Status: CANCELLED | OUTPATIENT
Start: 2022-02-23 | End: 2022-02-23

## 2022-02-23 RX ORDER — LIDOCAINE HYDROCHLORIDE 40 MG/ML
SOLUTION TOPICAL ONCE
Status: COMPLETED | OUTPATIENT
Start: 2022-02-23 | End: 2022-02-23

## 2022-02-23 RX ORDER — LIDOCAINE 40 MG/G
CREAM TOPICAL ONCE
Status: CANCELLED | OUTPATIENT
Start: 2022-02-23 | End: 2022-02-23

## 2022-02-23 RX ORDER — BACITRACIN ZINC AND POLYMYXIN B SULFATE 500; 1000 [USP'U]/G; [USP'U]/G
OINTMENT TOPICAL ONCE
Status: CANCELLED | OUTPATIENT
Start: 2022-02-23 | End: 2022-02-23

## 2022-02-23 RX ADMIN — LIDOCAINE HYDROCHLORIDE 10 ML: 40 SOLUTION TOPICAL at 11:11

## 2022-02-23 ASSESSMENT — PAIN DESCRIPTION - LOCATION: LOCATION: BUTTOCKS

## 2022-02-23 ASSESSMENT — PAIN DESCRIPTION - ORIENTATION: ORIENTATION: LEFT

## 2022-02-23 ASSESSMENT — PAIN SCALES - GENERAL
PAINLEVEL_OUTOF10: 3
PAINLEVEL_OUTOF10: 0

## 2022-02-23 ASSESSMENT — PAIN DESCRIPTION - FREQUENCY: FREQUENCY: INTERMITTENT

## 2022-02-23 ASSESSMENT — PAIN DESCRIPTION - ONSET: ONSET: ON-GOING

## 2022-02-23 ASSESSMENT — PAIN DESCRIPTION - DESCRIPTORS: DESCRIPTORS: DULL

## 2022-02-23 ASSESSMENT — PAIN - FUNCTIONAL ASSESSMENT: PAIN_FUNCTIONAL_ASSESSMENT: ACTIVITIES ARE NOT PREVENTED

## 2022-02-23 ASSESSMENT — PAIN DESCRIPTION - PAIN TYPE: TYPE: ACUTE PAIN

## 2022-02-23 ASSESSMENT — PAIN DESCRIPTION - PROGRESSION: CLINICAL_PROGRESSION: NOT CHANGED

## 2022-02-24 PROBLEM — Z74.09 DECREASED MOBILITY AND ENDURANCE: Status: ACTIVE | Noted: 2022-01-01

## 2022-02-24 NOTE — PROGRESS NOTES
[address]   Progress Note and Procedure Note      [address] RECORD NUMBER:  [id number]  AGE: 82 y.o. GENDER: female  : 1935  EPISODE DATE:  2022    Subjective:     Chief Complaint   Patient presents with    Wound Check     f/u visit - left buttock wound         HISTORY of PRESENT ILLNESS HPI   Pascual Roe is a 82 y.o. female who presents today for wound/ulcer evaluation. Pt accompanied by her younger sister. They both live next door to one another in two condominiums. Pt reports not feeling  pain in wound area. Sits in a recliner most of the day and sleeps in bed at night. Sister states pt looking at cushion for Bettejane Marcia but wanted to get the cheaper one, encouraged do get a denser cushion for prevention. Pt has short term memory loss and doesn't recall instructions from OT or PT and sister or pt do not monitor [facility] visit or how dressing instructions for application. When took off dressing at [facility] noted dressing not packed into wound, but laying simply on top which is not the plan of care. Pt's appetite is poor with largest meal at supper of a microwave dinner. The sister has bought pt Ensure to boost her protein intake. Pt's sister does not monitor home care either, despite knowing pt's short term memory issues. Sister also stated finding old dressings on floor of bathroom but pt denies removing them. History of Wound Context: reported and pictured as a unstagable closed deep tissue injury 2021. When first visited [facility] wound open.     Wound/Ulcer Pain Timing/Severity: none  Quality of pain: N/A  Severity:  0 / 10   Modifying Factors: None  Associated Signs/Symptoms: drainage     Ulcer Identification:  Ulcer Type: pressure     Contributing Factors: chronic pressure, decreased mobility and malnutrition, forgetfulness     PAST MEDICAL HISTORY        Diagnosis Date    CAD (coronary artery disease)     Inferior STEMI; CATRACHITA to RCA    DDD (degenerative disc disease), lumbosacral 4/10/2013    Decreased mobility and endurance 2/24/2022    Hearing aid worn     bilateral    [place] (hard of hearing)     [place] (hard of hearing)     Hyperlipidemia     Hypertension     MI, old 2017    Neuropathy     bilateral lower extremities    Pressure ulcer of left buttock, stage 3 ([address] 2/16/2022    Sleep apnea     uses mouth piece; bringing DOS 3/26/18    Thyroid disease     hypo    Wears glasses        PAST SURGICAL HISTORY    Past Surgical History:   Procedure Laterality Date    ABSCESS DRAINAGE Right 03/27/2018    evacuation hematoma right knee    APPENDECTOMY  1963    BACK SURGERY  [id number]    MILD PROCEDURE L2-3 BILATERAL     BREAST LUMPECTOMY  1986    BUNIONECTOMY  10/2003    CATARACT REMOVAL  03/23/2010 and 04/14/2010    COLONOSCOPY  6/2014    repeat 2019    CORONARY ANGIOPLASTY WITH STENT PLACEMENT  01/23/2017    INF STEMI with CATRACHITA to RCA    DILATION AND CURETTAGE OF UTERUS      INCONTINENCE SURGERY      JOINT REPLACEMENT Right 2018    right total knee replacment    OVARY REMOVAL  1963    right    TONSILLECTOMY AND ADENOIDECTOMY  1942       FAMILY HISTORY    Family History   Problem Relation Age of Onset    Cancer Mother     Heart Disease Mother     Arthritis Sister         rheumatoid    Cancer Brother     Cancer Sister     Diabetes Sister     Heart Disease Brother     High Blood Pressure Brother     High Blood Pressure Sister     Diabetes Brother        SOCIAL HISTORY    Social History     Tobacco Use    Smoking status: Never Smoker    Smokeless tobacco: Never Used    Tobacco comment: encouraged to never smoke    Vaping Use    Vaping Use: Never used   Substance Use Topics    Alcohol use: No    Drug use: No       ALLERGIES    Allergies   Allergen Reactions    Amoxicillin Hives and Rash    Linezolid Other (See Comments)     While taking linezolid, pt developed progressively altered mentation & ultimately had a witnessed seizure 4/26/18. Uncertain whether these symptoms were d/t linezolid, but possible.  Prednisone Itching    Vancomycin      Rising SCr during 5/2018 admission     Coreg [Carvedilol] Diarrhea    Oxycodone-Acetaminophen Rash       MEDICATIONS    Current Outpatient Medications on File Prior to Encounter   Medication Sig Dispense Refill    lisinopril (PRINIVIL;ZESTRIL) 20 MG tablet TAKE ONE TABLET BY MOUTH DAILY 90 tablet 0    Balsam [place]-Castor Oil (VENELEX) OINT ointment Apply topically 2 times daily 30 g 0    diclofenac (VOLTAREN) 75 MG EC tablet TAKE ONE TABLET BY MOUTH TWICE A DAY WITH MEALS (Patient taking differently: Take 75 mg by mouth 2 times daily as needed TAKE ONE TABLET BY MOUTH TWICE A DAY WITH MEALS) 180 tablet 2    potassium chloride (KLOR-CON M) 10 MEQ extended release tablet TAKE ONE TABLET BY MOUTH DAILY 90 tablet 0    thyroid (NP THYROID) 30 MG tablet TAKE ONE TABLET BY MOUTH DAILY 90 tablet 3    rosuvastatin (CRESTOR) 40 MG tablet TAKE ONE TABLET BY MOUTH DAILY 90 tablet 3    Lactobacillus (ACIDOPHILUS) TABS Take 1 tablet by mouth 2 times daily       Multiple Vitamins-Minerals (DAILY SHERIE MAXIMUM MULTIVITAMIN PO) Take 1 packet by mouth daily      aspirin 81 MG tablet Take 81 mg by mouth daily      latanoprost (XALATAN) 0.005 % ophthalmic solution Place 1 drop into the left eye nightly        No current facility-administered medications on file prior to encounter. REVIEW OF SYSTEMS  Review of Systems    Pertinent items are noted in HPI. Objective:      BP (!) 161/85   Pulse 86   Temp 97.1 °F (36.2 °C) (Temporal)   Resp 16     Wt Readings from Last 3 Encounters:   02/18/22 156 lb 3.2 oz (70.9 kg)   02/18/22 154 lb (69.9 kg)   02/16/22 153 lb 14.1 oz (69.8 kg)       PHYSICAL EXAM  Physical Exam    General Appearance: alert and oriented to person, place and time, in no acute distress, with anxious affect, forgetful and very [place].    Skin: warm and dry  Head: normocephalic and atraumatic  Eyes: pupils equal, round, and reactive to light  Pulmonary/Chest: clear to auscultation bilaterally- normal air movement, no respiratory distress  Cardiovascular: normal rate and regular rhythm  Neurologic: speech normal, gait abnormal- weakness, uses walker, sensory deficit present-very hard of hearing and short term memory, asking frequent repetitive questions. Assessment:        Problem List Items Addressed This Visit     Pressure ulcer of left buttock, stage 3 (HCC) - Primary    Decreased mobility and endurance           Procedure Note  Indications:  Based on my examination of this patient's wound(s)/ulcer(s) today, debridement is required to promote healing and evaluate the wound base. Performed by: MICHELLE Blackman - ZOE    Consent obtained:  Yes    Time out taken:  Yes    Pain Control: Anesthetic  Anesthetic: 4% Lidocaine Liquid Topical (10ml)       Debridement: Excisional Debridement    Using curette the wound(s)/ulcer(s) was/were debrided down through and including the removal of epidermis, dermis and subcutaneous tissue.         Devitalized Tissue Debrided:  fibrin, biofilm and slough    Pre Debridement Measurements:  Are located in the [place]  Documentation Flow Sheet    Diabetic/Pressure/Non Pressure Ulcers only:  Ulcer: Pressure ulcer, Stage 3     Wound/Ulcer #: 1    Post Debridement Measurements:  Wound/Ulcer Descriptions are Pre Debridement except measurements:    Wound 01/03/22 Buttocks Left (Active)   Number of days: 51       Wound 02/16/22 Buttocks Left #1 ( since about 12/15/2021) (Active)   Wound Image   02/16/22 1116   Wound Etiology Pressure Stage  3 02/16/22 1204   Wound Cleansed Cleansed with saline 02/16/22 1204   Dressing/Treatment Alginate with Ag;Gauze dressing/dressing sponge;Protective barrier;Tape/Soft cloth adhesive tape 02/23/22 1155   Wound Length (cm) 1.3 cm 02/23/22 1059   Wound Width (cm) 1.8 cm 02/23/22 1059   Wound Depth (cm) 4 cm 02/23/22 1059 Wound Surface Area (cm^2) 2.34 cm^2 02/23/22 1059   Change in Wound Size % (l*w) 37.6 02/23/22 1059   Wound Volume (cm^3) 9.36 cm^3 02/23/22 1059   Wound Healing % 65 02/23/22 1059   Post-Procedure Length (cm) 1.4 cm 02/23/22 1135   Post-Procedure Width (cm) 1.9 cm 02/23/22 1135   Post-Procedure Depth (cm) 4 cm 02/23/22 1135   Post-Procedure Surface Area (cm^2) 2.66 cm^2 02/23/22 1135   Post-Procedure Volume (cm^3) 10.64 cm^3 02/23/22 1135   Wound Assessment Granulation tissue; Hyper granulation tissue;Slough 02/23/22 1059   Drainage Amount Moderate 02/23/22 1059   Drainage Description Yellow 02/23/22 1059   Odor None 02/23/22 1059   Bianca-wound Assessment Dry/flaky 02/23/22 1059   Margins Attached edges 02/23/22 1059   Wound Thickness Description not for Pressure Injury Full thickness 02/23/22 1059   Number of days: 7          Total Surface Area Debrided:  2.66 sq cm     Estimated Blood Loss:  Minimal    Hemostasis Achieved:  by pressure    Procedural Pain:  0  / 10     Post Procedural Pain:  0 / 10     Response to treatment:  Well tolerated by patient. Plan:   Pt/sister education per provider and nursing care manager. Discussed need for more oversight with personal care when PT/OT/Nursing home visits. Sister seemed surprised at this suggestion, even thought this has been advised at last visit. Additional instructions added to discharge instructions. Will follow up next week and all agreeable to continue same of plan and questions answered. Treatment Note please see attached Discharge Instructions    Written patient dismissal instructions given to patient and signed by patient or POA.          Discharge [id number] [facility] Physician Orders and Discharge Instructions  [address] [place] [address], [facility]. [address]  Telephone: [phone]  [address] 8:30 am - 4:30 pm and Friday 8:30 am - 1:00 pm.          NAME:  Rhea Andersen Javad  YOB: 1935  MEDICAL RECORD NUMBER:  [id number]  DATE:  2/23/2022         Please wash hands with soap and water prior to and right after  every dressing change         Vashe wound cleanser:                                []?? Vashe Soak  5 minutes in clinic today . DO NOT rinse after Vashe.         WOUND LOCATION:   LEFT BUTTOCK     · May rinse wounds with 0.9% saline   · Do NOT SCRUB WOUND. · Keep wounds dry in the shower unless otherwise instructed by the physician.     Topical Treatments:              [x]?? Apply around the wound:      [x]? ? No-Sting barrier film        PRIMARY DRESSING:        [x]? ALGINATE AG ROPE - AQUACELL AG ROPE WITH STITCHING . the depth is 4 cm- and needs to be tucked inside         SECONDARY DRESSING:                [x]? Gauze                                                 [x]? Cover Roll Tape                                  HOW OFTEN TO CHANGE DRESSINGS:                                  [x]? Three times per week:             _______________________________________________________________________________________________      PRESSURE RELIEF AND OFF-LOADING:     Off-Loading:   [x]??????? Off-loading when       [x]??????? in bed         [x]??????? sitting                       [x]??????? Turn every 2 hours when in bed. [x]???????  Avoid position directing pressure on wound site. Limit side lying to 30 degree tilt. Limit HOB elevation to 30 degrees.     Specialty equipment ordered:       []??????? Wheelchair cushion    []??????? Specialty Bed/Mattress           _____________________________________________________________________________________________     Dietary:  Continue your diet as tolerated. ? Eat heart-healthy foods. These foods include vegetables, fruits, nuts, beans, lean meat, fish, and whole grains. Limit sodium, alcohol, and sugar. ?  Protein is a key nutrient in helping to repair damaged tissue and promote new tissue growth. Good sources of protein are milk, yogurt, cheese, fish, lean meat, and beans. ? If you are a diabetic, having diabetes can make it hard for wounds to heal. So try to keep your blood sugar in its target range.  __________________________________________________________________________________________________     ACTIVITY:   Activity as tolerated  ________________________________________________________________________________________________________________     PAIN:     Please note, A small amount of pain, drainage and/or bleeding from this process might be expected, and is NORMAL.      Elevate the affected limb. Use over-the-counter medications you would normally use for pain as permitted by your family doctor. For persistent pain not relieved by the above interventions, please call your family doctor.  ________________________________________________________________________________  Call your doctor now or seek immediate medical care if:    · You have symptoms of infection, such as:  ? Increased pain, swelling, warmth, or redness. ? Red streaks leading from the area. ? Pus draining from the area. ? A fever.      _______________________________________________________________________     Return Appointment:     · ECF or Home Healthcare: [facility]-  Please order ALGINATE AG [address] STITCHING- tunnel is 4 cm    [address] is responsible to order your supplies.      · Return Appointment: With Romilda Bernheim CNP  in 1 Week(s)                   []? ? Orders placed during your visit:       GET A THICK CUSHION FOR HER CHAIR    ( DENSE FOAM SEAT CUSHION)        Electronically signed by : Tim Kim on 2/23/2022 at [address] Information: Should you experience any significant changes in your wound(s) or have questions about your wound care, please contact the [address] [place] [address] at [address] 8:30 am - 4:30 pm and Friday 8:30 am - 1:00 pm.  If you need help with your wound outside these hours and cannot wait until we are again available, contact your PCP or go to the hospital emergency room.      PLEASE NOTE: IF YOU ARE UNABLE TO [address], CONTINUE TO USE THE SUPPLIES YOU HAVE AVAILABLE UNTIL YOU ARE ABLE TO [facility]. KEEP THE WOUND COVERED AT ALL TIMES.           Physician Signature:_______________________     Date: ___________ Time:  ____________         [x]?  Nesha Sandhu CNP               Electronically signed by MICHELLE Gonzáles CNP on 2/24/2022 at 8:34 AM

## 2022-02-28 ENCOUNTER — CARE COORDINATION (OUTPATIENT)
Dept: CASE MANAGEMENT | Age: 87
End: 2022-02-28

## 2022-02-28 NOTE — TELEPHONE ENCOUNTER
GOPAL Pimentel,     This patient has completed care transitions with [facility] and is being referred to [facility]. Please outreach and attempt to enroll.      Thanks,   Lazara Lucas, ITAN, RN  Manager Care Coordination  [phone]

## 2022-02-28 NOTE — CARE COORDINATION
[address] Transitions Follow Up Call    2022    Patient: Jarvis Blanco  Patient : 1935   MRN: [id number]  Reason for Admission: UTI, fall  Discharge Date: 1/3/22 RARS: Readmission Risk Score: 12.1 ( )         Spoke with: David Lundberg Transitions Follow Up Call    Pt was not home at time of call. Talked extensively to great niece, Laurance Castleman verified, she is calling [facility] soon to see what is available for resources. CTN also suggested she reach out to [facility] for SW assistance. Concern is the developing of memory issues and safety. Needs to be reviewed by the provider   Additional needs identified to be addressed with provider: No  none             Method of communication with provider : none      Care Transition Nurse (CTN) contacted the family by telephone to follow up after admission on 21. Verified name and  with family as identifiers. Addressed changes since last contact: home health care-still with pt, wound care biweekly  Discussed follow-up appointments. If no appointment was previously scheduled, appointment scheduling offered: No.   Is follow up appointment scheduled within 7 days of discharge? Yes. CTN reviewed discharge instructions, medical action plan and red flags with family and discussed any barriers to care and/or understanding of plan of care after discharge. Discussed appropriate site of care based on symptoms and resources available to patient including: PCP, Specialist and When to call 911. The family agrees to contact the PCP office for questions related to their healthcare. Patients top risk factors for readmission: falls  medical condition-UTI, fall  Interventions to address risk factors: Obtained and reviewed discharge summary and/or continuity of care documents and Assistance in accessing community resources-[facility]      Non-[facility] follow up appointment(s): na    CTN provided contact information for future needs.  No further follow-up call indicated based on severity of symptoms and risk factors. Plan for next call: referral to ambulatory care manager-pt and family would benefit from ongoing needs related to memory and safety issues. Care Transitions Subsequent and Final Call    Schedule Follow Up Appointment with PCP: Completed  Subsequent and Final Calls  Do you have any ongoing symptoms?: No  Have your medications changed?: No  Do you have any questions related to your medications?: No  Do you currently have any active services?: No  Are you currently active with any services?: Home Health  Do you have any needs or concerns that I can assist you with?: No  Identified Barriers: None  Care Transitions Interventions  No Identified Needs    Social Work: Completed    Other Interventions:            Follow Up  Future Appointments   Date Time Provider Catracho Gutierrez   3/2/2022 11:00 AM [address] [facility] DEEJAY Villareal [facility], [address] [place] [address]

## 2022-03-02 ENCOUNTER — HOSPITAL ENCOUNTER (OUTPATIENT)
Dept: WOUND CARE | Age: 87
Discharge: HOME | End: 2022-03-02
Payer: MEDICARE

## 2022-03-02 VITALS
DIASTOLIC BLOOD PRESSURE: 75 MMHG | HEART RATE: 78 BPM | TEMPERATURE: 97.3 F | RESPIRATION RATE: 16 BRPM | SYSTOLIC BLOOD PRESSURE: 138 MMHG

## 2022-03-02 DIAGNOSIS — Z74.09 DECREASED MOBILITY AND ENDURANCE: Primary | ICD-10-CM

## 2022-03-02 DIAGNOSIS — H91.93 BILATERAL HEARING LOSS, UNSPECIFIED HEARING LOSS TYPE: ICD-10-CM

## 2022-03-02 DIAGNOSIS — L89.323 PRESSURE ULCER OF LEFT BUTTOCK, STAGE 3 (HCC): ICD-10-CM

## 2022-03-02 DIAGNOSIS — Z87.898 HISTORY OF SHORT TERM MEMORY LOSS: ICD-10-CM

## 2022-03-02 PROCEDURE — 11042 DBRDMT SUBQ TIS 1ST 20SQCM/<: CPT | Performed by: NURSE PRACTITIONER

## 2022-03-02 PROCEDURE — 11042 DBRDMT SUBQ TIS 1ST 20SQCM/<: CPT

## 2022-03-02 RX ORDER — LIDOCAINE HYDROCHLORIDE 40 MG/ML
SOLUTION TOPICAL ONCE
Status: CANCELLED | OUTPATIENT
Start: 2022-03-02 | End: 2022-03-02

## 2022-03-02 RX ORDER — LIDOCAINE HYDROCHLORIDE 20 MG/ML
JELLY TOPICAL ONCE
Status: CANCELLED | OUTPATIENT
Start: 2022-03-02 | End: 2022-03-02

## 2022-03-02 RX ORDER — GENTAMICIN SULFATE 1 MG/G
OINTMENT TOPICAL ONCE
Status: CANCELLED | OUTPATIENT
Start: 2022-03-02 | End: 2022-03-02

## 2022-03-02 RX ORDER — LIDOCAINE 40 MG/G
CREAM TOPICAL ONCE
Status: CANCELLED | OUTPATIENT
Start: 2022-03-02 | End: 2022-03-02

## 2022-03-02 RX ORDER — LIDOCAINE HYDROCHLORIDE 40 MG/ML
SOLUTION TOPICAL ONCE
Status: COMPLETED | OUTPATIENT
Start: 2022-03-02 | End: 2022-03-02

## 2022-03-02 RX ORDER — BACITRACIN, NEOMYCIN, POLYMYXIN B 400; 3.5; 5 [USP'U]/G; MG/G; [USP'U]/G
OINTMENT TOPICAL ONCE
Status: CANCELLED | OUTPATIENT
Start: 2022-03-02 | End: 2022-03-02

## 2022-03-02 RX ORDER — LIDOCAINE 50 MG/G
OINTMENT TOPICAL ONCE
Status: CANCELLED | OUTPATIENT
Start: 2022-03-02 | End: 2022-03-02

## 2022-03-02 RX ORDER — GINSENG 100 MG
CAPSULE ORAL ONCE
Status: CANCELLED | OUTPATIENT
Start: 2022-03-02 | End: 2022-03-02

## 2022-03-02 RX ORDER — BACITRACIN ZINC AND POLYMYXIN B SULFATE 500; 1000 [USP'U]/G; [USP'U]/G
OINTMENT TOPICAL ONCE
Status: CANCELLED | OUTPATIENT
Start: 2022-03-02 | End: 2022-03-02

## 2022-03-02 RX ADMIN — LIDOCAINE HYDROCHLORIDE 10 ML: 40 SOLUTION TOPICAL at 11:04

## 2022-03-02 ASSESSMENT — PAIN DESCRIPTION - ORIENTATION
ORIENTATION: LEFT
ORIENTATION: LEFT

## 2022-03-02 ASSESSMENT — PAIN SCALES - GENERAL
PAINLEVEL_OUTOF10: 2
PAINLEVEL_OUTOF10: 2

## 2022-03-02 ASSESSMENT — PAIN DESCRIPTION - LOCATION
LOCATION: BUTTOCKS
LOCATION: BUTTOCKS

## 2022-03-02 ASSESSMENT — PAIN DESCRIPTION - DESCRIPTORS
DESCRIPTORS: DULL;ACHING
DESCRIPTORS: DULL

## 2022-03-02 ASSESSMENT — PAIN - FUNCTIONAL ASSESSMENT
PAIN_FUNCTIONAL_ASSESSMENT: ACTIVITIES ARE NOT PREVENTED
PAIN_FUNCTIONAL_ASSESSMENT: ACTIVITIES ARE NOT PREVENTED

## 2022-03-02 ASSESSMENT — PAIN DESCRIPTION - PROGRESSION
CLINICAL_PROGRESSION: NOT CHANGED
CLINICAL_PROGRESSION: NOT CHANGED

## 2022-03-02 ASSESSMENT — PAIN DESCRIPTION - FREQUENCY
FREQUENCY: INTERMITTENT
FREQUENCY: INTERMITTENT

## 2022-03-02 ASSESSMENT — PAIN DESCRIPTION - ONSET
ONSET: ON-GOING
ONSET: ON-GOING

## 2022-03-02 ASSESSMENT — PAIN DESCRIPTION - PAIN TYPE
TYPE: ACUTE PAIN
TYPE: ACUTE PAIN

## 2022-03-03 ENCOUNTER — CARE COORDINATION (OUTPATIENT)
Dept: CARE COORDINATION | Age: 87
End: 2022-03-03

## 2022-03-03 PROBLEM — Z87.898 HISTORY OF SHORT TERM MEMORY LOSS: Status: ACTIVE | Noted: 2022-01-01

## 2022-03-03 NOTE — PROGRESS NOTES
[address]   Progress Note and Procedure Note      [address] RECORD NUMBER:  [id number]  AGE: 82 y.o. GENDER: female  : 1935  EPISODE DATE:  3/2/2022    Subjective:     Chief Complaint   Patient presents with    Wound Check     f/u visit - left buttock wound         HISTORY of PRESENT ILLNESS HPI   Agueda Farnsworth is a 82 y. o. female who presents today for wound/ulcer evaluation. Pt accompanied by her younger sister. Jonathan Velarde both live next door to one another in separate condominiums.  Pt reports not feeling  pain in wound area.  Sits in a recliner most of the day and sleeps in bed at night. Sister states pt looking at cushion for chair but wanted to get the cheaper one, encouraged do get a denser cushion for prevention. Pt has short term memory loss and doesn't recall instructions from OT or PT and sister or pt do not monitor [address] visit or how dressing instructions for application. When took off dressing at [address] [place] [address],[address] noted no dressing in place. Pt's appetite is poor with largest meal at supper of a microwave dinner. The sister has bought pt Ensure to boost her protein intake. Last week discussed in detail with pt's sister the importance of monitoring home care and her sister to see if dressing applied correctly and sister is keeping dressing in place. Depsite lengthy discussion, pt's sister did not follow-up. Pt's sister does not monitor home care either, despite knowing pt's short term memory issues. Sister also stated finding old dressings on floor of bathroom but pt denies removing them.      History of Wound Context: reported and pictured as a unstagable closed deep tissue injury 2021. When first visited [address] [place] [address],[address] wound open.     Wound/Ulcer Pain Timing/Severity: none  Quality of pain: N/A  Severity:  0 / 10   Modifying Factors: None  Associated Signs/Symptoms: drainage     Ulcer Identification:  Ulcer Type: pressure     Contributing Factors: chronic pressure, decreased mobility and malnutrition, forgetfulness  PAST MEDICAL HISTORY        Diagnosis Date    CAD (coronary artery disease)     Inferior STEMI; CATRACHITA to RCA    DDD (degenerative disc disease), lumbosacral 4/10/2013    Decreased mobility and endurance 2/24/2022    Hearing aid worn     bilateral    History of short term memory loss 3/3/2022    Unga (hard of hearing)     Unga (hard of hearing)     Hyperlipidemia     Hypertension     MI, old 2017    Neuropathy     bilateral lower extremities    Pressure ulcer of left buttock, stage 3 ([address] 2/16/2022    Sleep apnea     uses mouth piece; bringing DOS 3/26/18    Thyroid disease     hypo    Wears glasses        PAST SURGICAL HISTORY    Past Surgical History:   Procedure Laterality Date    ABSCESS DRAINAGE Right 03/27/2018    evacuation hematoma right knee    APPENDECTOMY  1963    BACK SURGERY  [id number]    MILD PROCEDURE L2-3 BILATERAL     BREAST LUMPECTOMY  1986    BUNIONECTOMY  10/2003    CATARACT REMOVAL  03/23/2010 and 04/14/2010    COLONOSCOPY  6/2014    repeat 2019    CORONARY ANGIOPLASTY WITH STENT PLACEMENT  01/23/2017    INF STEMI with CATRACHITA to RCA    DILATION AND CURETTAGE OF UTERUS      INCONTINENCE SURGERY      JOINT REPLACEMENT Right 2018    right total knee replacment    OVARY REMOVAL  1963    right    TONSILLECTOMY AND ADENOIDECTOMY  1942       FAMILY HISTORY    Family History   Problem Relation Age of Onset    Cancer Mother     Heart Disease Mother     Arthritis Sister         rheumatoid    Cancer Brother     Cancer Sister     Diabetes Sister     Heart Disease Brother     High Blood Pressure Brother     High Blood Pressure Sister     Diabetes Brother        SOCIAL HISTORY    Social History     Tobacco Use    Smoking status: Never Smoker    Smokeless tobacco: Never Used    Tobacco comment: encouraged to never smoke    Vaping Use    Vaping Use: Never used   Substance Use Topics    Alcohol use: No    Drug use: No       ALLERGIES    Allergies   Allergen Reactions    Amoxicillin Hives and Rash    Linezolid Other (See Comments)     While taking linezolid, pt developed progressively altered mentation & ultimately had a witnessed seizure 4/26/18. Uncertain whether these symptoms were d/t linezolid, but possible.  Prednisone Itching    Vancomycin      Rising SCr during 5/2018 admission     Coreg [Carvedilol] Diarrhea    Oxycodone-Acetaminophen Rash       MEDICATIONS    Current Outpatient Medications on File Prior to Encounter   Medication Sig Dispense Refill    lisinopril (PRINIVIL;ZESTRIL) 20 MG tablet TAKE ONE TABLET BY MOUTH DAILY 90 tablet 0    Balsam [place]-Castor Oil (VENELEX) OINT ointment Apply topically 2 times daily 30 g 0    diclofenac (VOLTAREN) 75 MG EC tablet TAKE ONE TABLET BY MOUTH TWICE A DAY WITH MEALS (Patient taking differently: Take 75 mg by mouth 2 times daily as needed TAKE ONE TABLET BY MOUTH TWICE A DAY WITH MEALS) 180 tablet 2    potassium chloride (KLOR-CON M) 10 MEQ extended release tablet TAKE ONE TABLET BY MOUTH DAILY 90 tablet 0    thyroid (NP THYROID) 30 MG tablet TAKE ONE TABLET BY MOUTH DAILY 90 tablet 3    rosuvastatin (CRESTOR) 40 MG tablet TAKE ONE TABLET BY MOUTH DAILY 90 tablet 3    Lactobacillus (ACIDOPHILUS) TABS Take 1 tablet by mouth 2 times daily       Multiple Vitamins-Minerals (DAILY SHERIE MAXIMUM MULTIVITAMIN PO) Take 1 packet by mouth daily      aspirin 81 MG tablet Take 81 mg by mouth daily      latanoprost (XALATAN) 0.005 % ophthalmic solution Place 1 drop into the left eye nightly        No current facility-administered medications on file prior to encounter. REVIEW OF SYSTEMS  Review of Systems    Pertinent items are noted in HPI.     Objective:      /75   Pulse 78   Temp 97.3 °F (36.3 °C) (Temporal)   Resp 16     Wt Readings from Last 3 Encounters:   02/18/22 156 lb 3.2 oz (70.9 kg)   02/18/22 154 lb (69.9 kg)   02/16/22 153 lb 14.1 oz (69.8 kg) PHYSICAL EXAM  Physical Exam    General Appearance: alert and oriented to person, place and time, well-developed and well-nourished, in no acute distress and with anxious affect  Skin: warm and dry, no rash or erythema  Head: normocephalic and atraumatic  Eyes: pupils equal, round, and reactive to light  Pulmonary/Chest: normal air movement, no respiratory distress  Cardiovascular: normal rate and regular rhythm      Assessment:        Problem List Items Addressed This Visit     Pressure ulcer of left buttock, stage 3 (HCC)    Decreased mobility and endurance - Primary    [place] (hard of hearing)    History of short term memory loss           Procedure Note  Indications:  Based on my examination of this patient's wound(s)/ulcer(s) today, debridement is required to promote healing and evaluate the wound base. Performed by: MICHELLE Schneider CNP    Consent obtained:  Yes    Time out taken:  Yes    Pain Control: Anesthetic  Anesthetic: 4% Lidocaine Liquid Topical (10ml)       Debridement: Excisional Debridement    Using curette the wound(s)/ulcer(s) was/were debrided down through and including the removal of epidermis, dermis and subcutaneous tissue. Devitalized Tissue Debrided:  fibrin, biofilm and slough    Pre Debridement Measurements:  Are located in the [place]  Documentation Flow Sheet    Diabetic/Pressure/Non Pressure Ulcers only:  Ulcer: Pressure ulcer, Stage 3     Wound/Ulcer #: 1    Post Debridement Measurements:  Wound/Ulcer Descriptions are Pre Debridement except measurements:    Wound 01/03/22 Buttocks Left (Active)   Number of days: 58       Wound 02/16/22 Buttocks Left #1 ( since about 12/15/2021) (Active)   Wound Image   03/02/22 1052   Wound Etiology Pressure Stage  3 02/16/22 1204   Wound Cleansed Cleansed with saline 02/16/22 1204   Dressing/Treatment Barrier film;Dry dressing;Tape/Soft cloth adhesive tape; Other (comment) 03/02/22 1128   Wound Length (cm) 1.5 cm 03/02/22 1052 Wound Width (cm) 1 cm 03/02/22 1052   Wound Depth (cm) 7 cm 03/02/22 1052   Wound Surface Area (cm^2) 1.5 cm^2 03/02/22 1052   Change in Wound Size % (l*w) 60 03/02/22 1052   Wound Volume (cm^3) 10.5 cm^3 03/02/22 1052   Wound Healing % 61 03/02/22 1052   Post-Procedure Length (cm) 1.6 cm 03/02/22 1127   Post-Procedure Width (cm) 1.1 cm 03/02/22 1127   Post-Procedure Depth (cm) 7 cm 03/02/22 1127   Post-Procedure Surface Area (cm^2) 1.76 cm^2 03/02/22 1127   Post-Procedure Volume (cm^3) 12.32 cm^3 03/02/22 1127   Wound Assessment Granulation tissue; Hyper granulation tissue;Slough 03/02/22 1052   Drainage Amount Other (Comment) 03/02/22 1052   Drainage Description Yellow 02/23/22 1059   Odor None 03/02/22 1052   Bianca-wound Assessment Dry/flaky 03/02/22 1052   Margins Undefined edges 03/02/22 1052   Wound Thickness Description not for Pressure Injury Full thickness 03/02/22 1052   Number of days: 14          Total Surface Area Debrided:  1.76 sq cm     Estimated Blood Loss:  Minimal    Hemostasis Achieved:  by pressure    Procedural Pain:  0  / 10     Post Procedural Pain:  0 / 10     Response to treatment:  Well tolerated by patient. Plan:   Pt/sister education per provider related to difficulty assessing care of wound at home and need for sister to help in this regard. Discussed other options to close wound and sister strongly opposed to NPWT, stating her sister is so forgetful would not be a viable option. But she was agreeable to go with [facility] nurse into home during visit and to monitor her sister to make sure she is leaving dressing on this next week. Questions answered. Treatment Note please see attached Discharge Instructions    Written patient dismissal instructions given to patient and signed by patient or POA.          Discharge Instructions         Discharge [facility] Wound Care Physician Orders and Discharge Instructions  [address] [facility], [address] [place], [address]  Telephone: [phone]      FAX [phone]  [address] 8:30 am - 4:30 pm and Friday 8:30 am - 1:00 pm.          NAME: Lucía Singletary  DATE OF BIRTH:  1935  MEDICAL RECORD NUMBER:  [id number]  DATE:  3/2/2022         Please wash hands with soap and water prior to and right after  every dressing change         Vashe wound cleanser:                                []??? Vashe Soak  5 minutes in clinic today . DO NOT rinse after Vashe.         WOUND LOCATION:   LEFT BUTTOCK     · May rinse wounds with 0.9% saline   · Do NOT SCRUB WOUND. · Keep wounds dry in the shower unless otherwise instructed by the physician.     Topical Treatments:              [x]? ?? Apply around the wound:      [x]? ?? No-Sting barrier film        PRIMARY DRESSING:        [x]? ? ALGINATE AG ROPE - AQUACELL AG ROPE WITH STITCHING .    the depth is 7 cm- and needs to be tucked inside         SECONDARY DRESSING:                [x]? ? LPJUL                                                 [x]? ? Cover Roll Tape                                   HOW OFTEN TO CHANGE DRESSINGS:                                  [x]? ? Three times per week:             _______________________________________________________________________________________________        PRESSURE RELIEF AND OFF-LOADING:     Off-Loading:   [x]???????? Off-loading when       [x]???????? in bed         [x]???????? sitting                       [x]???????? Turn every 2 hours when in bed.                       [x]????????  Avoid position directing pressure on wound site.  Limit side lying to 30 degree tilt.  Limit HOB elevation to 30 degrees.     Specialty equipment ordered:       []???????? Wheelchair cushion    []???????? Specialty Bed/Mattress           _____________________________________________________________________________________________     Dietary:  Continue your diet as tolerated. ? Eat heart-healthy foods.  These foods include vegetables, fruits, nuts, beans, lean meat, fish, and whole grains. Limit sodium, alcohol, and sugar. ? Protein is a key nutrient in helping to repair damaged tissue and promote new tissue growth. Good sources of protein are milk, yogurt, cheese, fish, lean meat, and beans. ? If you are a diabetic, having diabetes can make it hard for wounds to heal. So try to keep your blood sugar in its target range.  __________________________________________________________________________________________________     ACTIVITY:   Activity as tolerated  ________________________________________________________________________________________________________________     PAIN:     Please note, A small amount of pain, drainage and/or bleeding from this process might be expected, and is NORMAL.      Elevate the affected limb. Use over-the-counter medications you would normally use for pain as permitted by your family doctor. For persistent pain not relieved by the above interventions, please call your family doctor.  ________________________________________________________________________________  Call your doctor now or seek immediate medical care if:    · You have symptoms of infection, such as:  ? Increased pain, swelling, warmth, or redness. ? Red streaks leading from the area. ? Pus draining from the area.   ? A fever.      _______________________________________________________________________     Return Appointment:     · ECF or Home Healthcare: [facility]-  Please order [address] STITCHING- tunnel is 7 cm    [address] is responsible to order your supplies.      · Return Appointment: With Megha Soria 1 Week(s)                   []??? Orders placed during your visit:       GET A [address]    ( DENSE FOAM SEAT CUSHION)        Electronically signed by : Ruiz Ballesteros on 3/2/2022 at 11:24 [id number] Information: Should you experience any significant changes in your wound(s) or have questions about your wound care, please contact the [address] 8:30 am - 4:30 pm and Friday 8:30 am - 1:00 pm.  If you need help with your wound outside these hours and cannot wait until we are again available, contact your PCP or go to the hospital emergency room.      PLEASE NOTE: IF YOU ARE UNABLE TO OBTAIN WOUND SUPPLIES, CONTINUE TO USE THE SUPPLIES YOU HAVE AVAILABLE UNTIL YOU ARE ABLE TO [facility]. KEEP THE WOUND COVERED AT ALL TIMES.          Physician Signature:_______________________     Date: ___________ Time:  ____________         [x]? ?Beth Flanagan CNP            Electronically signed by MICHELLE Torres CNP on 3/3/2022 at 9:42 AM

## 2022-03-03 NOTE — CARE COORDINATION
Attempted out reach call to pt/family to discuss care coordination program. No answer to phone, hipaa compliant message left with contact info.  Will await a return call

## 2022-03-08 ENCOUNTER — CARE COORDINATION (OUTPATIENT)
Dept: CARE COORDINATION | Age: 87
End: 2022-03-08

## 2022-03-08 NOTE — CARE COORDINATION
Second attempt to reach pt/family to discuss care coordination program . vm picked up for pt daughter  Octavio Snowball.  H[employer] Compliant message left with contact info

## 2022-03-09 ENCOUNTER — HOSPITAL ENCOUNTER (OUTPATIENT)
Dept: WOUND CARE | Age: 87
Discharge: HOME | End: 2022-03-09
Payer: MEDICARE

## 2022-03-09 VITALS
RESPIRATION RATE: 18 BRPM | TEMPERATURE: 97.2 F | DIASTOLIC BLOOD PRESSURE: 72 MMHG | SYSTOLIC BLOOD PRESSURE: 165 MMHG | HEART RATE: 85 BPM

## 2022-03-09 DIAGNOSIS — Z87.898 HISTORY OF SHORT TERM MEMORY LOSS: Primary | ICD-10-CM

## 2022-03-09 DIAGNOSIS — H91.93 BILATERAL HEARING LOSS, UNSPECIFIED HEARING LOSS TYPE: ICD-10-CM

## 2022-03-09 DIAGNOSIS — L89.323 PRESSURE ULCER OF LEFT BUTTOCK, STAGE 3 (HCC): ICD-10-CM

## 2022-03-09 DIAGNOSIS — Z74.09 DECREASED MOBILITY AND ENDURANCE: ICD-10-CM

## 2022-03-09 DIAGNOSIS — L89.323 PRESSURE ULCER OF ISCHIUM, STAGE 3, LEFT (HCC): ICD-10-CM

## 2022-03-09 PROCEDURE — 87186 SC STD MICRODIL/AGAR DIL: CPT

## 2022-03-09 PROCEDURE — 87070 CULTURE OTHR SPECIMN AEROBIC: CPT

## 2022-03-09 PROCEDURE — 11042 DBRDMT SUBQ TIS 1ST 20SQCM/<: CPT | Performed by: NURSE PRACTITIONER

## 2022-03-09 PROCEDURE — 87205 SMEAR GRAM STAIN: CPT

## 2022-03-09 PROCEDURE — 87181 SC STD AGAR DILUTION PER AGT: CPT

## 2022-03-09 PROCEDURE — 11042 DBRDMT SUBQ TIS 1ST 20SQCM/<: CPT

## 2022-03-09 PROCEDURE — 87077 CULTURE AEROBIC IDENTIFY: CPT

## 2022-03-09 RX ORDER — LIDOCAINE HYDROCHLORIDE 40 MG/ML
SOLUTION TOPICAL ONCE
Status: COMPLETED | OUTPATIENT
Start: 2022-03-09 | End: 2022-03-09

## 2022-03-09 RX ORDER — LIDOCAINE 50 MG/G
OINTMENT TOPICAL ONCE
Status: CANCELLED | OUTPATIENT
Start: 2022-03-09 | End: 2022-03-09

## 2022-03-09 RX ORDER — GINSENG 100 MG
CAPSULE ORAL ONCE
Status: CANCELLED | OUTPATIENT
Start: 2022-03-09 | End: 2022-03-09

## 2022-03-09 RX ORDER — LIDOCAINE HYDROCHLORIDE 20 MG/ML
JELLY TOPICAL ONCE
Status: CANCELLED | OUTPATIENT
Start: 2022-03-09 | End: 2022-03-09

## 2022-03-09 RX ORDER — BACITRACIN, NEOMYCIN, POLYMYXIN B 400; 3.5; 5 [USP'U]/G; MG/G; [USP'U]/G
OINTMENT TOPICAL ONCE
Status: CANCELLED | OUTPATIENT
Start: 2022-03-09 | End: 2022-03-09

## 2022-03-09 RX ORDER — GENTAMICIN SULFATE 1 MG/G
OINTMENT TOPICAL ONCE
Status: CANCELLED | OUTPATIENT
Start: 2022-03-09 | End: 2022-03-09

## 2022-03-09 RX ORDER — BACITRACIN ZINC AND POLYMYXIN B SULFATE 500; 1000 [USP'U]/G; [USP'U]/G
OINTMENT TOPICAL ONCE
Status: CANCELLED | OUTPATIENT
Start: 2022-03-09 | End: 2022-03-09

## 2022-03-09 RX ORDER — LIDOCAINE HYDROCHLORIDE 40 MG/ML
SOLUTION TOPICAL ONCE
Status: CANCELLED | OUTPATIENT
Start: 2022-03-09 | End: 2022-03-09

## 2022-03-09 RX ORDER — LIDOCAINE 40 MG/G
CREAM TOPICAL ONCE
Status: CANCELLED | OUTPATIENT
Start: 2022-03-09 | End: 2022-03-09

## 2022-03-09 RX ADMIN — LIDOCAINE HYDROCHLORIDE: 40 SOLUTION TOPICAL at 11:16

## 2022-03-09 ASSESSMENT — PAIN SCALES - GENERAL
PAINLEVEL_OUTOF10: 3
PAINLEVEL_OUTOF10: 6

## 2022-03-09 ASSESSMENT — PAIN DESCRIPTION - ORIENTATION
ORIENTATION: LEFT
ORIENTATION: LEFT

## 2022-03-09 ASSESSMENT — PAIN DESCRIPTION - PROGRESSION
CLINICAL_PROGRESSION: NOT CHANGED
CLINICAL_PROGRESSION: NOT CHANGED

## 2022-03-09 ASSESSMENT — PAIN DESCRIPTION - PAIN TYPE
TYPE: ACUTE PAIN
TYPE: ACUTE PAIN

## 2022-03-09 ASSESSMENT — PAIN DESCRIPTION - LOCATION
LOCATION: BUTTOCKS
LOCATION: BUTTOCKS

## 2022-03-09 ASSESSMENT — PAIN DESCRIPTION - DESCRIPTORS
DESCRIPTORS: ACHING
DESCRIPTORS: BURNING;DISCOMFORT

## 2022-03-09 ASSESSMENT — PAIN DESCRIPTION - ONSET
ONSET: ON-GOING
ONSET: ON-GOING

## 2022-03-09 ASSESSMENT — PAIN DESCRIPTION - FREQUENCY
FREQUENCY: INTERMITTENT
FREQUENCY: INTERMITTENT

## 2022-03-09 NOTE — PLAN OF CARE
Patient Name:  Ivy Catalan  YOB: 1935  Today's Date:  March 9, 2022  Medical Record Number:  [id number]      Patient in [address] [facility] today for evaluation by Mark Zamudio CNP, culture was obtained per CNP. Patient sister Anny Howard inquired if she could be informed about the patient's wound culture results and any new orders as her sister can be forgetful. The discussion was had between the patient [employer]), her sister and staff, the patient was agreeable to permit her sister to be informed of the culture results and any new medication orders stating \"Yes please do. Her memory is better than mine\". Notified RN Case Manager Rah Rincon of discussion and ensured correct number for family member is in the chart. Anny Howard phone # [phone].   Electronically signed by Milagro Faustin RN on 3/9/2022 at 12:17 PM

## 2022-03-10 NOTE — PROGRESS NOTES
[address]   Progress Note and Procedure Note      [address] RECORD NUMBER:  [id number]  AGE: 82 y.o. GENDER: female  : 1935  EPISODE DATE:  3/9/2022    Subjective:     Chief Complaint   Patient presents with    Wound Check     left buttock wound         HISTORY of PRESENT ILLNESS HPI  Francesco Murillo a 82 y. o. female who presents today for wound/ulcer evaluation. Pt accompanied by her younger sister. Alejandro Garcia both live next door to one another in separate condominiums.  Pt reports not feeling  pain in wound area.  Sits in a recliner most of the day and sleeps in bed at night.  Sister states pt looking at cushion for chair but wanted to get the cheaper one, encouraged do get a denser cushion for prevention. Pt has short term memory loss and doesn't recall instructions from OT or PT.     Pt's appetite is poor with largest meal at supper of a microwave dinner. The sister has bought  Ensure to boost her protein intake. Pt's sister did watch home care nurse apply dressing and how wound was packed. She verified that wound dressing is indeed being packed as directed. During this visit, we sat pt up to better determine location of wound. Noted when she she sits up, tissue bulges out of wound and is an ischial wounds rather than a buttock wound, and bulging of tissue may account for wound packing working it's way out between dressing changes.      History of Wound Context: reported and pictured as a unstagable closed deep tissue injury 2021. When first visited [facility] wound open.    Wound/Ulcer Pain Timing/Severity: none  Quality of pain: N/A  Severity:  0 / 10   Modifying Factors: None  Associated Signs/Symptoms: drainage     Ulcer Identification:  Ulcer Type: pressure     Contributing Factors: chronic pressure, decreased mobility and malnutrition, forgetfulness  PAST MEDICAL HISTORY        Diagnosis Date    CAD (coronary artery disease)     Inferior STEMI; CATRACHITA to RCA    DDD (degenerative disc disease), lumbosacral 4/10/2013    Decreased mobility and endurance 2/24/2022    Hearing aid worn     bilateral    History of short term memory loss 3/3/2022    Mcgrath (hard of hearing)     Mcgrath (hard of hearing)     Hyperlipidemia     Hypertension     MI, old 2017    Neuropathy     bilateral lower extremities    Pressure ulcer of left buttock, stage 3 ([address] 2/16/2022    Sleep apnea     uses mouth piece; bringing DOS 3/26/18    Thyroid disease     hypo    Wears glasses        PAST SURGICAL HISTORY    Past Surgical History:   Procedure Laterality Date    ABSCESS DRAINAGE Right 03/27/2018    evacuation hematoma right knee    APPENDECTOMY  1963    BACK SURGERY  [id number]    MILD PROCEDURE L2-3 BILATERAL     BREAST LUMPECTOMY  1986    BUNIONECTOMY  10/2003    CATARACT REMOVAL  03/23/2010 and 04/14/2010    COLONOSCOPY  6/2014    repeat 2019    CORONARY ANGIOPLASTY WITH STENT PLACEMENT  01/23/2017    INF STEMI with CATRACHITA to RCA    DILATION AND CURETTAGE OF UTERUS      INCONTINENCE SURGERY      JOINT REPLACEMENT Right 2018    right total knee replacment    OVARY REMOVAL  1963    right    TONSILLECTOMY AND ADENOIDECTOMY  1942       FAMILY HISTORY    Family History   Problem Relation Age of Onset    Cancer Mother     Heart Disease Mother     Arthritis Sister         rheumatoid    Cancer Brother     Cancer Sister     Diabetes Sister     Heart Disease Brother     High Blood Pressure Brother     High Blood Pressure Sister     Diabetes Brother        SOCIAL HISTORY    Social History     Tobacco Use    Smoking status: Never Smoker    Smokeless tobacco: Never Used    Tobacco comment: encouraged to never smoke    Vaping Use    Vaping Use: Never used   Substance Use Topics    Alcohol use: No    Drug use: No       ALLERGIES    Allergies   Allergen Reactions    Amoxicillin Hives and Rash    Linezolid Other (See Comments)     While taking linezolid, pt developed progressively altered mentation & ultimately had a witnessed seizure 4/26/18. Uncertain whether these symptoms were d/t linezolid, but possible.  Prednisone Itching    Vancomycin      Rising SCr during 5/2018 admission     Coreg [Carvedilol] Diarrhea    Oxycodone-Acetaminophen Rash       MEDICATIONS    Current Outpatient Medications on File Prior to Encounter   Medication Sig Dispense Refill    lisinopril (PRINIVIL;ZESTRIL) 20 MG tablet TAKE ONE TABLET BY MOUTH DAILY 90 tablet 0    Balsam [place]-Castor Oil (VENELEX) OINT ointment Apply topically 2 times daily 30 g 0    diclofenac (VOLTAREN) 75 MG EC tablet TAKE ONE TABLET BY MOUTH TWICE A DAY WITH MEALS (Patient taking differently: Take 75 mg by mouth 2 times daily as needed TAKE ONE TABLET BY MOUTH TWICE A DAY WITH MEALS) 180 tablet 2    potassium chloride (KLOR-CON M) 10 MEQ extended release tablet TAKE ONE TABLET BY MOUTH DAILY 90 tablet 0    thyroid (NP THYROID) 30 MG tablet TAKE ONE TABLET BY MOUTH DAILY 90 tablet 3    rosuvastatin (CRESTOR) 40 MG tablet TAKE ONE TABLET BY MOUTH DAILY 90 tablet 3    Lactobacillus (ACIDOPHILUS) TABS Take 1 tablet by mouth 2 times daily       Multiple Vitamins-Minerals (DAILY SHERIE MAXIMUM MULTIVITAMIN PO) Take 1 packet by mouth daily      aspirin 81 MG tablet Take 81 mg by mouth daily      latanoprost (XALATAN) 0.005 % ophthalmic solution Place 1 drop into the left eye nightly        No current facility-administered medications on file prior to encounter. REVIEW OF SYSTEMS  Review of Systems    Pertinent items are noted in HPI.     Objective:      BP (!) 165/72   Pulse 85   Temp 97.2 °F (36.2 °C) (Infrared)   Resp 18     Wt Readings from Last 3 Encounters:   02/18/22 156 lb 3.2 oz (70.9 kg)   02/18/22 154 lb (69.9 kg)   02/16/22 153 lb 14.1 oz (69.8 kg)       PHYSICAL EXAM  Physical Exam    General Appearance: alert and oriented to person, place and time, in no acute distress, with anxious affect and Wound Depth (cm) 6 cm 03/09/22 1113   Wound Surface Area (cm^2) 1.04 cm^2 03/09/22 1113   Change in Wound Size % (l*w) 72.27 03/09/22 1113   Wound Volume (cm^3) 6.24 cm^3 03/09/22 1113   Wound Healing % 77 03/09/22 1113   Post-Procedure Length (cm) 1.4 cm 03/09/22 1124   Post-Procedure Width (cm) 0.9 cm 03/09/22 1124   Post-Procedure Depth (cm) 6 cm 03/09/22 1124   Post-Procedure Surface Area (cm^2) 1.26 cm^2 03/09/22 1124   Post-Procedure Volume (cm^3) 7.56 cm^3 03/09/22 1124   Wound Assessment Granulation tissue;Slough; Hyper granulation tissue 03/09/22 1113   Drainage Amount Moderate 03/09/22 1113   Drainage Description Yellow 03/09/22 1113   Odor None 03/09/22 1113   Bianca-wound Assessment Dry/flaky 03/09/22 1113   Margins Undefined edges 03/09/22 1113   Wound Thickness Description not for Pressure Injury Full thickness 03/09/22 1113   Number of days: 21          Total Surface Area Debrided:  1.26 sq cm     Estimated Blood Loss:  Minimal    Hemostasis Achieved:  not needed    Procedural Pain:  2  / 10     Post Procedural Pain:  1 / 10     Response to treatment:  Well tolerated by patient. Plan:   Pt/sister education per provider related to healing and dressing options. Discussed plan of care with hydrofera blue this next week and agreeable with plan. Questions answered. Treatment Note please see attached Discharge Instructions    Written patient dismissal instructions given to patient and signed by patient or POA. Discharge [id number] [facility] Physician Orders and Discharge Instructions  [address] [place] [address]  Telephone: [phone]      FAX [phone]  [address] 8:30 am - 4:30 pm and Friday 8:30 am - 1:00 pm.          NAME: Xin Gann  DATE OF BIRTH:  1935  MEDICAL RECORD NUMBER:  [id number]  DATE:  3/9/2022        1.  WE ARE ORDERING YOU A SEAT CUSHION- USE CUSHION WHEN SITTING  2. LAY ON YOUR SIDE MORE OFTEN, STAY OFF YOU BUTT AS MUCH AS YOU CAN   3. SHOWER ONLY ON Monday AND FRIDAYS BEFORE WOUND CARE NURSE ARRIVES          Please wash hands with soap and water prior to and right after  every dressing change               WOUND LOCATION:   LEFT BUTTOCK     · May rinse wounds with 0.9% saline   · Do NOT SCRUB WOUND. · Keep wounds dry in the shower unless otherwise instructed by the physician.     Topical Treatments:              [x]? ??? Apply around the wound:      [x]???? No-Sting barrier film        PRIMARY DRESSING:        [x]? ?? HYDROFERA BLUE ROPE - SLIGHTLY MOISTENED- KEEP A TAIL STICKING OUT .     the depth is 6 cm- and needs to be tucked inside                     [X] STERI-STRIP IN PLACE      SECONDARY DRESSING:                [x]? ?? Gauze                                                 [x]? ?? Cover Roll Tape                                   HOW OFTEN TO CHANGE DRESSINGS:                                  [x]? ?? Three times per week:             _______________________________________________________________________________________________        PRESSURE RELIEF AND OFF-LOADING:     Off-Loading:   [x]????????? Off-loading when       [x]????????? in bed         [x]????????? sitting                       [x]????????? Turn every 2 hours when in bed.                       [x]?????????  Avoid position directing pressure on wound site.  Limit side lying to 30 degree tilt.  Limit HOB elevation to 30 degrees.     Specialty equipment ordered:       []????????? Wheelchair cushion    []????????? Specialty Bed/Mattress           _____________________________________________________________________________________________     Dietary:  Continue your diet as tolerated. ? Eat heart-healthy foods. These foods include vegetables, fruits, nuts, beans, lean meat, fish, and whole grains. Limit sodium, alcohol, and sugar. ? Protein is a key nutrient in helping to repair damaged tissue and promote new tissue growth.  Good sources of protein are milk, yogurt, cheese, fish, lean meat, and beans. ? If you are a diabetic, having diabetes can make it hard for wounds to heal. So try to keep your blood sugar in its target range.  __________________________________________________________________________________________________     ACTIVITY:   Activity as tolerated  ________________________________________________________________________________________________________________     PAIN:     Please note, A small amount of pain, drainage and/or bleeding from this process might be expected, and is NORMAL.      Elevate the affected limb. Use over-the-counter medications you would normally use for pain as permitted by your family doctor. For persistent pain not relieved by the above interventions, please call your family doctor.  ________________________________________________________________________________  Call your doctor now or seek immediate medical care if:    · You have symptoms of infection, such as:  ? Increased pain, swelling, warmth, or redness. ? Red streaks leading from the area. ? Pus draining from the area.   ? A fever.      _______________________________________________________________________     Return Appointment:     · ECF or Home Healthcare: Claudia Montilla order [address] is 6 cm    [address] is responsible to order your supplies.      · Return Appointment: With Mazin Castro CNP  in 1 Week(s)                   []???? Orders placed during your visit:       GET A [address]    ( DENSE FOAM SEAT CUSHION)     CULTURE OBTAINED : 3/9/22     Electronically signed by : Megan Barrera on 3/9/2022 at 11:24 AM           [address] [place] [address] Information: Should you experience any significant changes in your wound(s) or have questions about your wound care, please contact the [phone] 8:30 am - 4:30 pm and Friday 8:30 am - 1:00 pm.  If you need help with your wound outside these hours and cannot wait until we are again available, contact your PCP or go to the hospital emergency room.      PLEASE NOTE: IF YOU ARE UNABLE TO [address], CONTINUE TO USE THE SUPPLIES YOU HAVE AVAILABLE UNTIL YOU ARE ABLE TO [facility]. KEEP THE WOUND COVERED AT ALL TIMES.          Physician Signature:_______________________     Date: ___________ Time:  ____________         [x]? ??Geovany Vines CNP               Electronically signed by MICHELLE Martínez CNP on 3/10/2022 at 10:04 AM

## 2022-03-12 LAB
GRAM STAIN RESULT: ABNORMAL
ORGANISM: ABNORMAL
ORGANISM: ABNORMAL
WOUND/ABSCESS: ABNORMAL
WOUND/ABSCESS: ABNORMAL

## 2022-03-16 ENCOUNTER — HOSPITAL ENCOUNTER (OUTPATIENT)
Dept: WOUND CARE | Age: 87
Discharge: HOME | End: 2022-03-16
Payer: MEDICARE

## 2022-03-16 VITALS
SYSTOLIC BLOOD PRESSURE: 153 MMHG | RESPIRATION RATE: 18 BRPM | DIASTOLIC BLOOD PRESSURE: 72 MMHG | TEMPERATURE: 97 F | HEART RATE: 72 BPM

## 2022-03-16 DIAGNOSIS — L89.323 PRESSURE ULCER OF ISCHIUM, STAGE 3, LEFT (HCC): ICD-10-CM

## 2022-03-16 DIAGNOSIS — Z74.09 DECREASED MOBILITY AND ENDURANCE: ICD-10-CM

## 2022-03-16 DIAGNOSIS — Z87.898 HISTORY OF SHORT TERM MEMORY LOSS: Primary | ICD-10-CM

## 2022-03-16 PROCEDURE — 11042 DBRDMT SUBQ TIS 1ST 20SQCM/<: CPT

## 2022-03-16 PROCEDURE — 11042 DBRDMT SUBQ TIS 1ST 20SQCM/<: CPT | Performed by: NURSE PRACTITIONER

## 2022-03-16 RX ORDER — LIDOCAINE 40 MG/G
CREAM TOPICAL ONCE
Status: CANCELLED | OUTPATIENT
Start: 2022-03-16 | End: 2022-03-16

## 2022-03-16 RX ORDER — GENTAMICIN SULFATE 1 MG/G
OINTMENT TOPICAL ONCE
Status: CANCELLED | OUTPATIENT
Start: 2022-03-16 | End: 2022-03-16

## 2022-03-16 RX ORDER — LIDOCAINE HYDROCHLORIDE 20 MG/ML
JELLY TOPICAL ONCE
Status: CANCELLED | OUTPATIENT
Start: 2022-03-16 | End: 2022-03-16

## 2022-03-16 RX ORDER — LIDOCAINE 40 MG/G
CREAM TOPICAL ONCE
Status: COMPLETED | OUTPATIENT
Start: 2022-03-16 | End: 2022-03-16

## 2022-03-16 RX ORDER — BACITRACIN ZINC AND POLYMYXIN B SULFATE 500; 1000 [USP'U]/G; [USP'U]/G
OINTMENT TOPICAL ONCE
Status: CANCELLED | OUTPATIENT
Start: 2022-03-16 | End: 2022-03-16

## 2022-03-16 RX ORDER — LIDOCAINE HYDROCHLORIDE 40 MG/ML
SOLUTION TOPICAL ONCE
Status: CANCELLED | OUTPATIENT
Start: 2022-03-16 | End: 2022-03-16

## 2022-03-16 RX ORDER — GINSENG 100 MG
CAPSULE ORAL ONCE
Status: CANCELLED | OUTPATIENT
Start: 2022-03-16 | End: 2022-03-16

## 2022-03-16 RX ORDER — BACITRACIN, NEOMYCIN, POLYMYXIN B 400; 3.5; 5 [USP'U]/G; MG/G; [USP'U]/G
OINTMENT TOPICAL ONCE
Status: CANCELLED | OUTPATIENT
Start: 2022-03-16 | End: 2022-03-16

## 2022-03-16 RX ORDER — LIDOCAINE 50 MG/G
OINTMENT TOPICAL ONCE
Status: CANCELLED | OUTPATIENT
Start: 2022-03-16 | End: 2022-03-16

## 2022-03-16 RX ADMIN — LIDOCAINE: 40 CREAM TOPICAL at 10:42

## 2022-03-16 ASSESSMENT — PAIN SCALES - GENERAL
PAINLEVEL_OUTOF10: 0
PAINLEVEL_OUTOF10: 0

## 2022-03-16 ASSESSMENT — PAIN DESCRIPTION - PAIN TYPE: TYPE: ACUTE PAIN

## 2022-03-16 NOTE — PROGRESS NOTES
[address]   Progress Note and Procedure Note      Iona [address] RECORD NUMBER:  [id number]  AGE: 82 y.o. GENDER: female  : 1935  EPISODE DATE:  3/16/2022    Subjective:     Chief Complaint   Patient presents with    Wound Check     fOLLOW UP FOR LEFT BUTTOCKS         HISTORY of PRESENT ILLNESS HPI  Cloyd Sicard a 82 y. o. female who presents today for wound/ulcer evaluation. Pt accompanied by her younger sister. Nicole Colin  live next door to one another in separate condominiums, but their contact is not consistent. Pt reports not feeling  pain in wound area.  Sits in a recliner most of the day and sleeps in bed at night. A family member has ordered a Roho cushion. Pt has short term memory loss and doesn't recall instructions from caregivers.    Pt's appetite is poor with largest meal at supper of a microwave dinner. The sister has bought  Ensure to boost her protein intake. Pt's sister did watch home care nurse apply dressing and how wound was packed. She verified that wound dressing is indeed being packed as directed. During this visit, we sat pt up to better determine location of wound.   Noted when she she sits up, tissue bulges out of wound and is an ischial wounds rather than a buttock wound, and bulging of tissue may account for wound packing working it's way out between dressing changes.      History of Wound Context: reported and pictured as a unstagable closed deep tissue injury 2021. When first visited [address] [place] [address],[address] wound open.    Wound/Ulcer Pain Timing/Severity: none  Quality of pain: N/A  Severity:  0 / 10   Modifying Factors: None  Associated Signs/Symptoms: drainage     Ulcer Identification:  Ulcer Type: pressure     Contributing Factors: chronic pressure, decreased mobility and malnutrition, forgetfulness  PAST MEDICAL HISTORY        Diagnosis Date    CAD (coronary artery disease)     Inferior STEMI; CATRACHITA to RCA    DDD (degenerative disc disease), lumbosacral 4/10/2013    Decreased mobility and endurance 2/24/2022    Hearing aid worn     bilateral    History of short term memory loss 3/3/2022    [place] (hard of hearing)     [place] (hard of hearing)     Hyperlipidemia     Hypertension     MI, old 2017    Neuropathy     bilateral lower extremities    Pressure ulcer of ischium, stage 3, left ([address] 2/16/2022    Pressure ulcer of left buttock, stage 3 ([address] 2/16/2022    Sleep apnea     uses mouth piece; bringing DOS 3/26/18    Thyroid disease     hypo    Wears glasses        PAST SURGICAL HISTORY    Past Surgical History:   Procedure Laterality Date    ABSCESS DRAINAGE Right 03/27/2018    evacuation hematoma right knee    APPENDECTOMY  1963    BACK SURGERY  [id number]    MILD PROCEDURE L2-3 BILATERAL     BREAST LUMPECTOMY  1986    BUNIONECTOMY  10/2003    CATARACT REMOVAL  03/23/2010 and 04/14/2010    COLONOSCOPY  6/2014    repeat 2019    CORONARY ANGIOPLASTY WITH STENT PLACEMENT  01/23/2017    INF STEMI with CATRACHITA to RCA    DILATION AND CURETTAGE OF UTERUS      INCONTINENCE SURGERY      JOINT REPLACEMENT Right 2018    right total knee replacment    OVARY REMOVAL  1963    right    TONSILLECTOMY AND ADENOIDECTOMY  1942       FAMILY HISTORY    Family History   Problem Relation Age of Onset    Cancer Mother     Heart Disease Mother     Arthritis Sister         rheumatoid    Cancer Brother     Cancer Sister     Diabetes Sister     Heart Disease Brother     High Blood Pressure Brother     High Blood Pressure Sister     Diabetes Brother        SOCIAL HISTORY    Social History     Tobacco Use    Smoking status: Never Smoker    Smokeless tobacco: Never Used    Tobacco comment: encouraged to never smoke    Vaping Use    Vaping Use: Never used   Substance Use Topics    Alcohol use: No    Drug use: No       ALLERGIES    Allergies   Allergen Reactions    Amoxicillin Hives and Rash    Linezolid Other (See Comments)     While taking linezolid, pt developed progressively altered mentation & ultimately had a witnessed seizure 4/26/18. Uncertain whether these symptoms were d/t linezolid, but possible.  Prednisone Itching    Vancomycin      Rising SCr during 5/2018 admission     Coreg [Carvedilol] Diarrhea    Oxycodone-Acetaminophen Rash       MEDICATIONS    Current Outpatient Medications on File Prior to Encounter   Medication Sig Dispense Refill    lisinopril (PRINIVIL;ZESTRIL) 20 MG tablet TAKE ONE TABLET BY MOUTH DAILY 90 tablet 0    Balsam [place]-Castor Oil (VENELEX) OINT ointment Apply topically 2 times daily 30 g 0    diclofenac (VOLTAREN) 75 MG EC tablet TAKE ONE TABLET BY MOUTH TWICE A DAY WITH MEALS (Patient taking differently: Take 75 mg by mouth 2 times daily as needed TAKE ONE TABLET BY MOUTH TWICE A DAY WITH MEALS) 180 tablet 2    potassium chloride (KLOR-CON M) 10 MEQ extended release tablet TAKE ONE TABLET BY MOUTH DAILY 90 tablet 0    thyroid (NP THYROID) 30 MG tablet TAKE ONE TABLET BY MOUTH DAILY 90 tablet 3    rosuvastatin (CRESTOR) 40 MG tablet TAKE ONE TABLET BY MOUTH DAILY 90 tablet 3    Lactobacillus (ACIDOPHILUS) TABS Take 1 tablet by mouth 2 times daily       Multiple Vitamins-Minerals (DAILY SHERIE MAXIMUM MULTIVITAMIN PO) Take 1 packet by mouth daily      aspirin 81 MG tablet Take 81 mg by mouth daily      latanoprost (XALATAN) 0.005 % ophthalmic solution Place 1 drop into the left eye nightly        No current facility-administered medications on file prior to encounter. REVIEW OF SYSTEMS  Review of Systems    Pertinent items are noted in HPI.     Objective:      BP (!) 153/72   Pulse 72   Temp 97 °F (36.1 °C) (Infrared)   Resp 18     Wt Readings from Last 3 Encounters:   02/18/22 156 lb 3.2 oz (70.9 kg)   02/18/22 154 lb (69.9 kg)   02/16/22 153 lb 14.1 oz (69.8 kg)       PHYSICAL EXAM  Physical Exam    General Appearance: alert and oriented to person, place and time, well-developed and well-nourished, in no acute distress and pale  Skin: warm and dry, no rash or erythema  Head: normocephalic and atraumatic  Eyes: pupils equal, round, and reactive to light  Pulmonary/Chest: clear to auscultation bilaterally- no wheezes, rales or rhonchi, normal air movement, no respiratory distress  Cardiovascular: normal rate and regular rhythm  Wound:  Measurements slightly decreased, and hydrofera blue rope dressing had worked its way out similar to past dressing. Assessment:        Problem List Items Addressed This Visit     Pressure ulcer of ischium, stage 3, left (HCC)    Relevant Orders    Initiate Outpatient Wound Care Protocol    Decreased mobility and endurance    Relevant Orders    Initiate Outpatient Wound Care Protocol    History of short term memory loss - Primary    Relevant Orders    Initiate Outpatient Wound Care Protocol           Procedure Note  Indications:  Based on my examination of this patient's wound(s)/ulcer(s) today, debridement is required to promote healing and evaluate the wound base. Performed by: MICHELLE Castillo CNP    Consent obtained:  Yes    Time out taken:  Yes    Pain Control: Anesthetic  Anesthetic: 4% Lidocaine Cream       Debridement: Excisional Debridement    Using curette the wound(s)/ulcer(s) was/were debrided down through and including the removal of epidermis, dermis and subcutaneous tissue.         Devitalized Tissue Debrided:  fibrin, biofilm and slough    Pre Debridement Measurements:  Are located in the [facility]  Documentation Flow Sheet    Diabetic/Pressure/Non Pressure Ulcers only:  Ulcer: Non-Pressure ulcer, fat layer exposed     Wound/Ulcer #: 1    Post Debridement Measurements:  Wound/Ulcer Descriptions are Pre Debridement except measurements:    Wound 01/03/22 Buttocks Left (Active)   Number of days: 72       Wound 02/16/22 Ischium Left #1 ( since about 12/15/2021) (Active)   Wound Image   03/02/22 1052   Wound Etiology Pressure Stage  3 02/16/22 1204   Dressing Status New dressing applied 03/09/22 1210   Wound Cleansed Cleansed with saline 03/09/22 1210   Dressing/Treatment Collagen;Hydrofera blue;Gauze dressing/dressing sponge;Tape/Soft cloth adhesive tape 03/16/22 1139   Wound Length (cm) 1.1 cm 03/16/22 1041   Wound Width (cm) 1.5 cm 03/16/22 1041   Wound Depth (cm) 5.7 cm 03/16/22 1041   Wound Surface Area (cm^2) 1.65 cm^2 03/16/22 1041   Change in Wound Size % (l*w) 56 03/16/22 1041   Wound Volume (cm^3) 9.405 cm^3 03/16/22 1041   Wound Healing % 65 03/16/22 1041   Post-Procedure Length (cm) 1.2 cm 03/16/22 1108   Post-Procedure Width (cm) 1.6 cm 03/16/22 1108   Post-Procedure Depth (cm) 5.7 cm 03/16/22 1108   Post-Procedure Surface Area (cm^2) 1.92 cm^2 03/16/22 1108   Post-Procedure Volume (cm^3) 10.944 cm^3 03/16/22 1108   Wound Assessment Granulation tissue;Slough; Hyper granulation tissue 03/16/22 1041   Drainage Amount Moderate 03/16/22 1041   Drainage Description Yellow;Green 03/16/22 1041   Odor None 03/16/22 1041   Bianca-wound Assessment Dry/flaky 03/16/22 1041   Margins Undefined edges 03/16/22 1041   Wound Thickness Description not for Pressure Injury Full thickness 03/16/22 1041   Number of days: 28          Total Surface Area Debrided:  1.92 sq cm     Estimated Blood Loss:  Minimal    Hemostasis Achieved:  not needed    Procedural Pain:  1  / 10     Post Procedural Pain:  0 / 10     Response to treatment:  Well tolerated by patient. Plan:   Pt/sister instructions by provider related to current status of wound. Discussed cause of wound with pt and encourage her to use Roho cushion when family gets it in. Also discussed how to decrease time sitting in one place. Pt/sister agreeable to continue plan of care and questions answered. Treatment Note please see attached Discharge Instructions    Written patient dismissal instructions given to patient and signed by patient or POA.          Discharge [id number] [facility] Physician Orders and Discharge Instructions  [address], [address] [address]  Telephone: [phone]      FAX [phone]  [address] 8:30 am - 4:30 pm and Friday 8:30 am - 1:00 pm.          NAME: Lucía Singletary  DATE OF BIRTH:  1935  MEDICAL RECORD NUMBER:  [id number]  DATE:  3/16/2022        1. WE ARE ORDERING YOU A SEAT CUSHION- USE CUSHION WHEN SITTING  2. LAY ON YOUR SIDE MORE OFTEN, STAY OFF YOU BUTT AS MUCH AS YOU CAN   3. SHOWER ONLY ON Monday AND FRIDAYS BEFORE WOUND CARE NURSE ARRIVES             Please wash hands with soap and water prior to and right after  every dressing change            WOUND LOCATION:   LEFT BUTTOCK     · May rinse wounds with 0.9% saline   · Do NOT SCRUB WOUND. · Keep wounds dry in the shower unless otherwise instructed by the physician.     Topical Treatments:              [x]????? Apply around the wound:      [x]????? No-Sting barrier film        PRIMARY DRESSING:                          [x]? ???  PLAIN COLLAGEN TUCKED INTO THE BASE OF WOUND FOLLOWED WITH:         [x]???? HYDROFERA BLUE ROPE - SLIGHTLY MOISTENED- KEEP A TAIL STICKING OUT .     the depth is 6 cm- and needs to be tucked inside                     [X] STERI-STRIP HYDROFERA BLUE ROPE TAIL IN PLACE      SECONDARY DRESSING:                [x]? ??? Gauze                                                 [x]? ??? Cover Roll Tape                                   HOW OFTEN TO CHANGE DRESSINGS:                                  [x]? ??? Three times per week:             _______________________________________________________________________________________________        PRESSURE RELIEF AND OFF-LOADING:     Off-Loading:   [x]?????????? Off-loading when       [x]?????????? in bed         [x]?????????? sitting                       [x]?????????? Turn every 2 hours when in bed.                       [x]??????????  Avoid position directing pressure on wound site.  Limit side lying to 30 degree tilt.  Limit HOB elevation to 30 degrees.     Specialty equipment ordered:       [x]?????????? Wheelchair cushion        _____________________________________________________________________________________________     Dietary:  Continue your diet as tolerated. ? Eat heart-healthy foods. These foods include vegetables, fruits, nuts, beans, lean meat, fish, and whole grains. Limit sodium, alcohol, and sugar. ? Protein is a key nutrient in helping to repair damaged tissue and promote new tissue growth. Good sources of protein are milk, yogurt, cheese, fish, lean meat, and beans. ? If you are a diabetic, having diabetes can make it hard for wounds to heal. So try to keep your blood sugar in its target range.  __________________________________________________________________________________________________     ACTIVITY:   Activity as tolerated  ________________________________________________________________________________________________________________     PAIN:     Please note, A small amount of pain, drainage and/or bleeding from this process might be expected, and is NORMAL.      Elevate the affected limb. Use over-the-counter medications you would normally use for pain as permitted by your family doctor. For persistent pain not relieved by the above interventions, please call your family doctor.  ________________________________________________________________________________  Call your doctor now or seek immediate medical care if:    · You have symptoms of infection, such as:  ? Increased pain, swelling, warmth, or redness. ? Red streaks leading from the area. ? Pus draining from the area.   ? A fever.      _______________________________________________________________________     Return Appointment:     · ECF or Home Healthcare: Claudia Dickerson is 6 cm    [facility] [address] is responsible to order your supplies.      · Return Appointment: With Damaso Westfall 1 Week(s)                   []????? Orders placed during your visit:            CULTURE OBTAINED : 3/9/22- RESULTED         Electronically signed by : Tristan Bain. on 3/16/2022 at 11:07 AM           [address] [place] [address] Information: Should you experience any significant changes in your wound(s) or have questions about your wound care, please contact the [address] [place] [address] [phone] [address] 8:30 am - 4:30 pm and Friday 8:30 am - 1:00 pm.  If you need help with your wound outside these hours and cannot wait until we are again available, contact your PCP or go to the hospital emergency room.      PLEASE NOTE: IF YOU ARE UNABLE TO OBTAIN WOUND SUPPLIES, CONTINUE TO USE THE SUPPLIES YOU HAVE AVAILABLE UNTIL YOU ARE ABLE TO [facility]. KEEP THE WOUND COVERED AT ALL TIMES.          Physician Signature:_______________________     Date: ___________ Time:  ____________         [x]? ???Lenka Knight CNP            Electronically signed by MICHELLE Castañeda CNP on 3/16/2022 at 3:01 PM

## 2022-03-23 ENCOUNTER — HOSPITAL ENCOUNTER (OUTPATIENT)
Dept: WOUND CARE | Age: 87
Discharge: HOME | End: 2022-03-23
Payer: MEDICARE

## 2022-03-23 VITALS
SYSTOLIC BLOOD PRESSURE: 153 MMHG | DIASTOLIC BLOOD PRESSURE: 79 MMHG | TEMPERATURE: 97 F | RESPIRATION RATE: 20 BRPM | HEART RATE: 94 BPM

## 2022-03-23 DIAGNOSIS — H91.93 BILATERAL HEARING LOSS, UNSPECIFIED HEARING LOSS TYPE: ICD-10-CM

## 2022-03-23 DIAGNOSIS — Z87.898 HISTORY OF SHORT TERM MEMORY LOSS: Primary | ICD-10-CM

## 2022-03-23 DIAGNOSIS — Z74.09 DECREASED MOBILITY AND ENDURANCE: ICD-10-CM

## 2022-03-23 DIAGNOSIS — L89.323 PRESSURE ULCER OF ISCHIUM, STAGE 3, LEFT (HCC): ICD-10-CM

## 2022-03-23 PROCEDURE — 11042 DBRDMT SUBQ TIS 1ST 20SQCM/<: CPT | Performed by: NURSE PRACTITIONER

## 2022-03-23 PROCEDURE — 11042 DBRDMT SUBQ TIS 1ST 20SQCM/<: CPT

## 2022-03-23 RX ORDER — BACITRACIN, NEOMYCIN, POLYMYXIN B 400; 3.5; 5 [USP'U]/G; MG/G; [USP'U]/G
OINTMENT TOPICAL ONCE
Status: CANCELLED | OUTPATIENT
Start: 2022-03-23 | End: 2022-03-23

## 2022-03-23 RX ORDER — LIDOCAINE HYDROCHLORIDE 40 MG/ML
SOLUTION TOPICAL ONCE
Status: CANCELLED | OUTPATIENT
Start: 2022-03-23 | End: 2022-03-23

## 2022-03-23 RX ORDER — LIDOCAINE HYDROCHLORIDE 20 MG/ML
JELLY TOPICAL ONCE
Status: CANCELLED | OUTPATIENT
Start: 2022-03-23 | End: 2022-03-23

## 2022-03-23 RX ORDER — GINSENG 100 MG
CAPSULE ORAL ONCE
Status: CANCELLED | OUTPATIENT
Start: 2022-03-23 | End: 2022-03-23

## 2022-03-23 RX ORDER — BACITRACIN ZINC AND POLYMYXIN B SULFATE 500; 1000 [USP'U]/G; [USP'U]/G
OINTMENT TOPICAL ONCE
Status: CANCELLED | OUTPATIENT
Start: 2022-03-23 | End: 2022-03-23

## 2022-03-23 RX ORDER — LIDOCAINE HYDROCHLORIDE 40 MG/ML
SOLUTION TOPICAL ONCE
Status: COMPLETED | OUTPATIENT
Start: 2022-03-23 | End: 2022-03-23

## 2022-03-23 RX ORDER — LIDOCAINE 40 MG/G
CREAM TOPICAL ONCE
Status: CANCELLED | OUTPATIENT
Start: 2022-03-23 | End: 2022-03-23

## 2022-03-23 RX ORDER — LIDOCAINE 50 MG/G
OINTMENT TOPICAL ONCE
Status: CANCELLED | OUTPATIENT
Start: 2022-03-23 | End: 2022-03-23

## 2022-03-23 RX ORDER — GENTAMICIN SULFATE 1 MG/G
OINTMENT TOPICAL ONCE
Status: CANCELLED | OUTPATIENT
Start: 2022-03-23 | End: 2022-03-23

## 2022-03-23 RX ADMIN — LIDOCAINE HYDROCHLORIDE 2.5 ML: 40 SOLUTION TOPICAL at 11:18

## 2022-03-23 ASSESSMENT — PAIN SCALES - GENERAL
PAINLEVEL_OUTOF10: 0
PAINLEVEL_OUTOF10: 0

## 2022-03-24 NOTE — PROGRESS NOTES
[address]   Progress Note and Procedure Note      I[address] RECORD NUMBER:  [id number]  AGE: 82 y.o. GENDER: female  : 1935  EPISODE DATE:  3/23/2022    Subjective:     Chief Complaint   Patient presents with    Wound Check     f/u visit - left ischial wound         HISTORY of PRESENT ILLNESS HPI  Sathya Goldstein a 82 y. o. female who presents today for wound/ulcer evaluation. Pt accompanied by her younger sister. They  live next door to one another in separate condominiums, but their contact is not consistent. Pt reports not feeling  pain in wound area.  Sits in a recliner most of the day and sleeps in bed at night. Now has Roho cushion for chair. The biggest challengea to wound healing is dressings working themselves out between visits and pt short term memory in which pt doesn't recall instructions. Her sister's memory is better but still an issue. We have discussed NPWT but concern is that pt would not remember to plug in at night, may take it off or otherwise interrupt therapy.     Pt's appetite is poor with largest meal at supper of a microwave dinner.  The sister has bought  Ensure to boost her protein intake.   On a past visit, we sat pt up to better determine location of wound.  Noted when she she sits up, tissue bulges out of wound and is an ischial wounds rather than a buttock wound, and bulging of tissue may account for wound packing working it's way out between dressing changes.      History of Wound Context: reported and pictured as a unstagable closed deep tissue injury 2021. When first visited [address] [place] [address],[address] wound open.    Wound/Ulcer Pain Timing/Severity: none  Quality of pain: N/A  Severity:  0 / 10   Modifying Factors: None  Associated Signs/Symptoms: drainage     Ulcer Identification:  Ulcer Type: pressure     Contributing Factors: chronic pressure, decreased mobility and malnutrition, forgetfulnessPAST MEDICAL HISTORY        Diagnosis Date  CAD (coronary artery disease)     Inferior STEMI; CATRACHITA to RCA    DDD (degenerative disc disease), lumbosacral 4/10/2013    Decreased mobility and endurance 2/24/2022    Hearing aid worn     bilateral    History of short term memory loss 3/3/2022    [place] (hard of hearing)     [place] (hard of hearing)     Hyperlipidemia     Hypertension     MI, old 2017    Neuropathy     bilateral lower extremities    Pressure ulcer of ischium, stage 3, left ([address] 2/16/2022    Pressure ulcer of left buttock, stage 3 ([address] 2/16/2022    Sleep apnea     uses mouth piece; bringing DOS 3/26/18    Thyroid disease     hypo    Wears glasses        PAST SURGICAL HISTORY    Past Surgical History:   Procedure Laterality Date    ABSCESS DRAINAGE Right 03/27/2018    evacuation hematoma right knee    APPENDECTOMY  1963    BACK SURGERY  [id number]    MILD PROCEDURE L2-3 BILATERAL     BREAST LUMPECTOMY  1986    BUNIONECTOMY  10/2003    CATARACT REMOVAL  03/23/2010 and 04/14/2010    COLONOSCOPY  6/2014    repeat 2019    CORONARY ANGIOPLASTY WITH STENT PLACEMENT  01/23/2017    INF STEMI with CATRACHITA to RCA    DILATION AND CURETTAGE OF UTERUS      INCONTINENCE SURGERY      JOINT REPLACEMENT Right 2018    right total knee replacment    OVARY REMOVAL  1963    right    TONSILLECTOMY AND ADENOIDECTOMY  1942       FAMILY HISTORY    Family History   Problem Relation Age of Onset    Cancer Mother     Heart Disease Mother     Arthritis Sister         rheumatoid    Cancer Brother     Cancer Sister     Diabetes Sister     Heart Disease Brother     High Blood Pressure Brother     High Blood Pressure Sister     Diabetes Brother        SOCIAL HISTORY    Social History     Tobacco Use    Smoking status: Never Smoker    Smokeless tobacco: Never Used    Tobacco comment: encouraged to never smoke    Vaping Use    Vaping Use: Never used   Substance Use Topics    Alcohol use: No    Drug use: No       ALLERGIES    Allergies   Allergen Exam    General Appearance: alert and oriented to person, place and time, well-developed and well nourished and in no acute distress  Skin: warm and dry, no rash or erythema  Head: normocephalic and atraumatic  Eyes: pupils equal, round, and reactive to light  Pulmonary/Chest:  normal air movement, no respiratory distress  Cardiovascular: normal rate and regular rhythm  Wound:  Very little slough in wound, but wound depth has not decreased. Wound base pink and moist, no overt signs of infection. Assessment:        Problem List Items Addressed This Visit     Pressure ulcer of ischium, stage 3, left (HCC)    Decreased mobility and endurance    [place] (hard of hearing)    History of short term memory loss - Primary           Procedure Note  Indications:  Based on my examination of this patient's wound(s)/ulcer(s) today, debridement is required to promote healing and evaluate the wound base. Performed by: MICHELLE Caruso CNP    Consent obtained:  Yes    Time out taken:  Yes    Pain Control: Anesthetic  Anesthetic: 4% Lidocaine Liquid Topical (2.5ml)       Debridement: Excisional Debridement    Using curette the wound(s)/ulcer(s) was/were debrided down through and including the removal of epidermis, dermis and subcutaneous tissue.         Devitalized Tissue Debrided:  fibrin, biofilm and slough    Pre Debridement Measurements:  Are located in the [place]  Documentation Flow Sheet    Diabetic/Pressure/Non Pressure Ulcers only:  Ulcer: Pressure ulcer, Stage 3     Wound/Ulcer #: 1    Post Debridement Measurements:  Wound/Ulcer Descriptions are Pre Debridement except measurements:    Wound 01/03/22 Buttocks Left (Active)   Number of days: 79       Wound 02/16/22 Ischium Left #1 ( since about 12/15/2021) (Active)   Wound Image   03/02/22 1052   Wound Etiology Pressure Stage  3 02/16/22 1204   Dressing Status New dressing applied 03/09/22 1210   Wound Cleansed Cleansed with saline 03/09/22 1210 Dressing/Treatment Collagen with Ag;Silicone border 42/16/52 1512   Wound Length (cm) 1.5 cm 03/23/22 1110   Wound Width (cm) 0.7 cm 03/23/22 1110   Wound Depth (cm) 6.5 cm 03/23/22 1110   Wound Surface Area (cm^2) 1.05 cm^2 03/23/22 1110   Change in Wound Size % (l*w) 72 03/23/22 1110   Wound Volume (cm^3) 6.825 cm^3 03/23/22 1110   Wound Healing % 75 03/23/22 1110   Post-Procedure Length (cm) 1.6 cm 03/23/22 1120   Post-Procedure Width (cm) 0.8 cm 03/23/22 1120   Post-Procedure Depth (cm) 6.5 cm 03/23/22 1120   Post-Procedure Surface Area (cm^2) 1.28 cm^2 03/23/22 1120   Post-Procedure Volume (cm^3) 8.32 cm^3 03/23/22 1120   Wound Assessment Granulation tissue;Slough; Hyper granulation tissue 03/23/22 1110   Drainage Amount Moderate 03/23/22 1110   Drainage Description Serosanguinous 03/23/22 1110   Odor None 03/23/22 1110   Bianca-wound Assessment Dry/flaky 03/23/22 1110   Margins Undefined edges 03/23/22 1110   Wound Thickness Description not for Pressure Injury Full thickness 03/23/22 1110   Number of days: 35          Total Surface Area Debrided:  1.28 sq cm     Estimated Blood Loss:  Minimal    Hemostasis Achieved:  by pressure    Procedural Pain:  0  / 10     Post Procedural Pain:  0 / 10     Response to treatment:  Well tolerated by patient. Plan:   Pt and sister education per provider related to plan of care and adjustment made to fill wound with collagen product which will melt into wound. They are agreeable to new plan of care and questions answered  Treatment Note please see attached Discharge Instructions    Written patient dismissal instructions given to patient and signed by patient or POA.          Discharge [id number] [facility] Physician Orders and Discharge Instructions  [address] [place] [address]  Telephone: [phone]      FAX [phone]  [address] 8:30 am - 4:30 pm and Friday 8:30 am - 1:00 pm.          NAME: Xin Gann  DATE OF BIRTH:  1935  MEDICAL RECORD NUMBER:  [id number]  DATE:  3/23/2022        1. WE ARE ORDERING YOU A SEAT CUSHION- USE CUSHION WHEN SITTING  2. LAY ON YOUR SIDE MORE OFTEN, STAY OFF YOU BUTT AS MUCH AS YOU CAN   3. No SHOWERs, bird baths only           Please wash hands with soap and water prior to and right after  every dressing change            WOUND LOCATION:   LEFT BUTTOCK     · May rinse wounds with 0.9% saline   · Do NOT SCRUB WOUND. · No  shower     Topical Treatments:              [x]?????? Apply around the wound:      [x]?????? No-Sting barrier film        PRIMARY DRESSING:                          [x]?????  RASHAD TUCKED INTO entirety of  WOUND          [x]?????  the depth is 6.5 cm- and needs to be tucked inside                        SECONDARY DRESSING:                [x]????? MEPILEX   BORDER /    or                                      [x]????? gauze & Cover Roll Tape FOR SECUREMENT                                   HOW OFTEN TO CHANGE DRESSINGS:                                  [x]????? Three times per week:             _______________________________________________________________________________________________        PRESSURE RELIEF AND OFF-LOADING:     Off-Loading:   [x]??????????? Off-loading when       [x]??????????? in bed         [x]??????????? sitting                       [x]??????????? Turn every 2 hours when in bed.                       [x]???????????  Avoid position directing pressure on wound site.  Limit side lying to 30 degree tilt.  Limit HOB elevation to 30 degrees.     Specialty equipment ordered:       [x]??????????? Wheelchair cushion         _____________________________________________________________________________________________     Dietary:  Continue your diet as tolerated. ? Eat heart-healthy foods. These foods include vegetables, fruits, nuts, beans, lean meat, fish, and whole grains. Limit sodium, alcohol, and sugar. ?  Protein is a key

## 2022-03-30 ENCOUNTER — HOSPITAL ENCOUNTER (OUTPATIENT)
Dept: WOUND CARE | Age: 87
Discharge: HOME | End: 2022-03-30
Payer: MEDICARE

## 2022-03-30 VITALS
SYSTOLIC BLOOD PRESSURE: 151 MMHG | DIASTOLIC BLOOD PRESSURE: 72 MMHG | TEMPERATURE: 97.3 F | HEART RATE: 76 BPM | RESPIRATION RATE: 20 BRPM

## 2022-03-30 DIAGNOSIS — H91.93 BILATERAL HEARING LOSS, UNSPECIFIED HEARING LOSS TYPE: ICD-10-CM

## 2022-03-30 DIAGNOSIS — L89.323 PRESSURE ULCER OF ISCHIUM, STAGE 3, LEFT (HCC): ICD-10-CM

## 2022-03-30 DIAGNOSIS — Z87.898 HISTORY OF SHORT TERM MEMORY LOSS: Primary | ICD-10-CM

## 2022-03-30 DIAGNOSIS — Z74.09 DECREASED MOBILITY AND ENDURANCE: ICD-10-CM

## 2022-03-30 PROCEDURE — 11042 DBRDMT SUBQ TIS 1ST 20SQCM/<: CPT | Performed by: NURSE PRACTITIONER

## 2022-03-30 PROCEDURE — 11042 DBRDMT SUBQ TIS 1ST 20SQCM/<: CPT

## 2022-03-30 RX ORDER — GINSENG 100 MG
CAPSULE ORAL ONCE
Status: CANCELLED | OUTPATIENT
Start: 2022-03-30 | End: 2022-03-30

## 2022-03-30 RX ORDER — LIDOCAINE 50 MG/G
OINTMENT TOPICAL ONCE
Status: CANCELLED | OUTPATIENT
Start: 2022-03-30 | End: 2022-03-30

## 2022-03-30 RX ORDER — LIDOCAINE 40 MG/G
CREAM TOPICAL ONCE
Status: CANCELLED | OUTPATIENT
Start: 2022-03-30 | End: 2022-03-30

## 2022-03-30 RX ORDER — DOXYCYCLINE HYCLATE 100 MG
100 TABLET ORAL 2 TIMES DAILY
Qty: 14 TABLET | Refills: 0 | Status: SHIPPED | OUTPATIENT
Start: 2022-03-30 | End: 2022-04-06

## 2022-03-30 RX ORDER — BACITRACIN, NEOMYCIN, POLYMYXIN B 400; 3.5; 5 [USP'U]/G; MG/G; [USP'U]/G
OINTMENT TOPICAL ONCE
Status: CANCELLED | OUTPATIENT
Start: 2022-03-30 | End: 2022-03-30

## 2022-03-30 RX ORDER — LIDOCAINE HYDROCHLORIDE 20 MG/ML
JELLY TOPICAL ONCE
Status: CANCELLED | OUTPATIENT
Start: 2022-03-30 | End: 2022-03-30

## 2022-03-30 RX ORDER — LIDOCAINE HYDROCHLORIDE 40 MG/ML
SOLUTION TOPICAL ONCE
Status: COMPLETED | OUTPATIENT
Start: 2022-03-30 | End: 2022-03-30

## 2022-03-30 RX ORDER — LIDOCAINE HYDROCHLORIDE 40 MG/ML
SOLUTION TOPICAL ONCE
Status: CANCELLED | OUTPATIENT
Start: 2022-03-30 | End: 2022-03-30

## 2022-03-30 RX ORDER — GENTAMICIN SULFATE 1 MG/G
OINTMENT TOPICAL ONCE
Status: CANCELLED | OUTPATIENT
Start: 2022-03-30 | End: 2022-03-30

## 2022-03-30 RX ORDER — BACITRACIN ZINC AND POLYMYXIN B SULFATE 500; 1000 [USP'U]/G; [USP'U]/G
OINTMENT TOPICAL ONCE
Status: CANCELLED | OUTPATIENT
Start: 2022-03-30 | End: 2022-03-30

## 2022-03-30 RX ADMIN — LIDOCAINE HYDROCHLORIDE: 40 SOLUTION TOPICAL at 11:06

## 2022-03-30 ASSESSMENT — PAIN SCALES - GENERAL
PAINLEVEL_OUTOF10: 0
PAINLEVEL_OUTOF10: 0

## 2022-03-31 NOTE — PROGRESS NOTES
[address]   Progress Note and Procedure Note      Iona [address] RECORD NUMBER:  [id number]  AGE: 82 y.o. GENDER: female  : 1935  EPISODE DATE:  3/30/2022    Subjective:     Chief Complaint   Patient presents with    Wound Check     follow up visit left ischim wound         HISTORY of PRESENT ILLNESS HPI  Cloyd Sicard a 82 y. o. female who presents today for wound/ulcer evaluation. Pt accompanied by her younger sister. They  live next door to one another in separate condominiums, but their contact is not consistent. Pt reports not feeling  pain in wound area.  Sits in a recliner most of the day and sleeps in bed at night. Now has Roho cushion for chair. Her sister stated she has found the cushion on the floor at times. The biggest challengea to wound healing is dressings working themselves out between visits and pt short term memory in which pt doesn't recall instructions. Her sister's memory is better but still an issue. We have discussed NPWT but concern is that pt would not remember to plug in at night, may take it off or otherwise interrupt therapy.     Pt's appetite is poor with largest meal at supper of a microwave dinner.  The sister has bought  Ensure to boost her protein intake.   On a past visit, we sat pt up to better determine location of wound.  Noted when she she sits up, tissue bulges out of wound and is an ischial wounds rather than a buttock wound, and bulging of tissue may account for wound packing working it's way out between dressing changes.      History of Wound Context: reported and pictured as a unstagable closed deep tissue injury 2021. When first visited [facility] wound open.    Wound/Ulcer Pain Timing/Severity: none  Quality of pain: N/A  Severity:  0 / 10   Modifying Factors: None  Associated Signs/Symptoms: drainage     Ulcer Identification:  Ulcer Type: pressure     Contributing Factors: chronic pressure, decreased mobility and malnutrition, forgetfulnessPAST MEDICAL HISTORY  PAST MEDICAL HISTORY        Diagnosis Date    CAD (coronary artery disease)     Inferior STEMI; CATRACHITA to RCA    DDD (degenerative disc disease), lumbosacral 4/10/2013    Decreased mobility and endurance 2/24/2022    Hearing aid worn     bilateral    History of short term memory loss 3/3/2022    [place] (hard of hearing)     [place] (hard of hearing)     Hyperlipidemia     Hypertension     MI, old 2017    Neuropathy     bilateral lower extremities    Pressure ulcer of ischium, stage 3, left ([address] 2/16/2022    Pressure ulcer of left buttock, stage 3 ([address] 2/16/2022    Sleep apnea     uses mouth piece; bringing DOS 3/26/18    Thyroid disease     hypo    Wears glasses        PAST SURGICAL HISTORY    Past Surgical History:   Procedure Laterality Date    ABSCESS DRAINAGE Right 03/27/2018    evacuation hematoma right knee    APPENDECTOMY  1963    BACK SURGERY  [id number]    MILD PROCEDURE L2-3 BILATERAL     BREAST LUMPECTOMY  1986    BUNIONECTOMY  10/2003    CATARACT REMOVAL  03/23/2010 and 04/14/2010    COLONOSCOPY  6/2014    repeat 2019    CORONARY ANGIOPLASTY WITH STENT PLACEMENT  01/23/2017    INF STEMI with CATRACHITA to RCA    DILATION AND CURETTAGE OF UTERUS      INCONTINENCE SURGERY      JOINT REPLACEMENT Right 2018    right total knee replacment    OVARY REMOVAL  1963    right    TONSILLECTOMY AND ADENOIDECTOMY  1942       FAMILY HISTORY    Family History   Problem Relation Age of Onset    Cancer Mother     Heart Disease Mother     Arthritis Sister         rheumatoid    Cancer Brother     Cancer Sister     Diabetes Sister     Heart Disease Brother     High Blood Pressure Brother     High Blood Pressure Sister     Diabetes Brother        SOCIAL HISTORY    Social History     Tobacco Use    Smoking status: Never Smoker    Smokeless tobacco: Never Used    Tobacco comment: encouraged to never smoke    Vaping Use    Vaping Use: Never used Substance Use Topics    Alcohol use: No    Drug use: No       ALLERGIES    Allergies   Allergen Reactions    Amoxicillin Hives and Rash    Linezolid Other (See Comments)     While taking linezolid, pt developed progressively altered mentation & ultimately had a witnessed seizure 4/26/18. Uncertain whether these symptoms were d/t linezolid, but possible.  Prednisone Itching    Vancomycin      Rising SCr during 5/2018 admission     Coreg [Carvedilol] Diarrhea    Oxycodone-Acetaminophen Rash       MEDICATIONS    Current Outpatient Medications on File Prior to Encounter   Medication Sig Dispense Refill    lisinopril (PRINIVIL;ZESTRIL) 20 MG tablet TAKE ONE TABLET BY MOUTH DAILY 90 tablet 0    Balsam [place]-Castor Oil (VENELEX) OINT ointment Apply topically 2 times daily 30 g 0    diclofenac (VOLTAREN) 75 MG EC tablet TAKE ONE TABLET BY MOUTH TWICE A DAY WITH MEALS (Patient taking differently: Take 75 mg by mouth 2 times daily as needed TAKE ONE TABLET BY MOUTH TWICE A DAY WITH MEALS) 180 tablet 2    potassium chloride (KLOR-CON M) 10 MEQ extended release tablet TAKE ONE TABLET BY MOUTH DAILY 90 tablet 0    thyroid (NP THYROID) 30 MG tablet TAKE ONE TABLET BY MOUTH DAILY 90 tablet 3    rosuvastatin (CRESTOR) 40 MG tablet TAKE ONE TABLET BY MOUTH DAILY 90 tablet 3    Lactobacillus (ACIDOPHILUS) TABS Take 1 tablet by mouth 2 times daily       Multiple Vitamins-Minerals (DAILY SHERIE MAXIMUM MULTIVITAMIN PO) Take 1 packet by mouth daily      aspirin 81 MG tablet Take 81 mg by mouth daily      latanoprost (XALATAN) 0.005 % ophthalmic solution Place 1 drop into the left eye nightly        No current facility-administered medications on file prior to encounter. REVIEW OF SYSTEMS  Review of Systems    Pertinent items are noted in HPI.     Objective:      BP (!) 151/72   Pulse 76   Temp 97.3 °F (36.3 °C) (Infrared)   Resp 20     Wt Readings from Last 3 Encounters:   02/18/22 156 lb 3.2 oz (70.9 kg) 02/18/22 154 lb (69.9 kg)   02/16/22 153 lb 14.1 oz (69.8 kg)       PHYSICAL EXAM  Physical Exam    General Appearance: alert and oriented to person, place and time and well-developed and well nourished  Skin: warm and dry, no rash or erythema  Head: normocephalic and atraumatic  Eyes: pupils equal, round, and reactive to light  Pulmonary/Chest:  normal air movement, no respiratory distress  Cardiovascular: normal rate and regular rhythm      Assessment:        Problem List Items Addressed This Visit     Pressure ulcer of ischium, stage 3, left (HCC)    Decreased mobility and endurance    [place] (hard of hearing)    History of short term memory loss - Primary           Procedure Note  Indications:  Based on my examination of this patient's wound(s)/ulcer(s) today, debridement is required to promote healing and evaluate the wound base. Performed by: MICHELLE Braswell CNP    Consent obtained:  Yes    Time out taken:  Yes    Pain Control: Anesthetic  Anesthetic: 4% Lidocaine Liquid Topical       Debridement: Excisional Debridement    Using curette the wound(s)/ulcer(s) was/were debrided down through and including the removal of epidermis, dermis and subcutaneous tissue.         Devitalized Tissue Debrided:  fibrin, biofilm and slough    Pre Debridement Measurements:  Are located in the [facility]  Documentation Flow Sheet    Diabetic/Pressure/Non Pressure Ulcers only:  Ulcer: Pressure ulcer, Stage 3     Wound/Ulcer #: 1    Post Debridement Measurements:  Wound/Ulcer Descriptions are Pre Debridement except measurements:    Wound 01/03/22 Buttocks Left (Active)   Number of days: 87       Wound 02/16/22 Ischium Left #1 ( since about 12/15/2021) (Active)   Wound Image   03/02/22 1052   Wound Etiology Pressure Stage  3 02/16/22 1204   Dressing Status New dressing applied 03/09/22 1210   Wound Cleansed Cleansed with saline 03/09/22 1210   Dressing/Treatment Collagen with Ag;Silicone border;Tape/Soft cloth adhesive tape 03/30/22 1200   Wound Length (cm) 1.4 cm 03/30/22 1101   Wound Width (cm) 0.4 cm 03/30/22 1101   Wound Depth (cm) 5.1 cm 03/30/22 1101   Wound Surface Area (cm^2) 0.56 cm^2 03/30/22 1101   Change in Wound Size % (l*w) 85.07 03/30/22 1101   Wound Volume (cm^3) 2. 856 cm^3 03/30/22 1101   Wound Healing % 89 03/30/22 1101   Post-Procedure Length (cm) 1.5 cm 03/30/22 1137   Post-Procedure Width (cm) 0.5 cm 03/30/22 1137   Post-Procedure Depth (cm) 5.1 cm 03/30/22 1137   Post-Procedure Surface Area (cm^2) 0.75 cm^2 03/30/22 1137   Post-Procedure Volume (cm^3) 3.825 cm^3 03/30/22 1137   Wound Assessment Granulation tissue;Slough 03/30/22 1101   Drainage Amount Moderate 03/30/22 1101   Drainage Description Yellow;Green 03/30/22 1101   Odor None 03/30/22 1101   Bianca-wound Assessment Dry/flaky;Fragile 03/30/22 1101   Margins Undefined edges 03/30/22 1101   Wound Thickness Description not for Pressure Injury Full thickness 03/30/22 1101   Number of days: 43          Total Surface Area Debrided:  0.75 sq cm     Estimated Blood Loss:  Minimal    Hemostasis Achieved:  not needed    Procedural Pain:  1  / 10     Post Procedural Pain:  0 / 10     Response to treatment:  Well tolerated by patient. Plan:   Pt/sister education per provider related to plan of care and noted slightly green drainage on old dressing. Last wound culture had rare growth of pseudomonas and staph Epi. Did start pt on antibiotics sensitive to both organisms. Spoke at length with pt about importance on using cushion in chair and keeping dressing on. Unfortunately with her short term memory loss this may go unheeded, but is worth repetition on each visit. Treatment Note please see attached Discharge Instructions    Written patient dismissal instructions given to patient and signed by patient or POA.          Discharge [address] [facility] Physician Orders and Discharge Instructions  [address], [address] [place] [address]  Telephone: [phone]      FAX [phone] [address] 8:30 am - 4:30 pm and Friday 8:30 am - 1:00 pm.          NAME: Ricardo Sapp  DATE OF BIRTH:  1935  MEDICAL RECORD NUMBER:  [id number]  DATE:  3/30/2022        USE YOUR SPECIAL CUSHION AS MUCH AS POSSIBLE   1. LAY ON YOUR SIDE MORE OFTEN, STAY OFF YOU BUTT AS MUCH AS YOU CAN   2 No SHOWERs, bird baths only           Please wash hands with soap and water prior to and right after  every dressing change            WOUND LOCATION:   LEFT BUTTOCK     · May rinse wounds with 0.9% saline   · Do NOT SCRUB WOUND. · No  shower     Topical Treatments:              [x]??????? Apply around the wound:      [x]??????? No-Sting barrier film        PRIMARY DRESSING:                          [x]??????  RASHAD TUCKED INTO entirety of  WOUND          [x]??????  the depth is 5.5 cm- and needs to be tucked inside                        SECONDARY DRESSING:                [x]?????? MEPILEX   BORDER /    or                                      [x]?????? gauze & Cover Roll Tape FOR SECUREMENT                                   HOW OFTEN TO CHANGE DRESSINGS:                                  [x]?????? Three times per week:             _______________________________________________________________________________________________        PRESSURE RELIEF AND OFF-LOADING:     Off-Loading:   [x]???????????? Off-loading when       [x]???????????? in bed         [x]???????????? sitting                       [x]???????????? Turn every 2 hours when in bed.                       [x]????????????  Avoid position directing pressure on wound site.  Limit side lying to 30 degree tilt.  Limit HOB elevation to 30 degrees.     Specialty equipment ordered:       [x]???????????? Wheelchair cushion         _____________________________________________________________________________________________     Dietary:  Continue your diet as tolerated. ?  Eat heart-healthy foods. These foods include vegetables, fruits, nuts, beans, lean meat, fish, and whole grains. Limit sodium, alcohol, and sugar. ? Protein is a key nutrient in helping to repair damaged tissue and promote new tissue growth. Good sources of protein are milk, yogurt, cheese, fish, lean meat, and beans. ? If you are a diabetic, having diabetes can make it hard for wounds to heal. So try to keep your blood sugar in its target range.  __________________________________________________________________________________________________     ACTIVITY:   Activity as tolerated  ________________________________________________________________________________________________________________     PAIN:     Please note, A small amount of pain, drainage and/or bleeding from this process might be expected, and is NORMAL.      Elevate the affected limb. Use over-the-counter medications you would normally use for pain as permitted by your family doctor. For persistent pain not relieved by the above interventions, please call your family doctor.  ________________________________________________________________________________  Call your doctor now or seek immediate medical care if:    · You have symptoms of infection, such as:  ? Increased pain, swelling, warmth, or redness. ? Red streaks leading from the area. ? Pus draining from the area. ? A fever.      _______________________________________________________________________     Return Appointment:     · ECF or Home Healthcare: [address] order RASHAD - tunnel is 5.5 cm    [address] is responsible to order your supplies.      · Return Appointment: With Melisa Hendrix CNP  in 1 Week(s)                   []??????? Orders placed during your visit:            CULTURE OBTAINED : 3/9/22- RESULTED .   DOXYCYCLINE ORDERED FOR 7 DAYS            Electronically signed by : Deonte Cruz on 3/30/2022 at 11:37 AM           [id number] [facility]  N Information: Should you experience any significant changes in your wound(s) or have questions about your wound care, please contact the [address] 8:30 am - 4:30 pm and Friday 8:30 am - 1:00 pm.  If you need help with your wound outside these hours and cannot wait until we are again available, contact your PCP or go to the hospital emergency room.      PLEASE NOTE: IF YOU ARE UNABLE TO OBTAIN WOUND SUPPLIES, CONTINUE TO USE THE SUPPLIES YOU HAVE AVAILABLE UNTIL YOU ARE ABLE TO [address] [facility]. KEEP THE WOUND COVERED AT ALL TIMES.            Physician Signature:_______________________     Date: ___________ Time:  ____________         [x]?????? Natasha Jacobson CNP               Electronically signed by MICHELLE Caruso CNP on 3/31/2022 at 2:28 PM

## 2022-04-04 ENCOUNTER — TELEPHONE (OUTPATIENT)
Dept: FAMILY MEDICINE CLINIC | Age: 87
End: 2022-04-04

## 2022-04-04 NOTE — TELEPHONE ENCOUNTER
----- Message from Gerrit Castleman sent at 4/4/2022  4:40 PM EDT -----  Subject: Appointment Request    Reason for Call: Semi-Routine Skin Problem    QUESTIONS  Type of Appointment? Established Patient  Reason for appointment request? Available appointments did not meet   patient need  Additional Information for Provider? Pt has a lump the size of a baseball   on the right knee. The lump was warm to the touch. the lump has been   visible for a month and it seems to be getting bigger. ---------------------------------------------------------------------------  --------------  [place] PERFECTO  What is the best way for the office to contact you? OK to leave message on   voicemail  Preferred Call Back Phone Number? [phone]  ---------------------------------------------------------------------------  --------------  SCRIPT ANSWERS  Relationship to Patient? Other  Representative Name? Nitza Plan  Additional information verified (besides Name and Date of Birth)? Phone   Number  Are you having swelling in your throat or face? No  Are you having difficulty breathing? No  Have the symptoms worsened or spread in the last day? No  Are you having fevers (100.4), chills or sweats? No  Have you recently (14 days) seen a provider for this issue? No  Have you been diagnosed with, awaiting test results for, or told that you   are suspected of having COVID-19 (Coronavirus)? (If patient has tested   negative or was tested as a requirement for work, school, or travel and   not based on symptoms, answer no)? No  Within the past 10 days have you developed any of the following symptoms   (answer no if symptoms have been present longer than 10 days or began   more than 10 days ago)?  Fever or Chills, Cough, Shortness of breath or   difficulty breathing, Loss of taste or smell, Sore throat, Nasal   congestion, Sneezing or runny nose, Fatigue or generalized body aches   (answer no if pain is specific to a body part e.g. back pain), Diarrhea, Headache? No  Have you had close contact with someone with COVID-19 in the last 7 days? No  (Service Expert  click yes below to proceed with [employer] Codes As Usual   Scheduling)?  Yes

## 2022-04-04 NOTE — TELEPHONE ENCOUNTER
Tank Childers from [facility] ([phone]) called pt seeing wound care and the pt has fluid on lateral knee. Pt will call to schedule appt.       nv - [phone]-

## 2022-04-05 NOTE — TELEPHONE ENCOUNTER
She will need to have this drained  would recommend calling [facility] and see which ever doc can see her the soonest.  To ER if increasing size or fever

## 2022-04-05 NOTE — TELEPHONE ENCOUNTER
Spoke with patients sharon Toledo. Informed her of Dr. Channing Clifford message. Issa Toledo verbalized understanding.

## 2022-04-06 ENCOUNTER — HOSPITAL ENCOUNTER (OUTPATIENT)
Dept: WOUND CARE | Age: 87
Discharge: HOME | End: 2022-04-06
Payer: MEDICARE

## 2022-04-06 VITALS
TEMPERATURE: 97.2 F | SYSTOLIC BLOOD PRESSURE: 167 MMHG | HEART RATE: 78 BPM | RESPIRATION RATE: 20 BRPM | DIASTOLIC BLOOD PRESSURE: 83 MMHG

## 2022-04-06 DIAGNOSIS — L89.323 PRESSURE ULCER OF ISCHIUM, STAGE 3, LEFT (HCC): ICD-10-CM

## 2022-04-06 DIAGNOSIS — Z87.898 HISTORY OF SHORT TERM MEMORY LOSS: Primary | ICD-10-CM

## 2022-04-06 DIAGNOSIS — Z74.09 DECREASED MOBILITY AND ENDURANCE: ICD-10-CM

## 2022-04-06 PROCEDURE — 11042 DBRDMT SUBQ TIS 1ST 20SQCM/<: CPT | Performed by: NURSE PRACTITIONER

## 2022-04-06 PROCEDURE — 11042 DBRDMT SUBQ TIS 1ST 20SQCM/<: CPT

## 2022-04-06 RX ORDER — LIDOCAINE HYDROCHLORIDE 20 MG/ML
JELLY TOPICAL ONCE
Status: CANCELLED | OUTPATIENT
Start: 2022-04-06 | End: 2022-04-06

## 2022-04-06 RX ORDER — LIDOCAINE 50 MG/G
OINTMENT TOPICAL ONCE
Status: CANCELLED | OUTPATIENT
Start: 2022-04-06 | End: 2022-04-06

## 2022-04-06 RX ORDER — LIDOCAINE HYDROCHLORIDE 40 MG/ML
SOLUTION TOPICAL ONCE
Status: CANCELLED | OUTPATIENT
Start: 2022-04-06 | End: 2022-04-06

## 2022-04-06 RX ORDER — LIDOCAINE 40 MG/G
CREAM TOPICAL ONCE
Status: CANCELLED | OUTPATIENT
Start: 2022-04-06 | End: 2022-04-06

## 2022-04-06 RX ORDER — GENTAMICIN SULFATE 1 MG/G
OINTMENT TOPICAL ONCE
Status: CANCELLED | OUTPATIENT
Start: 2022-04-06 | End: 2022-04-06

## 2022-04-06 RX ORDER — LIDOCAINE HYDROCHLORIDE 40 MG/ML
SOLUTION TOPICAL ONCE
Status: COMPLETED | OUTPATIENT
Start: 2022-04-06 | End: 2022-04-06

## 2022-04-06 RX ORDER — BACITRACIN, NEOMYCIN, POLYMYXIN B 400; 3.5; 5 [USP'U]/G; MG/G; [USP'U]/G
OINTMENT TOPICAL ONCE
Status: CANCELLED | OUTPATIENT
Start: 2022-04-06 | End: 2022-04-06

## 2022-04-06 RX ORDER — BACITRACIN ZINC AND POLYMYXIN B SULFATE 500; 1000 [USP'U]/G; [USP'U]/G
OINTMENT TOPICAL ONCE
Status: CANCELLED | OUTPATIENT
Start: 2022-04-06 | End: 2022-04-06

## 2022-04-06 RX ORDER — GINSENG 100 MG
CAPSULE ORAL ONCE
Status: CANCELLED | OUTPATIENT
Start: 2022-04-06 | End: 2022-04-06

## 2022-04-06 RX ORDER — LIDOCAINE 40 MG/G
CREAM TOPICAL ONCE
Status: DISCONTINUED | OUTPATIENT
Start: 2022-04-06 | End: 2022-04-07 | Stop reason: HOSPADM

## 2022-04-06 RX ADMIN — LIDOCAINE HYDROCHLORIDE: 40 SOLUTION TOPICAL at 11:19

## 2022-04-06 ASSESSMENT — PAIN SCALES - GENERAL
PAINLEVEL_OUTOF10: 0
PAINLEVEL_OUTOF10: 0

## 2022-04-07 NOTE — PROGRESS NOTES
[address]   Progress Note and Procedure Note      Iona Arreola [address] RECORD NUMBER:  [id number]  AGE: 82 y.o. GENDER: female  : 1935  EPISODE DATE:  2022    Subjective:     Chief Complaint   Patient presents with    Wound Check     follow up visit lreft ischium wound         HISTORY of PRESENT ILLNESS HPI  Juan Carlos Desir a 82 y. o. female who presents today for wound/ulcer evaluation. Pt accompanied by her younger sister. Pt presented with sister and reports fell last night, not sure if fell out of bed or out of chair. Sister found her on carpeted floor where she had laid all night. Denies any injury did not go to ED. Sister reports she and pt have a very strained relationship from past issues. The pt has her grandaughter a power of , and granddaughter coming into town this weekend. Spoke to both pt and sister to speak with granddaughter about current situation and to evaluate pt's safety at home. The sister and pt live next door to one another in separate condominiums, but their contact is not consistent. Pt reports not feeling  pain in wound area. Sits in a recliner most of the day and sleeps in bed at night. Now has Roho cushion for chair. Her sister stated she has found the cushion on the floor at times. The biggest challenge to wound healing is dressings\"working themselves out\" between visits and pt short term memory causes pt does not recall instructions. Her sister's memory is better but still an issue.  We have discussed NPWT but concern is that pt would not remember to plug in at night, may take it off or otherwise interrupt therapy.     Pt's appetite is poor with largest meal at supper of a microwave dinner.  The sister has bought  Ensure to boost her protein intake.   On a past visit, we sat pt up to better determine location of wound.  Noted when she she sits up, tissue bulges out of wound and is an ischial wounds rather than a Relation Age of Onset    Cancer Mother     Heart Disease Mother     Arthritis Sister         rheumatoid    Cancer Brother     Cancer Sister     Diabetes Sister     Heart Disease Brother     High Blood Pressure Brother     High Blood Pressure Sister     Diabetes Brother        SOCIAL HISTORY    Social History     Tobacco Use    Smoking status: Never Smoker    Smokeless tobacco: Never Used    Tobacco comment: encouraged to never smoke    Vaping Use    Vaping Use: Never used   Substance Use Topics    Alcohol use: No    Drug use: No       ALLERGIES    Allergies   Allergen Reactions    Amoxicillin Hives and Rash    Linezolid Other (See Comments)     While taking linezolid, pt developed progressively altered mentation & ultimately had a witnessed seizure 4/26/18. Uncertain whether these symptoms were d/t linezolid, but possible.     Prednisone Itching    Vancomycin      Rising SCr during 5/2018 admission     Coreg [Carvedilol] Diarrhea    Oxycodone-Acetaminophen Rash       MEDICATIONS    Current Outpatient Medications on File Prior to Encounter   Medication Sig Dispense Refill    lisinopril (PRINIVIL;ZESTRIL) 20 MG tablet TAKE ONE TABLET BY MOUTH DAILY 90 tablet 0    Balsam [place]-Castor Oil (VENELEX) OINT ointment Apply topically 2 times daily 30 g 0    diclofenac (VOLTAREN) 75 MG EC tablet TAKE ONE TABLET BY MOUTH TWICE A DAY WITH MEALS (Patient taking differently: Take 75 mg by mouth 2 times daily as needed TAKE ONE TABLET BY MOUTH TWICE A DAY WITH MEALS) 180 tablet 2    potassium chloride (KLOR-CON M) 10 MEQ extended release tablet TAKE ONE TABLET BY MOUTH DAILY 90 tablet 0    thyroid (NP THYROID) 30 MG tablet TAKE ONE TABLET BY MOUTH DAILY 90 tablet 3    rosuvastatin (CRESTOR) 40 MG tablet TAKE ONE TABLET BY MOUTH DAILY 90 tablet 3    Lactobacillus (ACIDOPHILUS) TABS Take 1 tablet by mouth 2 times daily       Multiple Vitamins-Minerals (DAILY SHERIE MAXIMUM MULTIVITAMIN PO) Take 1 packet by mouth daily      aspirin 81 MG tablet Take 81 mg by mouth daily      latanoprost (XALATAN) 0.005 % ophthalmic solution Place 1 drop into the left eye nightly        No current facility-administered medications on file prior to encounter. REVIEW OF SYSTEMS  Review of Systems    Pertinent items are noted in HPI. Objective:      BP (!) 167/83   Pulse 78   Temp 97.2 °F (36.2 °C) (Infrared)   Resp 20     Wt Readings from Last 3 Encounters:   02/18/22 156 lb 3.2 oz (70.9 kg)   02/18/22 154 lb (69.9 kg)   02/16/22 153 lb 14.1 oz (69.8 kg)       PHYSICAL EXAM  Physical Exam    General Appearance: alert and oriented to person, place and time and in no acute distress  Skin: warm and dry, no rash or erythema  Head: normocephalic and atraumatic  Eyes: pupils equal, round, and reactive to light  Pulmonary/Chest:  normal air movement, no respiratory distress  Cardiovascular: normal rate and regular rhythm        Assessment:        Problem List Items Addressed This Visit     Pressure ulcer of ischium, stage 3, left (HCC)    Decreased mobility and endurance    History of short term memory loss - Primary           Procedure Note  Indications:  Based on my examination of this patient's wound(s)/ulcer(s) today, debridement is required to promote healing and evaluate the wound base. Performed by: MICHELLE Mcgovern CNP    Consent obtained:  Yes    Time out taken:  Yes    Pain Control: Anesthetic  Anesthetic: 4% Lidocaine Cream       Debridement: Excisional Debridement    Using curette the wound(s)/ulcer(s) was/were debrided down through and including the removal of epidermis, dermis and subcutaneous tissue.         Devitalized Tissue Debrided:  fibrin, biofilm and slough    Pre Debridement Measurements:  Are located in the [place]  Documentation Flow Sheet    Diabetic/Pressure/Non Pressure Ulcers only:  Ulcer: Pressure ulcer, Stage 3     Wound/Ulcer #: 1    Post Debridement Measurements:  Wound/Ulcer Descriptions instructions given to patient and signed by patient or POA. Discharge [facility] Physician Orders and Discharge Instructions  [address] [facility], [address] [address]  Telephone: [phone]      FAX [phone]  [address] 8:30 am - 4:30 pm and Friday 8:30 am - 1:00 pm.          NAME: Thelma Wells  DATE OF BIRTH:  1935  MEDICAL RECORD NUMBER:  [id number]  DATE:  4/6/2022        USE YOUR SPECIAL CUSHION AS MUCH AS POSSIBLE   1. LAY ON YOUR SIDE MORE OFTEN, STAY OFF YOU BUTT AS MUCH AS YOU CAN   2 No SHOWERs, bird baths only    Look into  life alert       Please wash hands with soap and water prior to and right after  every dressing change            WOUND LOCATION:   LEFT BUTTOCK     · May rinse wounds with 0.9% saline   · Do NOT SCRUB WOUND.   · No  shower     Topical Treatments:              [x]???????? Apply around the wound:      [x]???????? No-Sting barrier film        PRIMARY DRESSING:                          [x]???????  RASHAD TUCKED INTO entirety of  WOUND          [x]???????  the depth is 4 cm- and needs to be tucked inside                        SECONDARY DRESSING:                [x]??????? MEPILEX   BORDER /    or                                      [x]??????? gauze & Cover Roll Tape FOR SECUREMENT                                   HOW OFTEN TO CHANGE DRESSINGS:                                  [x]??????? Three times per week:             _______________________________________________________________________________________________        PRESSURE RELIEF AND OFF-LOADING:     Off-Loading:   [x]????????????? Off-loading when       [x]????????????? in bed         [x]????????????? sitting                       [x]????????????? Turn every 2 hours when in bed.                       [x]?????????????  Avoid position directing pressure on wound site.  Limit side lying to 30 degree tilt.  Limit HOB elevation to 30 degrees.     Specialty equipment ordered:       [x]????????????? Wheelchair cushion         _____________________________________________________________________________________________     Dietary:  Continue your diet as tolerated. ? Eat heart-healthy foods. These foods include vegetables, fruits, nuts, beans, lean meat, fish, and whole grains. Limit sodium, alcohol, and sugar. ? Protein is a key nutrient in helping to repair damaged tissue and promote new tissue growth. Good sources of protein are milk, yogurt, cheese, fish, lean meat, and beans. ? If you are a diabetic, having diabetes can make it hard for wounds to heal. So try to keep your blood sugar in its target range.  __________________________________________________________________________________________________     ACTIVITY:   Activity as tolerated  ________________________________________________________________________________________________________________     PAIN:     Please note, A small amount of pain, drainage and/or bleeding from this process might be expected, and is NORMAL.      Elevate the affected limb. Use over-the-counter medications you would normally use for pain as permitted by your family doctor. For persistent pain not relieved by the above interventions, please call your family doctor.  ________________________________________________________________________________  Call your doctor now or seek immediate medical care if:    · You have symptoms of infection, such as:  ? Increased pain, swelling, warmth, or redness. ? Red streaks leading from the area. ? Pus draining from the area.   ? A fever.      _______________________________________________________________________     Return Appointment:     § ECF or Home Healthcare: [address]  is 4 cm ---  Please contact a  to reach out to family- granddaughters       L[facility] 44 is responsible to order your supplies.      · Return Appointment: With Lisa Haely CNP  in 1 Week(s)                   []???????? Orders placed during your visit:            CULTURE OBTAINED : 3/9/22- RESULTED . DOXYCYCLINE ORDERED completed             Electronically signed by : Renetta Puentes on 4/6/2022 at 12:04 PM        [facility] Information: Should you experience any significant changes in your wound(s) or have questions about your wound care, please contact the [address] [place] [address] [phone] [address] 8:30 am - 4:30 pm and Friday 8:30 am - 1:00 pm.  If you need help with your wound outside these hours and cannot wait until we are again available, contact your PCP or go to the hospital emergency room.      PLEASE NOTE: IF YOU ARE UNABLE TO OBTAIN WOUND SUPPLIES, CONTINUE TO USE THE SUPPLIES YOU HAVE AVAILABLE UNTIL YOU ARE ABLE TO [facility]. KEEP THE WOUND COVERED AT ALL TIMES.            Physician Signature:_______________________     Date: ___________ Time:  ____________         [x]??????? Natasha Jacobson CNP            Electronically signed by MICHELLE Martin CNP on 4/7/2022 at 2:10 PM

## 2022-04-13 ENCOUNTER — OFFICE VISIT (OUTPATIENT)
Dept: FAMILY MEDICINE CLINIC | Age: 87
End: 2022-04-13
Payer: MEDICARE

## 2022-04-13 ENCOUNTER — HOSPITAL ENCOUNTER (OUTPATIENT)
Dept: WOUND CARE | Age: 87
Discharge: HOME | End: 2022-04-13
Payer: MEDICARE

## 2022-04-13 VITALS
SYSTOLIC BLOOD PRESSURE: 168 MMHG | TEMPERATURE: 97.2 F | HEART RATE: 65 BPM | DIASTOLIC BLOOD PRESSURE: 74 MMHG | RESPIRATION RATE: 20 BRPM

## 2022-04-13 VITALS
DIASTOLIC BLOOD PRESSURE: 88 MMHG | SYSTOLIC BLOOD PRESSURE: 136 MMHG | WEIGHT: 157 LBS | BODY MASS INDEX: 26.16 KG/M2 | HEIGHT: 65 IN

## 2022-04-13 DIAGNOSIS — G40.909 SEIZURE DISORDER (HCC): ICD-10-CM

## 2022-04-13 DIAGNOSIS — N39.45 CONTINUOUS LEAKAGE OF URINE: ICD-10-CM

## 2022-04-13 DIAGNOSIS — M25.461 FLUID IN RIGHT KNEE JOINT: Primary | ICD-10-CM

## 2022-04-13 DIAGNOSIS — Z87.898 HISTORY OF SHORT TERM MEMORY LOSS: Primary | ICD-10-CM

## 2022-04-13 DIAGNOSIS — H91.93 BILATERAL HEARING LOSS, UNSPECIFIED HEARING LOSS TYPE: ICD-10-CM

## 2022-04-13 DIAGNOSIS — Z74.09 DECREASED MOBILITY AND ENDURANCE: ICD-10-CM

## 2022-04-13 DIAGNOSIS — L89.323 PRESSURE ULCER OF ISCHIUM, STAGE 3, LEFT (HCC): ICD-10-CM

## 2022-04-13 PROCEDURE — 11042 DBRDMT SUBQ TIS 1ST 20SQCM/<: CPT | Performed by: NURSE PRACTITIONER

## 2022-04-13 PROCEDURE — 99213 OFFICE O/P EST LOW 20 MIN: CPT | Performed by: FAMILY MEDICINE

## 2022-04-13 PROCEDURE — 11042 DBRDMT SUBQ TIS 1ST 20SQCM/<: CPT

## 2022-04-13 PROCEDURE — 20610 DRAIN/INJ JOINT/BURSA W/O US: CPT | Performed by: FAMILY MEDICINE

## 2022-04-13 RX ORDER — LIDOCAINE 40 MG/G
CREAM TOPICAL ONCE
Status: COMPLETED | OUTPATIENT
Start: 2022-04-13 | End: 2022-04-13

## 2022-04-13 RX ORDER — LIDOCAINE HYDROCHLORIDE 20 MG/ML
JELLY TOPICAL ONCE
Status: CANCELLED | OUTPATIENT
Start: 2022-04-13 | End: 2022-04-13

## 2022-04-13 RX ORDER — BACITRACIN, NEOMYCIN, POLYMYXIN B 400; 3.5; 5 [USP'U]/G; MG/G; [USP'U]/G
OINTMENT TOPICAL ONCE
Status: CANCELLED | OUTPATIENT
Start: 2022-04-13 | End: 2022-04-13

## 2022-04-13 RX ORDER — LIDOCAINE HYDROCHLORIDE 40 MG/ML
SOLUTION TOPICAL ONCE
Status: CANCELLED | OUTPATIENT
Start: 2022-04-13 | End: 2022-04-13

## 2022-04-13 RX ORDER — GENTAMICIN SULFATE 1 MG/G
OINTMENT TOPICAL ONCE
Status: CANCELLED | OUTPATIENT
Start: 2022-04-13 | End: 2022-04-13

## 2022-04-13 RX ORDER — LIDOCAINE 50 MG/G
OINTMENT TOPICAL ONCE
Status: CANCELLED | OUTPATIENT
Start: 2022-04-13 | End: 2022-04-13

## 2022-04-13 RX ORDER — BACITRACIN ZINC AND POLYMYXIN B SULFATE 500; 1000 [USP'U]/G; [USP'U]/G
OINTMENT TOPICAL ONCE
Status: CANCELLED | OUTPATIENT
Start: 2022-04-13 | End: 2022-04-13

## 2022-04-13 RX ORDER — LIDOCAINE 40 MG/G
CREAM TOPICAL ONCE
Status: CANCELLED | OUTPATIENT
Start: 2022-04-13 | End: 2022-04-13

## 2022-04-13 RX ORDER — GINSENG 100 MG
CAPSULE ORAL ONCE
Status: CANCELLED | OUTPATIENT
Start: 2022-04-13 | End: 2022-04-13

## 2022-04-13 RX ADMIN — LIDOCAINE: 40 CREAM TOPICAL at 11:02

## 2022-04-13 ASSESSMENT — ENCOUNTER SYMPTOMS
GASTROINTESTINAL NEGATIVE: 1
RESPIRATORY NEGATIVE: 1

## 2022-04-13 ASSESSMENT — PAIN SCALES - GENERAL
PAINLEVEL_OUTOF10: 0
PAINLEVEL_OUTOF10: 0

## 2022-04-13 NOTE — PROGRESS NOTES
Darwin Perry (:  1935) is a New patient, here for evaluation of the following:    Assessment & Plan   Below is the assessment and plan developed based on review of pertinent history, physical exam, labs, studies, and medications. 1. Fluid in right knee joint  -     Culture, Body Fluid; Future   - SD DRAIN/INJECT LARGE JOINT/BURSA  2. Seizure disorder (HCC)  stable  No further seizure   No follow-ups on file. Subjective   HPI   Right knee swollen  Pocket of fluid on her right knee for last few months  Not painful  Had this knee replaced years ago   No fever or chills  No pain in knee   Review of Systems   Constitutional: Negative. HENT: Negative. Respiratory: Negative. Cardiovascular: Negative. Gastrointestinal: Negative. Musculoskeletal: Positive for arthralgias and joint swelling. Neurological: Negative. Psychiatric/Behavioral: Positive for decreased concentration. Objective   Patient-Reported Vitals  No data recorded     Physical Exam  Constitutional:       General: She is not in acute distress. Appearance: She is well-developed. HENT:      Head: Normocephalic. Musculoskeletal:         General: Swelling present. Comments: Right knee with pocket of fluid    Area prepped and using 20cc syringe  12 cc of cloudy fluid removed   Tolerated well  Specimen to lab   Dressing applied   Neurological:      Mental Status: She is alert and oriented to person, place, and time. Psychiatric:         Behavior: Behavior normal.         Thought Content: Thought content normal.         Judgment: Judgment normal.         Darwin Perry, was evaluated through a synchronous (real-time) audio-video encounter. The patient (or guardian if applicable) is aware that this is a billable service, which includes applicable co-pays. This Virtual Visit was conducted with patient's (and/or legal guardian's) consent.  The visit was conducted pursuant to the emergency declaration under the [facility], [address] [place] [address] waiver authority and the [email or web address] General Act. Patient identification was verified, and a caregiver was present when appropriate. The patient was located at home in a state where the provider was licensed to provide care.        --Sher Crespo,

## 2022-04-14 ENCOUNTER — TELEPHONE (OUTPATIENT)
Dept: WOUND CARE | Age: 87
End: 2022-04-14

## 2022-04-14 NOTE — TELEPHONE ENCOUNTER
Patient's sister, Zachariah Lynn,  did call back and clarified new lab orders and X ray orders. Family and patient will attempt to obtain labs and xrays prior to their next appointment. No further questions at this time.     Electronically signed by : Wayna Osgood on 4/14/2022 at 1:55 PM
RN called patient in regards to latest request per Addis Hunter CNP. Chuck Florian ordered lab work and x rays, see orders. This RN left message on [employer], niece,  voicemail and [employer], sister, explaining new orders. No answer to either preferred numbers. RN also verbalized when next appointment and if further explaination is needed, questions can be answered during this appointment.     Electronically signed by Xena Price RN on 4/14/2022 at 11:47 AM
No

## 2022-04-14 NOTE — PROGRESS NOTES
[address]   Progress Note and Procedure Note      Iona Arreola [address] RECORD NUMBER:  [id number]  AGE: 82 y.o. GENDER: female  : 1935  EPISODE DATE:  2022    Subjective:     Chief Complaint   Patient presents with    Wound Check     follow up ischium wound         HISTORY of PRESENT ILLNESS HPI  Foreign Gomez a 82 y. o. female who presents today for wound/ulcer evaluation. Pt accompanied by her younger sister.      Pt presented with sister and has a hx of falling the most recent was 2 weeks ago, pt stated not sure how she ended up on the floor. Sister found her on carpeted floor where she had laid all night. Denies any injury did not go to ED. Pt reports not feeling  pain in wound area. Sits in a recliner most of the day and sleeps in bed at night. Has a Roho cushion for chair. Her sister stated she has found the cushion on the floor at times. Sister and home care nurse have found dressings off, thrown away or on the floor which pt adamantly denies. Wound has not improved in the last since first [facility] visit on 2022 despite several types of dressings. Home care nurse changes dressings twice a week. History of Wound Context: reported and pictured as a unstagable closed deep tissue injury 2021. When first visited [facility] wound open with depth. Wound/Ulcer Pain Timing/Severity: none, cannot feel pain in the area of wound  Quality of pain: N/A  Severity:  0 / 10   Modifying Factors: None  Associated Signs/Symptoms: drainage     Ulcer Identification:  Ulcer Type: pressure     Contributing Factors: chronic pressure, decreased mobility and malnutrition, forgetfulness      The biggest challenge to wound healing is dressings\"working themselves out\" between visits and pt short term memory causes pt does not recall instructions.  Her sister's memory is better but still an issue.  We have discussed NPWT but concern is that pt would not remember to plug in at night, may take it off or otherwise interrupt therapy.     Pt's appetite is poor with largest meal at supper of a microwave dinner. The sister has bought  Ensure to boost her protein intake.    Noted when she she sits up, tissue bulges out of ischial wound and bulging of tissue may account for wound packing working it's way out between dressing changes. Sister reports she and pt have a very strained relationship from past issues. The pt has her grandaughter a power of , and granddaughter coming into town this weekend. Spoke with pt and sister about speaking with granddaughter about current situation and to evaluate pt's safety at home.  The sister and pt live next door to one another in separate condominiums, but their contact is not consistent. Sits in a recliner most of the day and sleeps in bed at night. Now has Roho cushion for chair. Her sister stated she has found the cushion on the floor at times.      PAST MEDICAL HISTORY        Diagnosis Date    CAD (coronary artery disease)     Inferior STEMI; CATRACHITA to RCA    DDD (degenerative disc disease), lumbosacral 4/10/2013    Decreased mobility and endurance 2/24/2022    Hearing aid worn     bilateral    History of short term memory loss 3/3/2022    [place] (hard of hearing)     [place] (hard of hearing)     Hyperlipidemia     Hypertension     MI, old 2017    Neuropathy     bilateral lower extremities    Pressure ulcer of ischium, stage 3, left ([address] 2/16/2022    Pressure ulcer of left buttock, stage 3 ([address] 2/16/2022    Sleep apnea     uses mouth piece; bringing DOS 3/26/18    Thyroid disease     hypo    Wears glasses        PAST SURGICAL HISTORY    Past Surgical History:   Procedure Laterality Date    ABSCESS DRAINAGE Right 03/27/2018    evacuation hematoma right knee    [facility]    BACK SURGERY  [id number]    MILD PROCEDURE L2-3 BILATERAL     BREAST LUMPECTOMY  1986    BUNIONECTOMY  10/2003    CATARACT REMOVAL  03/23/2010 and 04/14/2010    COLONOSCOPY  6/2014    repeat 2019    CORONARY ANGIOPLASTY WITH STENT PLACEMENT  01/23/2017    INF STEMI with CATRACHITA to RCA    DILATION AND CURETTAGE OF UTERUS      INCONTINENCE SURGERY      JOINT REPLACEMENT Right 2018    right total knee replacment    OVARY REMOVAL  1963    right    TONSILLECTOMY AND ADENOIDECTOMY  1942       FAMILY HISTORY    Family History   Problem Relation Age of Onset   Monyond Cancer Mother     Heart Disease Mother     Arthritis Sister         rheumatoid    Cancer Brother     Cancer Sister     Diabetes Sister     Heart Disease Brother     High Blood Pressure Brother     High Blood Pressure Sister     Diabetes Brother        SOCIAL HISTORY    Social History     Tobacco Use    Smoking status: Never Smoker    Smokeless tobacco: Never Used    Tobacco comment: encouraged to never smoke    Vaping Use    Vaping Use: Never used   Substance Use Topics    Alcohol use: No    Drug use: No       ALLERGIES    Allergies   Allergen Reactions    Amoxicillin Hives and Rash    Linezolid Other (See Comments)     While taking linezolid, pt developed progressively altered mentation & ultimately had a witnessed seizure 4/26/18. Uncertain whether these symptoms were d/t linezolid, but possible.     Prednisone Itching    Vancomycin      Rising SCr during 5/2018 admission     Coreg [Carvedilol] Diarrhea    Oxycodone-Acetaminophen Rash       MEDICATIONS    Current Outpatient Medications on File Prior to Encounter   Medication Sig Dispense Refill    lisinopril (PRINIVIL;ZESTRIL) 20 MG tablet TAKE ONE TABLET BY MOUTH DAILY 90 tablet 0    Balsam [place]-Castor Oil (VENELEX) OINT ointment Apply topically 2 times daily 30 g 0    diclofenac (VOLTAREN) 75 MG EC tablet TAKE ONE TABLET BY MOUTH TWICE A DAY WITH MEALS (Patient taking differently: Take 75 mg by mouth 2 times daily as needed TAKE ONE TABLET BY MOUTH TWICE A DAY WITH MEALS) 180 tablet 2    potassium chloride (KLOR-CON M) 10 MEQ extended release tablet TAKE ONE TABLET BY MOUTH DAILY 90 tablet 0    thyroid (NP THYROID) 30 MG tablet TAKE ONE TABLET BY MOUTH DAILY 90 tablet 3    rosuvastatin (CRESTOR) 40 MG tablet TAKE ONE TABLET BY MOUTH DAILY 90 tablet 3    Lactobacillus (ACIDOPHILUS) TABS Take 1 tablet by mouth 2 times daily       Multiple Vitamins-Minerals (DAILY SHERIE MAXIMUM MULTIVITAMIN PO) Take 1 packet by mouth daily      aspirin 81 MG tablet Take 81 mg by mouth daily      latanoprost (XALATAN) 0.005 % ophthalmic solution Place 1 drop into the left eye nightly        No current facility-administered medications on file prior to encounter. REVIEW OF SYSTEMS  Review of Systems    Pertinent items are noted in HPI. Objective:      BP (!) 168/74   Pulse 65   Temp 97.2 °F (36.2 °C) (Infrared)   Resp 20     Wt Readings from Last 3 Encounters:   04/13/22 157 lb (71.2 kg)   02/18/22 156 lb 3.2 oz (70.9 kg)   02/18/22 154 lb (69.9 kg)       PHYSICAL EXAM  Physical Exam    General Appearance: alert and oriented to person, place and time and in no acute distress  Skin: warm and dry, no rash or erythema  Head: normocephalic and atraumatic  Eyes: pupils equal, round, and reactive to light  Pulmonary/Chest:  normal air movement, no respiratory distress  Cardiovascular: normal rate and regular rhythm  Wound:  Dressing removed was wet, has light yellow drainage which came through cover dressing and onto her underwear, she denies taking a shower, but dressing unusually wet. Wound deeper today. Assessment:        Problem List Items Addressed This Visit     Pressure ulcer of ischium, stage 3, left (HCC)    Decreased mobility and endurance    [place] (hard of hearing)    History of short term memory loss - Primary    RESOLVED: Incontinence           Procedure Note  Indications:  Based on my examination of this patient's wound(s)/ulcer(s) today, debridement is required to promote healing and evaluate the wound base.     Performed by: Marleni Armando Zenia Garcia CNP    Consent obtained:  Yes    Time out taken:  Yes    Pain Control: Anesthetic  Anesthetic: 4% Lidocaine Cream       Debridement: Excisional Debridement    Using curette the wound(s)/ulcer(s) was/were debrided down through and including the removal of epidermis, dermis and subcutaneous tissue.         Devitalized Tissue Debrided:  fibrin, biofilm and slough    Pre Debridement Measurements:  Are located in the [place]  Documentation Flow Sheet    Diabetic/Pressure/Non Pressure Ulcers only:  Ulcer: Pressure ulcer, Stage 3     Wound/Ulcer #: 1    Post Debridement Measurements:  Wound/Ulcer Descriptions are Pre Debridement except measurements:    Wound 01/03/22 Buttocks Left (Active)   Number of days: 100       Wound 02/16/22 Ischium Left #1 ( since about 12/15/2021) (Active)   Wound Image   04/06/22 1118   Wound Etiology Pressure Stage  3 02/16/22 1204   Dressing Status Old drainage noted 04/13/22 1055   Wound Cleansed Cleansed with saline 03/09/22 1210   Dressing/Treatment Alginate with Ag;Gauze dressing/dressing sponge;Tape/Soft cloth adhesive tape 04/13/22 1203   Wound Length (cm) 0.8 cm 04/13/22 1055   Wound Width (cm) 1.2 cm 04/13/22 1055   Wound Depth (cm) 7.2 cm 04/13/22 1055   Wound Surface Area (cm^2) 0.96 cm^2 04/13/22 1055   Change in Wound Size % (l*w) 74.4 04/13/22 1055   Wound Volume (cm^3) 6.912 cm^3 04/13/22 1055   Wound Healing % 74 04/13/22 1055   Post-Procedure Length (cm) 0.9 cm 04/13/22 1130   Post-Procedure Width (cm) 1.3 cm 04/13/22 1130   Post-Procedure Depth (cm) 7.2 cm 04/13/22 1130   Post-Procedure Surface Area (cm^2) 1.17 cm^2 04/13/22 1130   Post-Procedure Volume (cm^3) 8.424 cm^3 04/13/22 1130   Wound Assessment Granulation tissue;Slough 04/13/22 1055   Drainage Amount Moderate 04/13/22 1055   Drainage Description Yellow;Serosanguinous 04/13/22 1055   Odor None 04/13/22 1055   Bianca-wound Assessment Dry/flaky;Fragile 04/13/22 1055   Margins Undefined edges 04/13/22 1056 Wound Thickness Description not for Pressure Injury Full thickness 04/13/22 1055   Number of days: 56          Total Surface Area Debrided:  1.17 sq cm     Estimated Blood Loss:  Minimal    Hemostasis Achieved:  by pressure    Procedural Pain:  0  / 10     Post Procedural Pain:  0 / 10     Response to treatment:  Well tolerated by patient. Plan:   Pt/sister education per provider related to plan of care and status of wound with no improvement in wound since last visit. Revisited pt's compliance with showers, sitting for long periods of time and leaving dressing in place, pt hears instructions but forgets. Plan to get xray and labs to r/o osteo. Treatment Note please see attached Discharge Instructions    Written patient dismissal instructions given to patient and signed by patient or POA. Discharge [id number] [place] [address] Physician Orders and Discharge Instructions  [address] [place] [address], [address] [place] [address]  Telephone: [phone]      FAX [phone]  [address] 8:30 am - 4:30 pm and Friday 8:30 am - 1:00 pm.          NAME: Shanita Darden  DATE OF BIRTH:  1935  MEDICAL RECORD NUMBER:  [id number]  DATE:  4/13/2022        USE YOUR SPECIAL CUSHION AS MUCH AS POSSIBLE   1. LAY ON YOUR SIDE MORE OFTEN, STAY OFF YOU BUTT AS MUCH AS YOU CAN   2 No SHOWERs, bird baths only    Look into  life alert       Please wash hands with soap and water prior to and right after  every dressing change            WOUND LOCATION:   LEFT BUTTOCK     · May rinse wounds with 0.9% saline   · Do NOT SCRUB WOUND.   · No  shower     Topical Treatments:              [x]????????? Apply around the wound:      [x]????????? No-Sting barrier film        PRIMARY DRESSING:                          [x]????????  Aquacel Ag Rope TUCKED INTO entirety of  WOUND- curves up to 12 o'clock          [x]????????  the depth is 7.2 cm- and needs to be tucked inside                        SECONDARY DRESSING:                [x]????????                                 [x]???????? thick gauze & Cover Roll Tape                                 HOW OFTEN TO CHANGE DRESSINGS:                                  [x]???????? Three times per week:             _______________________________________________________________________________________________        PRESSURE RELIEF AND OFF-LOADING:     Off-Loading:   [x]?????????????? Off-loading when       [x]?????????????? in bed         [x]?????????????? sitting                       [x]?????????????? Turn every 2 hours when in bed.                       [x]??????????????  Avoid position directing pressure on wound site.  Limit side lying to 30 degree tilt.  Limit HOB elevation to 30 degrees.     Specialty equipment ordered:       [x]?????????????? Wheelchair cushion         _____________________________________________________________________________________________     Dietary:  Continue your diet as tolerated. ? Eat heart-healthy foods. These foods include vegetables, fruits, nuts, beans, lean meat, fish, and whole grains. Limit sodium, alcohol, and sugar. ? Protein is a key nutrient in helping to repair damaged tissue and promote new tissue growth. Good sources of protein are milk, yogurt, cheese, fish, lean meat, and beans. ? If you are a diabetic, having diabetes can make it hard for wounds to heal. So try to keep your blood sugar in its target range.  __________________________________________________________________________________________________     ACTIVITY:   Activity as tolerated  ________________________________________________________________________________________________________________     PAIN:     Please note, A small amount of pain, drainage and/or bleeding from this process might be expected, and is NORMAL.      Elevate the affected limb.   Use over-the-counter medications you would normally use for pain as permitted by your family doctor. For persistent pain not relieved by the above interventions, please call your family doctor.  ________________________________________________________________________________  Call your doctor now or seek immediate medical care if:    · You have symptoms of infection, such as:  ? Increased pain, swelling, warmth, or redness. ? Red streaks leading from the area. ? Pus draining from the area. ? A fever.      _______________________________________________________________________     Return Appointment:     § EC or Home Healthcare: [address]  is 7.2cm ---  Please contact a  to reach out to family- granddaughters                                                                                         please order enough materials      [facility] is responsible to order your supplies.      · Return Appointment: With Fawn Arriaza 1 Week(s)                   []????????? Orders placed during your visit:            CULTURE OBTAINED : 3/9/22- RESULTED .  DOXYCYCLINE ORDERED completed          Electronically signed by : Aislinn Arias on 4/13/2022 at 11:26 AM     [address] [place] [address] Information: Should you experience any significant changes in your wound(s) or have questions about your wound care, please contact the [address] [place], [place] [address] [phone] [address] 8:30 am - 4:30 pm and Friday 8:30 am - 1:00 pm.  If you need help with your wound outside these hours and cannot wait until we are again available, contact your PCP or go to the hospital emergency room.      PLEASE NOTE: IF YOU ARE UNABLE TO OBTAIN WOUND SUPPLIES, CONTINUE TO USE THE SUPPLIES YOU HAVE AVAILABLE UNTIL YOU ARE ABLE TO [facility]. KEEP THE WOUND COVERED AT ALL TIMES.            Physician Signature:_______________________     Date: ___________ Time:  ____________         [x]???????? Natasha Jacobson CNP            Electronically signed by MICHELLE Espinosa CNP on

## 2022-04-17 LAB
BODY FLUID CULTURE, STERILE: ABNORMAL
GRAM STAIN RESULT: ABNORMAL
ORGANISM: ABNORMAL

## 2022-04-17 RX ORDER — CIPROFLOXACIN 500 MG/1
500 TABLET, FILM COATED ORAL 2 TIMES DAILY
Qty: 20 TABLET | Refills: 0 | Status: SHIPPED | OUTPATIENT
Start: 2022-04-17 | End: 2022-04-27

## 2022-04-20 ENCOUNTER — HOSPITAL ENCOUNTER (OUTPATIENT)
Dept: GENERAL RADIOLOGY | Age: 87
Discharge: HOME | End: 2022-04-20
Payer: MEDICARE

## 2022-04-20 ENCOUNTER — HOSPITAL ENCOUNTER (OUTPATIENT)
Age: 87
Discharge: HOME | End: 2022-04-20
Payer: MEDICARE

## 2022-04-20 ENCOUNTER — HOSPITAL ENCOUNTER (OUTPATIENT)
Dept: WOUND CARE | Age: 87
Discharge: HOME | End: 2022-04-20
Payer: MEDICARE

## 2022-04-20 VITALS
RESPIRATION RATE: 20 BRPM | SYSTOLIC BLOOD PRESSURE: 159 MMHG | HEART RATE: 70 BPM | TEMPERATURE: 97.2 F | DIASTOLIC BLOOD PRESSURE: 68 MMHG

## 2022-04-20 DIAGNOSIS — L89.323 PRESSURE ULCER OF ISCHIUM, STAGE 3, LEFT (HCC): ICD-10-CM

## 2022-04-20 DIAGNOSIS — H91.93 BILATERAL HEARING LOSS, UNSPECIFIED HEARING LOSS TYPE: ICD-10-CM

## 2022-04-20 DIAGNOSIS — Z87.898 HISTORY OF SHORT TERM MEMORY LOSS: Primary | ICD-10-CM

## 2022-04-20 DIAGNOSIS — Z74.09 DECREASED MOBILITY AND ENDURANCE: ICD-10-CM

## 2022-04-20 LAB
C-REACTIVE PROTEIN: 16 MG/L (ref 0–5.1)
SEDIMENTATION RATE, ERYTHROCYTE: 58 MM/HR (ref 0–30)

## 2022-04-20 PROCEDURE — 11042 DBRDMT SUBQ TIS 1ST 20SQCM/<: CPT | Performed by: NURSE PRACTITIONER

## 2022-04-20 PROCEDURE — 73502 X-RAY EXAM HIP UNI 2-3 VIEWS: CPT

## 2022-04-20 PROCEDURE — 11042 DBRDMT SUBQ TIS 1ST 20SQCM/<: CPT

## 2022-04-20 PROCEDURE — 36415 COLL VENOUS BLD VENIPUNCTURE: CPT

## 2022-04-20 PROCEDURE — 86140 C-REACTIVE PROTEIN: CPT

## 2022-04-20 PROCEDURE — 85652 RBC SED RATE AUTOMATED: CPT

## 2022-04-20 RX ORDER — LIDOCAINE 50 MG/G
OINTMENT TOPICAL ONCE
Status: CANCELLED | OUTPATIENT
Start: 2022-04-20 | End: 2022-04-20

## 2022-04-20 RX ORDER — BACITRACIN, NEOMYCIN, POLYMYXIN B 400; 3.5; 5 [USP'U]/G; MG/G; [USP'U]/G
OINTMENT TOPICAL ONCE
Status: CANCELLED | OUTPATIENT
Start: 2022-04-20 | End: 2022-04-20

## 2022-04-20 RX ORDER — LIDOCAINE HYDROCHLORIDE 20 MG/ML
JELLY TOPICAL ONCE
Status: CANCELLED | OUTPATIENT
Start: 2022-04-20 | End: 2022-04-20

## 2022-04-20 RX ORDER — LIDOCAINE 40 MG/G
CREAM TOPICAL ONCE
Status: CANCELLED | OUTPATIENT
Start: 2022-04-20 | End: 2022-04-20

## 2022-04-20 RX ORDER — BACITRACIN ZINC AND POLYMYXIN B SULFATE 500; 1000 [USP'U]/G; [USP'U]/G
OINTMENT TOPICAL ONCE
Status: CANCELLED | OUTPATIENT
Start: 2022-04-20 | End: 2022-04-20

## 2022-04-20 RX ORDER — GENTAMICIN SULFATE 1 MG/G
OINTMENT TOPICAL ONCE
Status: CANCELLED | OUTPATIENT
Start: 2022-04-20 | End: 2022-04-20

## 2022-04-20 RX ORDER — LIDOCAINE HYDROCHLORIDE 40 MG/ML
SOLUTION TOPICAL ONCE
Status: CANCELLED | OUTPATIENT
Start: 2022-04-20 | End: 2022-04-20

## 2022-04-20 RX ORDER — GINSENG 100 MG
CAPSULE ORAL ONCE
Status: CANCELLED | OUTPATIENT
Start: 2022-04-20 | End: 2022-04-20

## 2022-04-20 RX ORDER — LIDOCAINE HYDROCHLORIDE 40 MG/ML
SOLUTION TOPICAL ONCE
Status: COMPLETED | OUTPATIENT
Start: 2022-04-20 | End: 2022-04-20

## 2022-04-20 RX ADMIN — LIDOCAINE HYDROCHLORIDE: 40 SOLUTION TOPICAL at 10:56

## 2022-04-20 ASSESSMENT — PAIN SCALES - GENERAL
PAINLEVEL_OUTOF10: 0
PAINLEVEL_OUTOF10: 0

## 2022-04-20 NOTE — PROGRESS NOTES
[address]   Progress Note and Procedure Note      [address] RECORD NUMBER:  [id number]  AGE: 82 y.o. GENDER: female  : 1935  EPISODE DATE:  2022    Subjective:     Chief Complaint   Patient presents with    Wound Check     f/u visit - left ischial wound         HISTORY of PRESENT ILLNESS HPI  Charlene Bone a 82 y. o. female who presents today for wound/ulcer evaluation. Pt accompanied by her younger sister.   This week noting more wound drainage with green and yellow exudate. Culture of knee fluid + for pseudomonas. She has a hx of falling, not sure how she ends up on floor. Pt reports not feeling  pain in wound area. Sits in a recliner most of the day and sleeps in bed at night. Has a Roho cushion for chair. Her sister stated she has found the cushion on the floor at times. Sister and home care nurse have found dressings off, thrown away or on the floor which pt adamantly denies. Wound has not improved in the last since first [facility] visit on 2022 despite several types of dressings. Home care nurse changes dressings twice a week. History of Wound Context: reported and pictured as a unstagable closed deep tissue injury 2021. When first visited [facility] wound open with depth. Wound/Ulcer Pain Timing/Severity: none, cannot feel pain in the area of wound  Quality of pain: N/A  Severity:  0 / 10   Modifying Factors: None  Associated Signs/Symptoms: drainage     Ulcer Identification:  Ulcer Type: pressure     Contributing Factors: chronic pressure, decreased mobility and malnutrition, forgetfulness     The biggest challenge to wound healing is dressings\"working themselves out\" between visits and pt short term memory causes pt does not recall instructions. Her sister's memory is better but still an issue.  We have discussed NPWT but concern is that pt would not remember to plug in at night, may take it off or otherwise interrupt therapy.     Pt's appetite is poor with largest meal at supper of a microwave dinner. The sister has bought  Ensure to boost her protein intake.    Noted when she she sits up, tissue bulges out of ischial wound and bulging of tissue may account for wound packing working it's way out between dressing changes.       Sister reports she and pt have a very strained relationship from past issues.  The pt has her grandaughter a power of , and granddaughter. Spoke with pt and sister to share concerns with granddaughter about current situation and to evaluate pt's safety at home.  The sister and pt live next door to one another in separate condominiums, but their contact is not consistent.     PAST MEDICAL HISTORY        Diagnosis Date    CAD (coronary artery disease)     Inferior STEMI; CATRACHITA to RCA    DDD (degenerative disc disease), lumbosacral 4/10/2013    Decreased mobility and endurance 2/24/2022    Hearing aid worn     bilateral    History of short term memory loss 3/3/2022    [place]hard of hearing[place]     [place]hard of hearing[place]     Hyperlipidemia     Hypertension     MI, old 2017    Neuropathy     bilateral lower extremities    Pressure ulcer of ischium, stage 3, left ([address] 2/16/2022    Pressure ulcer of left buttock, stage 3 ([address] 2/16/2022    Sleep apnea     uses mouth piece; bringing DOS 3/26/18    Thyroid disease     hypo    Wears glasses        PAST SURGICAL HISTORY    Past Surgical History:   Procedure Laterality Date    ABSCESS DRAINAGE Right 03/27/2018    evacuation hematoma right knee    APPENDECTOMY  1963    BACK SURGERY  [id number]    MILD PROCEDURE L2-3 BILATERAL     BREAST LUMPECTOMY  1986    BUNIONECTOMY  10/2003    CATARACT REMOVAL  03/23/2010 and 04/14/2010    COLONOSCOPY  6/2014    repeat 2019    CORONARY ANGIOPLASTY WITH STENT PLACEMENT  01/23/2017    INF STEMI with CATRACHITA to RCA   [facility] REPLACEMENT Right 2018    right total knee replacment    OVARY REMOVAL  1963    right    TONSILLECTOMY AND ADENOIDECTOMY  1942       FAMILY HISTORY    Family History   Problem Relation Age of Onset   Morin Cancer Mother     Heart Disease Mother     Arthritis Sister         rheumatoid    Cancer Brother     Cancer Sister     Diabetes Sister     Heart Disease Brother     High Blood Pressure Brother     High Blood Pressure Sister     Diabetes Brother        SOCIAL HISTORY    Social History     Tobacco Use    Smoking status: Never Smoker    Smokeless tobacco: Never Used    Tobacco comment: encouraged to never smoke    Vaping Use    Vaping Use: Never used   Substance Use Topics    Alcohol use: No    Drug use: No       ALLERGIES    Allergies   Allergen Reactions    Amoxicillin Hives and Rash    Linezolid Other (See Comments)     While taking linezolid, pt developed progressively altered mentation & ultimately had a witnessed seizure 4/26/18. Uncertain whether these symptoms were d/t linezolid, but possible.     Prednisone Itching    Vancomycin      Rising SCr during 5/2018 admission     Coreg [Carvedilol] Diarrhea    Oxycodone-Acetaminophen Rash       MEDICATIONS    Current Outpatient Medications on File Prior to Encounter   Medication Sig Dispense Refill    ciprofloxacin (CIPRO) 500 MG tablet Take 1 tablet by mouth 2 times daily for 10 days 20 tablet 0    lisinopril (PRINIVIL;ZESTRIL) 20 MG tablet TAKE ONE TABLET BY MOUTH DAILY 90 tablet 0    Balsam [place]-Castor Oil (VENELEX) OINT ointment Apply topically 2 times daily 30 g 0    diclofenac (VOLTAREN) 75 MG EC tablet TAKE ONE TABLET BY MOUTH TWICE A DAY WITH MEALS (Patient taking differently: Take 75 mg by mouth 2 times daily as needed TAKE ONE TABLET BY MOUTH TWICE A DAY WITH MEALS) 180 tablet 2    potassium chloride (KLOR-CON M) 10 MEQ extended release tablet TAKE ONE TABLET BY MOUTH DAILY 90 tablet 0    thyroid (NP THYROID) 30 MG tablet TAKE ONE TABLET BY MOUTH DAILY 90 tablet 3  rosuvastatin (CRESTOR) 40 MG tablet TAKE ONE TABLET BY MOUTH DAILY 90 tablet 3    Lactobacillus (ACIDOPHILUS) TABS Take 1 tablet by mouth 2 times daily       Multiple Vitamins-Minerals (DAILY SHERIE MAXIMUM MULTIVITAMIN PO) Take 1 packet by mouth daily      aspirin 81 MG tablet Take 81 mg by mouth daily      latanoprost (XALATAN) 0.005 % ophthalmic solution Place 1 drop into the left eye nightly        No current facility-administered medications on file prior to encounter. REVIEW OF SYSTEMS  Review of Systems    Pertinent items are noted in HPI. Objective:      BP (!) 159/68   Pulse 70   Temp 97.2 °F (36.2 °C) (Temporal)   Resp 20     Wt Readings from Last 3 Encounters:   04/13/22 157 lb (71.2 kg)   02/18/22 156 lb 3.2 oz (70.9 kg)   02/18/22 154 lb (69.9 kg)       PHYSICAL EXAM  Physical Exam    General Appearance: alert and oriented to person, place and time, well-developed and well nourished and pale  Skin: warm and dry  Head: normocephalic and atraumatic  Eyes: pupils equal, round, and reactive to light  Pulmonary/Chest:  normal air movement, no respiratory distress  Cardiovascular: normal rate and regular rhythm  Wound:  Noting first decrease in wound size this week. Assessment:        Problem List Items Addressed This Visit     Pressure ulcer of ischium, stage 3, left (HCC)    Relevant Orders    Initiate Outpatient Wound Care Protocol    Decreased mobility and endurance    Relevant Orders    Initiate Outpatient Wound Care Protocol    [place] (hard of hearing)    History of short term memory loss - Primary    Relevant Orders    Initiate Outpatient Wound Care Protocol           Procedure Note  Indications:  Based on my examination of this patient's wound(s)/ulcer(s) today, debridement is required to promote healing and evaluate the wound base.     Performed by: MICHELLE Mcgovern - CNP    Consent obtained:  Yes    Time out taken:  Yes    Pain Control: Anesthetic  Anesthetic: 4% Lidocaine Liquid Topical (5ml)       Debridement: Excisional Debridement    Using curette the wound(s)/ulcer(s) was/were debrided down through and including the removal of epidermis, dermis and subcutaneous tissue.         Devitalized Tissue Debrided:  fibrin, biofilm and slough    Pre Debridement Measurements:  Are located in the [place]  Documentation Flow Sheet    Diabetic/Pressure/Non Pressure Ulcers only:  Ulcer: Non-Pressure ulcer, fat layer exposed     Wound/Ulcer #: 1    Post Debridement Measurements:  Wound/Ulcer Descriptions are Pre Debridement except measurements:    Wound 01/03/22 Buttocks Left (Active)   Number of days: 107       Wound 02/16/22 Ischium Left #1 ( since about 12/15/2021) (Active)   Wound Image   04/06/22 1118   Wound Etiology Pressure Stage  3 02/16/22 1204   Dressing Status Old drainage noted 04/13/22 1055   Wound Cleansed Vashe 04/20/22 1130   Dressing/Treatment Alginate with Ag;Barrier film;Dry dressing;Tape/Soft cloth adhesive tape 04/20/22 1130   Wound Length (cm) 0.7 cm 04/20/22 1045   Wound Width (cm) 1 cm 04/20/22 1045   Wound Depth (cm) 5.7 cm 04/20/22 1045   Wound Surface Area (cm^2) 0.7 cm^2 04/20/22 1045   Change in Wound Size % (l*w) 81.33 04/20/22 1045   Wound Volume (cm^3) 3.99 cm^3 04/20/22 1045   Wound Healing % 85 04/20/22 1045   Post-Procedure Length (cm) 0.8 cm 04/20/22 1102   Post-Procedure Width (cm) 1.1 cm 04/20/22 1102   Post-Procedure Depth (cm) 5.7 cm 04/20/22 1102   Post-Procedure Surface Area (cm^2) 0.88 cm^2 04/20/22 1102   Post-Procedure Volume (cm^3) 5.016 cm^3 04/20/22 1102   Wound Assessment Granulation tissue;Slough 04/20/22 1045   Drainage Amount Moderate 04/20/22 1045   Drainage Description Serosanguinous;Green 04/20/22 1045   Odor None 04/20/22 1045   Bianca-wound Assessment Blanchable erythema;Dry/flaky 04/20/22 1045   Margins Undefined edges 04/20/22 1045   Wound Thickness Description not for Pressure Injury Full thickness 04/20/22 1045   Number of days: 63 Total Surface Area Debrided:  0.88 sq cm     Estimated Blood Loss:  Minimal    Hemostasis Achieved:  not needed    Procedural Pain:  0  / 10     Post Procedural Pain:  0 / 10     Response to treatment:  Well tolerated by patient. Plan:   Pt/sister education per provider related to current wound status and continuation of plan of care with current product. Pt to see Dr. Shannan Wynn in [facility] next week for additional consultation. Pt/sister are agreeable. Treatment Note please see attached Discharge Instructions    Written patient dismissal instructions given to patient and signed by patient or POA. Discharge [id number] [facility] Physician Orders and Discharge Instructions  [facility], [address] [address]  Telephone: [phone]      FAX [phone]  [address] 8:30 am - 4:30 pm and Friday 8:30 am - 1:00 pm.          NAME: Kenna Fisher  DATE OF BIRTH:  1935  MEDICAL RECORD NUMBER:  [id number]  DATE:  4/20/2022        USE YOUR SPECIAL CUSHION AS MUCH AS POSSIBLE   1. LAY ON YOUR SIDE MORE OFTEN, STAY OFF YOU BUTT AS MUCH AS YOU CAN   2 No SHOWERs, bird baths only    Look into  life alert       Please wash hands with soap and water prior to and right after  every dressing change            WOUND LOCATION:   LEFT BUTTOCK     · May rinse wounds with 0.9% saline   · Do NOT SCRUB WOUND.   · No  shower     Topical Treatments:              [x]?????????? Apply around the wound:      [x]?????????? No-Sting barrier film        PRIMARY DRESSING:                    VASHE AT CLINIC ONLY                           [x]?????????  Aquacel Ag Rope TUCKED INTO entirety of  WOUND- curves up to 12 o'clock          [x]?????????  the depth is 5.7 cm- and needs to be tucked inside                        SECONDARY DRESSING:                [x]?????????                                 [x]????????? thick gauze & Cover Roll Tape                                 HOW OFTEN TO CHANGE DRESSINGS:                                  [x]????????? Three times per week:             _______________________________________________________________________________________________        PRESSURE RELIEF AND OFF-LOADING:     Off-Loading:   [x]??????????????? Off-loading when       [x]??????????????? in bed         [x]??????????????? sitting                       [x]??????????????? Turn every 2 hours when in bed.                       [x]???????????????  Avoid position directing pressure on wound site.  Limit side lying to 30 degree tilt.  Limit HOB elevation to 30 degrees.     Specialty equipment ordered:       [x]??????????????? Wheelchair cushion         _____________________________________________________________________________________________     Dietary:  Continue your diet as tolerated. ? Eat heart-healthy foods. These foods include vegetables, fruits, nuts, beans, lean meat, fish, and whole grains. Limit sodium, alcohol, and sugar. ? Protein is a key nutrient in helping to repair damaged tissue and promote new tissue growth. Good sources of protein are milk, yogurt, cheese, fish, lean meat, and beans. ? If you are a diabetic, having diabetes can make it hard for wounds to heal. So try to keep your blood sugar in its target range.  __________________________________________________________________________________________________     ACTIVITY:   Activity as tolerated  ________________________________________________________________________________________________________________     PAIN:     Please note, A small amount of pain, drainage and/or bleeding from this process might be expected, and is NORMAL.      Elevate the affected limb. Use over-the-counter medications you would normally use for pain as permitted by your family doctor.   For persistent pain not relieved by the above interventions, please call your family doctor.  ________________________________________________________________________________  Call your doctor now or seek immediate medical care if:    · You have symptoms of infection, such as:  ? Increased pain, swelling, warmth, or redness. ? Red streaks leading from the area. ? Pus draining from the area. ? A fever.      _______________________________________________________________________     Return Appointment:     § ECF or Home Healthcare: [facility]  [address] ---  Please contact a  to reach out to family- granddaughters                                                                                         please order enough [address] [place] [address] St is responsible to order your supplies.      · Return Appointment: With DR. ROGEL  in 1 Week(s)                   []?????????? Orders placed during your visit:             CULTURE OBTAINED : 3/9/22- [address]         Electronically signed by : Yohannes Melchor. on 4/20/2022 at 11:10 AM     [address] [place] [address] Information: Should you experience any significant changes in your wound(s) or have questions about your wound care, please contact the [address] [place] [address] [phone] [address] [place], [place] [address] [place] 8:30 am - 4:30 pm and Friday 8:30 am - 1:00 pm.  If you need help with your wound outside these hours and cannot wait until we are again available, contact your PCP or go to the hospital emergency room.      PLEASE NOTE: IF YOU ARE UNABLE TO OBTAIN WOUND SUPPLIES, CONTINUE TO USE THE SUPPLIES YOU HAVE AVAILABLE UNTIL YOU ARE ABLE TO [facility]. KEEP THE WOUND COVERED AT ALL TIMES.            Physician Signature:_______________________     Date: ___________ Time:  ____________         [x]????????? Natasha Jacobson CNP               Electronically signed by MICHELLE Baer CNP on 4/20/2022 at 2:32 PM

## 2022-04-28 ENCOUNTER — HOSPITAL ENCOUNTER (OUTPATIENT)
Dept: WOUND CARE | Age: 87
Discharge: HOME | End: 2022-04-28
Payer: MEDICARE

## 2022-04-28 VITALS
SYSTOLIC BLOOD PRESSURE: 169 MMHG | RESPIRATION RATE: 18 BRPM | HEART RATE: 76 BPM | TEMPERATURE: 96.6 F | DIASTOLIC BLOOD PRESSURE: 87 MMHG

## 2022-04-28 DIAGNOSIS — Z87.898 HISTORY OF SHORT TERM MEMORY LOSS: Primary | ICD-10-CM

## 2022-04-28 DIAGNOSIS — Z74.09 DECREASED MOBILITY AND ENDURANCE: ICD-10-CM

## 2022-04-28 DIAGNOSIS — L89.323 PRESSURE ULCER OF ISCHIUM, STAGE 3, LEFT (HCC): ICD-10-CM

## 2022-04-28 PROCEDURE — 99024 POSTOP FOLLOW-UP VISIT: CPT | Performed by: SURGERY

## 2022-04-28 PROCEDURE — 11043 DBRDMT MUSC&/FSCA 1ST 20/<: CPT | Performed by: SURGERY

## 2022-04-28 PROCEDURE — 11043 DBRDMT MUSC&/FSCA 1ST 20/<: CPT

## 2022-04-28 RX ORDER — LIDOCAINE HYDROCHLORIDE 40 MG/ML
SOLUTION TOPICAL ONCE
Status: COMPLETED | OUTPATIENT
Start: 2022-04-28 | End: 2022-04-28

## 2022-04-28 RX ORDER — LIDOCAINE HYDROCHLORIDE 20 MG/ML
JELLY TOPICAL ONCE
Status: CANCELLED | OUTPATIENT
Start: 2022-04-28 | End: 2022-04-28

## 2022-04-28 RX ORDER — LIDOCAINE 50 MG/G
OINTMENT TOPICAL ONCE
Status: CANCELLED | OUTPATIENT
Start: 2022-04-28 | End: 2022-04-28

## 2022-04-28 RX ORDER — BACITRACIN, NEOMYCIN, POLYMYXIN B 400; 3.5; 5 [USP'U]/G; MG/G; [USP'U]/G
OINTMENT TOPICAL ONCE
Status: CANCELLED | OUTPATIENT
Start: 2022-04-28 | End: 2022-04-28

## 2022-04-28 RX ORDER — GENTAMICIN SULFATE 1 MG/G
OINTMENT TOPICAL ONCE
Status: CANCELLED | OUTPATIENT
Start: 2022-04-28 | End: 2022-04-28

## 2022-04-28 RX ORDER — LIDOCAINE HYDROCHLORIDE 40 MG/ML
SOLUTION TOPICAL ONCE
Status: CANCELLED | OUTPATIENT
Start: 2022-04-28 | End: 2022-04-28

## 2022-04-28 RX ORDER — LIDOCAINE 40 MG/G
CREAM TOPICAL ONCE
Status: CANCELLED | OUTPATIENT
Start: 2022-04-28 | End: 2022-04-28

## 2022-04-28 RX ORDER — BACITRACIN ZINC AND POLYMYXIN B SULFATE 500; 1000 [USP'U]/G; [USP'U]/G
OINTMENT TOPICAL ONCE
Status: CANCELLED | OUTPATIENT
Start: 2022-04-28 | End: 2022-04-28

## 2022-04-28 RX ORDER — GINSENG 100 MG
CAPSULE ORAL ONCE
Status: CANCELLED | OUTPATIENT
Start: 2022-04-28 | End: 2022-04-28

## 2022-04-28 RX ADMIN — LIDOCAINE HYDROCHLORIDE: 40 SOLUTION TOPICAL at 14:01

## 2022-04-28 ASSESSMENT — PAIN SCALES - GENERAL: PAINLEVEL_OUTOF10: 0

## 2022-04-28 NOTE — PROGRESS NOTES
[address]   Progress Note and Procedure Note      [address] RECORD NUMBER:  [id number]  AGE: 82 y.o. GENDER: female  : 1935  EPISODE DATE:  2022    Subjective:           Chief Complaint   Patient presents with    Wound Check       f/u visit - left ischial wound         HISTORY of PRESENT ILLNESS HPI  Renu Lema a 82 y. o. female who presents today for wound/ulcer evaluation. Pt accompanied by her younger sister.   This week noting more wound drainage with green and yellow exudate. Culture of knee fluid + for pseudomonas. She has a hx of falling, not sure how she ends up on floor. Pt reports not feeling  pain in wound area. Sits in a recliner most of the day and sleeps in bed at night. Has a Roho cushion for chair. Her sister stated she has found the cushion on the floor at times.  Sister and home care nurse have found dressings off, thrown away or on the floor which pt adamantly denies. Wound has not improved in the last since first [facility] visit on 2022 despite several types of dressings.  Home care nurse changes dressings twice a week. History of Wound Context: reported and pictured as a unstagable closed deep tissue injury 2021. When first visited [facility] wound open with depth. Wound/Ulcer Pain Timing/Severity: none, cannot feel pain in the area of wound  Quality of pain: N/A  Severity:  0 / 10   Modifying Factors: None  Associated Signs/Symptoms: drainage     Ulcer Identification:  Ulcer Type: pressure     Contributing Factors: chronic pressure, decreased mobility and malnutrition, forgetfulness     The biggest challenge to wound healing is dressings\"working themselves out\" between visits and pt short term memory causes pt does not recall instructions.  Her sister's memory is better but still an issue.  We have discussed NPWT but concern is that pt would not remember to plug in at night, may take it off or otherwise interrupt therapy.     Pt's appetite is poor with largest meal at supper of a microwave dinner. The sister has bought  Ensure to boost her protein intake.    Noted when she she sits up, tissue bulges out of ischial wound and bulging of tissue may account for wound packing working it's way out between dressing changes.       Sister reports she and pt have a very strained relationship from past issues.  The pt has her grandaughter a power of , and granddaughter. Spoke with pt and sister to share concerns with granddaughter about current situation and to evaluate pt's safety at home.  The sister and pt live next door to one another in separate condominiums, but their contact is not consistent.      PAST MEDICAL HISTORY     Past Medical History             Diagnosis Date    CAD (coronary artery disease)       Inferior STEMI; CATRACHITA to RCA    DDD (degenerative disc disease), lumbosacral 4/10/2013    Decreased mobility and endurance 2/24/2022    Hearing aid worn       bilateral    History of short term memory loss 3/3/2022    [place] (hard of hearing)      [place] (hard of hearing)      Hyperlipidemia      Hypertension      MI, old 2017    Neuropathy       bilateral lower extremities    Pressure ulcer of ischium, stage 3, left ([address] 2/16/2022    Pressure ulcer of left buttock, stage 3 ([address] 2/16/2022    Sleep apnea       uses mouth piece; bringing DOS 3/26/18    Thyroid disease       hypo    Wears glasses              PAST SURGICAL HISTORY     Past Surgical History         Past Surgical History:   Procedure Laterality Date    ABSCESS DRAINAGE Right 03/27/2018     evacuation hematoma right knee    APPENDECTOMY   1963   [facility]   [id number]     MILD PROCEDURE L2-3 BILATERAL     BREAST LUMPECTOMY   1986    BUNIONECTOMY   10/2003    CATARACT REMOVAL   03/23/2010 and 04/14/2010    COLONOSCOPY   6/2014     repeat 2019    CORONARY ANGIOPLASTY WITH STENT PLACEMENT   01/23/2017     INF STEMI with CATRACHITA to RCA    DILATION AND CURETTAGE OF UTERUS        INCONTINENCE SURGERY        JOINT REPLACEMENT Right 2018     right total knee replacment    OVARY REMOVAL   1963     right    TONSILLECTOMY AND ADENOIDECTOMY   1942            FAMILY HISTORY     Family History         Family History   Problem Relation Age of Onset    Cancer Mother      Heart Disease Mother      Arthritis Sister           rheumatoid    Cancer Brother      Cancer Sister      Diabetes Sister      Heart Disease Brother      High Blood Pressure Brother      High Blood Pressure Sister      Diabetes Brother              SOCIAL HISTORY     Social History           Tobacco Use    Smoking status: Never Smoker    Smokeless tobacco: Never Used    Tobacco comment: encouraged to never smoke    Vaping Use    Vaping Use: Never used   Substance Use Topics    Alcohol use: No    Drug use: No         ALLERGIES     Allergies   Allergen Reactions    Amoxicillin Hives and Rash    Linezolid Other (See Comments)       While taking linezolid, pt developed progressively altered mentation & ultimately had a witnessed seizure 4/26/18. Uncertain whether these symptoms were d/t linezolid, but possible.     Prednisone Itching    Vancomycin         Rising SCr during 5/2018 admission     Coreg [Carvedilol] Diarrhea    Oxycodone-Acetaminophen Rash         MEDICATIONS            Current Outpatient Medications on File Prior to Encounter   Medication Sig Dispense Refill    ciprofloxacin (CIPRO) 500 MG tablet Take 1 tablet by mouth 2 times daily for 10 days 20 tablet 0    lisinopril (PRINIVIL;ZESTRIL) 20 MG tablet TAKE ONE TABLET BY MOUTH DAILY 90 tablet 0    Balsam [place]-Castor Oil (VENELEX) OINT ointment Apply topically 2 times daily 30 g 0    diclofenac (VOLTAREN) 75 MG EC tablet TAKE ONE TABLET BY MOUTH TWICE A DAY WITH MEALS (Patient taking differently: Take 75 mg by mouth 2 times daily as needed TAKE ONE TABLET BY MOUTH TWICE A DAY WITH MEALS) 180 tablet 2    potassium chloride (KLOR-CON M) 10 MEQ extended release tablet TAKE ONE TABLET BY MOUTH DAILY 90 tablet 0    thyroid (NP THYROID) 30 MG tablet TAKE ONE TABLET BY MOUTH DAILY 90 tablet 3    rosuvastatin (CRESTOR) 40 MG tablet TAKE ONE TABLET BY MOUTH DAILY 90 tablet 3    Lactobacillus (ACIDOPHILUS) TABS Take 1 tablet by mouth 2 times daily         Multiple Vitamins-Minerals (DAILY SHERIE MAXIMUM MULTIVITAMIN PO) Take 1 packet by mouth daily        aspirin 81 MG tablet Take 81 mg by mouth daily        latanoprost (XALATAN) 0.005 % ophthalmic solution Place 1 drop into the left eye nightly           No current facility-administered medications on file prior to encounter.         REVIEW OF SYSTEMS  Review of Systems     Pertinent items are noted in HPI.     Objective:       BP (!) 159/68   Pulse 70   Temp 97.2 °F (36.2 °C) (Temporal)   Resp 20          Wt Readings from Last 3 Encounters:   04/13/22 157 lb (71.2 kg)   02/18/22 156 lb 3.2 oz (70.9 kg)   02/18/22 154 lb (69.9 kg)         PHYSICAL EXAM  Physical Exam     General Appearance: alert and oriented to person, place and time, well-developed and well nourished and pale  Skin: warm and dry  Head: normocephalic and atraumatic  Eyes: pupils equal, round, and reactive to light  Pulmonary/Chest:  normal air movement, no respiratory distress  Cardiovascular: normal rate and regular rhythm  Wound:  Noting first decrease in wound size this week.        Assessment:              Problem List Items Addressed This Visit           Pressure ulcer of ischium, stage 3, left (HCC)      Relevant Orders      Initiate Outpatient Wound Care Protocol      Decreased mobility and endurance      Relevant Orders      Initiate Outpatient Wound Care Protocol      [place] (hard of hearing)      History of short term memory loss - Primary      Relevant Orders      Initiate Outpatient Wound Care Protocol             Excisional Debridement Procedure Note  Indications:  Based on my examination of this patient's wound(s)/ulcer(s) today, debridement is required to promote healing and evaluate the wound base. Performed by: Kasey Dykes MD    Consent obtained? Yes    Time out taken: Yes    Pain Control: Anesthetic: 4% Lidocaine Liquid Topical (5 ml)     Debridement:Excisional Debridement    Using curette, scissors and forceps the wound/ulcer was sharply debrided    down through and included excision of  subcutaneous tissue and muscle/fascia.         Devitalized Tissue Debrided:  fibrin, biofilm and slough      Pre Debridement Measurements:  Are located in the [place]  Documentation Flow Sheet   Wound/Ulcer #: 1     Post  Debridement Measurements:  Wound 01/03/22 Buttocks Left (Active)   Number of days: 115       Wound 02/16/22 Ischium Left #1 ( since about 12/15/2021) (Active)   Wound Image   04/06/22 1118   Wound Etiology Pressure Stage  3 02/16/22 1204   Dressing Status New dressing applied 04/28/22 1517   Wound Cleansed Vashe 04/28/22 1517   Dressing/Treatment Alginate with Ag;Barrier film;Gauze dressing/dressing sponge;Tape/Soft cloth adhesive tape 04/28/22 1517   Wound Length (cm) 1 cm 04/28/22 1354   Wound Width (cm) 0.5 cm 04/28/22 1354   Wound Depth (cm) 5.9 cm 04/28/22 1354   Wound Surface Area (cm^2) 0.5 cm^2 04/28/22 1354   Change in Wound Size % (l*w) 86.67 04/28/22 1354   Wound Volume (cm^3) 2.95 cm^3 04/28/22 1354   Wound Healing % 89 04/28/22 1354   Post-Procedure Length (cm) 1.1 cm 04/28/22 1445   Post-Procedure Width (cm) 0.6 cm 04/28/22 1445   Post-Procedure Depth (cm) 5.9 cm 04/28/22 1445   Post-Procedure Surface Area (cm^2) 0.66 cm^2 04/28/22 1445   Post-Procedure Volume (cm^3) 3.894 cm^3 04/28/22 1445   Wound Assessment Granulation tissue;Slough 04/28/22 1354   Drainage Amount Scant 04/28/22 1354   Drainage Description Serosanguinous 04/28/22 1354   Odor None 04/28/22 1354   Bianca-wound Assessment Dry/flaky 04/28/22 1354   Margins Undefined edges 04/28/22 1354   Wound Thickness Description not for Pressure Injury Full thickness 04/28/22 1354   Number of days: 71       Percent of Wound/Ulcer Debrided: 100%    Total Surface Area Debrided:  5.9 sq cm    Diabetic/Pressure/Non Pressure Ulcers only:  Ulcer: Pressure ulcer, Stage 3    Bleeding: Minimal    Hemostasis Achieved: by pressure and by silver nitrate stick    Procedural Pain: 3  / 10     Post Procedural Pain: 0 / 10     Response to treatment:  Well tolerated by patient. We are asked to see Ms. Farnsworth by García Hidalgo because of lack of improvement. We did take some of the fingerlike fronds off today pulling them out cutting them off and silver nitrate them. This may help. However until her social situation changes I do not think were going to make much progress she is very forgetful takes the dressings off. Swears she does not do it thinks she may do it in her sleep does not find the dressings anywhere. Has been told 100x2. sit on the sore the sister says she finds her doing it anyway she has a callus she could lay on. We told the sister to put a big sign over the TV so that while she is watching it she will be looking at the sign that says not to sit on the sore    Plan:     Treatment Note please see attached Discharge Instructions    Written patient dismissal instructions given to patient and signed by patient or POA.          Discharge [id number] [place] [address] Physician Orders and Discharge Instructions  5 [facility], [address] [place] [address]  Telephone: [phone]      FAX [phone]  [address] 8:30 am - 4:30 pm and Friday 8:30 am - 1:00 pm.          NAME: Jere Akbar  DATE OF BIRTH:  1935  MEDICAL RECORD NUMBER:  [id number]  DATE:  4/28/2022        USE YOUR SPECIAL CUSHION AS MUCH AS POSSIBLE   1. LAY ON YOUR SIDE MORE OFTEN, STAY OFF YOU BUTT AS MUCH AS YOU CAN   2 No SHOWERs, bird baths only    Look into  life alert       Please wash hands with soap and water prior to and right after  every dressing change            WOUND LOCATION:   LEFT BUTTOCK     · May rinse wounds with 0.9% saline   · Do NOT SCRUB WOUND. · No  shower     Topical Treatments:              [x]??????????? Apply around the wound:      [x]??????????? No-Sting barrier film        PRIMARY DRESSING:                    VASHE AT CLINIC ONLY                           [x]??????????  Aquacel Ag Rope TUCKED INTO entirety of  WOUND- curves up to 12 o'clock          [x]??????????  the depth is 6 cm- and needs to be tucked inside                        SECONDARY DRESSING:                [x]??????????                                 [x]?????????? thick gauze & Cover Roll Tape                                 HOW OFTEN TO CHANGE DRESSINGS:                                  [x]?????????? Three times per week:             _______________________________________________________________________________________________        PRESSURE RELIEF AND OFF-LOADING:     Off-Loading:   [x]???????????????? Off-loading when       [x]???????????????? in bed         [x]???????????????? sitting                       [x]???????????????? Turn every 2 hours when in bed.                       [x]????????????????  Avoid position directing pressure on wound site.  Limit side lying to 30 degree tilt.  Limit HOB elevation to 30 degrees.     Specialty equipment ordered:       [x]???????????????? Wheelchair cushion (TORO)         _____________________________________________________________________________________________     Dietary:  Continue your diet as tolerated. ? Eat heart-healthy foods. These foods include vegetables, fruits, nuts, beans, lean meat, fish, and whole grains. Limit sodium, alcohol, and sugar. ? Protein is a key nutrient in helping to repair damaged tissue and promote new tissue growth. Good sources of protein are milk, yogurt, cheese, fish, lean meat, and beans.   ? If you are a diabetic, having diabetes can make it hard for wounds to heal. So try to keep your blood sugar in its target range.  __________________________________________________________________________________________________     ACTIVITY:   Activity as tolerated  ________________________________________________________________________________________________________________     PAIN:     Please note, A small amount of pain, drainage and/or bleeding from this process might be expected, and is NORMAL.      Elevate the affected limb. Use over-the-counter medications you would normally use for pain as permitted by your family doctor. For persistent pain not relieved by the above interventions, please call your family doctor.  ________________________________________________________________________________  Call your doctor now or seek immediate medical care if:    · You have symptoms of infection, such as:  ? Increased pain, swelling, warmth, or redness. ? Red streaks leading from the area. ? Pus draining from the area. ? A fever.      _______________________________________________________________________     Return Appointment:     § ECF or Home Healthcare: [id number] ---  Please contact a  to reach out to family- granddaughters                                                                                         please order enough materials      Laura Pacheco is responsible to order your supplies.      · Return Appointment: With Rebecca Spencer CNP  in 1 Week(s)                   []??????????? Orders placed during your visit:             CULTURE OBTAINED : 3/9/22- RESULTED .            Electronically signed by Tere Gibbs RN on 4/28/2022 at 2:53 PM            [address] [place] [address] Information: Should you experience any significant changes in your wound(s) or have questions about your wound care, please contact the [id number] 8:30 am - 4:30 pm and Friday 8:30 am - 1:00 pm.  If you need help with your wound outside these hours and cannot wait until we are again available, contact your PCP or go to the hospital emergency room.      PLEASE NOTE: IF YOU ARE UNABLE TO [address], CONTINUE TO USE THE SUPPLIES YOU HAVE AVAILABLE UNTIL YOU ARE ABLE TO [facility]. KEEP THE WOUND COVERED AT ALL TIMES.            Physician Signature:_______________________     Date: ___________ Time:  ____________         [x]?????????? Natasha Jacobson CNP            Electronically signed by Krystal Alexis MD on 4/28/2022 at 4:15 PM

## 2022-05-04 ENCOUNTER — HOSPITAL ENCOUNTER (OUTPATIENT)
Dept: WOUND CARE | Age: 87
Discharge: HOME | End: 2022-05-04
Payer: MEDICARE

## 2022-05-04 VITALS
HEART RATE: 64 BPM | DIASTOLIC BLOOD PRESSURE: 68 MMHG | TEMPERATURE: 97.3 F | SYSTOLIC BLOOD PRESSURE: 163 MMHG | RESPIRATION RATE: 20 BRPM

## 2022-05-04 DIAGNOSIS — Z87.898 HISTORY OF SHORT TERM MEMORY LOSS: Primary | ICD-10-CM

## 2022-05-04 DIAGNOSIS — Z74.09 DECREASED MOBILITY AND ENDURANCE: ICD-10-CM

## 2022-05-04 DIAGNOSIS — H91.93 BILATERAL HEARING LOSS, UNSPECIFIED HEARING LOSS TYPE: ICD-10-CM

## 2022-05-04 DIAGNOSIS — L89.323 PRESSURE ULCER OF ISCHIUM, STAGE 3, LEFT (HCC): ICD-10-CM

## 2022-05-04 PROCEDURE — 11042 DBRDMT SUBQ TIS 1ST 20SQCM/<: CPT | Performed by: NURSE PRACTITIONER

## 2022-05-04 PROCEDURE — 11042 DBRDMT SUBQ TIS 1ST 20SQCM/<: CPT

## 2022-05-04 RX ORDER — BACITRACIN, NEOMYCIN, POLYMYXIN B 400; 3.5; 5 [USP'U]/G; MG/G; [USP'U]/G
OINTMENT TOPICAL ONCE
Status: CANCELLED | OUTPATIENT
Start: 2022-05-04 | End: 2022-05-04

## 2022-05-04 RX ORDER — LIDOCAINE 40 MG/G
CREAM TOPICAL ONCE
Status: CANCELLED | OUTPATIENT
Start: 2022-05-04 | End: 2022-05-04

## 2022-05-04 RX ORDER — GENTAMICIN SULFATE 1 MG/G
OINTMENT TOPICAL ONCE
Status: CANCELLED | OUTPATIENT
Start: 2022-05-04 | End: 2022-05-04

## 2022-05-04 RX ORDER — LIDOCAINE 50 MG/G
OINTMENT TOPICAL ONCE
Status: CANCELLED | OUTPATIENT
Start: 2022-05-04 | End: 2022-05-04

## 2022-05-04 RX ORDER — LIDOCAINE HYDROCHLORIDE 40 MG/ML
SOLUTION TOPICAL ONCE
Status: CANCELLED | OUTPATIENT
Start: 2022-05-04 | End: 2022-05-04

## 2022-05-04 RX ORDER — GINSENG 100 MG
CAPSULE ORAL ONCE
Status: CANCELLED | OUTPATIENT
Start: 2022-05-04 | End: 2022-05-04

## 2022-05-04 RX ORDER — LIDOCAINE HYDROCHLORIDE 20 MG/ML
JELLY TOPICAL ONCE
Status: CANCELLED | OUTPATIENT
Start: 2022-05-04 | End: 2022-05-04

## 2022-05-04 RX ORDER — LIDOCAINE HYDROCHLORIDE 40 MG/ML
SOLUTION TOPICAL ONCE
Status: COMPLETED | OUTPATIENT
Start: 2022-05-04 | End: 2022-05-04

## 2022-05-04 RX ORDER — BACITRACIN ZINC AND POLYMYXIN B SULFATE 500; 1000 [USP'U]/G; [USP'U]/G
OINTMENT TOPICAL ONCE
Status: CANCELLED | OUTPATIENT
Start: 2022-05-04 | End: 2022-05-04

## 2022-05-04 RX ADMIN — LIDOCAINE HYDROCHLORIDE 2.5 ML: 40 SOLUTION TOPICAL at 10:32

## 2022-05-04 ASSESSMENT — PAIN SCALES - GENERAL
PAINLEVEL_OUTOF10: 0
PAINLEVEL_OUTOF10: 0

## 2022-05-04 NOTE — PROGRESS NOTES
[address]   Progress Note and Procedure Note      I[address] RECORD NUMBER:  [id number]  AGE: 82 y.o. GENDER: female  : 1935  EPISODE DATE:  2022    Subjective:     Chief Complaint   Patient presents with    Wound Check     f/u visit - left ischial wound         HISTORY of PRESENT ILLNESS HPI  Jovana Orozco a 82 y. o. female who presents today for wound/ulcer evaluation. Pt accompanied by her younger sister.   Culture of knee fluid + for pseudomonas.      She has a hx of falling, not sure how she ends up on floor. Pt reports not feeling pain in wound area. Sits in a recliner most of the day and sleeps in bed at night. Has a Roho cushion for chair. Her sister stated she has found the cushion on the floor at times.  Sister and home care nurse have found dressings off, thrown away or on the floor which pt adamantly denies. Wound has not improved in the last since first [facility] visit on 2022 despite several types of dressings.  Home care nurse changes dressings twice a week. Sister asking whether needs to come every week as it has [place] thirteen weeks now and no major improvement\"  Both pt and sister somewhat irritated at lack of progress even though they know pt ability to comply is a major obstacle. Appreciate consultation by Dr. Rhea Al last week. History of Wound Context: reported and pictured as a unstagable closed deep tissue injury 2021. When first visited [facility] wound open with depth.   Wound/Ulcer Pain Timing/Severity: none, cannot feel pain in the area of wound  Quality of pain: N/A  Severity:  0 / 10   Modifying Factors: None  Associated Signs/Symptoms: drainage     Ulcer Identification:  Ulcer Type: pressure     Contributing Factors: chronic pressure, decreased mobility and malnutrition, forgetfulness     The biggest challenge to wound healing is dressings\"working themselves out\" between visits and pt short term memory causes pt does not recall instructions. Her sister's memory is better but still an issue.  We have discussed NPWT but concern is that pt would not remember to plug in at night, may take it off or otherwise interrupt therapy.     Pt's appetite is poor with largest meal at supper of a microwave dinner. The sister has bought  Ensure to boost her protein intake.    Noted when she she sits up, tissue bulges out of ischial wound and bulging of tissue may account for wound packing working it's way out between dressing changes.       Sister reports she and pt have a very strained relationship from past issues.  The pt has her grandaughter a power of , and granddaughter. Spoke with pt and sister to share concerns with granddaughter about current situation and to evaluate pt's safety at home.  The sister and pt live next door to one another in separate condominiums, but their contact is not consistent.     PAST MEDICAL HISTORY        Diagnosis Date    CAD (coronary artery disease)     Inferior STEMI; CATRACHITA to RCA    DDD (degenerative disc disease), lumbosacral 4/10/2013    Decreased mobility and endurance 2/24/2022    Hearing aid worn     bilateral    History of short term memory loss 3/3/2022    [place] (hard of hearing)     [place] (hard of hearing)     Hyperlipidemia     Hypertension     MI, old 2017    Neuropathy     bilateral lower extremities    Pressure ulcer of ischium, stage 3, left ([address] 2/16/2022    Pressure ulcer of left buttock, stage 3 ([address] 2/16/2022    Sleep apnea     uses mouth piece; bringing DOS 3/26/18    Thyroid disease     hypo    Wears glasses        PAST SURGICAL HISTORY    Past Surgical History:   Procedure Laterality Date    ABSCESS DRAINAGE Right 03/27/2018    evacuation hematoma right knee    [facility]    BACK SURGERY  [id number]    MILD PROCEDURE L2-3 BILATERAL     BREAST LUMPECTOMY  1986    BUNIONECTOMY  10/2003    CATARACT REMOVAL  03/23/2010 and 04/14/2010    COLONOSCOPY  6/2014 repeat 2019    CORONARY ANGIOPLASTY WITH STENT PLACEMENT  01/23/2017    INF STEMI with CATRACHITA to RCA    DILATION AND CURETTAGE OF UTERUS      INCONTINENCE SURGERY      JOINT REPLACEMENT Right 2018    right total knee replacment    OVARY REMOVAL  1963    right    TONSILLECTOMY AND ADENOIDECTOMY  1942       FAMILY HISTORY    Family History   Problem Relation Age of Onset   Tyron Arteaga Cancer Mother     Heart Disease Mother     Arthritis Sister         rheumatoid    Cancer Brother     Cancer Sister     Diabetes Sister     Heart Disease Brother     High Blood Pressure Brother     High Blood Pressure Sister     Diabetes Brother        SOCIAL HISTORY    Social History     Tobacco Use    Smoking status: Never Smoker    Smokeless tobacco: Never Used    Tobacco comment: encouraged to never smoke    Vaping Use    Vaping Use: Never used   Substance Use Topics    Alcohol use: No    Drug use: No       ALLERGIES    Allergies   Allergen Reactions    Amoxicillin Hives and Rash    Linezolid Other (See Comments)     While taking linezolid, pt developed progressively altered mentation & ultimately had a witnessed seizure 4/26/18. Uncertain whether these symptoms were d/t linezolid, but possible.     Prednisone Itching    Vancomycin      Rising SCr during 5/2018 admission     Coreg [Carvedilol] Diarrhea    Oxycodone-Acetaminophen Rash       MEDICATIONS    Current Outpatient Medications on File Prior to Encounter   Medication Sig Dispense Refill    lisinopril (PRINIVIL;ZESTRIL) 20 MG tablet TAKE ONE TABLET BY MOUTH DAILY 90 tablet 0    Balsam [place]-Castor Oil (VENELEX) OINT ointment Apply topically 2 times daily 30 g 0    diclofenac (VOLTAREN) 75 MG EC tablet TAKE ONE TABLET BY MOUTH TWICE A DAY WITH MEALS (Patient taking differently: Take 75 mg by mouth 2 times daily as needed TAKE ONE TABLET BY MOUTH TWICE A DAY WITH MEALS) 180 tablet 2    potassium chloride (KLOR-CON M) 10 MEQ extended release tablet TAKE ONE TABLET BY MOUTH DAILY 90 tablet 0    thyroid (NP THYROID) 30 MG tablet TAKE ONE TABLET BY MOUTH DAILY 90 tablet 3    rosuvastatin (CRESTOR) 40 MG tablet TAKE ONE TABLET BY MOUTH DAILY 90 tablet 3    Lactobacillus (ACIDOPHILUS) TABS Take 1 tablet by mouth 2 times daily       Multiple Vitamins-Minerals (DAILY SHERIE MAXIMUM MULTIVITAMIN PO) Take 1 packet by mouth daily      aspirin 81 MG tablet Take 81 mg by mouth daily      latanoprost (XALATAN) 0.005 % ophthalmic solution Place 1 drop into the left eye nightly        No current facility-administered medications on file prior to encounter. REVIEW OF SYSTEMS  Review of Systems    Pertinent items are noted in HPI. Objective:      BP (!) 163/68   Pulse 64   Temp 97.3 °F (36.3 °C) (Temporal)   Resp 20     Wt Readings from Last 3 Encounters:   04/13/22 157 lb (71.2 kg)   02/18/22 156 lb 3.2 oz (70.9 kg)   02/18/22 154 lb (69.9 kg)       PHYSICAL EXAM  Physical Exam    General Appearance: alert and oriented to person, place and time, well-developed and well-nourished, in no acute distress and with flat affect  Skin: warm and dry, no rash or erythema  Head: normocephalic and atraumatic  Eyes: pupils equal, round, and reactive to light  Pulmonary/Chest:  normal air movement, no respiratory distress  Cardiovascular: normal rate and regular rhythm  Wound:  Less depth noted this week, seeing fat globules protruding out of wound; area debrided.  No overt signs of infection       Assessment:        Problem List Items Addressed This Visit     Pressure ulcer of ischium, stage 3, left (HCC)    Relevant Orders    Initiate Outpatient Wound Care Protocol    Decreased mobility and endurance    Relevant Orders    Initiate Outpatient Wound Care Protocol    [place] (hard of hearing)    History of short term memory loss - Primary    Relevant Orders    Initiate Outpatient Wound Care Protocol           Procedure Note  Indications:  Based on my examination of this patient's wound(s)/ulcer(s) today, debridement is required to promote healing and evaluate the wound base. Performed by: MICHELLE Low CNP    Consent obtained:  Yes    Time out taken:  Yes    Pain Control: Anesthetic  Anesthetic: 4% Lidocaine Liquid Topical (2.5ml)       Debridement: Excisional Debridement    Using curette and forceps the wound(s)/ulcer(s) was/were debrided down through and including the removal of epidermis, dermis and subcutaneous tissue. Devitalized Tissue Debrided:  fibrin, biofilm, slough and fat    Pre Debridement Measurements:  Are located in the [employer]  Documentation Flow Sheet    Diabetic/Pressure/Non Pressure Ulcers only:  Ulcer: Pressure ulcer, Stage 3     Wound/Ulcer #: 1    Post Debridement Measurements:  Wound/Ulcer Descriptions are Pre Debridement except measurements:    Wound 02/16/22 Ischium Left #1 ( since about 12/15/2021) (Active)   Wound Image   04/06/22 1118   Wound Etiology Pressure Stage  3 02/16/22 1204   Dressing Status New dressing applied 04/28/22 1517   Wound Cleansed Vashe 04/28/22 1517   Dressing/Treatment Alginate with Ag;Barrier film;Dry dressing;Tape/Soft cloth adhesive tape 05/04/22 1105   Wound Length (cm) 0.6 cm 05/04/22 1017   Wound Width (cm) 1.3 cm 05/04/22 1017   Wound Depth (cm) 4.9 cm 05/04/22 1017   Wound Surface Area (cm^2) 0.78 cm^2 05/04/22 1017   Change in Wound Size % (l*w) 79.2 05/04/22 1017   Wound Volume (cm^3) 3.822 cm^3 05/04/22 1017   Wound Healing % 86 05/04/22 1017   Post-Procedure Length (cm) 0.7 cm 05/04/22 1055   Post-Procedure Width (cm) 1.4 cm 05/04/22 1055   Post-Procedure Depth (cm) 4.9 cm 05/04/22 1055   Post-Procedure Surface Area (cm^2) 0.98 cm^2 05/04/22 1055   Post-Procedure Volume (cm^3) 4.802 cm^3 05/04/22 1055   Wound Assessment Granulation tissue;Slough 05/04/22 1017   Drainage Amount Moderate 05/04/22 1017   Drainage Description Serosanguinous; Yellow 05/04/22 1017   Odor None 05/04/22 1017   Bianca-wound Assessment Dry/flaky 05/04/22 1017   Margins Attached edges 05/04/22 1017   Wound Thickness Description not for Pressure Injury Full thickness 05/04/22 1017   Number of days: 77          Total Surface Area Debrided:  0.98 sq cm     Estimated Blood Loss:  Estimated amount of blood loss is 3 ml. Hemostasis Achieved:  by pressure, by silver nitrate stick and surgicel    Procedural Pain:  0  / 10     Post Procedural Pain:  0 / 10     Response to treatment:  Well tolerated by patient. Plan:   Pt/sister education per provider related to concerns about lack of progress of wound to healing. Sister able to speak to pt issues with her memory but still no happy about bringing her sister to appointments. Shared that since this is the first week we have seen improvement will follow-up in 2 weeks, but if wound deteriorates will return to weekly visits. Pt/sister agreeable. Treatment Note please see attached Discharge Instructions    Written patient dismissal instructions given to patient and signed by patient or POA. Discharge Instructions         Discharge [facility] Wound Care Physician Orders and Discharge Instructions  [address] [facility], [address] [place] [address]  Telephone: [phone]      FAX [phone]  [address] 8:30 am - 4:30 pm and Friday 8:30 am - 1:00 pm.          NAME: Isis Montoya  DATE OF BIRTH:  1935  MEDICAL RECORD NUMBER:  [id number]  DATE:  5/4/2022        USE YOUR SPECIAL CUSHION AS MUCH AS POSSIBLE   1. LAY ON YOUR SIDE MORE OFTEN, STAY OFF YOU BUTT AS MUCH AS YOU CAN   2 No SHOWERs, bird baths only    Look into  life alert       Please wash hands with soap and water prior to and right after  every dressing change            WOUND LOCATION:   LEFT BUTTOCK     · May rinse wounds with 0.9% saline   · Do NOT SCRUB WOUND.   · No  shower     Topical Treatments:              [x]???????????? Apply around the wound:      [x]???????????? No-Sting barrier film        PRIMARY DRESSING:                    surgicel was used at [facility]                             [x]???????????  Aquacel Ag Rope TUCKED INTO entirety of  WOUND- curves up to 12 o'clock          [x]???????????  the depth is 5 cm- and needs to be tucked inside                        SECONDARY DRESSING:                [x]???????????                                 [x]??????????? thick gauze & Cover Roll Tape                                 HOW OFTEN TO CHANGE DRESSINGS:                                  [x]??????????? Three times per week:             _______________________________________________________________________________________________        PRESSURE RELIEF AND OFF-LOADING:     Off-Loading:   [x]????????????????? Off-loading when       [x]????????????????? in bed         [x]????????????????? sitting                       [x]????????????????? Turn every 2 hours when in bed.                       [x]?????????????????  Avoid position directing pressure on wound site.  Limit side lying to 30 degree tilt.  Limit HOB elevation to 30 degrees.     Specialty equipment ordered:       [x]????????????????? Wheelchair cushion (TORO)         _____________________________________________________________________________________________     Dietary:  Continue your diet as tolerated. ? Eat heart-healthy foods. These foods include vegetables, fruits, nuts, beans, lean meat, fish, and whole grains. Limit sodium, alcohol, and sugar. ? Protein is a key nutrient in helping to repair damaged tissue and promote new tissue growth. Good sources of protein are milk, yogurt, cheese, fish, lean meat, and beans.   ? If you are a diabetic, having diabetes can make it hard for wounds to heal. So try to keep your blood sugar in its target range.  __________________________________________________________________________________________________     ACTIVITY:   Activity as tolerated  ________________________________________________________________________________________________________________     PAIN:     Please note, A small amount of pain, drainage and/or bleeding from this process might be expected, and is NORMAL.      Elevate the affected limb. Use over-the-counter medications you would normally use for pain as permitted by your family doctor. For persistent pain not relieved by the above interventions, please call your family doctor.  ________________________________________________________________________________  Call your doctor now or seek immediate medical care if:    · You have symptoms of infection, such as:  ? Increased pain, swelling, warmth, or redness. ? Red streaks leading from the area. ? Pus draining from the area. ? A fever.      _______________________________________________________________________     Return Appointment:     § ECF or Home Healthcare: [id number] ---    please order enough materials      [address] is responsible to order your supplies.      · Return Appointment: With Aneta Sears CNP  in 2 Week(s)                   []???????????? Orders placed during your visit:             CULTURE OBTAINED : 3/9/22- RESULTED .                  Electronically signed by : Dariel Gallagher on 5/4/2022 at 10:53 AM       [address] [place] [address] Information: Should you experience any significant changes in your wound(s) or have questions about your wound care, please contact the [address] [place], [place] [address]K [phone] [address] 8:30 am - 4:30 pm and Friday 8:30 am - 1:00 pm.  If you need help with your wound outside these hours and cannot wait until we are again available, contact your PCP or go to the hospital emergency room.      PLEASE NOTE: IF YOU ARE UNABLE TO OBTAIN WOUND SUPPLIES, CONTINUE TO USE THE SUPPLIES YOU HAVE AVAILABLE UNTIL YOU ARE ABLE TO [facility].  KEEP THE WOUND COVERED AT ALL TIMES.           Physician Signature:_______________________     Date: ___________ Time:  ____________         [x]??????????? Natasha Jacobson CNP                  Electronically signed by MICHELLE Campa CNP on 5/4/2022 at 2:17 PM

## 2022-05-11 RX ORDER — LEVETIRACETAM 500 MG/1
TABLET ORAL
Qty: 180 TABLET | Refills: 1 | Status: SHIPPED | OUTPATIENT
Start: 2022-05-11

## 2022-05-11 NOTE — TELEPHONE ENCOUNTER
Last office visit 4/13/2022     Last written     Next office visit scheduled Visit date not found    Requested Prescriptions     Pending Prescriptions Disp Refills    levETIRAcetam (KEPPRA) 500 MG tablet [Pharmacy Med Name: LEVETIRACETAM 500 MG TABLET] 180 tablet      Sig: TAKE 1 TABLET BY MOUTH TWICE A DAY

## 2022-05-18 ENCOUNTER — HOSPITAL ENCOUNTER (OUTPATIENT)
Dept: WOUND CARE | Age: 87
Discharge: HOME | End: 2022-05-18
Payer: MEDICARE

## 2022-05-18 VITALS
RESPIRATION RATE: 20 BRPM | DIASTOLIC BLOOD PRESSURE: 77 MMHG | HEART RATE: 74 BPM | TEMPERATURE: 97.4 F | SYSTOLIC BLOOD PRESSURE: 151 MMHG

## 2022-05-18 DIAGNOSIS — Z87.898 HISTORY OF SHORT TERM MEMORY LOSS: Primary | ICD-10-CM

## 2022-05-18 DIAGNOSIS — Z74.09 DECREASED MOBILITY AND ENDURANCE: ICD-10-CM

## 2022-05-18 DIAGNOSIS — L89.323 PRESSURE ULCER OF ISCHIUM, STAGE 3, LEFT (HCC): ICD-10-CM

## 2022-05-18 DIAGNOSIS — N39.45 CONTINUOUS LEAKAGE OF URINE: ICD-10-CM

## 2022-05-18 DIAGNOSIS — H91.93 BILATERAL HEARING LOSS, UNSPECIFIED HEARING LOSS TYPE: ICD-10-CM

## 2022-05-18 PROCEDURE — 11042 DBRDMT SUBQ TIS 1ST 20SQCM/<: CPT | Performed by: NURSE PRACTITIONER

## 2022-05-18 PROCEDURE — 11042 DBRDMT SUBQ TIS 1ST 20SQCM/<: CPT

## 2022-05-18 RX ORDER — LIDOCAINE 40 MG/G
CREAM TOPICAL ONCE
Status: CANCELLED | OUTPATIENT
Start: 2022-05-18 | End: 2022-05-18

## 2022-05-18 RX ORDER — LIDOCAINE 50 MG/G
OINTMENT TOPICAL ONCE
Status: CANCELLED | OUTPATIENT
Start: 2022-05-18 | End: 2022-05-18

## 2022-05-18 RX ORDER — BACITRACIN, NEOMYCIN, POLYMYXIN B 400; 3.5; 5 [USP'U]/G; MG/G; [USP'U]/G
OINTMENT TOPICAL ONCE
Status: CANCELLED | OUTPATIENT
Start: 2022-05-18 | End: 2022-05-18

## 2022-05-18 RX ORDER — LIDOCAINE HYDROCHLORIDE 20 MG/ML
JELLY TOPICAL ONCE
Status: CANCELLED | OUTPATIENT
Start: 2022-05-18 | End: 2022-05-18

## 2022-05-18 RX ORDER — GINSENG 100 MG
CAPSULE ORAL ONCE
Status: CANCELLED | OUTPATIENT
Start: 2022-05-18 | End: 2022-05-18

## 2022-05-18 RX ORDER — LIDOCAINE HYDROCHLORIDE 40 MG/ML
SOLUTION TOPICAL ONCE
Status: COMPLETED | OUTPATIENT
Start: 2022-05-18 | End: 2022-05-18

## 2022-05-18 RX ORDER — GENTAMICIN SULFATE 1 MG/G
OINTMENT TOPICAL ONCE
Status: CANCELLED | OUTPATIENT
Start: 2022-05-18 | End: 2022-05-18

## 2022-05-18 RX ORDER — BACITRACIN ZINC AND POLYMYXIN B SULFATE 500; 1000 [USP'U]/G; [USP'U]/G
OINTMENT TOPICAL ONCE
Status: CANCELLED | OUTPATIENT
Start: 2022-05-18 | End: 2022-05-18

## 2022-05-18 RX ORDER — LIDOCAINE HYDROCHLORIDE 40 MG/ML
SOLUTION TOPICAL ONCE
Status: CANCELLED | OUTPATIENT
Start: 2022-05-18 | End: 2022-05-18

## 2022-05-18 RX ADMIN — LIDOCAINE HYDROCHLORIDE: 40 SOLUTION TOPICAL at 10:09

## 2022-05-18 ASSESSMENT — PAIN SCALES - GENERAL: PAINLEVEL_OUTOF10: 0

## 2022-05-18 NOTE — PROGRESS NOTES
[address]   Progress Note and Procedure Note      Iona Arreola [address] RECORD NUMBER:  [id number]  AGE: 82 y.o. GENDER: female  : 1935  EPISODE DATE:  2022    Subjective:     Chief Complaint   Patient presents with    Wound Check     follow up visit left ischium wound         HISTORY of PRESENT ILLNESS BETITO Crockett Seen a 82 y. o. female who presents today for wound/ulcer evaluation. Pt accompanied by her younger sister. No new issues from this past 2 weeks at home, wound is decreased in size.      She has a hx of falling, not sure how she ends up on floor. Pt reports not feeling pain in wound area. Sits in a recliner most of the day and sleeps in bed at night. Has a Roho cushion for chair. Her sister stated she has found the cushion on the floor at times.  Sister and home care nurse have found dressings off, thrown away or on the floor which pt adamantly denies. Home care nurse changes dressings twice a week. Both pt and sister somewhat irritated at lack of progress even though they know pt ability to comply is a major obstacle.      Appreciate consultation by Dr. Renata Burciaga last week. History of Wound Context: reported and pictured as a unstagable closed deep tissue injury 2021. When first visited [facility] wound open with depth. Wound/Ulcer Pain Timing/Severity: none, cannot feel pain in the area of wound  Quality of pain: N/A  Severity:  0 / 10   Modifying Factors: None  Associated Signs/Symptoms: drainage     Ulcer Identification:  Ulcer Type: pressure     Contributing Factors: chronic pressure, decreased mobility and malnutrition, forgetfulness     The biggest challenge to wound healing is dressings\"working themselves out\" between visits and pt short term memory causes pt does not recall instructions.  Her sister's memory is better but still an issue.  We have discussed NPWT but concern is that pt would not remember to plug in at night, may take it off or REPLACEMENT Right 2018    right total knee replacment    OVARY REMOVAL  1963    right    TONSILLECTOMY AND ADENOIDECTOMY  1942       FAMILY HISTORY    Family History   Problem Relation Age of Onset   Morin Cancer Mother     Heart Disease Mother     Arthritis Sister         rheumatoid    Cancer Brother     Cancer Sister     Diabetes Sister     Heart Disease Brother     High Blood Pressure Brother     High Blood Pressure Sister     Diabetes Brother        SOCIAL HISTORY    Social History     Tobacco Use    Smoking status: Never Smoker    Smokeless tobacco: Never Used    Tobacco comment: encouraged to never smoke    Vaping Use    Vaping Use: Never used   Substance Use Topics    Alcohol use: No    Drug use: No       ALLERGIES    Allergies   Allergen Reactions    Amoxicillin Hives and Rash    Linezolid Other (See Comments)     While taking linezolid, pt developed progressively altered mentation & ultimately had a witnessed seizure 4/26/18. Uncertain whether these symptoms were d/t linezolid, but possible.     Prednisone Itching    Vancomycin      Rising SCr during 5/2018 admission     Coreg [Carvedilol] Diarrhea    Oxycodone-Acetaminophen Rash       MEDICATIONS    Current Outpatient Medications on File Prior to Encounter   Medication Sig Dispense Refill    levETIRAcetam (KEPPRA) 500 MG tablet TAKE 1 TABLET BY MOUTH TWICE A  tablet 1    lisinopril (PRINIVIL;ZESTRIL) 20 MG tablet TAKE ONE TABLET BY MOUTH DAILY 90 tablet 0    Balsam [place]-Castor Oil (VENELEX) OINT ointment Apply topically 2 times daily 30 g 0    diclofenac (VOLTAREN) 75 MG EC tablet TAKE ONE TABLET BY MOUTH TWICE A DAY WITH MEALS (Patient taking differently: Take 75 mg by mouth 2 times daily as needed TAKE ONE TABLET BY MOUTH TWICE A DAY WITH MEALS) 180 tablet 2    potassium chloride (KLOR-CON M) 10 MEQ extended release tablet TAKE ONE TABLET BY MOUTH DAILY 90 tablet 0    thyroid (NP THYROID) 30 MG tablet TAKE ONE TABLET BY Control: Anesthetic  Anesthetic: 4% Lidocaine Liquid Topical       Debridement: Excisional Debridement    Using curette the wound(s)/ulcer(s) was/were debrided down through and including the removal of epidermis, dermis and subcutaneous tissue.         Devitalized Tissue Debrided:  fibrin, biofilm and slough    Pre Debridement Measurements:  Are located in the [place]  Documentation Flow Sheet    Diabetic/Pressure/Non Pressure Ulcers only:  Ulcer: Pressure ulcer, Stage 3     Wound/Ulcer #: 1    Post Debridement Measurements:  Wound/Ulcer Descriptions are Pre Debridement except measurements:    Wound 02/16/22 Ischium Left #1 ( since about 12/15/2021) (Active)   Wound Image   04/06/22 1118   Wound Etiology Pressure Stage  3 02/16/22 1204   Dressing Status New dressing applied 05/18/22 1115   Wound Cleansed Vashe 04/28/22 1517   Dressing/Treatment Alginate with Ag;Barrier film;Tape/Soft cloth adhesive tape;Gauze dressing/dressing sponge 05/18/22 1115   Wound Length (cm) 0.7 cm 05/18/22 1009   Wound Width (cm) 1.4 cm 05/18/22 1009   Wound Depth (cm) 4.1 cm 05/18/22 1009   Wound Surface Area (cm^2) 0.98 cm^2 05/18/22 1009   Change in Wound Size % (l*w) 73.87 05/18/22 1009   Wound Volume (cm^3) 4.018 cm^3 05/18/22 1009   Wound Healing % 85 05/18/22 1009   Post-Procedure Length (cm) 0.8 cm 05/18/22 1031   Post-Procedure Width (cm) 1.5 cm 05/18/22 1031   Post-Procedure Depth (cm) 4.1 cm 05/18/22 1031   Post-Procedure Surface Area (cm^2) 1.2 cm^2 05/18/22 1031   Post-Procedure Volume (cm^3) 4.92 cm^3 05/18/22 1031   Wound Assessment Granulation tissue;Slough 05/18/22 1009   Drainage Amount Moderate 05/18/22 1009   Drainage Description Serosanguinous;Brown 05/18/22 1009   Odor None 05/18/22 1009   Bianca-wound Assessment Fragile 05/18/22 1009   Margins Undefined edges 05/18/22 1009   Wound Thickness Description not for Pressure Injury Full thickness 05/18/22 1009   Number of days: 91          Total Surface Area Debrided:  1.2 sq cm     Estimated Blood Loss:  Minimal    Hemostasis Achieved:  not needed    Procedural Pain:  1  / 10     Post Procedural Pain:  0 / 10     Response to treatment:  Well tolerated by patient. Plan:   Pt/sister education per provider related to wound improvement when pt following plan of care. They are both anxious to \"get wound healed\". Questions answered  Treatment Note please see attached Discharge Instructions    Written patient dismissal instructions given to patient and signed by patient or POA. Discharge Instructions         Discharge [facility] Wound Care Physician Orders and Discharge Instructions  [address], [address], [place] [address]  Telephone: [phone]      FAX [phone]  [address] 8:30 am - 4:30 pm and Friday 8:30 am - 1:00 pm.          NAME: Ezra Covarrubias  DATE OF BIRTH:  1935  MEDICAL RECORD NUMBER:  [id number]  DATE:  5/18/2022        USE YOUR SPECIAL CUSHION AS MUCH AS POSSIBLE   1. LAY ON YOUR SIDE MORE OFTEN, STAY OFF YOU BUTT AS MUCH AS YOU CAN   2 No SHOWERs, bird baths only    Look into  life alert       Please wash hands with soap and water prior to and right after  every dressing change            WOUND LOCATION:   LEFT BUTTOCK     · May rinse wounds with 0.9% saline   · Do NOT SCRUB WOUND.   · No  shower     Topical Treatments:              [x]????????????? Apply around the wound:      [x]????????????? No-Sting barrier film        PRIMARY DRESSING:                    surgicel was used at [address] [facility]                          [x]????????????  Aquacel Ag Rope TUCKED INTO entirety of  WOUND- curves up to 12 o'clock          [x]????????????  the depth is 4 cm- and needs to be tucked inside                        SECONDARY DRESSING:                [x]????????????                                 [x]???????????? thick gauze & Cover Roll Tape                                 HOW OFTEN TO CHANGE DRESSINGS:                                  [x]???????????? Three times per week:             _______________________________________________________________________________________________        PRESSURE RELIEF AND OFF-LOADING:     Off-Loading:   [x]?????????????????? Off-loading when       [x]?????????????????? in bed         [x]?????????????????? sitting                       [x]?????????????????? Turn every 2 hours when in bed.                       [x]??????????????????  Avoid position directing pressure on wound site.  Limit side lying to 30 degree tilt.  Limit HOB elevation to 30 degrees.     Specialty equipment ordered:       [x]?????????????????? Wheelchair cushion (JEANMARIEO)         _____________________________________________________________________________________________     Dietary:  Continue your diet as tolerated. ? Eat heart-healthy foods. These foods include vegetables, fruits, nuts, beans, lean meat, fish, and whole grains. Limit sodium, alcohol, and sugar. ? Protein is a key nutrient in helping to repair damaged tissue and promote new tissue growth. Good sources of protein are milk, yogurt, cheese, fish, lean meat, and beans. ? If you are a diabetic, having diabetes can make it hard for wounds to heal. So try to keep your blood sugar in its target range.  __________________________________________________________________________________________________     ACTIVITY:   Activity as tolerated  ________________________________________________________________________________________________________________     PAIN:     Please note, A small amount of pain, drainage and/or bleeding from this process might be expected, and is NORMAL.      Elevate the affected limb. Use over-the-counter medications you would normally use for pain as permitted by your family doctor.   For persistent pain not relieved by the above interventions, please call your family doctor.  ________________________________________________________________________________  Call your doctor now or seek immediate medical care if:    · You have symptoms of infection, such as:  ? Increased pain, swelling, warmth, or redness. ? Red streaks leading from the area. ? Pus draining from the area. ? A fever.      _______________________________________________________________________     Return Appointment:     § ECF or Home Healthcare: [facility]0 ---    please order enough materials      Laura [address] is responsible to order your supplies.      · Return Appointment: With Marko Arriola CNP  in 2 Week(s)                   []????????????? Orders placed during your visit:             CULTURE OBTAINED : 3/9/22- RESULTED .                Electronically signed by : Ole Zapata on 5/18/2022 at 10:34 AM        [facility] Information: Should you experience any significant changes in your wound(s) or have questions about your wound care, please contact the [address] [place] [address] [phone] [address] [place], [place] [address]in [address] [place] 8:30 am - 4:30 pm and Friday 8:30 am - 1:00 pm.  If you need help with your wound outside these hours and cannot wait until we are again available, contact your PCP or go to the hospital emergency room.      PLEASE NOTE: IF YOU ARE UNABLE TO OBTAIN WOUND SUPPLIES, CONTINUE TO USE THE SUPPLIES YOU HAVE AVAILABLE UNTIL YOU ARE ABLE TO [facility]. KEEP THE WOUND COVERED AT ALL TIMES.            Physician Signature:_______________________     Date: ___________ Time:  ____________         [x]???????????? Natasha Jacobson CNP            Electronically signed by MICHELLE Martin CNP on 5/18/2022 at 3:13 PM

## 2022-06-01 ENCOUNTER — HOSPITAL ENCOUNTER (OUTPATIENT)
Dept: WOUND CARE | Age: 87
Discharge: HOME | End: 2022-06-01
Payer: MEDICARE

## 2022-06-01 VITALS
DIASTOLIC BLOOD PRESSURE: 76 MMHG | TEMPERATURE: 97.7 F | HEART RATE: 84 BPM | RESPIRATION RATE: 20 BRPM | SYSTOLIC BLOOD PRESSURE: 179 MMHG

## 2022-06-01 DIAGNOSIS — L89.323 PRESSURE ULCER OF ISCHIUM, STAGE 3, LEFT (HCC): ICD-10-CM

## 2022-06-01 DIAGNOSIS — H91.93 BILATERAL HEARING LOSS, UNSPECIFIED HEARING LOSS TYPE: ICD-10-CM

## 2022-06-01 DIAGNOSIS — N39.45 CONTINUOUS LEAKAGE OF URINE: ICD-10-CM

## 2022-06-01 DIAGNOSIS — Z74.09 DECREASED MOBILITY AND ENDURANCE: ICD-10-CM

## 2022-06-01 DIAGNOSIS — Z91.199 NON-COMPLIANCE WITH TREATMENT: ICD-10-CM

## 2022-06-01 DIAGNOSIS — Z87.898 HISTORY OF SHORT TERM MEMORY LOSS: Primary | ICD-10-CM

## 2022-06-01 PROCEDURE — 11042 DBRDMT SUBQ TIS 1ST 20SQCM/<: CPT | Performed by: NURSE PRACTITIONER

## 2022-06-01 PROCEDURE — 11042 DBRDMT SUBQ TIS 1ST 20SQCM/<: CPT

## 2022-06-01 RX ORDER — LIDOCAINE HYDROCHLORIDE 20 MG/ML
JELLY TOPICAL ONCE
OUTPATIENT
Start: 2022-06-01 | End: 2022-06-01

## 2022-06-01 RX ORDER — GENTAMICIN SULFATE 1 MG/G
OINTMENT TOPICAL ONCE
OUTPATIENT
Start: 2022-06-01 | End: 2022-06-01

## 2022-06-01 RX ORDER — LIDOCAINE 40 MG/G
CREAM TOPICAL ONCE
OUTPATIENT
Start: 2022-06-01 | End: 2022-06-01

## 2022-06-01 RX ORDER — GINSENG 100 MG
CAPSULE ORAL ONCE
OUTPATIENT
Start: 2022-06-01 | End: 2022-06-01

## 2022-06-01 RX ORDER — BACITRACIN ZINC AND POLYMYXIN B SULFATE 500; 1000 [USP'U]/G; [USP'U]/G
OINTMENT TOPICAL ONCE
OUTPATIENT
Start: 2022-06-01 | End: 2022-06-01

## 2022-06-01 RX ORDER — LIDOCAINE 50 MG/G
OINTMENT TOPICAL ONCE
OUTPATIENT
Start: 2022-06-01 | End: 2022-06-01

## 2022-06-01 RX ORDER — LIDOCAINE HYDROCHLORIDE 40 MG/ML
SOLUTION TOPICAL ONCE
Status: COMPLETED | OUTPATIENT
Start: 2022-06-01 | End: 2022-06-01

## 2022-06-01 RX ORDER — LIDOCAINE HYDROCHLORIDE 40 MG/ML
SOLUTION TOPICAL ONCE
Status: CANCELLED | OUTPATIENT
Start: 2022-06-01 | End: 2022-06-01

## 2022-06-01 RX ORDER — BACITRACIN, NEOMYCIN, POLYMYXIN B 400; 3.5; 5 [USP'U]/G; MG/G; [USP'U]/G
OINTMENT TOPICAL ONCE
OUTPATIENT
Start: 2022-06-01 | End: 2022-06-01

## 2022-06-01 RX ADMIN — LIDOCAINE HYDROCHLORIDE: 40 SOLUTION TOPICAL at 10:34

## 2022-06-01 ASSESSMENT — PAIN SCALES - GENERAL
PAINLEVEL_OUTOF10: 0
PAINLEVEL_OUTOF10: 0

## 2022-06-02 PROBLEM — Z91.199 NON-COMPLIANCE WITH TREATMENT: Status: ACTIVE | Noted: 2022-01-01

## 2022-06-02 NOTE — PROGRESS NOTES
[address]   Progress Note and Procedure Note      Iona Ar[address] RECORD NUMBER:  [id number]  AGE: 82 y.o. GENDER: female  : 1935  EPISODE DATE:  2022    Subjective:     Chief Complaint   Patient presents with    Wound Check     follow up left ischium wound         HISTORY of PRESENT ILLNESS HPI  Dwayne Alvarez a 82 y. o. female who presents today for wound/ulcer evaluation. Pt accompanied by her younger sister. No new issues from this past 2 weeks at home, wound is slowly decreasing in depth.        She has a hx of falling, she is \"not sure how she ends up on floor\". Pt reports not feeling pain in wound area. Sits in a recliner most of the day and sleeps in bed at night. Has a Roho cushion for chair. Her sister stated she has found the cushion on the floor often.  Sister and home care nurse have found dressings off, thrown away or on the floor which pt adamantly denies. Home care nurse changes dressings twice a week. Both pt and sister continue to voice frustration at continued visits and lack of progress of wound closure  even though they know pt ability to comply is a major obstacle. Pt and sister got I into a heated discussion about whose \"fault it was\"  About wound not healing and continued visits.      Appreciate consultation by Dr. Daniel Dunne last week.   History of Wound Context: reported and pictured as a unstagable closed deep tissue injury 2021. When first visited [address] [place] [address],[address] wound open with depth.   Wound/Ulcer Pain Timing/Severity: none, cannot feel pain in the area of wound  Quality of pain: N/A  Severity:  0 / 10   Modifying Factors: None  Associated Signs/Symptoms: drainage     Ulcer Identification:  Ulcer Type: pressure     Contributing Factors: chronic pressure, decreased mobility and malnutrition, forgetfulness, non adherence to plan of care.      The biggest challenge to wound healing is dressings\"working themselves out\" between visits and pt short term memory causes pt does not recall instructions. Her sister's memory is better but still an issue.  We have discussed NPWT but concern is that pt would not remember to plug in at night, may take it off or otherwise interrupt therapy.     Pt's appetite is poor with largest meal at supper of a microwave dinner. The sister has bought  Ensure to boost her protein intake.    Noted when she she sits up, tissue bulges out of ischial wound and bulging of tissue may account for wound packing working it's way out between dressing changes.       Sister reports she and pt have a very strained relationship from past issues.  The pt has her grandaughter a power of , and granddaughter. Spoke with pt and sister to share concerns with granddaughter about current situation and to evaluate pt's safety at home.  The sister and pt live next door to one another in separate condominiums, but their contact is not consistent.      PAST MEDICAL HISTORY        Diagnosis Date    CAD (coronary artery disease)     Inferior STEMI; CATRACHITA to RCA    DDD (degenerative disc disease), lumbosacral 4/10/2013    Decreased mobility and endurance 2/24/2022    Hearing aid worn     bilateral    History of short term memory loss 3/3/2022    [place] (hard of hearing)     [place] (hard of hearing)     Hyperlipidemia     Hypertension     MI, old 2017    Neuropathy     bilateral lower extremities    Non-compliance with treatment 6/2/2022    Pressure ulcer of ischium, stage 3, left ([address] 2/16/2022    Pressure ulcer of left buttock, stage 3 ([address] 2/16/2022    Sleep apnea     uses mouth piece; bringing DOS 3/26/18    Thyroid disease     hypo    Wears glasses        PAST SURGICAL HISTORY    Past Surgical History:   Procedure Laterality Date    ABSCESS DRAINAGE Right 03/27/2018    evacuation hematoma right knee    APPENDECTOMY  1963    BACK SURGERY  [id number]    MILD PROCEDURE L2-3 BILATERAL     BREAST LUMPECTOMY  1986    BUNIONECTOMY 10/2003    CATARACT REMOVAL  03/23/2010 and 04/14/2010    COLONOSCOPY  6/2014    repeat 2019    CORONARY ANGIOPLASTY WITH STENT PLACEMENT  01/23/2017    INF STEMI with CATRACHITA to RCA    DILATION AND CURETTAGE OF UTERUS      INCONTINENCE SURGERY      JOINT REPLACEMENT Right 2018    right total knee replacment    OVARY REMOVAL  1963    right    TONSILLECTOMY AND ADENOIDECTOMY  1942       FAMILY HISTORY    Family History   Problem Relation Age of Onset   [employer] Cancer Mother     Heart Disease Mother     Arthritis Sister         rheumatoid    Cancer Brother     Cancer Sister     Diabetes Sister     Heart Disease Brother     High Blood Pressure Brother     High Blood Pressure Sister     Diabetes Brother        SOCIAL HISTORY    Social History     Tobacco Use    Smoking status: Never Smoker    Smokeless tobacco: Never Used    Tobacco comment: encouraged to never smoke    Vaping Use    Vaping Use: Never used   Substance Use Topics    Alcohol use: No    Drug use: No       ALLERGIES    Allergies   Allergen Reactions    Amoxicillin Hives and Rash    Linezolid Other (See Comments)     While taking linezolid, pt developed progressively altered mentation & ultimately had a witnessed seizure 4/26/18. Uncertain whether these symptoms were d/t linezolid, but possible.     Prednisone Itching    Vancomycin      Rising SCr during 5/2018 admission     Coreg [Carvedilol] Diarrhea    Oxycodone-Acetaminophen Rash       MEDICATIONS    Current Outpatient Medications on File Prior to Encounter   Medication Sig Dispense Refill    levETIRAcetam (KEPPRA) 500 MG tablet TAKE 1 TABLET BY MOUTH TWICE A  tablet 1    lisinopril (PRINIVIL;ZESTRIL) 20 MG tablet TAKE ONE TABLET BY MOUTH DAILY 90 tablet 0    Balsam [place]-Castor Oil (VENELEX) OINT ointment Apply topically 2 times daily 30 g 0    diclofenac (VOLTAREN) 75 MG EC tablet TAKE ONE TABLET BY MOUTH TWICE A DAY WITH MEALS (Patient taking differently: Take 75 mg by mouth 2 times daily as needed TAKE ONE TABLET BY MOUTH TWICE A DAY WITH MEALS) 180 tablet 2    potassium chloride (KLOR-CON M) 10 MEQ extended release tablet TAKE ONE TABLET BY MOUTH DAILY 90 tablet 0    thyroid (NP THYROID) 30 MG tablet TAKE ONE TABLET BY MOUTH DAILY 90 tablet 3    rosuvastatin (CRESTOR) 40 MG tablet TAKE ONE TABLET BY MOUTH DAILY 90 tablet 3    Lactobacillus (ACIDOPHILUS) TABS Take 1 tablet by mouth 2 times daily       Multiple Vitamins-Minerals (DAILY SHERIE MAXIMUM MULTIVITAMIN PO) Take 1 packet by mouth daily      aspirin 81 MG tablet Take 81 mg by mouth daily      latanoprost (XALATAN) 0.005 % ophthalmic solution Place 1 drop into the left eye nightly        No current facility-administered medications on file prior to encounter. REVIEW OF SYSTEMS  Review of Systems    Pertinent items are noted in HPI.     Objective:      BP (!) 179/76   Pulse 84   Temp 97.7 °F (36.5 °C) (Infrared)   Resp 20     Wt Readings from Last 3 Encounters:   04/13/22 157 lb (71.2 kg)   02/18/22 156 lb 3.2 oz (70.9 kg)   02/18/22 154 lb (69.9 kg)       PHYSICAL EXAM  Physical Exam    General Appearance: alert and oriented to person, place and time, well-developed and well-nourished, in no acute distress and with anxious affect  Skin: warm and dry, no rash or erythema  Head: normocephalic and atraumatic  Eyes: pupils equal, round, and reactive to light  Pulmonary/Chest: clear to auscultation bilaterally- no wheezes, rales or rhonchi, normal air movement, no respiratory distress  Cardiovascular: normal rate and regular rhythm      Assessment:        Problem List Items Addressed This Visit     Non-compliance with treatment    Pressure ulcer of ischium, stage 3, left (HCC)    Decreased mobility and endurance    [place] (hard of hearing)    History of short term memory loss - Primary    RESOLVED: Incontinence           Procedure Note  Indications:  Based on my examination of this patient's wound(s)/ulcer(s) today, debridement is required to promote healing and evaluate the wound base. Performed by: MICHELLE Martin CNP    Consent obtained:  Yes    Time out taken:  Yes    Pain Control: Anesthetic  Anesthetic: 4% Lidocaine Liquid Topical       Debridement: Excisional Debridement    Using curette the wound(s)/ulcer(s) was/were debrided down through and including the removal of epidermis, dermis and subcutaneous tissue. Devitalized Tissue Debrided:  fibrin, biofilm and slough    Pre Debridement Measurements:  Are located in the [facility]  Documentation Flow Sheet    Diabetic/Pressure/Non Pressure Ulcers only:  Ulcer: Pressure ulcer, Stage 3     Wound/Ulcer #: 1    Post Debridement Measurements:  Wound/Ulcer Descriptions are Pre Debridement except measurements:    Wound 02/16/22 Ischium Left #1 ( since about 12/15/2021) (Active)   Wound Image   06/01/22 1029   Wound Etiology Pressure Stage  3 02/16/22 1204   Dressing Status Old drainage noted 06/01/22 1029   Wound Cleansed Vashe 06/01/22 1104   Dressing/Treatment Alginate; Other (comment);Gauze dressing/dressing sponge;Tape/Soft cloth adhesive tape 06/01/22 1104   Wound Length (cm) 0.5 cm 06/01/22 1029   Wound Width (cm) 1.7 cm 06/01/22 1029   Wound Depth (cm) 4.4 cm 06/01/22 1029   Wound Surface Area (cm^2) 0.85 cm^2 06/01/22 1029   Change in Wound Size % (l*w) 77.33 06/01/22 1029   Wound Volume (cm^3) 3.74 cm^3 06/01/22 1029   Wound Healing % 86 06/01/22 1029   Post-Procedure Length (cm) 0.6 cm 06/01/22 1039   Post-Procedure Width (cm) 1.8 cm 06/01/22 1039   Post-Procedure Depth (cm) 4.4 cm 06/01/22 1039   Post-Procedure Surface Area (cm^2) 1.08 cm^2 06/01/22 1039   Post-Procedure Volume (cm^3) 4.752 cm^3 06/01/22 1039   Wound Assessment Granulation tissue;Slough 06/01/22 1029   Drainage Amount Moderate 06/01/22 1029   Drainage Description Serosanguinous; Yellow;Green 06/01/22 1029   Odor None 06/01/22 1029   Bianca-wound Assessment Maceration;Blanchable erythema 06/01/22 1029   Margins Undefined edges 06/01/22 1029   Wound Thickness Description not for Pressure Injury Full thickness 06/01/22 1029   Number of days: 105          Total Surface Area Debrided:  1.08 sq cm     Estimated Blood Loss:  Minimal    Hemostasis Achieved:  by pressure    Procedural Pain:  0  / 10     Post Procedural Pain:  0 / 10     Response to treatment:  Well tolerated by patient. Plan:   Pt and sister education per provider related to plan of care and discussion of continuing care. Pt has visits every 2 weeks, has home care. Discussing adding collagen to base of wound and goals of treatment. Lengthy discussion about cause of initial wound and continuation of wound with current activity level and not relieving pressure to area with prolonged sitting. Questions answered. Treatment Note please see attached Discharge Instructions    Written patient dismissal instructions given to patient and signed by patient or POA. Discharge Instructions         Discharge [facility] Wound Care Physician Orders and Discharge Instructions  [address] [place] [address], [address] [place] [address]  Telephone: [phone]      FAX [phone]  [address] 8:30 am - 4:30 pm and Friday 8:30 am - 1:00 pm.          NAME: Shanita Darden  DATE OF BIRTH:  1935  MEDICAL RECORD NUMBER:  [id number]  DATE:  5/18/2022        USE YOUR SPECIAL CUSHION AS MUCH AS POSSIBLE   1. LAY ON YOUR SIDE MORE OFTEN, STAY OFF YOU BUTT AS MUCH AS YOU CAN   2 No SHOWERs, bird baths only    Look into  life alert       Please wash hands with soap and water prior to and right after  every dressing change            WOUND LOCATION:   LEFT BUTTOCK     · May rinse wounds with 0.9% saline   · Do NOT SCRUB WOUND.   · No  shower     Topical Treatments:              [x]?????????????? Apply around the wound:      [x]?????????????? No-Sting barrier film        PRIMARY DRESSING:                                     [x] Endoform AM at deep base of wound                           [x]?????????????  Aquacel Rope TUCKED INTO entirety of  WOUND- curves up to 12 o'clock          [x]?????????????  the depth is 4.4 cm- and needs to be tucked inside                        SECONDARY DRESSING:                [x]?????????????                                 [x]????????????? thick gauze & Cover Roll Tape                                 HOW OFTEN TO CHANGE DRESSINGS:                                  [x]????????????? Three times per week:             _______________________________________________________________________________________________        PRESSURE RELIEF AND OFF-LOADING:     Off-Loading:   [x]??????????????????? Off-loading when       [x]??????????????????? in bed         [x]??????????????????? sitting                       [x]??????????????????? Turn every 2 hours when in bed.                       [x]???????????????????  Avoid position directing pressure on wound site.  Limit side lying to 30 degree tilt.  Limit HOB elevation to 30 degrees.     Specialty equipment ordered:       [x]??????????????????? Wheelchair cushion (TORO)         _____________________________________________________________________________________________     Dietary:  Continue your diet as tolerated. ? Eat heart-healthy foods. These foods include vegetables, fruits, nuts, beans, lean meat, fish, and whole grains. Limit sodium, alcohol, and sugar. ? Protein is a key nutrient in helping to repair damaged tissue and promote new tissue growth. Good sources of protein are milk, yogurt, cheese, fish, lean meat, and beans.   ? If you are a diabetic, having diabetes can make it hard for wounds to heal. So try to keep your blood sugar in its target range.  __________________________________________________________________________________________________     ACTIVITY:   Activity as tolerated  ________________________________________________________________________________________________________________     PAIN:     Please note, A small amount of pain, drainage and/or bleeding from this process might be expected, and is NORMAL.      Elevate the affected limb. Use over-the-counter medications you would normally use for pain as permitted by your family doctor. For persistent pain not relieved by the above interventions, please call your family doctor.  ________________________________________________________________________________  Call your doctor now or seek immediate medical care if:    · You have symptoms of infection, such as:  ? Increased pain, swelling, warmth, or redness. ? Red streaks leading from the area. ? Pus draining from the area. ? A fever.      _______________________________________________________________________     Return Appointment:     § ECF or Home Healthcare: [facility] 4.4cm ---    please order enough materials      [address] is responsible to order your supplies.      · Return Appointment: With Vladimir Estrada CNP  in 2 Week(s)                   []?????????????? Orders placed during your visit:             CULTURE OBTAINED : 3/9/22- RESULTED .               Electronically signed by : Capri Wright on 6/1/2022 at 10:44 AM           [address] [place] [address] Information: Should you experience any significant changes in your wound(s) or have questions about your wound care, please contact the [address] [place] [address] [phone] [address] 8:30 am - 4:30 pm and Friday 8:30 am - 1:00 pm.  If you need help with your wound outside these hours and cannot wait until we are again available, contact your PCP or go to the hospital emergency room.      PLEASE NOTE: IF YOU ARE UNABLE TO OBTAIN WOUND SUPPLIES, CONTINUE TO USE THE SUPPLIES YOU HAVE AVAILABLE UNTIL YOU ARE ABLE TO [facility].  KEEP THE WOUND COVERED AT ALL TIMES.           Physician Signature:_______________________     Date: ___________ Time:  ____________         [x]????????????? Natasha Jacobson CNP            Electronically signed by MICHELLE Soto CNP on 6/2/2022 at 11:35 AM

## 2022-06-10 ENCOUNTER — TELEPHONE (OUTPATIENT)
Dept: FAMILY MEDICINE CLINIC | Age: 87
End: 2022-06-10

## 2022-06-10 DIAGNOSIS — M17.11 PRIMARY OSTEOARTHRITIS OF RIGHT KNEE: ICD-10-CM

## 2022-06-10 DIAGNOSIS — M25.461 EFFUSION OF RIGHT KNEE: Primary | ICD-10-CM

## 2022-06-10 NOTE — TELEPHONE ENCOUNTER
I drained her knee two months ago   if the fluid is back we need to refer to orthopedic specialist   [address] Carmen Marte MD  [address] [address] [place] [address], [place] [address]  Phone: [phone]

## 2022-06-10 NOTE — TELEPHONE ENCOUNTER
Isabelle Dennis the nurse with [facility] called to let Dr Paola Edwards know that the pt had swelling in her right knee. Dr Paola Edwards drained some of the fluid off last week. She states that now she has blood and fluids running out of it. She left a message for her niece(Amberly) to take her somewhere to have it looked at. It is to much for the pt to tend to herself with Dementia.  Please give Isabelle Dennis a call [phone]

## 2022-06-15 ENCOUNTER — HOSPITAL ENCOUNTER (OUTPATIENT)
Dept: WOUND CARE | Age: 87
Discharge: HOME | End: 2022-06-15
Payer: MEDICARE

## 2022-06-15 ENCOUNTER — OFFICE VISIT (OUTPATIENT)
Dept: FAMILY MEDICINE CLINIC | Age: 87
End: 2022-06-15
Payer: MEDICARE

## 2022-06-15 VITALS
SYSTOLIC BLOOD PRESSURE: 120 MMHG | DIASTOLIC BLOOD PRESSURE: 78 MMHG | HEIGHT: 65 IN | WEIGHT: 148 LBS | BODY MASS INDEX: 24.66 KG/M2

## 2022-06-15 VITALS
HEART RATE: 76 BPM | SYSTOLIC BLOOD PRESSURE: 134 MMHG | RESPIRATION RATE: 20 BRPM | TEMPERATURE: 97.4 F | DIASTOLIC BLOOD PRESSURE: 76 MMHG

## 2022-06-15 DIAGNOSIS — Z74.09 DECREASED MOBILITY AND ENDURANCE: ICD-10-CM

## 2022-06-15 DIAGNOSIS — I10 ESSENTIAL HYPERTENSION: ICD-10-CM

## 2022-06-15 DIAGNOSIS — Z91.199 NON-COMPLIANCE WITH TREATMENT: Primary | ICD-10-CM

## 2022-06-15 DIAGNOSIS — N18.31 STAGE 3A CHRONIC KIDNEY DISEASE (HCC): ICD-10-CM

## 2022-06-15 DIAGNOSIS — L89.323 PRESSURE ULCER OF ISCHIUM, STAGE 3, LEFT (HCC): ICD-10-CM

## 2022-06-15 DIAGNOSIS — F03.90 DEMENTIA WITHOUT BEHAVIORAL DISTURBANCE, UNSPECIFIED DEMENTIA TYPE: ICD-10-CM

## 2022-06-15 DIAGNOSIS — M25.461 EFFUSION OF RIGHT KNEE: Primary | ICD-10-CM

## 2022-06-15 DIAGNOSIS — Z87.898 HISTORY OF SHORT TERM MEMORY LOSS: ICD-10-CM

## 2022-06-15 PROBLEM — N18.30 CHRONIC RENAL DISEASE, STAGE III (HCC): Status: ACTIVE | Noted: 2022-01-01

## 2022-06-15 LAB
A/G RATIO: 2 (ref 1.1–2.2)
ALBUMIN SERPL-MCNC: 5 G/DL (ref 3.4–5)
ALP BLD-CCNC: 95 U/L (ref 40–129)
ALT SERPL-CCNC: 7 U/L (ref 10–40)
ANION GAP SERPL CALCULATED.3IONS-SCNC: 16 MMOL/L (ref 3–16)
AST SERPL-CCNC: 14 U/L (ref 15–37)
BILIRUB SERPL-MCNC: 0.5 MG/DL (ref 0–1)
BUN BLDV-MCNC: 26 MG/DL (ref 7–20)
CALCIUM SERPL-MCNC: 10.4 MG/DL (ref 8.3–10.6)
CHLORIDE BLD-SCNC: 104 MMOL/L (ref 99–110)
CO2: 20 MMOL/L (ref 21–32)
CREAT SERPL-MCNC: 1 MG/DL (ref 0.6–1.2)
GFR AFRICAN AMERICAN: >60
GFR NON-AFRICAN AMERICAN: 53
GLUCOSE BLD-MCNC: 95 MG/DL (ref 70–99)
HCT VFR BLD CALC: 38.5 % (ref 36–48)
HEMOGLOBIN: 12.5 G/DL (ref 12–16)
MCH RBC QN AUTO: 26.9 PG (ref 26–34)
MCHC RBC AUTO-ENTMCNC: 32.6 G/DL (ref 31–36)
MCV RBC AUTO: 82.6 FL (ref 80–100)
PDW BLD-RTO: 15.9 % (ref 12.4–15.4)
PLATELET # BLD: 233 K/UL (ref 135–450)
PMV BLD AUTO: 9.2 FL (ref 5–10.5)
POTASSIUM SERPL-SCNC: 4.5 MMOL/L (ref 3.5–5.1)
RBC # BLD: 4.66 M/UL (ref 4–5.2)
SODIUM BLD-SCNC: 140 MMOL/L (ref 136–145)
TOTAL PROTEIN: 7.5 G/DL (ref 6.4–8.2)
WBC # BLD: 6.1 K/UL (ref 4–11)

## 2022-06-15 PROCEDURE — 36415 COLL VENOUS BLD VENIPUNCTURE: CPT | Performed by: FAMILY MEDICINE

## 2022-06-15 PROCEDURE — 1123F ACP DISCUSS/DSCN MKR DOCD: CPT | Performed by: FAMILY MEDICINE

## 2022-06-15 PROCEDURE — 99214 OFFICE O/P EST MOD 30 MIN: CPT | Performed by: FAMILY MEDICINE

## 2022-06-15 PROCEDURE — 99212 OFFICE O/P EST SF 10 MIN: CPT | Performed by: NURSE PRACTITIONER

## 2022-06-15 PROCEDURE — 99213 OFFICE O/P EST LOW 20 MIN: CPT

## 2022-06-15 RX ORDER — LIDOCAINE HYDROCHLORIDE 40 MG/ML
SOLUTION TOPICAL ONCE
Status: COMPLETED | OUTPATIENT
Start: 2022-06-15 | End: 2022-06-15

## 2022-06-15 RX ORDER — LIDOCAINE HYDROCHLORIDE 40 MG/ML
SOLUTION TOPICAL ONCE
OUTPATIENT
Start: 2022-06-15 | End: 2022-06-15

## 2022-06-15 RX ORDER — BACITRACIN, NEOMYCIN, POLYMYXIN B 400; 3.5; 5 [USP'U]/G; MG/G; [USP'U]/G
OINTMENT TOPICAL ONCE
OUTPATIENT
Start: 2022-06-15 | End: 2022-06-15

## 2022-06-15 RX ORDER — BACITRACIN ZINC AND POLYMYXIN B SULFATE 500; 1000 [USP'U]/G; [USP'U]/G
OINTMENT TOPICAL ONCE
OUTPATIENT
Start: 2022-06-15 | End: 2022-06-15

## 2022-06-15 RX ORDER — LIDOCAINE 40 MG/G
CREAM TOPICAL ONCE
OUTPATIENT
Start: 2022-06-15 | End: 2022-06-15

## 2022-06-15 RX ORDER — LIDOCAINE 50 MG/G
OINTMENT TOPICAL ONCE
OUTPATIENT
Start: 2022-06-15 | End: 2022-06-15

## 2022-06-15 RX ORDER — LIDOCAINE HYDROCHLORIDE 20 MG/ML
JELLY TOPICAL ONCE
OUTPATIENT
Start: 2022-06-15 | End: 2022-06-15

## 2022-06-15 RX ORDER — GINSENG 100 MG
CAPSULE ORAL ONCE
OUTPATIENT
Start: 2022-06-15 | End: 2022-06-15

## 2022-06-15 RX ORDER — GENTAMICIN SULFATE 1 MG/G
OINTMENT TOPICAL ONCE
OUTPATIENT
Start: 2022-06-15 | End: 2022-06-15

## 2022-06-15 RX ADMIN — LIDOCAINE HYDROCHLORIDE 2.5 ML: 40 SOLUTION TOPICAL at 10:36

## 2022-06-15 ASSESSMENT — PATIENT HEALTH QUESTIONNAIRE - PHQ9
5. POOR APPETITE OR OVEREATING: 0
8. MOVING OR SPEAKING SO SLOWLY THAT OTHER PEOPLE COULD HAVE NOTICED. OR THE OPPOSITE, BEING SO FIGETY OR RESTLESS THAT YOU HAVE BEEN MOVING AROUND A LOT MORE THAN USUAL: 0
6. FEELING BAD ABOUT YOURSELF - OR THAT YOU ARE A FAILURE OR HAVE LET YOURSELF OR YOUR FAMILY DOWN: 0
SUM OF ALL RESPONSES TO PHQ QUESTIONS 1-9: 0
2. FEELING DOWN, DEPRESSED OR HOPELESS: 0
SUM OF ALL RESPONSES TO PHQ QUESTIONS 1-9: 0
3. TROUBLE FALLING OR STAYING ASLEEP: 0
10. IF YOU CHECKED OFF ANY PROBLEMS, HOW DIFFICULT HAVE THESE PROBLEMS MADE IT FOR YOU TO DO YOUR WORK, TAKE CARE OF THINGS AT HOME, OR GET ALONG WITH OTHER PEOPLE: 0
4. FEELING TIRED OR HAVING LITTLE ENERGY: 0
7. TROUBLE CONCENTRATING ON THINGS, SUCH AS READING THE NEWSPAPER OR WATCHING TELEVISION: 0
SUM OF ALL RESPONSES TO PHQ QUESTIONS 1-9: 0
9. THOUGHTS THAT YOU WOULD BE BETTER OFF DEAD, OR OF HURTING YOURSELF: 0
SUM OF ALL RESPONSES TO PHQ QUESTIONS 1-9: 0

## 2022-06-15 ASSESSMENT — PAIN SCALES - GENERAL
PAINLEVEL_OUTOF10: 0
PAINLEVEL_OUTOF10: 0

## 2022-06-15 ASSESSMENT — ENCOUNTER SYMPTOMS: RESPIRATORY NEGATIVE: 1

## 2022-06-15 NOTE — PROGRESS NOTES
OUTPATIENT PROGRESS NOTE  Date of Service:  6/15/2022  Address: [address] [place] [address]  [facility] [address] [facility],First [address] [address]  Dept: [phone]  Loc: [phone]    Subjective:      Patient ID:  [id number]  Hillary Bryan is a 82 y.o. female     HPI   Right knee is leaking fluid  Had fluid drawn off and the fluid was cultured   It was Pseudomonas and she was treated   Was ok for awhile but now the fluid is returned and draining all the time  Able to walk ok  Some pain in replaced knee    Dementia  Here with her sister  She admits her memory is terrible   She is still living at home with assist of family  Not driving   Car not working and she is not going to get this fixed    Weight loss  She is going to wound care and is having hard time getting sacral wound healed  Not drinking protein shake as prescribed  She has lost 50 pounds since last year     Review of Systems   Constitutional: Positive for activity change and fatigue. Respiratory: Negative. Cardiovascular: Negative. Musculoskeletal: Positive for arthralgias and gait problem. Objective:   YOB: 1935    Date of Visit:  6/15/2022       Allergies   Allergen Reactions    Amoxicillin Hives and Rash    Linezolid Other (See Comments)     While taking linezolid, pt developed progressively altered mentation & ultimately had a witnessed seizure 4/26/18. Uncertain whether these symptoms were d/t linezolid, but possible.     Prednisone Itching    Vancomycin      Rising SCr during 5/2018 admission     Coreg [Carvedilol] Diarrhea    Oxycodone-Acetaminophen Rash       Outpatient Medications Marked as Taking for the 6/15/22 encounter (Office Visit) with Boubacar Lemus, DO   Medication Sig Dispense Refill    levETIRAcetam (KEPPRA) 500 MG tablet TAKE 1 TABLET BY MOUTH TWICE A  tablet 1    lisinopril (PRINIVIL;ZESTRIL) 20 MG tablet TAKE ONE TABLET BY MOUTH DAILY 90 tablet 0  Balsam [place]-Castor Oil (VENELEX) OINT ointment Apply topically 2 times daily 30 g 0    diclofenac (VOLTAREN) 75 MG EC tablet TAKE ONE TABLET BY MOUTH TWICE A DAY WITH MEALS (Patient taking differently: Take 75 mg by mouth 2 times daily as needed TAKE ONE TABLET BY MOUTH TWICE A DAY WITH MEALS) 180 tablet 2    potassium chloride (KLOR-CON M) 10 MEQ extended release tablet TAKE ONE TABLET BY MOUTH DAILY 90 tablet 0    thyroid (NP THYROID) 30 MG tablet TAKE ONE TABLET BY MOUTH DAILY 90 tablet 3    rosuvastatin (CRESTOR) 40 MG tablet TAKE ONE TABLET BY MOUTH DAILY 90 tablet 3    Lactobacillus (ACIDOPHILUS) TABS Take 1 tablet by mouth 2 times daily       Multiple Vitamins-Minerals (DAILY SHERIE MAXIMUM MULTIVITAMIN PO) Take 1 packet by mouth daily      aspirin 81 MG tablet Take 81 mg by mouth daily      latanoprost (XALATAN) 0.005 % ophthalmic solution Place 1 drop into the left eye nightly          Vitals:    06/15/22 1136   BP: 120/78   Weight: 148 lb (67.1 kg)   Height: 5' 5\" (1.651 m)     Body mass index is 24.63 kg/m². Wt Readings from Last 3 Encounters:   06/15/22 148 lb (67.1 kg)   04/13/22 157 lb (71.2 kg)   02/18/22 156 lb 3.2 oz (70.9 kg)     BP Readings from Last 3 Encounters:   06/15/22 120/78   06/15/22 134/76   06/01/22 (!) 179/76       Physical Exam  Vitals and nursing note reviewed. Constitutional:       Appearance: She is well-developed. HENT:      Head: Normocephalic. Neck:      Thyroid: No thyromegaly. Cardiovascular:      Rate and Rhythm: Normal rate and regular rhythm. Heart sounds: Normal heart sounds. Pulmonary:      Effort: Pulmonary effort is normal.      Breath sounds: Normal breath sounds. Musculoskeletal:      Comments: Right knee with two areas draining yellow fluid  Knee with some erythema but not warm to touch     Lymphadenopathy:      Cervical: No cervical adenopathy. Neurological:      Mental Status: She is alert and oriented to person, place, and time. Psychiatric:         Behavior: Behavior normal.         Thought Content: Thought content normal.         Judgment: Judgment normal.            Assessment/Plan     Assessment/plan;  Juana Serrano was seen today for medication refill and wound check. Diagnoses and all orders for this visit:    Effusion of right knee  -     Loni Avendano MD, Orthopedic Surgery, [facility]  -     Comprehensive Metabolic Panel    Stage 3a chronic kidney disease ([facility]  -     CBC    Essential hypertension  stable  Dementia without behavioral disturbance, unspecified dementia type ([facility]  No more driving     No follow-ups on file.         Peterson Fernández,

## 2022-06-15 NOTE — PROGRESS NOTES
[address]   Progress Note and Procedure Note      Iona Green RECORD NUMBER:  [id number]  AGE: 82 y.o. GENDER: female  : 1935  EPISODE DATE:  6/15/2022    Subjective:     Chief Complaint   Patient presents with    Wound Check     f/u visit - buttock wound. HISTORY of PRESENT ILLNESS HPI  Tony Go a 82 y. o. female who presents today for wound/ulcer evaluation. Pt accompanied by her younger sister. No new issues from this past 2 weeks at home, wound is slowly decreasing in depth.        She has a hx of falling, she is \"not sure how she ends up on floor\". Pt reports not feeling pain in wound area. Sits in a recliner most of the day and sleeps in bed at night. Has a Roho cushion for chair. Her sister stated she has found the cushion on the floor often.  Sister and home care nurse have found dressings off, thrown away or on the floor which pt adamantly denies. Home care nurse changes dressings twice a week. Both pt and sister continue to voice frustration at continued visits and lack of progress of wound closure  even though they know pt ability to comply is a major obstacle. Pt and sister got I into a heated discussion about whose \"fault it was\"  about wound not healing and continued visits.      Appreciate consultation by Dr. Petey Hyman. History of Wound Context: reported and pictured as a unstagable closed deep tissue injury 2021. When first visited [address] [place] [address],[address] wound open with depth.   Wound/Ulcer Pain Timing/Severity: none, cannot feel pain in the area of wound  Quality of pain: N/A  Severity:  0 / 10   Modifying Factors: None  Associated Signs/Symptoms: drainage     Ulcer Identification:  Ulcer Type: pressure     Contributing Factors: chronic pressure, decreased mobility and malnutrition, forgetfulness, non adherence to plan of care.      The biggest challenge to wound healing is dressings\"working themselves out\" between visits and pt short term memory causes pt does not recall instructions. Her sister's memory is better but still an issue.  We have discussed NPWT but concern is that pt would not remember to plug in at night, may take it off or otherwise interrupt therapy.     Pt's appetite is poor with largest meal at supper of a microwave dinner. The sister has bought  Ensure to boost her protein intake.    Noted when she she sits up, tissue bulges out of ischial wound and bulging of tissue may account for wound packing working it's way out between dressing changes.       Sister reports she and pt have a very strained relationship from past issues.  The pt has her grandaughter a power of , and granddaughter. Spoke with pt and sister to share concerns with granddaughter about current situation and to evaluate pt's safety at home.  The sister and pt live next door to one another in separate condominiums, but their contact is not consistent.      PAST MEDICAL HISTORY        Diagnosis Date    CAD (coronary artery disease)     Inferior STEMI; CATRACHITA to RCA    DDD (degenerative disc disease), lumbosacral 4/10/2013    Decreased mobility and endurance 2/24/2022    Hearing aid worn     bilateral    History of short term memory loss 3/3/2022    [place] (hard of hearing)     [place] (hard of hearing)     Hyperlipidemia     Hypertension     MI, old 2017    Neuropathy     bilateral lower extremities    Non-compliance with treatment 6/2/2022    Pressure ulcer of ischium, stage 3, left ([address] 2/16/2022    Pressure ulcer of left buttock, stage 3 ([address] 2/16/2022    Sleep apnea     uses mouth piece; bringing DOS 3/26/18    Thyroid disease     hypo    Wears glasses        PAST SURGICAL HISTORY    Past Surgical History:   Procedure Laterality Date    ABSCESS DRAINAGE Right 03/27/2018    evacuation hematoma right knee    APPENDECTOMY  [facility]   [facility] SURGERY  [id number]    MILD PROCEDURE L2-3 BILATERAL     BREAST LUMPECTOMY  1986    BUNIONECTOMY  10/2003    CATARACT REMOVAL  03/23/2010 and 04/14/2010    COLONOSCOPY  6/2014    repeat 2019    CORONARY ANGIOPLASTY WITH STENT PLACEMENT  01/23/2017    INF STEMI with CATRACHITA to RCA    DILATION AND CURETTAGE OF UTERUS      INCONTINENCE SURGERY      JOINT REPLACEMENT Right 2018    right total knee replacment    OVARY REMOVAL  1963    right    TONSILLECTOMY AND ADENOIDECTOMY  1942       FAMILY HISTORY    Family History   Problem Relation Age of Onset   Yajaira Clint Cancer Mother     Heart Disease Mother     Arthritis Sister         rheumatoid    Cancer Brother     Cancer Sister     Diabetes Sister     Heart Disease Brother     High Blood Pressure Brother     High Blood Pressure Sister     Diabetes Brother        SOCIAL HISTORY    Social History     Tobacco Use    Smoking status: Never Smoker    Smokeless tobacco: Never Used    Tobacco comment: encouraged to never smoke    Vaping Use    Vaping Use: Never used   Substance Use Topics    Alcohol use: No    Drug use: No       ALLERGIES    Allergies   Allergen Reactions    Amoxicillin Hives and Rash    Linezolid Other (See Comments)     While taking linezolid, pt developed progressively altered mentation & ultimately had a witnessed seizure 4/26/18. Uncertain whether these symptoms were d/t linezolid, but possible.     Prednisone Itching    Vancomycin      Rising SCr during 5/2018 admission     Coreg [Carvedilol] Diarrhea    Oxycodone-Acetaminophen Rash       MEDICATIONS    Current Outpatient Medications on File Prior to Encounter   Medication Sig Dispense Refill    levETIRAcetam (KEPPRA) 500 MG tablet TAKE 1 TABLET BY MOUTH TWICE A  tablet 1    lisinopril (PRINIVIL;ZESTRIL) 20 MG tablet TAKE ONE TABLET BY MOUTH DAILY 90 tablet 0    Balsam [place]-Castor Oil (VENELEX) OINT ointment Apply topically 2 times daily 30 g 0    diclofenac (VOLTAREN) 75 MG EC tablet TAKE ONE TABLET BY MOUTH TWICE A DAY WITH MEALS (Patient taking differently: Take 75 mg by mouth 2 times daily as needed TAKE ONE TABLET BY MOUTH TWICE A DAY WITH MEALS) 180 tablet 2    potassium chloride (KLOR-CON M) 10 MEQ extended release tablet TAKE ONE TABLET BY MOUTH DAILY 90 tablet 0    thyroid (NP THYROID) 30 MG tablet TAKE ONE TABLET BY MOUTH DAILY 90 tablet 3    rosuvastatin (CRESTOR) 40 MG tablet TAKE ONE TABLET BY MOUTH DAILY 90 tablet 3    Lactobacillus (ACIDOPHILUS) TABS Take 1 tablet by mouth 2 times daily       Multiple Vitamins-Minerals (DAILY SHERIE MAXIMUM MULTIVITAMIN PO) Take 1 packet by mouth daily      aspirin 81 MG tablet Take 81 mg by mouth daily      latanoprost (XALATAN) 0.005 % ophthalmic solution Place 1 drop into the left eye nightly        No current facility-administered medications on file prior to encounter. REVIEW OF SYSTEMS  Review of Systems    Pertinent items are noted in HPI.     Objective:      /76   Pulse 76   Temp 97.4 °F (36.3 °C) (Temporal)   Resp 20     Wt Readings from Last 3 Encounters:   06/15/22 148 lb (67.1 kg)   04/13/22 157 lb (71.2 kg)   02/18/22 156 lb 3.2 oz (70.9 kg)       PHYSICAL EXAM  Physical Exam    General Appearance: alert and oriented to person, place and time, in no acute distress and with anxious affect  Skin: warm and dry, no rash or erythema  Head: normocephalic and atraumatic  Eyes: pupils equal, round, and reactive to light  Pulmonary/Chest:  normal air movement, no respiratory distress  Cardiovascular: normal rate and regular rhythm      Assessment:        Problem List Items Addressed This Visit     Non-compliance with treatment - Primary    Relevant Orders    Initiate Outpatient Wound Care Protocol    Pressure ulcer of ischium, stage 3, left (HCC)    Relevant Orders    Initiate Outpatient Wound Care Protocol    Decreased mobility and endurance    Relevant Orders    Initiate Outpatient Wound Care Protocol    History of short term memory loss    Relevant Orders    Initiate Outpatient Wound Care Protocol           Procedure Note  Indications:  Based on my examination of this patient's wound(s)/ulcer(s) today, debridement is not required to promote healing and evaluate the wound base. Performed by: MICHELLE Noriega CNP    Wound/Ulcer Descriptions are Pre Debridement except measurements:    Wound 02/16/22 Ischium Left #1 ( since about 12/15/2021) (Active)   Wound Image   06/01/22 1029   Wound Etiology Pressure Stage  3 02/16/22 1204   Dressing Status New dressing applied 06/15/22 1124   Wound Cleansed Vashe 06/01/22 1104   Dressing/Treatment Alginate; Other (comment);Gauze dressing/dressing sponge;Tape/Soft cloth adhesive tape 06/15/22 1124   Wound Length (cm) 0.8 cm 06/15/22 1027   Wound Width (cm) 0.6 cm 06/15/22 1027   Wound Depth (cm) 4 cm 06/15/22 1027   Wound Surface Area (cm^2) 0.48 cm^2 06/15/22 1027   Change in Wound Size % (l*w) 87.2 06/15/22 1027   Wound Volume (cm^3) 1.92 cm^3 06/15/22 1027   Wound Healing % 93 06/15/22 1027   Post-Procedure Length (cm) 0.8 cm 06/15/22 1117   Post-Procedure Width (cm) 0.6 cm 06/15/22 1117   Post-Procedure Depth (cm) 4 cm 06/15/22 1117   Post-Procedure Surface Area (cm^2) 0.48 cm^2 06/15/22 1117   Post-Procedure Volume (cm^3) 1.92 cm^3 06/15/22 1117   Wound Assessment Granulation tissue;Slough 06/15/22 1027   Drainage Amount Moderate 06/15/22 1027   Drainage Description Serosanguinous 06/15/22 1027   Odor None 06/15/22 1027   Bianca-wound Assessment Maceration 06/15/22 1027   Margins Undefined edges 06/15/22 1027   Wound Thickness Description not for Pressure Injury Full thickness 06/15/22 1027   Number of days: 119        Plan:   Pt and sister in for visit, both are discussing how they are frustrated even though some decrease in depth, \"tired of coming out for visits\". Last visit was 2 weeks ago and even with that plan still would like a better plan.   Explained that if they no longer wanted to visit but pt still had wound her PCP would need to order home care nursing 3x/week to monitor wound and do dressing changes. I will pose this question to Dr. Lefty Boo. Questions answered. Treatment Note please see attached Discharge Instructions    Written patient dismissal instructions given to patient and signed by patient or POA. Discharge Instructions         Discharge [facility] Wound Care Physician Orders and Discharge Instructions  [address], [address] [address]  Telephone: [phone]      FAX [phone]  [address] [place] 8:30 am - 4:30 pm and Friday 8:30 am - 1:00 pm.          NAME: Sary Anand  DATE OF BIRTH:  1935  MEDICAL RECORD NUMBER:  [id number]  DATE:  6/15/2022        USE YOUR SPECIAL CUSHION AS MUCH AS POSSIBLE   1. LAY ON YOUR SIDE MORE OFTEN, STAY OFF YOU BUTT AS MUCH AS YOU CAN   2 No SHOWERs, bird baths only    Look into  life alert       Please wash hands with soap and water prior to and right after  every dressing change            WOUND LOCATION:   LEFT BUTTOCK     · May rinse wounds with 0.9% saline   · Do NOT SCRUB WOUND.   · No  shower     Topical Treatments:              [x]??????????????? Apply around the wound:      [x]??????????????? No-Sting barrier film        PRIMARY DRESSING:                                     [x] Endoform AM at deep base of wound                           [x]??????????????  Aquacel Rope TUCKED INTO entirety of  WOUND- curves up to 12 o'clock          [x]??????????????  the depth is 4.0 cm- and needs to be tucked inside                        SECONDARY DRESSING:                [x]??????????????                                 [x]?????????????? thick gauze & Cover Roll Tape                                 HOW OFTEN TO CHANGE DRESSINGS:                                  [x]?????????????? Three times per week:        _______________________________________________________________________________________________        PRESSURE RELIEF AND OFF-LOADING:     Off-Loading:   [x]???????????????????? Off-loading when       [x]???????????????????? in bed         [x]???????????????????? sitting                       [x]???????????????????? Turn every 2 hours when in bed.                       [x]????????????????????  Avoid position directing pressure on wound site.  Limit side lying to 30 degree tilt.  Limit HOB elevation to 30 degrees.     Specialty equipment ordered:       [x]???????????????????? Wheelchair cushion (JEANMARIEO)         _____________________________________________________________________________________________     Dietary:  Continue your diet as tolerated. ? Eat heart-healthy foods. These foods include vegetables, fruits, nuts, beans, lean meat, fish, and whole grains. Limit sodium, alcohol, and sugar. ? Protein is a key nutrient in helping to repair damaged tissue and promote new tissue growth. Good sources of protein are milk, yogurt, cheese, fish, lean meat, and beans. ? If you are a diabetic, having diabetes can make it hard for wounds to heal. So try to keep your blood sugar in its target range.  __________________________________________________________________________________________________     ACTIVITY:   Activity as tolerated  ________________________________________________________________________________________________________________     PAIN:     Please note, A small amount of pain, drainage and/or bleeding from this process might be expected, and is NORMAL.      Elevate the affected limb. Use over-the-counter medications you would normally use for pain as permitted by your family doctor. For persistent pain not relieved by the above interventions, please call your family doctor.  ________________________________________________________________________________  Call your doctor now or seek immediate medical care if:    · You have symptoms of infection, such as:  ? Increased pain, swelling, warmth, or redness.   ? Red streaks leading from the area. ? Pus draining from the area. ? A fever.      _______________________________________________________________________     Return Appointment:     § ECF or Home Healthcare: [facility]-   -depth  is 4.0 cm ---    please order enough materials      [facility] [address] is responsible to order your supplies.      · Return Appointment: With Paige Burton CNP  in 2 Week(s)                   []??????????????? Orders placed during your visit:             CULTURE OBTAINED : 3/9/22- RESULTED .         Electronically signed by : Luana Najjar on 6/15/2022 at [address] [facility],[address] Information: Should you experience any significant changes in your wound(s) or have questions about your wound care, please contact the [address] [place] [address] [phone] [facility] [place] 8:30 am - 4:30 pm and Friday 8:30 am - 1:00 pm.  If you need help with your wound outside these hours and cannot wait until we are again available, contact your PCP or go to the hospital emergency room.      PLEASE NOTE: IF YOU ARE UNABLE TO OBTAIN WOUND SUPPLIES, CONTINUE TO USE THE SUPPLIES YOU HAVE AVAILABLE UNTIL YOU ARE ABLE TO [facility]. KEEP THE WOUND COVERED AT ALL TIMES.            Physician Signature:_______________________     Date: ___________ Time:  ____________         [x]?????????????? Natasha Jacobson CNP               Electronically signed by MICHELLE Ward CNP on 6/15/2022 at 4:20 PM

## 2022-06-16 ENCOUNTER — HOSPITAL ENCOUNTER (INPATIENT)
Age: 87
LOS: 6 days | Discharge: NURSING, REHAB OR LONG-TERM CARE | DRG: 487 | End: 2022-06-22
Attending: FAMILY MEDICINE | Admitting: FAMILY MEDICINE
Payer: MEDICARE

## 2022-06-16 DIAGNOSIS — Z79.2 RECEIVING INTRAVENOUS ANTIBIOTIC TREATMENT AS OUTPATIENT: Primary | ICD-10-CM

## 2022-06-16 DIAGNOSIS — L02.419 ABSCESS OF KNEE: ICD-10-CM

## 2022-06-16 PROBLEM — Z96.659 DISLOCATION OF PROSTHETIC JOINT OF KNEE (HCC): Status: ACTIVE | Noted: 2022-01-01

## 2022-06-16 PROBLEM — T84.028A DISLOCATION OF PROSTHETIC JOINT OF KNEE (HCC): Status: ACTIVE | Noted: 2022-06-16

## 2022-06-16 LAB
BASOPHILS ABSOLUTE: 0 K/UL (ref 0–0.2)
BASOPHILS RELATIVE PERCENT: 0.4 %
EOSINOPHILS ABSOLUTE: 0 K/UL (ref 0–0.6)
EOSINOPHILS RELATIVE PERCENT: 0.4 %
HCT VFR BLD CALC: 39.4 % (ref 36–48)
HEMOGLOBIN: 12.7 G/DL (ref 12–16)
LYMPHOCYTES ABSOLUTE: 2.3 K/UL (ref 1–5.1)
LYMPHOCYTES RELATIVE PERCENT: 23.8 %
MCH RBC QN AUTO: 27 PG (ref 26–34)
MCHC RBC AUTO-ENTMCNC: 32.3 G/DL (ref 31–36)
MCV RBC AUTO: 83.5 FL (ref 80–100)
MONOCYTES ABSOLUTE: 1.2 K/UL (ref 0–1.3)
MONOCYTES RELATIVE PERCENT: 12.1 %
NEUTROPHILS ABSOLUTE: 6.1 K/UL (ref 1.7–7.7)
NEUTROPHILS RELATIVE PERCENT: 63.3 %
PDW BLD-RTO: 16.1 % (ref 12.4–15.4)
PLATELET # BLD: 221 K/UL (ref 135–450)
PMV BLD AUTO: 8.4 FL (ref 5–10.5)
RBC # BLD: 4.72 M/UL (ref 4–5.2)
WBC # BLD: 9.6 K/UL (ref 4–11)

## 2022-06-16 PROCEDURE — 86140 C-REACTIVE PROTEIN: CPT

## 2022-06-16 PROCEDURE — 85610 PROTHROMBIN TIME: CPT

## 2022-06-16 PROCEDURE — 80053 COMPREHEN METABOLIC PANEL: CPT

## 2022-06-16 PROCEDURE — 85025 COMPLETE CBC W/AUTO DIFF WBC: CPT

## 2022-06-16 PROCEDURE — 36415 COLL VENOUS BLD VENIPUNCTURE: CPT

## 2022-06-16 PROCEDURE — 1200000000 HC SEMI PRIVATE

## 2022-06-16 PROCEDURE — 82550 ASSAY OF CK (CPK): CPT

## 2022-06-16 PROCEDURE — 85652 RBC SED RATE AUTOMATED: CPT

## 2022-06-16 RX ORDER — ASPIRIN 81 MG/1
81 TABLET, CHEWABLE ORAL DAILY
Status: DISCONTINUED | OUTPATIENT
Start: 2022-06-17 | End: 2022-06-22 | Stop reason: HOSPADM

## 2022-06-16 RX ORDER — ONDANSETRON 2 MG/ML
4 INJECTION INTRAMUSCULAR; INTRAVENOUS EVERY 6 HOURS PRN
Status: DISCONTINUED | OUTPATIENT
Start: 2022-06-16 | End: 2022-06-22 | Stop reason: HOSPADM

## 2022-06-16 RX ORDER — ACETAMINOPHEN 325 MG/1
650 TABLET ORAL EVERY 6 HOURS PRN
Status: DISCONTINUED | OUTPATIENT
Start: 2022-06-16 | End: 2022-06-17

## 2022-06-16 RX ORDER — IBUPROFEN 400 MG/1
400 TABLET ORAL EVERY 6 HOURS PRN
Status: DISCONTINUED | OUTPATIENT
Start: 2022-06-16 | End: 2022-06-22 | Stop reason: HOSPADM

## 2022-06-16 RX ORDER — ROSUVASTATIN CALCIUM 40 MG/1
1 TABLET, COATED ORAL DAILY
Status: DISCONTINUED | OUTPATIENT
Start: 2022-06-17 | End: 2022-06-17

## 2022-06-16 RX ORDER — LISINOPRIL 20 MG/1
1 TABLET ORAL DAILY
Status: DISCONTINUED | OUTPATIENT
Start: 2022-06-17 | End: 2022-06-17

## 2022-06-16 RX ORDER — SODIUM CHLORIDE 0.9 % (FLUSH) 0.9 %
5-40 SYRINGE (ML) INJECTION EVERY 12 HOURS SCHEDULED
Status: DISCONTINUED | OUTPATIENT
Start: 2022-06-16 | End: 2022-06-18 | Stop reason: SDUPTHER

## 2022-06-16 RX ORDER — LATANOPROST 50 UG/ML
1 SOLUTION/ DROPS OPHTHALMIC NIGHTLY
Status: DISCONTINUED | OUTPATIENT
Start: 2022-06-16 | End: 2022-06-17

## 2022-06-16 RX ORDER — LEVOTHYROXINE AND LIOTHYRONINE 19; 4.5 UG/1; UG/1
30 TABLET ORAL DAILY
Status: DISCONTINUED | OUTPATIENT
Start: 2022-06-17 | End: 2022-06-22 | Stop reason: HOSPADM

## 2022-06-16 RX ORDER — ACETAMINOPHEN 650 MG/1
650 SUPPOSITORY RECTAL EVERY 6 HOURS PRN
Status: DISCONTINUED | OUTPATIENT
Start: 2022-06-16 | End: 2022-06-17

## 2022-06-16 RX ORDER — POLYETHYLENE GLYCOL 3350 17 G/17G
17 POWDER, FOR SOLUTION ORAL DAILY PRN
Status: DISCONTINUED | OUTPATIENT
Start: 2022-06-16 | End: 2022-06-21

## 2022-06-16 RX ORDER — SODIUM CHLORIDE 9 MG/ML
INJECTION, SOLUTION INTRAVENOUS PRN
Status: DISCONTINUED | OUTPATIENT
Start: 2022-06-16 | End: 2022-06-18 | Stop reason: SDUPTHER

## 2022-06-16 RX ORDER — ONDANSETRON 4 MG/1
4 TABLET, ORALLY DISINTEGRATING ORAL EVERY 8 HOURS PRN
Status: DISCONTINUED | OUTPATIENT
Start: 2022-06-16 | End: 2022-06-22 | Stop reason: HOSPADM

## 2022-06-16 RX ORDER — LEVETIRACETAM 500 MG/1
500 TABLET ORAL 2 TIMES DAILY
Status: DISCONTINUED | OUTPATIENT
Start: 2022-06-17 | End: 2022-06-20

## 2022-06-16 RX ORDER — SODIUM CHLORIDE 0.9 % (FLUSH) 0.9 %
5-40 SYRINGE (ML) INJECTION PRN
Status: DISCONTINUED | OUTPATIENT
Start: 2022-06-16 | End: 2022-06-22 | Stop reason: HOSPADM

## 2022-06-16 RX ORDER — ENOXAPARIN SODIUM 100 MG/ML
40 INJECTION SUBCUTANEOUS DAILY
Status: DISCONTINUED | OUTPATIENT
Start: 2022-06-17 | End: 2022-06-17

## 2022-06-16 ASSESSMENT — PAIN SCALES - GENERAL: PAINLEVEL_OUTOF10: 0

## 2022-06-17 ENCOUNTER — ANESTHESIA (OUTPATIENT)
Dept: OPERATING ROOM | Age: 87
DRG: 487 | End: 2022-06-17
Payer: MEDICARE

## 2022-06-17 ENCOUNTER — APPOINTMENT (OUTPATIENT)
Dept: CT IMAGING | Age: 87
DRG: 487 | End: 2022-06-17
Attending: FAMILY MEDICINE
Payer: MEDICARE

## 2022-06-17 ENCOUNTER — APPOINTMENT (OUTPATIENT)
Dept: GENERAL RADIOLOGY | Age: 87
DRG: 487 | End: 2022-06-17
Attending: FAMILY MEDICINE
Payer: MEDICARE

## 2022-06-17 ENCOUNTER — ANESTHESIA EVENT (OUTPATIENT)
Dept: OPERATING ROOM | Age: 87
DRG: 487 | End: 2022-06-17
Payer: MEDICARE

## 2022-06-17 PROBLEM — T84.53XA INFECTION OF PROSTHETIC RIGHT KNEE JOINT (HCC): Status: ACTIVE | Noted: 2022-01-01

## 2022-06-17 LAB
A/G RATIO: 1.3 (ref 1.1–2.2)
ALBUMIN SERPL-MCNC: 4.1 G/DL (ref 3.4–5)
ALP BLD-CCNC: 90 U/L (ref 40–129)
ALT SERPL-CCNC: 15 U/L (ref 10–40)
ANION GAP SERPL CALCULATED.3IONS-SCNC: 12 MMOL/L (ref 3–16)
AST SERPL-CCNC: 28 U/L (ref 15–37)
BILIRUB SERPL-MCNC: 0.9 MG/DL (ref 0–1)
BUN BLDV-MCNC: 23 MG/DL (ref 7–20)
C-REACTIVE PROTEIN: 129.6 MG/L (ref 0–5.1)
CALCIUM SERPL-MCNC: 9.9 MG/DL (ref 8.3–10.6)
CHLORIDE BLD-SCNC: 104 MMOL/L (ref 99–110)
CO2: 19 MMOL/L (ref 21–32)
CREAT SERPL-MCNC: 0.9 MG/DL (ref 0.6–1.2)
EKG ATRIAL RATE: 85 BPM
EKG DIAGNOSIS: NORMAL
EKG P AXIS: 64 DEGREES
EKG P-R INTERVAL: 136 MS
EKG Q-T INTERVAL: 380 MS
EKG QRS DURATION: 102 MS
EKG QTC CALCULATION (BAZETT): 452 MS
EKG R AXIS: 32 DEGREES
EKG T AXIS: 44 DEGREES
EKG VENTRICULAR RATE: 85 BPM
GFR AFRICAN AMERICAN: >60
GFR NON-AFRICAN AMERICAN: 60
GLUCOSE BLD-MCNC: 122 MG/DL (ref 70–99)
INR BLD: 1.09 (ref 0.87–1.14)
POTASSIUM REFLEX MAGNESIUM: 3.8 MMOL/L (ref 3.5–5.1)
PROTHROMBIN TIME: 14 SEC (ref 11.7–14.5)
SEDIMENTATION RATE, ERYTHROCYTE: 62 MM/HR (ref 0–30)
SODIUM BLD-SCNC: 135 MMOL/L (ref 136–145)
TOTAL CK: 64 U/L (ref 26–192)
TOTAL PROTEIN: 7.3 G/DL (ref 6.4–8.2)

## 2022-06-17 PROCEDURE — 1200000000 HC SEMI PRIVATE

## 2022-06-17 PROCEDURE — 2580000003 HC RX 258: Performed by: INTERNAL MEDICINE

## 2022-06-17 PROCEDURE — 6360000002 HC RX W HCPCS: Performed by: INTERNAL MEDICINE

## 2022-06-17 PROCEDURE — APPNB45 APP NON BILLABLE 31-45 MINUTES: Performed by: NURSE PRACTITIONER

## 2022-06-17 PROCEDURE — 99223 1ST HOSP IP/OBS HIGH 75: CPT | Performed by: INTERNAL MEDICINE

## 2022-06-17 PROCEDURE — 6370000000 HC RX 637 (ALT 250 FOR IP): Performed by: INTERNAL MEDICINE

## 2022-06-17 PROCEDURE — 93005 ELECTROCARDIOGRAM TRACING: CPT

## 2022-06-17 PROCEDURE — 73560 X-RAY EXAM OF KNEE 1 OR 2: CPT

## 2022-06-17 RX ORDER — DORZOLAMIDE HYDROCHLORIDE AND TIMOLOL MALEATE 20; 5 MG/ML; MG/ML
1 SOLUTION/ DROPS OPHTHALMIC 2 TIMES DAILY
COMMUNITY

## 2022-06-17 RX ORDER — TRAMADOL HYDROCHLORIDE 50 MG/1
50 TABLET ORAL EVERY 4 HOURS PRN
Status: DISCONTINUED | OUTPATIENT
Start: 2022-06-17 | End: 2022-06-22 | Stop reason: HOSPADM

## 2022-06-17 RX ORDER — ENOXAPARIN SODIUM 100 MG/ML
40 INJECTION SUBCUTANEOUS DAILY
Status: DISCONTINUED | OUTPATIENT
Start: 2022-06-18 | End: 2022-06-22 | Stop reason: HOSPADM

## 2022-06-17 RX ORDER — MORPHINE SULFATE 2 MG/ML
2 INJECTION, SOLUTION INTRAMUSCULAR; INTRAVENOUS EVERY 4 HOURS PRN
Status: DISCONTINUED | OUTPATIENT
Start: 2022-06-17 | End: 2022-06-22 | Stop reason: HOSPADM

## 2022-06-17 RX ORDER — ACETAMINOPHEN 500 MG
1000 TABLET ORAL 3 TIMES DAILY
Status: DISCONTINUED | OUTPATIENT
Start: 2022-06-17 | End: 2022-06-22 | Stop reason: HOSPADM

## 2022-06-17 RX ADMIN — Medication 10 ML: at 08:56

## 2022-06-17 RX ADMIN — Medication 10 ML: at 02:55

## 2022-06-17 RX ADMIN — LEVETIRACETAM 500 MG: 500 TABLET, FILM COATED ORAL at 21:53

## 2022-06-17 RX ADMIN — CEFEPIME HYDROCHLORIDE 2000 MG: 2 INJECTION, POWDER, FOR SOLUTION INTRAVENOUS at 12:31

## 2022-06-17 RX ADMIN — DAPTOMYCIN 350 MG: 500 INJECTION, POWDER, LYOPHILIZED, FOR SOLUTION INTRAVENOUS at 21:53

## 2022-06-17 RX ADMIN — LEVETIRACETAM 500 MG: 500 TABLET, FILM COATED ORAL at 08:55

## 2022-06-17 RX ADMIN — LEVOTHYROXINE, LIOTHYRONINE 30 MG: 19; 4.5 TABLET ORAL at 08:55

## 2022-06-17 RX ADMIN — LEVETIRACETAM 500 MG: 500 TABLET, FILM COATED ORAL at 02:35

## 2022-06-17 ASSESSMENT — PAIN SCALES - GENERAL
PAINLEVEL_OUTOF10: 4
PAINLEVEL_OUTOF10: 0

## 2022-06-17 NOTE — CARE COORDINATION
Discharge Planning. The SW called and left a voicemail for Jairo at [facility] to run IV abx benefits. Waiting on a return call at this time.      Electronically signed by LUIS E Perry on 6/17/2022 at 2:56 PM

## 2022-06-17 NOTE — H&P
Hospital Medicine History & Physical      PCP: Lester Escalona DO    Date of Admission: 6/16/2022    Date of Service: Pt seen/examined on 6/16/2022  and Admitted to Inpatient with expected LOS greater than two midnights due to medical therapy    Chief Complaint: Right knee pain with swelling and warmth concerning for infection and prosthetic dislocation      History Of Present Illness: The patient is a 82 y.o. female with history of hearing impairment, hypertension, seizure disorder, hyperlipidemia, hypothyroidism who has had right knee replacement in 2018 and presents with pain and swelling of the right knee with associated chills and difficulty ambulating due to pain. Pain nonradiating. No nausea or vomiting. Patient was seen at [facility] worsening drainage and discussed with orthopedics recommending transfer to [facility] for further management.       Past Medical History:        Diagnosis Date    CAD (coronary artery disease)     Inferior STEMI; CATRACHITA to RCA    DDD (degenerative disc disease), lumbosacral 4/10/2013    Decreased mobility and endurance 2/24/2022    Hearing aid worn     bilateral    History of short term memory loss 3/3/2022    [place] (hard of hearing)     [place] (hard of hearing)     Hyperlipidemia     Hypertension     MI, old 2017    Neuropathy     bilateral lower extremities    Non-compliance with treatment 6/2/2022    Pressure ulcer of ischium, stage 3, left ([address] 2/16/2022    Pressure ulcer of left buttock, stage 3 ([address] 2/16/2022    Sleep apnea     uses mouth piece; bringing DOS 3/26/18    Thyroid disease     hypo    Wears glasses        Past Surgical History:        Procedure Laterality Date    ABSCESS DRAINAGE Right 03/27/2018    evacuation hematoma right knee    APPENDECTOMY  [facility]    BACK SURGERY  [id number]    MILD PROCEDURE L2-3 BILATERAL     BREAST LUMPECTOMY  1986    BUNIONECTOMY  10/2003    CATARACT REMOVAL  03/23/2010 and 04/14/2010    COLONOSCOPY  6/2014 repeat 2019    CORONARY ANGIOPLASTY WITH STENT PLACEMENT  01/23/2017    INF STEMI with CATRACHITA to RCA    DILATION AND CURETTAGE OF UTERUS      INCONTINENCE SURGERY      JOINT REPLACEMENT Right 2018    right total knee replacment    OVARY REMOVAL  1963    right    TONSILLECTOMY AND ADENOIDECTOMY  1942       Medications Prior to Admission:    Prior to Admission medications    Medication Sig Start Date End Date Taking?  Authorizing Provider   levETIRAcetam (KEPPRA) 500 MG tablet TAKE 1 TABLET BY MOUTH TWICE A DAY 5/11/22   Serge Davenport DO   lisinopril (PRINIVIL;ZESTRIL) 20 MG tablet TAKE ONE TABLET BY MOUTH DAILY 1/23/22   Serge Davenport DO   Balsam [place]-Castor Oil (VENELEX) OINT ointment Apply topically 2 times daily 1/3/22   Buell Lanes, MD   diclofenac (VOLTAREN) 75 MG EC tablet TAKE ONE TABLET BY MOUTH TWICE A DAY WITH MEALS  Patient taking differently: Take 75 mg by mouth 2 times daily as needed TAKE ONE TABLET BY MOUTH TWICE A DAY WITH MEALS 12/31/21   Serge Davenport DO   potassium chloride (KLOR-CON M) 10 MEQ extended release tablet TAKE ONE TABLET BY MOUTH DAILY 9/2/21   Harsh Hall DO   thyroid (NP THYROID) 30 MG tablet TAKE ONE TABLET BY MOUTH DAILY 8/31/21   Serge Davenport DO   rosuvastatin (CRESTOR) 40 MG tablet TAKE ONE TABLET BY MOUTH DAILY 4/1/21   Divya Fisher MD   Lactobacillus (ACIDOPHILUS) TABS Take 1 tablet by mouth 2 times daily     Historical Provider, MD   Multiple Vitamins-Minerals (DAILY SHERIE MAXIMUM MULTIVITAMIN PO) Take 1 packet by mouth daily    Historical Provider, MD   aspirin 81 MG tablet Take 81 mg by mouth daily    Historical Provider, MD   latanoprost (XALATAN) 0.005 % ophthalmic solution Place 1 drop into the left eye nightly  2/1/18   Historical Provider, MD       Allergies:  Amoxicillin, Linezolid, Prednisone, Vancomycin, Coreg [carvedilol], and Oxycodone-acetaminophen    Social History:  The patient currently lives with family    TOBACCO:   reports that she has never smoked. She has never used smokeless tobacco.  ETOH:   reports no history of alcohol use. Family History:  Reviewed in detail and negative for DM, Early CAD, Cancer, CVA. Positive as follows:        Problem Relation Age of Onset   [de-identified] Cancer Mother     Heart Disease Mother     Arthritis Sister         rheumatoid    Cancer Brother     Cancer Sister     Diabetes Sister     Heart Disease Brother     High Blood Pressure Brother     High Blood Pressure Sister     Diabetes Brother        REVIEW OF SYSTEMS:   Positive for right knee swelling with pain, erythema and chills and as noted in the HPI. All other systems reviewed and negative. PHYSICAL EXAM:    BP (!) 161/67   Pulse 92   Temp 98.7 °F (37.1 °C) (Oral)   Resp 16   SpO2 99%     General appearance: No apparent distress appears stated age and cooperative. Hard of hearing  HEENT Normal cephalic, atraumatic without obvious deformity. Pupils equal, round, and reactive to light. Extra ocular muscles intact. Conjunctivae/corneas clear. Neck: Supple, No jugular venous distention/bruits. Lungs: Clear to auscultation, bilaterally without Rales/Wheezes/Rhonchi with good respiratory effort. Heart: Regular rate and rhythm with Normal S1/S2 without murmurs, rubs or gallops  Abdomen: Soft, non-tender or non-distended without rigidity or guarding and positive bowel sounds  Extremities: No clubbing, cyanosis, or edema bilaterally. Skin: Skin color, texture, turgor normal.  No rashes or lesions. Neurologic: Alert and oriented X 3, neurovascularly intact with sensory/motor intact upper extremities/lower extremities, bilaterally. Cranial nerves: II-XII intact, grossly non-focal.  Mental status: Alert, oriented, thought content appropriate.       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 female with history of hearing impairment, hypertension, seizure disorder, hyperlipidemia, hypothyroidism who has had right knee replacement in 2018 and presents with pain and swelling of the right knee with associated chills and difficulty ambulating due to pain concerning for infected right knee joint and prosthetic dislocation    Plan:  - We will cover with empiric antibiotics including vancomycin and Zosyn  - Orthopedic consult  - CT of the right knee to delineate pathology  - Pain management  - Continue home medications  - Check CBC, CMP and inflammatory markers  -PT OT evaluation    DVT Prophylaxis: Subcut enoxaparin  Diet: ADULT DIET; Regular  Code Status: Full Code         Sonal Calles MD    Thank you Kim Bañuelos DO for the opportunity to be involved in this patient's care. If you have any questions or concerns please feel free to contact me at [phone].

## 2022-06-17 NOTE — PLAN OF CARE
Problem: Discharge Planning  Goal: Discharge to home or other facility with appropriate resources  6/17/2022 0950 by Quique Wakefield RN  Outcome: Progressing  6/17/2022 0343 by Marvin Gracia RN  Outcome: Progressing     Problem: Pain  Goal: Verbalizes/displays adequate comfort level or baseline comfort level  6/17/2022 0950 by Quique Wakefield RN  Outcome: Progressing  6/17/2022 0343 by Marvin Gracia RN  Outcome: Progressing     Problem: Safety - Adult  Goal: Free from fall injury  6/17/2022 0950 by Quique Wakefield RN  Outcome: Progressing  6/17/2022 0343 by Marvin Gracia RN  Outcome: Progressing     Problem: ABCDS Injury Assessment  Goal: Absence of physical injury  6/17/2022 0950 by Quique Wakefield RN  Outcome: Progressing  6/17/2022 0343 by Marvin Gracia RN  Outcome: Progressing

## 2022-06-17 NOTE — CARE COORDINATION
[address] [address] Continence Nurse  Consult Note       NAME:  Stefani De La Rosa  MEDICAL RECORD NUMBER:  [id number]  AGE: 82 y.o.    GENDER: female  : 1935  TODAY'S DATE:  2022    Subjective   Reason for WOCN Evaluation and Assessment: wound to left ischium      Stefani De La Rosa is a 82 y.o. female referred by:   [x] Physician  [x] Nursing  [] Other:     Wound Identification:  Wound Type: pressure  Contributing Factors: chronic pressure and decreased mobility    Wound History: present on admission  Current Wound Care Treatment:  Foam dressing    Patient Goal of Care:  [x] Wound Healing  [] Odor Control  [] Palliative Care  [] Pain Control   [] Other:         PAST MEDICAL HISTORY        Diagnosis Date    CAD (coronary artery disease)     Inferior STEMI; CATRACHITA to RCA    DDD (degenerative disc disease), lumbosacral 4/10/2013    Decreased mobility and endurance 2022    Hearing aid worn     bilateral    History of short term memory loss 3/3/2022    [place] (hard of hearing)     [place] (hard of hearing)     Hyperlipidemia     Hypertension     MI, old 2017    Neuropathy     bilateral lower extremities    Non-compliance with treatment 2022    Pressure ulcer of ischium, stage 3, left ([address] 2022    Pressure ulcer of left buttock, stage 3 ([address] 2022    Sleep apnea     uses mouth piece; bringing DOS 3/26/18    Thyroid disease     hypo    Wears glasses        PAST SURGICAL HISTORY    Past Surgical History:   Procedure Laterality Date    ABSCESS DRAINAGE Right 2018    evacuation hematoma right knee    APPENDECTOMY  1963    BACK SURGERY  [id number]    MILD PROCEDURE L2-3 BILATERAL     BREAST LUMPECTOMY  1986    BUNIONECTOMY  10/2003    CATARACT REMOVAL  2010 and 2010    COLONOSCOPY  2014    repeat 2019    CORONARY ANGIOPLASTY WITH STENT PLACEMENT  2017    INF STEMI with CATRACHITA to RCA   [facility] REPLACEMENT Right 2018    right total knee replacment    OVARY REMOVAL  1963    right    TONSILLECTOMY AND ADENOIDECTOMY  1942       FAMILY HISTORY    Family History   Problem Relation Age of Onset   Alison Se Cancer Mother     Heart Disease Mother     Arthritis Sister         rheumatoid    Cancer Brother     Cancer Sister     Diabetes Sister     Heart Disease Brother     High Blood Pressure Brother     High Blood Pressure Sister     Diabetes Brother        SOCIAL HISTORY    Social History     Tobacco Use    Smoking status: Never Smoker    Smokeless tobacco: Never Used    Tobacco comment: encouraged to never smoke    Vaping Use    Vaping Use: Never used   Substance Use Topics    Alcohol use: No    Drug use: No       ALLERGIES    Allergies   Allergen Reactions    Amoxicillin Hives and Rash    Linezolid Other (See Comments)     While taking linezolid, pt developed progressively altered mentation & ultimately had a witnessed seizure 4/26/18. Uncertain whether these symptoms were d/t linezolid, but possible.  Prednisone Itching    Vancomycin      Rising SCr during 5/2018 admission     Coreg [Carvedilol] Diarrhea    Oxycodone-Acetaminophen Rash       MEDICATIONS    No current facility-administered medications on file prior to encounter.      Current Outpatient Medications on File Prior to Encounter   Medication Sig Dispense Refill    dorzolamide-timolol (COSOPT) 22.3-6.8 MG/ML ophthalmic solution Place 1 drop into the left eye 2 times daily      levETIRAcetam (KEPPRA) 500 MG tablet TAKE 1 TABLET BY MOUTH TWICE A  tablet 1    lisinopril (PRINIVIL;ZESTRIL) 20 MG tablet TAKE ONE TABLET BY MOUTH DAILY 90 tablet 0    Balsam [place]-Castor Oil (VENELEX) OINT ointment Apply topically 2 times daily 30 g 0    diclofenac (VOLTAREN) 75 MG EC tablet TAKE ONE TABLET BY MOUTH TWICE A DAY WITH MEALS (Patient taking differently: Take 75 mg by mouth 2 times daily as needed TAKE ONE TABLET BY MOUTH TWICE A DAY WITH MEALS) 180 tablet 2    potassium chloride (KLOR-CON M) 10 MEQ extended release tablet TAKE ONE TABLET BY MOUTH DAILY 90 tablet 0    thyroid (NP THYROID) 30 MG tablet TAKE ONE TABLET BY MOUTH DAILY 90 tablet 3    rosuvastatin (CRESTOR) 40 MG tablet TAKE ONE TABLET BY MOUTH DAILY 90 tablet 3    Lactobacillus (ACIDOPHILUS) TABS Take 1 tablet by mouth 2 times daily       Multiple Vitamins-Minerals (DAILY SHERIE MAXIMUM MULTIVITAMIN PO) Take 1 packet by mouth daily      aspirin 81 MG tablet Take 81 mg by mouth daily      latanoprost (XALATAN) 0.005 % ophthalmic solution Place 1 drop into the left eye nightly          Objective    BP (!) 151/76   Pulse 85   Temp 97.7 °F (36.5 °C) (Oral)   Resp 14   Wt 136 lb 12.8 oz (62.1 kg)   SpO2 98%   BMI 22.76 kg/m²     LABS:  WBC:    Lab Results   Component Value Date    WBC 9.6 06/16/2022     H/H:    Lab Results   Component Value Date    HGB 12.7 06/16/2022    HCT 39.4 06/16/2022     PTT:    Lab Results   Component Value Date    APTT 32.8 02/27/2018   [APTT}  PT/INR:    Lab Results   Component Value Date    PROTIME 14.0 06/16/2022    INR 1.09 06/16/2022     HgBA1c:    Lab Results   Component Value Date    LABA1C 5.3 03/07/2018       Assessment   Av Risk Score: Av Scale Score: 19    Patient Active Problem List   Diagnosis Code    Low back pain radiating to both legs M54.50, M79.604, M79.605    Lumbar spinal stenosis M48.061    DDD (degenerative disc disease), lumbosacral M51.37    Stenosis, spinal, lumbar M48.061    Pain and swelling of right knee M25.561, M25.461    Acute inferior myocardial infarction (HCC) I21.19    Hyperlipidemia LDL goal <70 E78.5    Retroperitoneal hemorrhage R58    Essential hypertension I10    Arthritis of right knee M17.11    History of total knee arthroplasty, right-3. 6.2018 Z96.651    Traumatic hematoma of knee, right, subsequent encounter S80. 01XD    Traumatic hematoma of right knee S80. 01XA    Prosthetic joint infection ([facility] T84.50XA    Seizure disorder ([facility] G40.909    Fall against object W18.00XA    Sepsis ([facility] A41.9    Pressure ulcer of ischium, stage 3, left ([facility]) L89.323    Decreased mobility and endurance Z74.09    [place] (hard of hearing) H91.90    History of short term memory loss Z87.898    Non-compliance with treatment Z91.19    Chronic renal disease, stage III ([facility] [965100] N18.30    Infection of prosthetic right knee joint ([facility]) T84.53XA    Pseudomonas infection A49.8    Elevated C-reactive protein (CRP) R79.82    Elevated sed rate R70.0    Osteoarthritis M19.90    PRINCE (obstructive sleep apnea) G47.33       Measurements:  Wound 02/16/22 Ischium Left #1 ( since about 12/15/2021) (Active)   Wound Image   06/01/22 1029   Wound Etiology Pressure Stage 3 06/17/22 0850   Dressing Status Clean;Dry; Intact 06/17/22 0850   Wound Cleansed Vashe 06/01/22 1104   Dressing/Treatment Foam 06/17/22 0850   Wound Length (cm) 0.8 cm 06/17/22 1203   Wound Width (cm) 0.5 cm 06/17/22 1203   Wound Depth (cm) 4 cm 06/15/22 1027   Wound Surface Area (cm^2) 0.4 cm^2 06/17/22 1203   Change in Wound Size % (l*w) 89.33 06/17/22 1203   Wound Assessment Granulation tissue;Pink/red;Devitalized tissue 06/17/22 1203   Drainage Amount Scant 06/17/22 0850   Drainage Description Serosanguinous 06/17/22 1203   Odor None 06/17/22 1203   Bianca-wound Assessment Fragile 06/17/22 1203   Margins Defined edges 06/17/22 1203   Wound Thickness Description not for Pressure Injury Full thickness 06/17/22 1203   Number of days: 120      patient here for fluid to right knee, will have surgery today. Patient seen in wound center 6/13 for wound to left ischium. Patient agreeable to visit, nurses Kendal Augustin and Deena Ahmadi at bedside. Patient lying on right side. Old dressing removed, old drainage on dressing. Wound is red with some devitalized tissue, bianca wound edges denuded. Wound irrigated with saline, foam with Ag+ dressing placed.   Will follow this admission          Response to treatment:  Well tolerated by patient. Pain Assessment:  Severity:  0 / 10  Quality of pain: N/A  Wound Pain Timing/Severity: none  Premedicated: N/A    Plan   Plan of Care: Wound 02/16/22 Ischium Left #1 ( since about 12/15/2021)-Dressing/Treatment: Foam Irrigate wound with saline, cover with foam Ag+ dressing. If foam with Ag+ not available, use hydrofiber with Ag+ to wound cover with foam dressing, change dressing every 2 days or as needed.      Specialty Bed Required : No   [] Low Air Loss   [] Pressure Redistribution  [] Fluid Immersion  [] Bariatric  [] Total Pressure Relief  [] Other:     Current Diet: Diet NPO  Dietician consult:  Yes    Discharge Plan:  Placement for patient upon discharge: skilled nursing    Patient appropriate for Outpatient [address]: Yes    Referrals:  []   [] [facility]  [] Supplies  [] Other    Patient/Caregiver Teaching:  Level of patient/caregiver understanding able to:   [x] Indicates understanding       [x] Needs reinforcement  [] Unsuccessful      [] Verbal Understanding  [] Demonstrated understanding       [] No evidence of learning  [] Refused teaching         [] N/A       Electronically signed by  MURPHY Meredith, RN, [facility] [address]  Wound[facility]Ostomy [facility]  [phone] on 6/17/2022 at 12:06 PM

## 2022-06-17 NOTE — PLAN OF CARE
Problem: Discharge Planning  Goal: Discharge to home or other facility with appropriate resources  Outcome: Progressing     Problem: Pain  Goal: Verbalizes/displays adequate comfort level or baseline comfort level  Outcome: Progressing     Problem: Safety - Adult  Goal: Free from fall injury  Outcome: Progressing     Problem: ABCDS Injury Assessment  Goal: Absence of physical injury  Outcome: Progressing    Fall precautions in place, hourly rounding, call light and belongings in reach, bed in lowest position, wheels locked in place, side rails up x 2, walkways free of clutter, bed alarm engaged. Camera in room for safety    Pain assessed using 0-10 scale, patient able to voice pain level and states no pain medication needed at this time.

## 2022-06-17 NOTE — PROGRESS NOTES
8128: Shift assessment completed, morning medications given per MAR. VSS, alert and oriented to self. Call light within reach. The care plan and education has been reviewed and mutually agreed upon with the patient.

## 2022-06-17 NOTE — CONSULTS
Infectious Diseases   Consult Note        Admission Date: 6/16/2022  Hospital Day: Hospital Day: 2   Attending: Toni Thorpe MD  Date of service: 6/17/22     Reason for admission: Dislocation of prosthetic joint of knee, initial encounter [facility]) [T68.052L, [address]    Chief complaint/ Reason for consult: Complicated right knee prosthetic joint infection    Microbiology:        I have reviewed allavailable micro lab data and cultures    · Blood culture (2/2) - collected on 6/16/2022: In process  · Right knee synovial fluid culture - collectedon 4/13/2022: Pseudomonas aeruginosa    Susceptibility     Pseudomonas aeruginosa     BACTERIAL SUSCEPTIBILITY PANEL BY MIR BACTERIAL SUSCEPTIBILITY PANEL BY E-TEST      cefepime <=2 mcg/mL Sensitive       ciprofloxacin <=1 mcg/mL Sensitive       gentamicin <=4 mcg/mL Sensitive       levofloxacin   0.5 mcg/mL Sensitive     meropenem <=1 mcg/mL Sensitive       piperacillin-tazobactam <=16 mcg/mL Sensitive       tobramycin <=4 mcg/mL Sensitive                    · Buttock wound culture  - collected on 3/9/2022: Staph epidermidis, Pseudomonas aeruginosa    Susceptibility     Pseudomonas aeruginosa Staphylococcus epidermidis     BACTERIAL SUSCEPTIBILITY PANEL BY MIR BACTERIAL SUSCEPTIBILITY PANEL BY E-TEST  BACTERIAL SUSCEPTIBILITY PANEL BY MIR     cefepime <=2 mcg/mL Sensitive         ciprofloxacin <=1 mcg/mL Sensitive         clindamycin     <=0.25 mcg/mL Sensitive     erythromycin     >=8 mcg/mL Resistant     gentamicin <=4 mcg/mL Sensitive         levofloxacin   <=1 ug/ml Sensitive       meropenem <=1 mcg/mL Sensitive         oxacillin     >=4 mcg/mL Resistant     piperacillin-tazobactam <=16 mcg/mL Sensitive         tetracycline     2 mcg/mL Sensitive     tobramycin <=4 mcg/mL Sensitive         trimethoprim-sulfamethoxazole     160 mcg/mL Resistant     vancomycin     2 mcg/mL Sensitive                   Antibiotics and immunizations:       Current antibiotics:  All antibiotics and their doses were reviewed by me    Recent Abx Admin      No antibiotic orders with administrations found. Immunization History: All immunization history was reviewed by me today. Immunization History   Administered Date(s) Administered    COVID-19Aneta, Primary or Immunocompromised, PF, 100mcg/0.5mL 03/31/2021, 04/28/2021    DT (pediatric) 08/31/1990    Influenza Virus Vaccine 10/22/2002    Influenza, Triv, inactivated, subunit, adjuvanted, IM (Fluad 65 yrs and older) 12/11/2019    Pneumococcal Conjugate 13-valent ([id number]) 09/29/2015    Pneumococcal Polysaccharide ([id number]) 09/05/2017    Td vaccine (adult) 03/17/2010    Td, unspecified formulation 08/31/1990    Tdap (Boostrix, Adacel) 09/19/2017       Known drug allergies: All allergies were reviewed and updated    Allergies   Allergen Reactions    Amoxicillin Hives and Rash    Linezolid Other (See Comments)     While taking linezolid, pt developed progressively altered mentation & ultimately had a witnessed seizure 4/26/18. Uncertain whether these symptoms were d/t linezolid, but possible.  Prednisone Itching    Vancomycin      Rising SCr during 5/2018 admission     Coreg [Carvedilol] Diarrhea    Oxycodone-Acetaminophen Rash       Social history:     Social History:  All social andepidemiologic history was reviewed and updated by me today as needed. · Tobacco use:   reports that she has never smoked. She has never used smokeless tobacco.  · Alcohol use:   reports no history of alcohol use. · Currently lives in: [address] [place] [address] Drive  ·  reports no history of drug use.      COVID VACCINATION AND LAB RESULT RECORDS:     Internal Administration   First Dose COVID-19, Moderna, Primary or Immunocompromised, PF, 100mcg/0.5mL  03/31/2021   Second Dose COVID-19, Aneta Mccabeid, Primary or Immunocompromised, PF, 100mcg/0.5mL   04/28/2021       Last COVID Lab SARS-CoV-2, NAAT (no units)   Date Value   01/01/2022 Not Detected            Assessment:     The patient is a 82 y.o. old female who  has a past medical history of CAD (coronary artery disease), DDD (degenerative disc disease), lumbosacral (4/10/2013), Decreased mobility and endurance (2/24/2022), Hearing aid worn, History of short term memory loss (3/3/2022), [place]hard of hearing[place] (hard of hearing), Hyperlipidemia, Hypertension, MI, old (2017), Neuropathy, Non-compliance with treatment (6/2/2022), Pressure ulcer of ischium, stage 3, left ([address] (2/16/2022), Pressure ulcer of left buttock, stage 3 ([address] (2/16/2022), Sleep apnea, Thyroid disease, and Wears glasses. with following problems:    · Right knee chronic prosthetic joint infection  · Pansensitive Pseudomonas growth from right knee synovial fluid aspirate on 4/13/2022  · Buttock decubitus wounds, wound culture on 3/9/2022 was positive for Staph epidermidis and pansensitive Pseudomonas, has been following up with the wound care center  · History of right total knee arthroplasty on 3/6/2018  · Elevated CRP of 129.6  · Elevated sed rate of 62  · Severe dementia  · Degenerative disc disease  · Chronic hearing loss  · Coronary artery disease  · Essential hypertension  · Obstructive sleep apnea        Discussion:      The patient has a chronic right knee prosthetic joint infection that seems to be going on for past several months. She was last seen by ID in 2018 for a right knee hematoma and had Enterococcus infection at that time that was treated with 6 weeks of IV vancomycin and daptomycin by Dr. Bandar Chavira. The patient declined a chronic antibiotic suppression therapy after that. I reviewed all of her previous cultures. The patient had a right knee synovial fluid culture done on 4/13/2022. It was positive for pansensitive Pseudomonas.   It appears that patient received 10 days of oral ciprofloxacin on 4/17/2022 from epic records    Her serum creatinine is 0.9        Plan:     Diagnostic Workup:    · Will order baseline CK level  · Follow-up on surgical cultures and pathology  · Continue to follow fever curve, WBC count and blood cultures  · Follow up on liverand renal functions closely    Antimicrobials:    · Given her advanced age, she would not be a good candidate for long-term fluoroquinolone based therapy with ciprofloxacin  · She has history of allergies to IV vancomycin, linezolid and amoxicillin  · Will order IV cefepime 2 g every 12 hour for gram-negative coverage  · We will order IV daptomycin for empiric gram-positive coverage at a dose of 350 mg every 24 hours  · Orthopedic surgical plans reviewed. · Unfortunately, I cannot rule out the possibility of chronic osteomyelitis, which may eventually need right above-knee amputation. · Okay to place a PICC line  · We will follow up on the culture results and clinical progress and will make further recommendations accordingly. · Continue close vitals monitoring. · Maintain good glycemic control. · Fall precautions. Aspiration precautions. · Continue to watch for new fever or diarrhea. · DVT prophylaxis. · Discussed all above with RN. Drug Monitoring:    · Continue serial monitoring for antibiotic toxicity as follows: CBC, CMP, CK  · Continue to watch for following: new or worsening fever, hypotension, hives, lip swelling and redness or purulence at vascular access sites. I/v access Management:    · Continue to monitor i.v access sites for erythema, induration, discharge or tenderness. · As always, continue efforts to minimizetubes/lines/drains as clinically appropriate to reduce chances of line associated infections. Current isolation precautions: There are no current isolations documented for this patient. Level of complexity of consult: High     Risk of Complications/Morbidity: High     · Illness(es)/ Infection present that pose threat to bodily function.    · There is potential for severe exacerbation of infection/side effects of treatment. · Therapy requires intensive monitoring for antimicrobial agent toxicity. Thank you for involving me in the care of your patient. I will continue to follow. If you have any additional questions, please do not hesitate to contact me. Subjective:     Presenting complaint in ER:     No chief complaint on file. HPI: Emmett Cardoso is a 82 y.o. female patient, who was seen at the request of Dr. Scott Smith MD.    History was obtained from chart review and RN as patient has chronic severe dementia    The patient was admitted on 6/16/2022. I have been consulted to see the patient for above mentioned reason(s). The patient has multiple medical comorbidities, and presented to the ER for pain and redness and swelling of the right knee with swelling and associated chills. She has history of right knee arthroplasty surgery in 2018. The patient has been having chronic drainage from the right knee at her home. She has been admitted. Orthopedics has been consulted      I have been asked for my opinion for management for this patient. Past Medical History: All past medical history reviewed today.     Past Medical History:   Diagnosis Date    CAD (coronary artery disease)     Inferior STEMI; CATRACHITA to RCA    DDD (degenerative disc disease), lumbosacral 4/10/2013    Decreased mobility and endurance 2/24/2022    Hearing aid worn     bilateral    History of short term memory loss 3/3/2022    [place] (hard of hearing)     [place] (hard of hearing)     Hyperlipidemia     Hypertension     MI, old 2017    Neuropathy     bilateral lower extremities    Non-compliance with treatment 6/2/2022    Pressure ulcer of ischium, stage 3, left ([address] 2/16/2022    Pressure ulcer of left buttock, stage 3 ([address] 2/16/2022    Sleep apnea     uses mouth piece; bringing DOS 3/26/18    Thyroid disease     hypo    Wears glasses          Past Surgical History: All pastsurgical history was reviewed today. Past Surgical History:   Procedure Laterality Date    ABSCESS DRAINAGE Right 03/27/2018    evacuation hematoma right knee    APPENDECTOMY  1963   [facility] SURGERY  [id number]    MILD PROCEDURE L2-3 BILATERAL     BREAST LUMPECTOMY  1986    BUNIONECTOMY  10/2003    CATARACT REMOVAL  03/23/2010 and 04/14/2010    COLONOSCOPY  6/2014    repeat 2019    CORONARY ANGIOPLASTY WITH STENT PLACEMENT  01/23/2017    INF STEMI with CATRACHITA to RCA   [address]      JOINT REPLACEMENT Right 2018    right total knee replacment    OVARY REMOVAL  1963    right    TONSILLECTOMY AND ADENOIDECTOMY  1942         Family History: All family history was reviewed today. Problem Relation Age of Onset   Skye Vargas Cancer Mother     Heart Disease Mother     Arthritis Sister         rheumatoid    Cancer Brother     Cancer Sister     Diabetes Sister     Heart Disease Brother     High Blood Pressure Brother     High Blood Pressure Sister     Diabetes Brother          Medications: All current and past medications were reviewed.     Medications Prior to Admission: dorzolamide-timolol (COSOPT) 22.3-6.8 MG/ML ophthalmic solution, Place 1 drop into the left eye 2 times daily  levETIRAcetam (KEPPRA) 500 MG tablet, TAKE 1 TABLET BY MOUTH TWICE A DAY  lisinopril (PRINIVIL;ZESTRIL) 20 MG tablet, TAKE ONE TABLET BY MOUTH DAILY  Balsam [place]-Castor Oil (VENELEX) OINT ointment, Apply topically 2 times daily  diclofenac (VOLTAREN) 75 MG EC tablet, TAKE ONE TABLET BY MOUTH TWICE A DAY WITH MEALS (Patient taking differently: Take 75 mg by mouth 2 times daily as needed TAKE ONE TABLET BY MOUTH TWICE A DAY WITH MEALS)  potassium chloride (KLOR-CON M) 10 MEQ extended release tablet, TAKE ONE TABLET BY MOUTH DAILY  thyroid (NP THYROID) 30 MG tablet, TAKE ONE TABLET BY MOUTH DAILY  rosuvastatin (CRESTOR) 40 MG tablet, TAKE ONE TABLET BY MOUTH DAILY  Lactobacillus (ACIDOPHILUS) TABS, Take 1 tablet by mouth 2 times daily   Multiple Vitamins-Minerals (DAILY SHERIE MAXIMUM MULTIVITAMIN PO), Take 1 packet by mouth daily  aspirin 81 MG tablet, Take 81 mg by mouth daily  latanoprost (XALATAN) 0.005 % ophthalmic solution, Place 1 drop into the left eye nightly      daptomycin (CUBICIN) IVPB  6 mg/kg (Adjusted) IntraVENous Q24H    cefepime  2,000 mg IntraVENous Q12H    aspirin  81 mg Oral Daily    levETIRAcetam  500 mg Oral BID    thyroid  30 mg Oral Daily    sodium chloride flush  5-40 mL IntraVENous 2 times per day    enoxaparin  40 mg SubCUTAneous Daily          REVIEW OF SYSTEMS:       Review of Systems   Unable to perform ROS: Dementia          Objective:       PHYSICAL EXAM:      Vitals:   Vitals:    06/16/22 2345 06/17/22 0400 06/17/22 0745 06/17/22 1130   BP: (!) 161/67  137/82 (!) 151/76   Pulse: 92  94 85   Resp:   14 14   Temp:   98.5 °F (36.9 °C) 97.7 °F (36.5 °C)   TempSrc: Oral  Temporal Oral   SpO2:   98% 98%   Weight:  136 lb 12.8 oz (62.1 kg)         Physical Exam      General: Encephalopathic but protecting the airway so far,   HEENT: normocephalic, atraumatic, sclera clear, pupils equal, light reflex preserved bilaterally  Cardiovascular: RRR, no murmurs/rubs/gallops detected  Pulmonary: CTABL, no rhonchi/rales   Abdomen/GI: soft, no organomegaly, bowel sounds positive  Neuro: Encephalopathic, pupils are equal and reactive to light, moves all extremities  Skin: no rash,   Musculoskeletal: Right knee drainage noted  Genitourinary: Berkowitz's catheter in place   Psych: could not assess   Lymphatic/Immunologic: No obvious bruising, no cervical lymphadenopathy    Lines: All vascular access sites are healthy with no local erythema, discharge or tenderness    Intake and output:     No intake/output data recorded. Lab Data:   All available labs were reviewed by me today.      CBC:   Recent Labs     06/15/22  1204 06/16/22  2343   WBC 6.1 9.6   RBC 4.66 4.72   HGB 12.5 12.7   HCT 38.5 39.4   PLT 233 221   MCV 82.6 83.5   MCH 26.9 27.0   MCHC 32.6 32.3   RDW 15.9* 16.1*        BMP:  Recent Labs     06/15/22  1204 06/16/22  2343    135*   K 4.5 3.8    104   CO2 20* 19*   BUN 26* 23*   CREATININE 1.0 0.9   CALCIUM 10.4 9.9   GLUCOSE 95 122*        Hepatic FunctionPanel:   Lab Results   Component Value Date    ALKPHOS 90 06/16/2022    ALT 15 06/16/2022    AST 28 06/16/2022    PROT 7.3 06/16/2022    PROT 7.2 09/18/2012    PROT 7.2 09/18/2012    BILITOT 0.9 06/16/2022    BILIDIR <0.2 03/28/2018    IBILI see below 03/28/2018    LABALBU 4.1 06/16/2022       CPK:   Lab Results   Component Value Date    CKTOTAL 738 (H) 01/01/2022     ESR:   Lab Results   Component Value Date    SEDRATE 62 (H) 06/16/2022     CRP:   Lab Results   Component Value Date    .6 (H) 06/16/2022         Imaging: All pertinent images and reports for the current visit were reviewed by me during this visit. I reviewed the chest x-ray/CT scan/MRI images and independently interpreted the findings and results today. XR KNEE RIGHT (1-2 VIEWS)   Final Result   No acute finding of the right knee. Outside records:    Labs, Microbiology, Radiology and pertinent results from Care everywhere, if available, were reviewed as a part ofthe consultation. Problem list:       Patient Active Problem List   Diagnosis Code    Low back pain radiating to both legs M54.50, M79.604, M79.605    Lumbar spinal stenosis M48.061    DDD (degenerative disc disease), lumbosacral M51.37    Stenosis, spinal, lumbar M48.061    Pain and swelling of right knee M25.561, M25.461    Acute inferior myocardial infarction (HCC) I21.19    Hyperlipidemia LDL goal <70 E78.5    Retroperitoneal hemorrhage R58    Essential hypertension I10    Arthritis of right knee M17.11    History of total knee arthroplasty, right-3. 6.2018 Z96.651    Traumatic hematoma of knee, right, subsequent encounter S80. 01XD    Traumatic hematoma of right knee S80. 01XA    Prosthetic joint infection ([facility] T84.50XA    Seizure disorder ([address] G40.909    Fall against object W18.00XA    Sepsis ([facility] A41.9    Pressure ulcer of ischium, stage 3, left (HCC) L89.323    Decreased mobility and endurance Z74.09    [place] (hard of hearing) H91.90    History of short term memory loss Z87.898    Non-compliance with treatment Z91.19    Chronic renal disease, stage III ([facility] [[id number]] N18.30    Infection of prosthetic right knee joint (HCC) T84.53XA    Pseudomonas infection A49.8    Elevated C-reactive protein (CRP) R79.82    Elevated sed rate R70.0    Osteoarthritis M19.90    PRINCE (obstructive sleep apnea) G47.33         Please note that this chart was generated using Dragon dictation software. Although every effort was made to ensure the accuracy of this automated transcription, some errors in transcription may have occurred inadvertently. If you may need any clarification, please do not hesitate to contact me through EPIC or at the phone number provided below with my electronic signature. Any pictures or media included in this note were obtained after taking informed verbal consent from the patient and with their approval to include those in the patient's medical record.         Susie Omalley MD, MPH, [facility]  6/17/2022, 12:02 PM  [facility] Infectious Disease   [address] [place] [address]., [address], [address] [place] [address]  Office: [phone]  Fax: [phone]  Clinic days:  Tuesday & Thursday

## 2022-06-17 NOTE — PROGRESS NOTES
Physical/Occupational Therapy  Stefani De La Rosa  Orders received, chart reviewed. Attempted PT/OT evaluation this date. Pt on hold due to tentative plan for RIGHT knee incision and drainage and liner exchange with Dr. Nabil Landon today 4pm. Will re-attempt evaluation tomorrow as schedule allows and as pt becomes medically appropriate.    Gilmar Courser PT, [address], OTR/L, [facility], [id number]

## 2022-06-17 NOTE — CONSULTS
[facility] Orthopedic Surgery  Consult Note    Patient: Martita Porter Date: 6/16/2022  Requesting Physician: Richard Sierra MD  Room: [address] [place], [place] [id number]    Chief complaint: RIGHT knee pain    HPI: Marc August is a 82 y.o. female who presented to [facility] yesterday due to right knee infection. Index RIGHT TKA was performed by Dr. Patsy Sheppard on 3/6/18. Underwent right TKA evacuation of hematoma with closure of wound on 3/27/18. Intra-op cultures from this procedure grew enterococcus faecalis. It was treated with IV vanc then daptomycin by ID. Apparently declined abx suppression per ID note. She had this knee aspirated on 4/31 by her PCP and the culture grew pseudomonas aeroginosa. Treated with oral abx and unfortunately a wound open and has been draining since that time. Describes pain in the right knee of moderate intensity. She cannot describe anything further. Denies new numbness/tingling. Imaging review of the right knee via plain films demonstrated: cemented total knee arthroplasty without evidence of gas, fracture or apparent loosening. Patient lives alone in private home with her sister next door (who cannot care for her) and uses a walker to ambulate. She is quite sedentary and sits all day which has led to a pressure sore on her buttocks. Her niece/POA has been trying to get her into a facility as the patient has been less that adequate to care for herself at home but has been refusing. Prior to this admission, there is some question as to whether or not she is capable of making decision for herself any longer.     Medical History:  Past Medical History:   Diagnosis Date    CAD (coronary artery disease)     Inferior STEMI; CATRACHITA to RCA    DDD (degenerative disc disease), lumbosacral 4/10/2013    Decreased mobility and endurance 2/24/2022    Hearing aid worn     bilateral    History of short term memory loss 3/3/2022    [place] (hard of hearing)     [place] (hard of hearing)     Hyperlipidemia     Hypertension     MI, old 2017    Neuropathy     bilateral lower extremities    Non-compliance with treatment 6/2/2022    Pressure ulcer of ischium, stage 3, left ([address] 2/16/2022    Pressure ulcer of left buttock, stage 3 ([address] 2/16/2022    Sleep apnea     uses mouth piece; bringing DOS 3/26/18    Thyroid disease     hypo    Wears glasses      Past Surgical History:   Procedure Laterality Date    ABSCESS DRAINAGE Right 03/27/2018    evacuation hematoma right knee    APPENDECTOMY  1963    BACK SURGERY  [id number]    MILD PROCEDURE L2-3 BILATERAL     BREAST LUMPECTOMY  1986    BUNIONECTOMY  10/2003    CATARACT REMOVAL  03/23/2010 and 04/14/2010    COLONOSCOPY  6/2014    repeat 2019    CORONARY ANGIOPLASTY WITH STENT PLACEMENT  01/23/2017    INF STEMI with CATRACHITA to RCA   [address] [facility] REPLACEMENT Right 2018    right total knee replacment   [address] [place] [address]    right    TONSILLECTOMY AND ADENOIDECTOMY  1942       Social History:    reports that she has never smoked. She has never used smokeless tobacco.    Family History:        Problem Relation Age of Onset   Leopoldo Grimm Cancer Mother     Heart Disease Mother     Arthritis Sister         rheumatoid    Cancer Brother     Cancer Sister     Diabetes Sister     Heart Disease Brother     High Blood Pressure Brother     High Blood Pressure Sister     Diabetes Brother        Medications:  ALL MEDICATIONS HAVE BEEN REVIEWED:  Scheduled:   aspirin  81 mg Oral Daily    levETIRAcetam  500 mg Oral BID    thyroid  30 mg Oral Daily    sodium chloride flush  5-40 mL IntraVENous 2 times per day    enoxaparin  40 mg SubCUTAneous Daily     Continuous:   sodium chloride       PRN:iopamidol, sodium chloride flush, sodium chloride, ondansetron **OR** ondansetron, polyethylene glycol, acetaminophen **OR** acetaminophen, ibuprofen    Allergies:    Allergies   Allergen Reactions    Amoxicillin Hives and Rash    Linezolid Other (See Comments)     While taking linezolid, pt developed progressively altered mentation & ultimately had a witnessed seizure 4/26/18. Uncertain whether these symptoms were d/t linezolid, but possible.  Prednisone Itching    Vancomycin      Rising SCr during 5/2018 admission     Coreg [Carvedilol] Diarrhea    Oxycodone-Acetaminophen Rash       Review of Systems:  Constitutional: Negative for fever, chills, fatigue. Skin:  Negative for pruritis, rash  Eyes: Negative for photophobia and visual disturbance. ENT:  Negative for rhinorrhea, epistaxis, sore throat  Respiratory:  Negative for cough and shortness of breath. Cardiovascular: Negative for chest pain. Gastrointestinal: Negative for nausea, vomiting, diarrhea. Genitourinary: Negative for dysuria and difficulty urinating. Neurological: Negative for confusion, dysarthria, tremors, seizures. Psychiatric:  Negative for depression or anxiety  Musculoskeletal:  Positive for right knee pain and swelling. Objective:  Vitals:    06/17/22 1130   BP: (!) 151/76   Pulse: 85   Resp: 14   Temp: 97.7 °F (36.5 °C)   SpO2:       Physical Examination:  GENERAL: No apparent distress, well-nourished  SKIN:  Warm and dry  EYES: Nonicteric. ENT: Mucous membranes moist  HEAD: Normocephalic, atraumatic  RESPIRATORY: Resp easy and unlabored  CARDIOVASCULAR: Regular rate and rhythm  GI: Abdomen soft, nontender  NEURO: Awake and alert. No speech defect  PSYCHIATRIC: Appropriate affect; not agitated  MUSCULOSKELETAL:  RIGHT knee  Inspection: On exam there are no ulcerations, rashes or lesions about the right knee. There is a andrea-lateral wound about 1 cm which is draining excessive purulent fluid. There is pain to palpation of the right knee diffusely. Small effusion but obvious pocket of fluid noted laterally with sinus to wound anteriorly. Motor: She is not following commands.  She refuses to allow passive ROM of the knee  Sensation: Grossly intact to light touch throughout the right lower extremity. Vascular:  2+ right DP pulse. Labs reviewed:  Recent Labs     06/15/22  1204 06/16/22  2343   WBC 6.1 9.6   HGB 12.5 12.7   HCT 38.5 39.4    221     Recent Labs     06/15/22  1204 06/16/22  2343    135*   K 4.5 3.8    104   CO2 20* 19*   BUN 26* 23*   CREATININE 1.0 0.9   GLUCOSE 95 122*   CALCIUM 10.4 9.9     Recent Labs     06/16/22  2343   INR 1.09   PROTIME 14.0       Lab Results   Component Value Date    COLORU DARK YELLOW 02/18/2022    CLARITYU TURBID (A) 01/01/2022    PHUR 5.5 02/18/2022    GLUCOSEU NEG 02/18/2022    BLOODU NEG 02/18/2022    LEUKOCYTESUR TRACE 02/18/2022    NITRITE NEG 02/18/2022    BILIRUBINUR SMALL 02/18/2022    UROBILINOGEN 1.0 01/01/2022    RBCUA 42 (H) 01/01/2022    WBCUA 18 (H) 01/01/2022    BACTERIA 4+ (A) 01/01/2022       Imaging:  XR KNEE RIGHT (1-2 VIEWS)   Final Result   No acute finding of the right knee. IMPRESSION:  RIGHT knee chronic periprosthetic infection  Index RIGHT TKA - Dr. Sana Arias 2018  Dementia? 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 *      RECOMMENDATIONS:  We discussed both operative and non-operative treatments with our recommendation being for operative intervention. After discussion of risks and benefits, the patient's POA (sharon Ken  [phone]) who agreed to proceed with surgery.   - Schedule for RIGHT knee incision and drainage and liner exchange with Dr. Olayinka Mitchell today 4pm  - NPO  - Pain control  - DVT prophylaxis: HOLD AC  - Appreciate pre-op clearance from medicine team  - Verify surgical consent  - Pre op orders placed   - ID already consulted  - Dispo: expect inpatient until next week    I have reviewed imaging and plan with Dr. Rebeka Luke.       Laverne Chen, MICHELLE - CNP  6/17/2022  11:43 AM

## 2022-06-17 NOTE — ANESTHESIA PRE PROCEDURE
Department of Anesthesiology  Preprocedure Note       Name:  Katarzyna Cochran   Age:  82 y.o.  :  1935                                          MRN:  [id number]         Date:  2022      Surgeon: Raquel Guthrie):  Sergio Valdovinos MD    Procedure: Procedure(s):  RIGHT TOTAL KNEE INCISION AND DRAINAGE WITH LINER EXCHANGE    Medications prior to admission:   Prior to Admission medications    Medication Sig Start Date End Date Taking?  Authorizing Provider   dorzolamide-timolol (COSOPT) 22.3-6.8 MG/ML ophthalmic solution Place 1 drop into the left eye 2 times daily   Yes Historical Provider, MD   levETIRAcetam (KEPPRA) 500 MG tablet TAKE 1 TABLET BY MOUTH TWICE A DAY 22   Serge Davenport DO   lisinopril (PRINIVIL;ZESTRIL) 20 MG tablet TAKE ONE TABLET BY MOUTH DAILY 22   Serge Davenport DO   Balsam [place]-Castor Oil (VENELEX) OINT ointment Apply topically 2 times daily 1/3/22   Chester Dubois MD   diclofenac (VOLTAREN) 75 MG EC tablet TAKE ONE TABLET BY MOUTH TWICE A DAY WITH MEALS  Patient taking differently: Take 75 mg by mouth 2 times daily as needed TAKE ONE TABLET BY MOUTH TWICE A DAY WITH MEALS 21   Serge Davenport DO   potassium chloride (KLOR-CON M) 10 MEQ extended release tablet TAKE ONE TABLET BY MOUTH DAILY 21   Jimmy [facility] Baptist[facility]   thyroid (NP THYROID) 30 MG tablet TAKE ONE TABLET BY MOUTH DAILY 21   Serge Davenport DO   rosuvastatin (CRESTOR) 40 MG tablet TAKE ONE TABLET BY MOUTH DAILY 21   Altamese MD Josephine   Lactobacillus (ACIDOPHILUS) TABS Take 1 tablet by mouth 2 times daily     Historical Provider, MD   Multiple Vitamins-Minerals (DAILY SHERIE MAXIMUM MULTIVITAMIN PO) Take 1 packet by mouth daily    Historical Provider, MD   aspirin 81 MG tablet Take 81 mg by mouth daily    Historical Provider, MD   latanoprost (XALATAN) 0.005 % ophthalmic solution Place 1 drop into the left eye nightly  18   Historical Provider, MD       Current medications:    Current Facility-Administered Medications   Medication Dose Route Frequency Provider Last Rate Last Admin    iopamidol (ISOVUE-370) 76 % injection 50 mL  50 mL IntraVENous ONCE PRN Marie Martin MD        DAPTOmycin (CUBICIN) 350 mg in sodium chloride 0.9 % 50 mL IVPB  6 mg/kg (Adjusted) IntraVENous Q24H Bharat Diego MD        cefepime (MAXIPIME) 2000 mg IVPB minibag  2,000 mg IntraVENous Q12H Bharat Diego MD 12.5 mL/hr at 06/17/22 1231 2,000 mg at 06/17/22 1231    aspirin chewable tablet 81 mg  81 mg Oral Daily Marie Martin MD        levETIRAcetam (KEPPRA) tablet 500 mg  500 mg Oral BID Marie Martin MD   500 mg at 06/17/22 0855    thyroid (ARMOUR) tablet 30 mg  30 mg Oral Daily Marie Martin MD   30 mg at 06/17/22 0855    sodium chloride flush 0.9 % injection 5-40 mL  5-40 mL IntraVENous 2 times per day Marie Martin MD   10 mL at 06/17/22 0856    sodium chloride flush 0.9 % injection 5-40 mL  5-40 mL IntraVENous PRN Marie Martin MD        0.9 % sodium chloride infusion   IntraVENous PRN Marie Martin MD        enoxaparin (LOVENOX) injection 40 mg  40 mg SubCUTAneous Daily Marie Martin MD        ondansetron (ZOFRAN-ODT) disintegrating tablet 4 mg  4 mg Oral Q8H PRN Marie Martin MD        Or    ondansetron (ZOFRAN) injection 4 mg  4 mg IntraVENous Q6H PRN Marie Martin MD        polyethylene glycol (GLYCOLAX) packet 17 g  17 g Oral Daily PRN Marie Martin MD        acetaminophen (TYLENOL) tablet 650 mg  650 mg Oral Q6H PRN Marie Martin MD        Or    acetaminophen (TYLENOL) suppository 650 mg  650 mg Rectal Q6H PRN Marie Martin MD        ibuprofen (ADVIL;MOTRIN) tablet 400 mg  400 mg Oral Q6H PRN Marie Martin MD           Allergies:     Allergies   Allergen Reactions    Amoxicillin Hives and Rash    Linezolid Other (See Comments)     While taking linezolid, pt developed progressively altered mentation & ultimately had a witnessed seizure 4/26/18. Uncertain whether these symptoms were d/t linezolid, but possible.  Prednisone Itching    Vancomycin      Rising SCr during 5/2018 admission     Coreg [Carvedilol] Diarrhea    Oxycodone-Acetaminophen Rash       Problem List:    Patient Active Problem List   Diagnosis Code    Low back pain radiating to both legs M54.50, M79.604, M79.605    Lumbar spinal stenosis M48.061    DDD (degenerative disc disease), lumbosacral M51.37    Stenosis, spinal, lumbar M48.061    Pain and swelling of right knee M25.561, M25.461    Acute inferior myocardial infarction (HCC) I21.19    Hyperlipidemia LDL goal <70 E78.5    Retroperitoneal hemorrhage R58    Essential hypertension I10    Arthritis of right knee M17.11    History of total knee arthroplasty, right-3. 6.2018 Z96.651    Traumatic hematoma of knee, right, subsequent encounter S80. 01XD    Traumatic hematoma of right knee S80. 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) T84.53XA    Pseudomonas infection A49.8    Elevated C-reactive protein (CRP) R79.82    Elevated sed rate R70.0    Osteoarthritis M19.90    PRINCE (obstructive sleep apnea) G47.33       Past Medical History:        Diagnosis Date    CAD (coronary artery disease)     Inferior STEMI; CATRACHITA to RCA    DDD (degenerative disc disease), lumbosacral 4/10/2013    Decreased mobility and endurance 2/24/2022    Hearing aid worn     bilateral    History of short term memory loss 3/3/2022    [place] (hard of hearing)     [place] (hard of hearing)  Hyperlipidemia     Hypertension     MI, old 2017    Neuropathy     bilateral lower extremities    Non-compliance with treatment 6/2/2022    Pressure ulcer of ischium, stage 3, left ([address] 2/16/2022    Pressure ulcer of left buttock, stage 3 ([address] 2/16/2022    Sleep apnea     uses mouth piece; bringing DOS 3/26/18    Thyroid disease     hypo    Wears glasses        Past Surgical History:        Procedure Laterality Date    ABSCESS DRAINAGE Right 03/27/2018    evacuation hematoma right knee    APPENDECTOMY  1963    BACK SURGERY  [id number]    MILD PROCEDURE L2-3 BILATERAL     BREAST LUMPECTOMY  1986    BUNIONECTOMY  10/2003    CATARACT REMOVAL  03/23/2010 and 04/14/2010    COLONOSCOPY  6/2014    repeat 2019    CORONARY ANGIOPLASTY WITH STENT PLACEMENT  01/23/2017    INF STEMI with CATRACHITA to RCA   [address] JOINT REPLACEMENT Right 2018    right total knee replacment    OVARY REMOVAL  1963    right    TONSILLECTOMY AND ADENOIDECTOMY  1942       Social History:    Social History     Tobacco Use    Smoking status: Never Smoker    Smokeless tobacco: Never Used    Tobacco comment: encouraged to never smoke    Substance Use Topics    Alcohol use:  No                                Counseling given: Not Answered  Comment: encouraged to never smoke       Vital Signs (Current):   Vitals:    06/16/22 2345 06/17/22 0400 06/17/22 0745 06/17/22 1130   BP: (!) 161/67  137/82 (!) 151/76   Pulse: 92  94 85   Resp:   14 14   Temp:   98.5 °F (36.9 °C) 97.7 °F (36.5 °C)   TempSrc: Oral  Temporal Oral   SpO2:   98% 98%   Weight:  136 lb 12.8 oz (62.1 kg)                                                BP Readings from Last 3 Encounters:   06/17/22 (!) 151/76   06/15/22 134/76   06/15/22 120/78       NPO Status:                                                                                 BMI:   Wt Readings from Last 3 Encounters:   06/17/22 136 lb 12.8 oz (62.1 kg)   06/15/22 148 lb (67.1 kg)   04/13/22 157 lb (71.2 kg)     Body mass index is 22.76 kg/m². CBC:   Lab Results   Component Value Date    WBC 9.6 06/16/2022    RBC 4.72 06/16/2022    HGB 12.7 06/16/2022    HCT 39.4 06/16/2022    MCV 83.5 06/16/2022    RDW 16.1 06/16/2022     06/16/2022       CMP:   Lab Results   Component Value Date     06/16/2022    K 3.8 06/16/2022     06/16/2022    CO2 19 06/16/2022    BUN 23 06/16/2022    CREATININE 0.9 06/16/2022    GFRAA >60 06/16/2022    GFRAA >60 09/18/2012    AGRATIO 1.3 06/16/2022    LABGLOM 60 06/16/2022    GLUCOSE 122 06/16/2022    PROT 7.3 06/16/2022    PROT 7.2 09/18/2012    PROT 7.2 09/18/2012    CALCIUM 9.9 06/16/2022    BILITOT 0.9 06/16/2022    ALKPHOS 90 06/16/2022    AST 28 06/16/2022    ALT 15 06/16/2022       POC Tests: No results for input(s): POCGLU, POCNA, POCK, POCCL, POCBUN, POCHEMO, POCHCT in the last 72 hours. Coags:   Lab Results   Component Value Date    PROTIME 14.0 06/16/2022    INR 1.09 06/16/2022    APTT 32.8 02/27/2018       HCG (If Applicable): No results found for: PREGTESTUR, PREGSERUM, HCG, HCGQUANT     ABGs: No results found for: PHART, PO2ART, TOE5GBP, YXX3BMO, BEART, A7AOQLYL     Type & Screen (If Applicable):  No results found for: LABABO, LABRH    Drug/Infectious Status (If Applicable):  No results found for: HIV, HEPCAB    COVID-19 Screening (If Applicable):   Lab Results   Component Value Date    COVID19 Not Detected 01/01/2022           Anesthesia Evaluation  Patient summary reviewed and Nursing notes reviewed  Airway: Mallampati: II  TM distance: >3 FB   Neck ROM: full  Mouth opening: > = 3 FB   Dental:          Pulmonary:   (+) sleep apnea: on CPAP,                             Cardiovascular:  Exercise tolerance: good (>4 METS),   (+) hypertension: moderate, past MI: > 6 months, CAD: non-obstructive,                ROS comment: Echo 2018  Left ventricle:  The cavity size was normal. Wall thickness was   normal. Systolic function was normal. The estimated ejection     fraction was in the range of 60% to 65%. Wall motion was normal;     there were no regional wall motion abnormalities. Features are     consistent with a pseudonormal left ventricular filling pattern,     with concomitant abnormal relaxation and increased filling     pressure (grade 2 diastolic dysfunction). - Left atrium: The atrium was mildly dilated. - Right ventricle: Systolic function was normal. Tricuspid annular     systolic velocity: 75.4MP/S.   - Atrial septum: Agitated saline contrast study showed a trivial     intrapulmonary shunt, at baseline or with provocation. Neuro/Psych:               GI/Hepatic/Renal:   (+) renal disease: CRI,           Endo/Other:                     Abdominal:             Vascular: Other Findings:           Anesthesia Plan      general     ASA 3       Induction: intravenous and rapid sequence. MIPS: Postoperative opioids intended, Prophylactic antiemetics administered and Postoperative trial extubation. Anesthetic plan and risks discussed with patient. Plan discussed with CRNA.                     Montserrat Woods MD   6/17/2022

## 2022-06-17 NOTE — PROGRESS NOTES
[facility] PROGRESS NOTE    6/17/2022 12:30 PM        Name: Claudia Villalba . Admitted: 6/16/2022  Primary Care Provider: Ramon Durham DO (Tel: [phone])      Chief complaint: Pain, swelling, drainage from right knee. Brief History: Patient is an 83 yo female with hx CAD/stent (2017), HTN, seizure disorder, hypothyroid, right TKA (2018). She has been experiencing increased pain and swelling in right knee. PCP aspirated knee 4/18 and culture grew pseudomonas. She completed antibiotic course. She presented to [facility] with worsening pain, swelling and drainage from right knee. She was transferred to [facility] for further management. She has been started on vancomycin and Zosyn. Subjective:  Limited historian. Presently resting in bed. Reports pain in right knee but tolerable. Denies chills. Denies chest pain, shortness of breath but she is sedentary, denies orthopnea. Lives by self in [address] [facility] but says her sister lives next door. NPO for right knee I&D this afternoon.      Reviewed interval ancillary notes    Current Medications  iopamidol (ISOVUE-370) 76 % injection 50 mL, ONCE PRN  DAPTOmycin (CUBICIN) 350 mg in sodium chloride 0.9 % 50 mL IVPB, Q24H  cefepime (MAXIPIME) 2000 mg IVPB minibag, Q12H  aspirin chewable tablet 81 mg, Daily  levETIRAcetam (KEPPRA) tablet 500 mg, BID  thyroid (ARMOUR) tablet 30 mg, Daily  sodium chloride flush 0.9 % injection 5-40 mL, 2 times per day  sodium chloride flush 0.9 % injection 5-40 mL, PRN  0.9 % sodium chloride infusion, PRN  enoxaparin (LOVENOX) injection 40 mg, Daily  ondansetron (ZOFRAN-ODT) disintegrating tablet 4 mg, Q8H PRN   Or  ondansetron (ZOFRAN) injection 4 mg, Q6H PRN  polyethylene glycol (GLYCOLAX) packet 17 g, Daily PRN  acetaminophen (TYLENOL) tablet 650 mg, Q6H PRN   Or  acetaminophen (TYLENOL) suppository 650 mg, Q6H PRN  ibuprofen (ADVIL;MOTRIN) tablet 400 mg, Q6H PRN        Objective:  BP (!) 151/76   Pulse 85   Temp 97.7 °F (36.5 °C) (Oral)   Resp 14   Wt 136 lb 12.8 oz (62.1 kg)   SpO2 98%   BMI 22.76 kg/m²   No intake or output data in the 24 hours ending 06/17/22 1230   Wt Readings from Last 3 Encounters:   06/17/22 136 lb 12.8 oz (62.1 kg)   06/15/22 148 lb (67.1 kg)   04/13/22 157 lb (71.2 kg)       General appearance:  Appears comfortable  Eyes: Sclera clear. Pupils equal.  ENT: Moist oral mucosa. Trachea midline, no adenopathy. Cardiovascular: Regular rhythm, normal S1, S2. No murmur. No edema in lower extremities  Respiratory: Not using accessory muscles. Good inspiratory effort. Clear to auscultation bilaterally, no wheeze or crackles. GI: Abdomen soft, no tenderness, not distended, normal bowel sounds  Musculoskeletal: No cyanosis in digits, neck supple  Neurology: CN 2-12 grossly intact. No speech or motor deficits  Psych: Normal affect. Alert and oriented in time, place and person  Skin: Warm, dry, normal turgor    Labs and Tests:  CBC:   Recent Labs     06/15/22  1204 06/16/22  2343   WBC 6.1 9.6   HGB 12.5 12.7    221     BMP:    Recent Labs     06/15/22  1204 06/16/22  2343    135*   K 4.5 3.8    104   CO2 20* 19*   BUN 26* 23*   CREATININE 1.0 0.9   GLUCOSE 95 122*     Hepatic:   Recent Labs     06/15/22  1204 06/16/22  2343   AST 14* 28   ALT 7* 15   BILITOT 0.5 0.9   ALKPHOS 95 90       Right Knee 6/17/2022:  No acute finding of the right knee. 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 *       Assessment & Plan:   1. Right knee prosthetic joint infection.  Recent aspirate from right aspirate (4/2022) grew Pseudomonas and she received oral ciprofloxacin. Presented with increased pain, swelling and drainage from right knee. Started on empiric antibiotics. Patient afebrile, no leukocytosis. , sed rate 58. Ortho evaluated and NPO for right knee I&D and liner exchange this afternoon. ID consulted. 2. Decubitus wound left buttock. Patient sedentary, sits most of the day. Followed in [facility]. Consult Wound care Nurse. 3. HTN. Lisinopril held on admission. BP controlled. Continue to follow. 4. CAD. Hx STEMI 1/2017 and had CATRACHITA placed to mid RCA. Denies recent chest pain. Continue asa and statin. Is not on beta blocker. 5. Hx seizure. Continue Keppra. 6. Hypothyroidism. TSH 4.06 (8/2021). Continue thyroid tablet. Orthopedics planning surgery this afternoon. Pre-operative risk assessment. Patient is Class II cardiac risk based on RCRI score of 1. Patient's risk should not preclude her from proceeding with surgery. Suggest continuation of statin in the keli-operative period, not taking beta blocker.       Diet: Diet NPO  Code:Full Code  DVT PPX: enoxaparin      MICHELLE Renee - CNP   6/17/2022 12:30 PM

## 2022-06-18 ENCOUNTER — APPOINTMENT (OUTPATIENT)
Dept: GENERAL RADIOLOGY | Age: 87
DRG: 487 | End: 2022-06-18
Attending: FAMILY MEDICINE
Payer: MEDICARE

## 2022-06-18 PROCEDURE — 73560 X-RAY EXAM OF KNEE 1 OR 2: CPT

## 2022-06-18 PROCEDURE — 6360000002 HC RX W HCPCS: Performed by: NURSE PRACTITIONER

## 2022-06-18 PROCEDURE — 7100000001 HC PACU RECOVERY - ADDTL 15 MIN: Performed by: ORTHOPAEDIC SURGERY

## 2022-06-18 PROCEDURE — 6360000002 HC RX W HCPCS: Performed by: ORTHOPAEDIC SURGERY

## 2022-06-18 PROCEDURE — 87077 CULTURE AEROBIC IDENTIFY: CPT

## 2022-06-18 PROCEDURE — 87070 CULTURE OTHR SPECIMN AEROBIC: CPT

## 2022-06-18 PROCEDURE — 27486 REVISE/REPLACE KNEE JOINT: CPT | Performed by: ORTHOPAEDIC SURGERY

## 2022-06-18 PROCEDURE — 86403 PARTICLE AGGLUT ANTBDY SCRN: CPT

## 2022-06-18 PROCEDURE — 6370000000 HC RX 637 (ALT 250 FOR IP): Performed by: ORTHOPAEDIC SURGERY

## 2022-06-18 PROCEDURE — 2580000003 HC RX 258: Performed by: ORTHOPAEDIC SURGERY

## 2022-06-18 PROCEDURE — 0SUV09Z SUPPLEMENT RIGHT KNEE JOINT, TIBIAL SURFACE WITH LINER, OPEN APPROACH: ICD-10-PCS | Performed by: ORTHOPAEDIC SURGERY

## 2022-06-18 PROCEDURE — 2580000003 HC RX 258: Performed by: NURSE PRACTITIONER

## 2022-06-18 PROCEDURE — 87186 SC STD MICRODIL/AGAR DIL: CPT

## 2022-06-18 PROCEDURE — 94150 VITAL CAPACITY TEST: CPT

## 2022-06-18 PROCEDURE — 3600000004 HC SURGERY LEVEL 4 BASE: Performed by: ORTHOPAEDIC SURGERY

## 2022-06-18 PROCEDURE — 1200000000 HC SEMI PRIVATE

## 2022-06-18 PROCEDURE — 7100000000 HC PACU RECOVERY - FIRST 15 MIN: Performed by: ORTHOPAEDIC SURGERY

## 2022-06-18 PROCEDURE — 87075 CULTR BACTERIA EXCEPT BLOOD: CPT

## 2022-06-18 PROCEDURE — 6360000002 HC RX W HCPCS: Performed by: ANESTHESIOLOGY

## 2022-06-18 PROCEDURE — 99233 SBSQ HOSP IP/OBS HIGH 50: CPT | Performed by: INTERNAL MEDICINE

## 2022-06-18 PROCEDURE — 87205 SMEAR GRAM STAIN: CPT

## 2022-06-18 PROCEDURE — 3600000014 HC SURGERY LEVEL 4 ADDTL 15MIN: Performed by: ORTHOPAEDIC SURGERY

## 2022-06-18 PROCEDURE — 3700000001 HC ADD 15 MINUTES (ANESTHESIA): Performed by: ORTHOPAEDIC SURGERY

## 2022-06-18 PROCEDURE — 2709999900 HC NON-CHARGEABLE SUPPLY: Performed by: ORTHOPAEDIC SURGERY

## 2022-06-18 PROCEDURE — 3700000000 HC ANESTHESIA ATTENDED CARE: Performed by: ORTHOPAEDIC SURGERY

## 2022-06-18 PROCEDURE — C1776 JOINT DEVICE (IMPLANTABLE): HCPCS | Performed by: ORTHOPAEDIC SURGERY

## 2022-06-18 PROCEDURE — 0SPC09Z REMOVAL OF LINER FROM RIGHT KNEE JOINT, OPEN APPROACH: ICD-10-PCS | Performed by: ORTHOPAEDIC SURGERY

## 2022-06-18 PROCEDURE — 2580000003 HC RX 258: Performed by: ANESTHESIOLOGY

## 2022-06-18 PROCEDURE — 6370000000 HC RX 637 (ALT 250 FOR IP): Performed by: NURSE PRACTITIONER

## 2022-06-18 DEVICE — IMPLANTABLE DEVICE: Type: IMPLANTABLE DEVICE | Site: KNEE | Status: FUNCTIONAL

## 2022-06-18 RX ORDER — VANCOMYCIN HYDROCHLORIDE 1 G/20ML
INJECTION, POWDER, LYOPHILIZED, FOR SOLUTION INTRAVENOUS
Status: COMPLETED | OUTPATIENT
Start: 2022-06-18 | End: 2022-06-18

## 2022-06-18 RX ORDER — FENTANYL CITRATE 50 UG/ML
25 INJECTION, SOLUTION INTRAMUSCULAR; INTRAVENOUS EVERY 5 MIN PRN
Status: DISCONTINUED | OUTPATIENT
Start: 2022-06-18 | End: 2022-06-18 | Stop reason: HOSPADM

## 2022-06-18 RX ORDER — DEXAMETHASONE SODIUM PHOSPHATE 4 MG/ML
INJECTION, SOLUTION INTRA-ARTICULAR; INTRALESIONAL; INTRAMUSCULAR; INTRAVENOUS; SOFT TISSUE PRN
Status: DISCONTINUED | OUTPATIENT
Start: 2022-06-18 | End: 2022-06-18 | Stop reason: SDUPTHER

## 2022-06-18 RX ORDER — MAGNESIUM HYDROXIDE 1200 MG/15ML
LIQUID ORAL CONTINUOUS PRN
Status: COMPLETED | OUTPATIENT
Start: 2022-06-18 | End: 2022-06-18

## 2022-06-18 RX ORDER — SODIUM CHLORIDE 0.9 % (FLUSH) 0.9 %
5-40 SYRINGE (ML) INJECTION PRN
Status: DISCONTINUED | OUTPATIENT
Start: 2022-06-18 | End: 2022-06-18 | Stop reason: SDUPTHER

## 2022-06-18 RX ORDER — FENTANYL CITRATE 50 UG/ML
INJECTION, SOLUTION INTRAMUSCULAR; INTRAVENOUS PRN
Status: DISCONTINUED | OUTPATIENT
Start: 2022-06-18 | End: 2022-06-18 | Stop reason: SDUPTHER

## 2022-06-18 RX ORDER — SODIUM CHLORIDE 0.9 % (FLUSH) 0.9 %
5-40 SYRINGE (ML) INJECTION EVERY 12 HOURS SCHEDULED
Status: DISCONTINUED | OUTPATIENT
Start: 2022-06-18 | End: 2022-06-20 | Stop reason: SDUPTHER

## 2022-06-18 RX ORDER — SODIUM CHLORIDE 0.9 % (FLUSH) 0.9 %
5-40 SYRINGE (ML) INJECTION EVERY 12 HOURS SCHEDULED
Status: DISCONTINUED | OUTPATIENT
Start: 2022-06-18 | End: 2022-06-18 | Stop reason: HOSPADM

## 2022-06-18 RX ORDER — LISINOPRIL 20 MG/1
20 TABLET ORAL DAILY
Status: DISCONTINUED | OUTPATIENT
Start: 2022-06-18 | End: 2022-06-22 | Stop reason: HOSPADM

## 2022-06-18 RX ORDER — SODIUM CHLORIDE 0.9 % (FLUSH) 0.9 %
5-40 SYRINGE (ML) INJECTION PRN
Status: DISCONTINUED | OUTPATIENT
Start: 2022-06-18 | End: 2022-06-18 | Stop reason: HOSPADM

## 2022-06-18 RX ORDER — SODIUM CHLORIDE 9 MG/ML
INJECTION, SOLUTION INTRAVENOUS PRN
Status: DISCONTINUED | OUTPATIENT
Start: 2022-06-18 | End: 2022-06-22 | Stop reason: HOSPADM

## 2022-06-18 RX ORDER — PROPOFOL 10 MG/ML
INJECTION, EMULSION INTRAVENOUS PRN
Status: DISCONTINUED | OUTPATIENT
Start: 2022-06-18 | End: 2022-06-18 | Stop reason: SDUPTHER

## 2022-06-18 RX ORDER — SODIUM CHLORIDE 450 MG/100ML
INJECTION, SOLUTION INTRAVENOUS CONTINUOUS
Status: DISCONTINUED | OUTPATIENT
Start: 2022-06-18 | End: 2022-06-21

## 2022-06-18 RX ORDER — DIPHENHYDRAMINE HYDROCHLORIDE 50 MG/ML
12.5 INJECTION INTRAMUSCULAR; INTRAVENOUS
Status: DISCONTINUED | OUTPATIENT
Start: 2022-06-18 | End: 2022-06-18 | Stop reason: HOSPADM

## 2022-06-18 RX ORDER — ONDANSETRON 2 MG/ML
4 INJECTION INTRAMUSCULAR; INTRAVENOUS
Status: DISCONTINUED | OUTPATIENT
Start: 2022-06-18 | End: 2022-06-18 | Stop reason: HOSPADM

## 2022-06-18 RX ORDER — ONDANSETRON 2 MG/ML
4 INJECTION INTRAMUSCULAR; INTRAVENOUS
Status: DISCONTINUED | OUTPATIENT
Start: 2022-06-18 | End: 2022-06-18 | Stop reason: SDUPTHER

## 2022-06-18 RX ORDER — HYDROMORPHONE HCL 110MG/55ML
0.5 PATIENT CONTROLLED ANALGESIA SYRINGE INTRAVENOUS EVERY 5 MIN PRN
Status: DISCONTINUED | OUTPATIENT
Start: 2022-06-18 | End: 2022-06-18 | Stop reason: HOSPADM

## 2022-06-18 RX ORDER — SODIUM CHLORIDE 9 MG/ML
INJECTION, SOLUTION INTRAVENOUS PRN
Status: DISCONTINUED | OUTPATIENT
Start: 2022-06-18 | End: 2022-06-18 | Stop reason: HOSPADM

## 2022-06-18 RX ORDER — HYDROMORPHONE HCL 110MG/55ML
0.25 PATIENT CONTROLLED ANALGESIA SYRINGE INTRAVENOUS EVERY 5 MIN PRN
Status: DISCONTINUED | OUTPATIENT
Start: 2022-06-18 | End: 2022-06-18 | Stop reason: HOSPADM

## 2022-06-18 RX ORDER — SODIUM CHLORIDE 9 MG/ML
INJECTION, SOLUTION INTRAVENOUS PRN
Status: DISCONTINUED | OUTPATIENT
Start: 2022-06-18 | End: 2022-06-18 | Stop reason: SDUPTHER

## 2022-06-18 RX ORDER — HYDROCODONE BITARTRATE AND ACETAMINOPHEN 5; 325 MG/1; MG/1
1 TABLET ORAL EVERY 4 HOURS PRN
Status: DISCONTINUED | OUTPATIENT
Start: 2022-06-18 | End: 2022-06-22 | Stop reason: HOSPADM

## 2022-06-18 RX ORDER — SODIUM CHLORIDE 9 MG/ML
INJECTION, SOLUTION INTRAVENOUS CONTINUOUS PRN
Status: DISCONTINUED | OUTPATIENT
Start: 2022-06-18 | End: 2022-06-18 | Stop reason: SDUPTHER

## 2022-06-18 RX ADMIN — DAPTOMYCIN 350 MG: 500 INJECTION, POWDER, LYOPHILIZED, FOR SOLUTION INTRAVENOUS at 21:30

## 2022-06-18 RX ADMIN — SODIUM CHLORIDE: 4.5 INJECTION, SOLUTION INTRAVENOUS at 10:50

## 2022-06-18 RX ADMIN — HYDROCODONE BITARTRATE AND ACETAMINOPHEN 1 TABLET: 5; 325 TABLET ORAL at 15:02

## 2022-06-18 RX ADMIN — FENTANYL CITRATE 25 MCG: 50 INJECTION INTRAMUSCULAR; INTRAVENOUS at 10:04

## 2022-06-18 RX ADMIN — FENTANYL CITRATE 25 MCG: 50 INJECTION INTRAMUSCULAR; INTRAVENOUS at 10:14

## 2022-06-18 RX ADMIN — SODIUM CHLORIDE: 9 INJECTION, SOLUTION INTRAVENOUS at 08:40

## 2022-06-18 RX ADMIN — FENTANYL CITRATE 100 MCG: 50 INJECTION, SOLUTION INTRAMUSCULAR; INTRAVENOUS at 08:27

## 2022-06-18 RX ADMIN — ONDANSETRON 4 MG: 2 INJECTION INTRAMUSCULAR; INTRAVENOUS at 08:27

## 2022-06-18 RX ADMIN — ACETAMINOPHEN 1000 MG: 500 TABLET ORAL at 15:02

## 2022-06-18 RX ADMIN — LISINOPRIL 20 MG: 20 TABLET ORAL at 12:54

## 2022-06-18 RX ADMIN — FENTANYL CITRATE 100 MCG: 50 INJECTION, SOLUTION INTRAMUSCULAR; INTRAVENOUS at 09:09

## 2022-06-18 RX ADMIN — DEXAMETHASONE SODIUM PHOSPHATE 8 MG: 4 INJECTION, SOLUTION INTRAMUSCULAR; INTRAVENOUS at 08:27

## 2022-06-18 RX ADMIN — CEFEPIME HYDROCHLORIDE 2000 MG: 2 INJECTION, POWDER, FOR SOLUTION INTRAVENOUS at 12:10

## 2022-06-18 RX ADMIN — FENTANYL CITRATE 25 MCG: 50 INJECTION INTRAMUSCULAR; INTRAVENOUS at 10:09

## 2022-06-18 RX ADMIN — LEVETIRACETAM 500 MG: 500 TABLET, FILM COATED ORAL at 20:51

## 2022-06-18 RX ADMIN — PROPOFOL 200 MG: 10 INJECTION, EMULSION INTRAVENOUS at 08:27

## 2022-06-18 RX ADMIN — ACETAMINOPHEN 1000 MG: 500 TABLET ORAL at 20:51

## 2022-06-18 RX ADMIN — CEFEPIME HYDROCHLORIDE 2000 MG: 2 INJECTION, POWDER, FOR SOLUTION INTRAVENOUS at 02:22

## 2022-06-18 ASSESSMENT — PAIN SCALES - WONG BAKER
WONGBAKER_NUMERICALRESPONSE: 2
WONGBAKER_NUMERICALRESPONSE: 8
WONGBAKER_NUMERICALRESPONSE: 2
WONGBAKER_NUMERICALRESPONSE: 0

## 2022-06-18 ASSESSMENT — PAIN SCALES - GENERAL
PAINLEVEL_OUTOF10: 2
PAINLEVEL_OUTOF10: 2
PAINLEVEL_OUTOF10: 8
PAINLEVEL_OUTOF10: 8
PAINLEVEL_OUTOF10: 4
PAINLEVEL_OUTOF10: 6

## 2022-06-18 NOTE — ANESTHESIA PRE PROCEDURE
Department of Anesthesiology  Preprocedure Note       Name:  Perry Park   Age:  82 y.o.  :  1935                                          MRN:  [id number]         Date:  2022      Surgeon: David Jaime):  Elva Pedro MD    Procedure: Procedure(s):  RIGHT TOTAL KNEE INCISION AND DRAINAGE WITH LINER EXCHANGE    Medications prior to admission:   Prior to Admission medications    Medication Sig Start Date End Date Taking?  Authorizing Provider   dorzolamide-timolol (COSOPT) 22.3-6.8 MG/ML ophthalmic solution Place 1 drop into the left eye 2 times daily    Historical Provider, MD   levETIRAcetam (KEPPRA) 500 MG tablet TAKE 1 TABLET BY MOUTH TWICE A DAY 22   Serge Davenport DO   lisinopril (PRINIVIL;ZESTRIL) 20 MG tablet TAKE ONE TABLET BY MOUTH DAILY 22   Serge Davenport DO   Balsam [place]-Castor Oil (VENELEX) OINT ointment Apply topically 2 times daily 1/3/22   Checo Arevalo MD   diclofenac (VOLTAREN) 75 MG EC tablet TAKE ONE TABLET BY MOUTH TWICE A DAY WITH MEALS  Patient taking differently: Take 75 mg by mouth 2 times daily as needed TAKE ONE TABLET BY MOUTH TWICE A DAY WITH MEALS 21   Serge Davenport DO   potassium chloride (KLOR-CON M) 10 MEQ extended release tablet TAKE ONE TABLET BY MOUTH DAILY 21   Jesus Navarrete DO   thyroid (NP THYROID) 30 MG tablet TAKE ONE TABLET BY MOUTH DAILY 21   Serge Davenport DO   rosuvastatin (CRESTOR) 40 MG tablet TAKE ONE TABLET BY MOUTH DAILY 21   Roberto Carlos Blanco MD   Lactobacillus (ACIDOPHILUS) TABS Take 1 tablet by mouth 2 times daily     Historical Provider, MD   Multiple Vitamins-Minerals (DAILY SHERIE MAXIMUM MULTIVITAMIN PO) Take 1 packet by mouth daily    Historical Provider, MD   aspirin 81 MG tablet Take 81 mg by mouth daily    Historical Provider, MD   latanoprost (XALATAN) 0.005 % ophthalmic solution Place 1 drop into the left eye nightly  18   Historical Provider, MD       Current medications:    No current facility-administered medications for this visit. No current outpatient medications on file.      Facility-Administered Medications Ordered in Other Visits   Medication Dose Route Frequency Provider Last Rate Last Admin    iopamidol (ISOVUE-370) 76 % injection 50 mL  50 mL IntraVENous ONCE PRN Melissa Lot, APRN - CNP        DAPTOmycin (CUBICIN) 350 mg in sodium chloride 0.9 % 50 mL IVPB  6 mg/kg (Adjusted) IntraVENous Q24H Noble Lot, APRN - CNP   Stopped at 06/17/22 2223    cefepime (MAXIPIME) 2000 mg IVPB minibag  2,000 mg IntraVENous Q12H Melissa Lot, APRN - CNP   Stopped at 06/18/22 0622    enoxaparin (LOVENOX) injection 40 mg  40 mg SubCUTAneous Daily Noble Lot, APRN - CNP        acetaminophen (TYLENOL) tablet 1,000 mg  1,000 mg Oral TID Melissa Lot, APRN - CNP        morphine (PF) injection 2 mg  2 mg IntraVENous Q4H PRN Noble Lot, APRN - CNP        traMADol (ULTRAM) tablet 50 mg  50 mg Oral Q4H PRN Noble Lot, APRN - CNP        aspirin chewable tablet 81 mg  81 mg Oral Daily Noble Lot, APRN - CNP        levETIRAcetam (KEPPRA) tablet 500 mg  500 mg Oral BID Noble Lot, APRN - CNP   500 mg at 06/17/22 2153    thyroid (ARMOUR) tablet 30 mg  30 mg Oral Daily Noble Lot, APRN - CNP   30 mg at 06/17/22 0855    sodium chloride flush 0.9 % injection 5-40 mL  5-40 mL IntraVENous 2 times per day Noble Lot, APRN - CNP   10 mL at 06/17/22 0856    sodium chloride flush 0.9 % injection 5-40 mL  5-40 mL IntraVENous PRN Noble Lot, APRN - CNP        0.9 % sodium chloride infusion   IntraVENous PRN Noble Lot, APRN - CNP        ondansetron (ZOFRAN-ODT) disintegrating tablet 4 mg  4 mg Oral Q8H PRN Noble Lot, APRN - CNP        Or    ondansetron (ZOFRAN) injection 4 mg  4 mg IntraVENous Q6H PRN Melissa Lot, APRN - CNP        polyethylene glycol (GLYCOLAX) packet 17 g  17 g Oral Daily PRN Noble Lot, APRN - CNP        ibuprofen (ADVIL;MOTRIN) tablet 400 mg  400 mg Oral Q6H PRN Melissa Lot, APRN - CNP           Allergies: Allergies   Allergen Reactions    Amoxicillin Hives and Rash    Linezolid Other (See Comments)     While taking linezolid, pt developed progressively altered mentation & ultimately had a witnessed seizure 4/26/18. Uncertain whether these symptoms were d/t linezolid, but possible.  Prednisone Itching    Vancomycin      Rising SCr during 5/2018 admission     Coreg [Carvedilol] Diarrhea    Oxycodone-Acetaminophen Rash       Problem List:    Patient Active Problem List   Diagnosis Code    Low back pain radiating to both legs M54.50, M79.604, M79.605    Lumbar spinal stenosis M48.061    DDD (degenerative disc disease), lumbosacral M51.37    Stenosis, spinal, lumbar M48.061    Pain and swelling of right knee M25.561, M25.461    Acute inferior myocardial infarction (HCC) I21.19    Hyperlipidemia LDL goal <70 E78.5    Retroperitoneal hemorrhage R58    Essential hypertension I10    Arthritis of right knee M17.11    History of total knee arthroplasty, right-3. 6.2018 Z96.651    Traumatic hematoma of knee, right, subsequent encounter S80. 01XD    Traumatic hematoma of right knee S80. 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    Pseudomonas infection A49.8    Elevated C-reactive protein (CRP) R79.82    Elevated sed rate R70.0    Osteoarthritis M19.90    PRINCE (obstructive sleep apnea) G47.33       Past Medical History:        Diagnosis Date    CAD (coronary artery disease) Inferior STEMI; CATRACHITA to RCA    DDD (degenerative disc disease), lumbosacral 4/10/2013    Decreased mobility and endurance 2/24/2022    Hearing aid worn     bilateral    History of short term memory loss 3/3/2022    [place]hard [place] hearing[place]     [place]hard [place] hearing[place]     Hyperlipidemia     Hypertension     MI, old 2017    Neuropathy     bilateral lower extremities    Non-compliance with treatment 6/2/2022    Pressure ulcer of ischium, stage 3, left ([address] 2/16/2022    Pressure ulcer of left buttock, stage 3 ([address] 2/16/2022    Sleep apnea     uses mouth piece; bringing DOS 3/26/18    Thyroid disease     hypo    Wears glasses        Past Surgical History:        Procedure Laterality Date    ABSCESS DRAINAGE Right 03/27/2018    evacuation hematoma right knee    APPENDECTOMY  1963    BACK SURGERY  [id number]    MILD PROCEDURE L2-3 BILATERAL     BREAST LUMPECTOMY  1986    BUNIONECTOMY  10/2003    CATARACT REMOVAL  03/23/2010 and 04/14/2010    COLONOSCOPY  6/2014    repeat 2019    CORONARY ANGIOPLASTY WITH STENT PLACEMENT  01/23/2017    INF STEMI with CATRACHITA to RCA    DILATION AND CURETTAGE OF UTERUS      INCONTINENCE SURGERY      JOINT REPLACEMENT Right 2018    right total knee replacment    OVARY REMOVAL  1963    right    TONSILLECTOMY AND ADENOIDECTOMY  1942       Social History:    Social History     Tobacco Use    Smoking status: Never Smoker    Smokeless tobacco: Never Used    Tobacco comment: encouraged to never smoke    Substance Use Topics    Alcohol use: No                                Counseling given: Not Answered  Comment: encouraged to never smoke       Vital Signs (Current): There were no vitals filed for this visit.                                            BP Readings from Last 3 Encounters:   06/18/22 (!) 151/66   06/15/22 134/76   06/15/22 120/78       NPO Status:                                                                                 BMI:   Wt Readings from Last 3 Encounters:   06/17/22 136 lb 12.8 oz (62.1 kg)   06/15/22 148 lb (67.1 kg)   04/13/22 157 lb (71.2 kg)     There is no height or weight on file to calculate BMI.    CBC:   Lab Results   Component Value Date    WBC 9.6 06/16/2022    RBC 4.72 06/16/2022    HGB 12.7 06/16/2022    HCT 39.4 06/16/2022    MCV 83.5 06/16/2022    RDW 16.1 06/16/2022     06/16/2022       CMP:   Lab Results   Component Value Date     06/16/2022    K 3.8 06/16/2022     06/16/2022    CO2 19 06/16/2022    BUN 23 06/16/2022    CREATININE 0.9 06/16/2022    GFRAA >60 06/16/2022    GFRAA >60 09/18/2012    AGRATIO 1.3 06/16/2022    LABGLOM 60 06/16/2022    GLUCOSE 122 06/16/2022    PROT 7.3 06/16/2022    PROT 7.2 09/18/2012    PROT 7.2 09/18/2012    CALCIUM 9.9 06/16/2022    BILITOT 0.9 06/16/2022    ALKPHOS 90 06/16/2022    AST 28 06/16/2022    ALT 15 06/16/2022       POC Tests: No results for input(s): POCGLU, POCNA, POCK, POCCL, POCBUN, POCHEMO, POCHCT in the last 72 hours.     Coags:   Lab Results   Component Value Date    PROTIME 14.0 06/16/2022    INR 1.09 06/16/2022    APTT 32.8 02/27/2018       HCG (If Applicable): No results found for: PREGTESTUR, PREGSERUM, HCG, HCGQUANT     ABGs: No results found for: PHART, PO2ART, BQE9XCH, SGJ0VNW, BEART, Q7YEMEHG     Type & Screen (If Applicable):  No results found for: LABABO, LABRH    Drug/Infectious Status (If Applicable):  No results found for: HIV, HEPCAB    COVID-19 Screening (If Applicable):   Lab Results   Component Value Date    COVID19 Not Detected 01/01/2022           Anesthesia Evaluation  Patient summary reviewed and Nursing notes reviewed  Airway: Mallampati: II  TM distance: >3 FB   Neck ROM: full  Mouth opening: > = 3 FB   Dental:          Pulmonary:   (+) sleep apnea: on CPAP,                             Cardiovascular:  Exercise tolerance: good (>4 METS),   (+) hypertension: moderate, past MI: > 6 months, CAD: non-obstructive,                ROS comment: Echo 2018  Left ventricle: The cavity size was normal. Wall thickness was     normal. Systolic function was normal. The estimated ejection     fraction was in the range of 60% to 65%. Wall motion was normal;     there were no regional wall motion abnormalities. Features are     consistent with a pseudonormal left ventricular filling pattern,     with concomitant abnormal relaxation and increased filling     pressure (grade 2 diastolic dysfunction). - Left atrium: The atrium was mildly dilated. - Right ventricle: Systolic function was normal. Tricuspid annular     systolic velocity: 40.7MQ/L.   - Atrial septum: Agitated saline contrast study showed a trivial     intrapulmonary shunt, at baseline or with provocation. Neuro/Psych:               GI/Hepatic/Renal:   (+) renal disease: CRI,           Endo/Other:                     Abdominal:             Vascular: Other Findings:             Anesthesia Plan      general     ASA 3       Induction: intravenous and rapid sequence. MIPS: Postoperative opioids intended, Prophylactic antiemetics administered and Postoperative trial extubation. Anesthetic plan and risks discussed with patient. Plan discussed with CRNA.                     Viet Rivero MD   6/18/2022

## 2022-06-18 NOTE — PROGRESS NOTES
Patient to PACU from OR. Drowsy. VSS. Dressing to right leg c/d/i. +2 right pedal pulse. Radiology called for post op xray.

## 2022-06-18 NOTE — OP NOTE
Patient: Lisseth Michel  YOB: 1935  MRN: [id number]    DATE OF PROCEDURE: 6/18/2022      PREOPERATIVE DIAGNOSIS:  Right knee prosthetic joint infection     POSTOPERATIVE DIAGNOSIS:  Same     OPERATION PERFORMED: Right total knee arthroplasty with polyethylene liner exchange     SURGEON:  Hernan Keller MD    EBL: 100 mL     IMPLANTS:  Marysol NexGen 14 mm PS poly     INDICATIONS:  The patient is a 82 y.o. female who presents with a draining sinus tract from her right total knee arthroplasty. She is known to be chronically infected. The operative procedure, alternatives, and risks were discussed in detail with the patient's healthcare power of . The risks include but are not limited to: Infection, vessel injury, nerve injury, DVT, pulmonary embolism, implant loosening, need for revision surgery, loss of motion, continued pain. Informed consent for surgery was obtained. OPERATIVE PROCEDURE:  The patient was seen in the preoperative holding area where the site of surgery was marked and informed consent was confirmed. The patient was brought back to the operating room by OR personnel. General anesthesia was administered. The patient was positioned supine on the operating table. The right lower extremity was then prepped and draped in a standard and sterile fashion. A final and formal timeout was then performed which confirmed the correct patient, correct position, and correct site of surgery. IV antibiotics were administered within 1 hour of the skin incision. An anterior midline incision was made over the knee. Skin and subcutaneous tissue were divided down to the extensor mechanism. Purulence was encountered in the subcutaneous tissue over the lateral patella and extending laterally along the IT band into the proximal thigh. There was no obvious sinus tract connecting to the knee. A swab of this area was performed and sent for culture.  Regardless, given her history of infection, a medial parapatellar arthrotomy was performed. The knee was fully exposed. There was cloudy bloody fluid within the knee joint but no gross purulence. The existing polyethylene liner was removed. A complete synovectomy was performed. Synovial tissue was sent for culture. The knee was thoroughly irrigated with sterile saline, followed by Enio Shelby, again followed by sterile saline. Polyethylene liner trials were then performed to obtain the best stability and range of motion in the knee. A 14 mm posterior stabilized polyethylene liner was then inserted. The knee came to full extension, excellent flexion, and good overall stability. The patella tracked well. 1 g of vancomycin powder was inserted into the joint. The wound then was closed in layers. The patient tolerated the procedure well. A dressing was applied, and the patient was brought to the recovery room in good condition.     Ulisses Byers MD  6/18/2022

## 2022-06-18 NOTE — PROGRESS NOTES
Shift assessment complete. VSS. Medications administered per order. Pt presented and confused, but compliant with assessment. Oriented to person and situation only. No aggression. Reports pain of 4/10, but declined pain medication, non pharmacological interventions utilized to manage pts pain. Dressing on Right knee CDI. Pt to be NPO at midnight, pt verbalized understanding. [place] in place. Resting at this time. The care plan and education has been reviewed and mutually agreed upon with the patient. Patient remains free from falls. All fall precautions in place. Yellow bracelet on patient. SAFE sign on door. Bed and chair alarms being used. Bed in lowest position. Will monitor.

## 2022-06-18 NOTE — PROGRESS NOTES
Nutrition Note    RECOMMENDATIONS  1. PO Diet: Resume diet orders per MD  2. ONS: RD to order Jorge BID for wound healing when diet adv  3. Other: Obtain updated actual wt      NUTRITION ASSESSMENT   Pt triggered for positive nutrition screen r/t wounds- pressure stage 3 to left ischium. Currently NPO and off unit for I&D right knee. All information obtained via chart review. No PO intakes recorded since admission. Wt hx in EMR shows  lbs with wt in February of this year ~153-156lbs. Unable to accurate assess for wt loss given admission wt of 136lbs no method recorded, and wt on 6/15 148lbs. Will reorder updated actual wt. RD will order Jorge BID when diet advanced  to offer additional protein to promote wound healing. RD will continue to monitor.  Nutrition Related Findings: +1 LLE, +2 RLE edema; No BM recorded; Na 135, Glu 122   Wounds: Pressure Injury,Stage III   Nutrition Education:  Education not appropriate    Nutrition Goals: PO intake 50% or greater     MALNUTRITION ASSESSMENT   Acute Illness  Malnutrition Status: At risk for malnutrition (Comment)    NUTRITION DIAGNOSIS   · Increased nutrient needs related to increase demand for energy/nutrients as evidenced by wounds    CURRENT NUTRITION THERAPIES  Diet NPO     PO Intake: NPO   PO Supplement Intake:NPO    ANTHROPOMETRICS   Current Height: 5' 5\" (165.1 cm)   Current Weight: 136 lb 12.8 oz (62.1 kg)     Admission weight: 136 lb (61.7 kg)   Ideal Body Weight (IBW): 125 lbs  (57 kg)    BMI: 22.6      COMPARATIVE STANDARDS  Energy (kcal):  1472-3534     Protein (g):  62-93       Fluid (mL/day):  2975-5114    The patient will be monitored per nutrition standards of care. Consult dietitian if additional nutrition interventions are needed prior to RD reassessment.      Viola Peguero, [address], [place]    Contact: [phone]

## 2022-06-18 NOTE — ANESTHESIA POSTPROCEDURE EVALUATION
Department of Anesthesiology  Postprocedure Note    Patient: Maribel Anand  MRN: [id number]  YOB: 1935  Date of evaluation: 6/18/2022  Time:  9:46 AM     Procedure Summary     Date: 06/18/22 Room / Location: [address]    Anesthesia Start: 5804 Anesthesia Stop:     Procedure: RIGHT TOTAL KNEE INCISION AND DRAINAGE WITH LINER EXCHANGE (Right Knee) Diagnosis:       Abscess of knee      (RIGHT KNEE INFECTION)    Surgeons: Yvonne Garcia MD Responsible Provider: Scott England MD    Anesthesia Type: general ASA Status: 3          Anesthesia Type: No value filed. Bina Phase I:      Bina Phase II:      Last vitals: Reviewed and per EMR flowsheets.        Anesthesia Post Evaluation    Patient location during evaluation: PACU  Patient participation: complete - patient participated  Level of consciousness: awake  Airway patency: patent  Nausea & Vomiting: no vomiting and no nausea  Complications: no  Cardiovascular status: hemodynamically stable  Respiratory status: acceptable  Hydration status: stable  Multimodal analgesia pain management approach

## 2022-06-18 NOTE — PLAN OF CARE
Problem: Discharge Planning  Goal: Discharge to home or other facility with appropriate resources  6/17/2022 2247 by Yue Mota RN  Outcome: Progressing  6/17/2022 0950 by Kemi Lobato RN  Outcome: Progressing     Problem: Pain  Goal: Verbalizes/displays adequate comfort level or baseline comfort level  6/17/2022 2247 by Yue Mota RN  Outcome: Progressing  6/17/2022 0950 by Kemi Lobato RN  Outcome: Progressing     Problem: Safety - Adult  Goal: Free from fall injury  6/17/2022 2247 by Yue Mota RN  Outcome: Progressing  6/17/2022 0950 by Kemi Lobato RN  Outcome: Progressing     Problem: ABCDS Injury Assessment  Goal: Absence of physical injury  6/17/2022 2247 by Yue Mota RN  Outcome: Progressing  6/17/2022 0950 by Kemi Lobato RN  Outcome: Progressing

## 2022-06-18 NOTE — PROGRESS NOTES
Problems:    Pseudomonas infection    Elevated C-reactive protein (CRP)    Elevated sed rate    Osteoarthritis    PRINCE (obstructive sleep apnea)    Pain and swelling of right knee    Essential hypertension    Seizure disorder (HCC)    [place] (hard of hearing)  Resolved Problems:    * No resolved hospital problems. *       Assessment & Plan:   1. Right knee prosthetic joint infection. Recent aspirate from right knee (4/2022) grew Pseudomonas and she received oral ciprofloxacin. Presented with increased pain, swelling and drainage from right knee. Started on empiric antibiotics. , sed rate 58. S/p right TKA with liner exchange 6/18. Antibiotics per ID. CBC in am.  2. Decubitus wound left buttock. Patient sedentary, sits most of the day. Followed [facility]. Evaluated by wound care nurse and dressing changed. 3. HTN. Lisinopril held on admission. BP elevated, resume lisinopril, stop IV fluids. Continue to follow. BMP in am.    4. CAD. Hx STEMI 1/2017, CATRACHITA placed to mid RCA. Denies chest pain. Continue asa and statin. 5. Hx seizure. Continue Keppra. 6. Hypothyroidism. TSH 4.06 (8/2021). Continue thyroid tablet. Diet: ADULT DIET;  Regular  Code:Full Code  DVT PPX: enoxaparin      MICHELLE Piper - CNP   6/18/2022 12:11 PM

## 2022-06-18 NOTE — PROGRESS NOTES
Infectious Diseases   Progress Note      Admission Date: 6/16/2022  Hospital Day: Hospital Day: 3   Attending: Domingo Yepez MD  Date of service: 6/18/2022     Chief complaint/ Reason for consult:     · Right knee chronic prosthetic joint infection  · Pansensitive Pseudomonas growth from right knee synovial fluid aspirate on 4/13/2022  · Buttock decubitus wounds, wound culture on 3/9/2022 was positive for Staph epidermidis and pansensitive Pseudomonas, has been following up with the wound care center  · History of right total knee arthroplasty on 3/6/2018  · Elevated CRP of 129.6  · Elevated sed rate of 62    Microbiology:        I have reviewed allavailable micro lab data and cultures    · Right knee surgical culture  - collected on 6/18/2022: in process      Antibiotics and immunizations:       Current antibiotics: All antibiotics and their doses were reviewed by me    Recent Abx Admin                   cefepime (MAXIPIME) 2000 mg IVPB minibag (mg) 2,000 mg New Bag 06/18/22 1210     2,000 mg New Bag  0222    vancomycin (VANCOCIN) injection (mg) 1,000 mg Given 06/18/22 0928    DAPTOmycin (CUBICIN) 350 mg in sodium chloride 0.9 % 50 mL IVPB (mg) 350 mg New Bag 06/17/22 2153                  Immunization History: All immunization history was reviewed by me today. Immunization History   Administered Date(s) Administered    [facility] [place] [address], Primary or Immunocompromised, PF, 100mcg/0.5mL 03/31/2021, 04/28/2021    DT (pediatric) 08/31/1990    Influenza Virus Vaccine 10/22/2002    Influenza, Triv, inactivated, subunit, adjuvanted, IM (Fluad 65 yrs and older) 12/11/2019    Pneumococcal Conjugate 13-valent ([id number]) 09/29/2015    Pneumococcal Polysaccharide ([id number]) 09/05/2017    Td vaccine (adult) 03/17/2010    Td, unspecified formulation 08/31/1990    Tdap (Boostrix, Adacel) 09/19/2017       Known drug allergies:      All allergies were reviewed and updated    Allergies   Allergen Reactions  Amoxicillin Hives and Rash    Linezolid Other (See Comments)     While taking linezolid, pt developed progressively altered mentation & ultimately had a witnessed seizure 4/26/18. Uncertain whether these symptoms were d/t linezolid, but possible.  Prednisone Itching    Vancomycin      Rising SCr during 5/2018 admission     Coreg [Carvedilol] Diarrhea    Oxycodone-Acetaminophen Rash       Social history:     Social History:  All social andepidemiologic history was reviewed and updated by me today as needed. · Tobacco use:   reports that she has never smoked. She has never used smokeless tobacco.  · Alcohol use:   reports no history of alcohol use. · Currently lives in: [place] [address]  ·  reports no history of drug use. COVID VACCINATION AND LAB RESULT RECORDS:     Internal Administration   First Dose COVID-19, Moderna, Primary or Immunocompromised, PF, 100mcg/0.5mL  03/31/2021   Second Dose COVID-19, Kimmie Bajwa, Primary or Immunocompromised, PF, 100mcg/0.5mL   04/28/2021       Last COVID Lab SARS-CoV-2, NAAT (no units)   Date Value   01/01/2022 Not Detected            Assessment:     The patient is a 82 y.o. old female who  has a past medical history of CAD (coronary artery disease), DDD (degenerative disc disease), lumbosacral (4/10/2013), Decreased mobility and endurance (2/24/2022), Hearing aid worn, History of short term memory loss (3/3/2022), [place]hard [place] hearing[place] (hard of hearing), Hyperlipidemia, Hypertension, MI, old (2017), Neuropathy, Non-compliance with treatment (6/2/2022), Pressure ulcer of ischium, stage 3, left ([address] (2/16/2022), Pressure ulcer of left buttock, stage 3 ([address] (2/16/2022), Sleep apnea, Thyroid disease, and Wears glasses.  with following problems:      · Right knee chronic prosthetic joint infection -s/p debridement and hardware retention surgery on 6/18/2022  · Pansensitive Pseudomonas growth from right knee synovial fluid aspirate on 4/13/2022-currently on broad-spectrum antibiotics  · Buttock decubitus wounds, wound culture on 3/9/2022 was positive for Staph epidermidis and pansensitive Pseudomonas, has been following up with the wound care center  · History of right total knee arthroplasty on 3/6/2018  · Elevated CRP of 129.6  · Elevated sed rate of 62  · Severe dementia-chronic and ongoing  · Degenerative disc disease  · Chronic hearing loss  · Coronary artery disease-stable  · Essential hypertension-blood pressure okay  · Obstructive sleep apnea  ·       Discussion:      The patient had a high fever of 100.4 yesterday. She is on empiric IV vancomycin and IV cefepime. The fever curve is coming down. She underwent right knee surgical I&D with polyethylene liner exchange. Hardware was retained. I cannot rule out the possibility of osteomyelitis of the distal femur/proximal tibia      Plan:     Diagnostic Workup:    · Follow-up on surgical cultures  · Continue to follow  fever curve, WBC count and blood cultures. · Continue to monitor blood counts, liver and renal function. Antimicrobials:    · Will continue IV daptomycin 350 mg every 24 hour for empiric gram-positive coverage including empiric MRSA coverage  · Continue IV cefepime 2 g every 12 hour for empiric gram-negative coverage. Patient has history of pansensitive Pseudomonas in the left knee few months ago  · Due to her advanced age, she would not be a candidate for oral fluoroquinolones. Will need IV antibiotics at discharge  · Remains at high risk of requiring a right above-knee amputation in the future, especially if she has chronic osteomyelitis  · We will follow up on the culture results and clinical progress and will make further recommendations accordingly. · Continue close vitals monitoring. · Maintain good glycemic control. · Fall precautions. Aspiration precautions. · Continue to watch for new fever or diarrhea. · DVT prophylaxis. · Discussed all above with  RN.       Drug Monitoring:    · Continue monitoring for antibiotic toxicity as follows: CBC, CMP, CK  · Continue to watch for following: new or worsening fever, new hypotension, hives, lip swelling and redness or purulence at vascular access sites. I/v access Management:    · Continue to monitor i.v access sites for erythema, induration, discharge or tenderness. · As always, continue efforts to minimize tubes/lines/drains as clinically appropriate to reduce chances of line associated infections. Level of complexity of visit: High     Risk of Complications/Morbidity: High     · Illness(es)/ Infection present that pose threat to bodily function. · There is potential for severe exacerbation of infection/side effects of treatment. · Therapy requires intensive monitoring for antimicrobial agent toxicity. TIME SPENT TODAY:     - Spent over  36 minutes on visit (including interval history, physical exam, review of data including labs, cultures, imaging, development and implementation of treatment plan and coordination of complex care). More than 50 percent of this includes face-to-face time spent with the patient for counseling and coordination of care. Thank you for involving me in the care of your patient. I will continue to follow. If you have anyadditional questions, please do not hesitate to contact me. Subjective: Interval history: Interval history was obtained from chart review and  RN. The patient is afebrile. She is tolerating antibiotics okay. He has undergone right knee surgery earlier today     REVIEW OF SYSTEMS:      Review of Systems   Unable to perform ROS: Dementia         Past Medical History: All past medical history reviewed today.     Past Medical History:   Diagnosis Date    CAD (coronary artery disease)     Inferior STEMI; CATRACHIAT to RCA    DDD (degenerative disc disease), lumbosacral 4/10/2013    Decreased mobility and endurance 2/24/2022    Hearing aid worn     bilateral    History of short term memory loss 3/3/2022    [place] (hard of hearing)     [place] (hard of hearing)     Hyperlipidemia     Hypertension     MI, old 2017    Neuropathy     bilateral lower extremities    Non-compliance with treatment 6/2/2022    Pressure ulcer of ischium, stage 3, left ([address] 2/16/2022    Pressure ulcer of left buttock, stage 3 ([address] 2/16/2022    Sleep apnea     uses mouth piece; bringing DOS 3/26/18    Thyroid disease     hypo    Wears glasses        Past Surgical History: All past surgical history was reviewed today. Past Surgical History:   Procedure Laterality Date    ABSCESS DRAINAGE Right 03/27/2018    evacuation hematoma right knee    APPENDECTOMY  1963   [facility]ERY  [id number]    MILD PROCEDURE L2-3 BILATERAL     BREAST LUMPECTOMY  1986    BUNIONECTOMY  10/2003    CATARACT REMOVAL  03/23/2010 and 04/14/2010    COLONOSCOPY  6/2014    repeat 2019    CORONARY ANGIOPLASTY WITH STENT PLACEMENT  01/23/2017    INF STEMI with CATRACHITA to RCA   [address]      JOINT REPLACEMENT Right 2018    right total knee replacment    OVARY REMOVAL  1963    right    TONSILLECTOMY AND ADENOIDECTOMY  1942       Family History: All family history was reviewed today.         Problem Relation Age of Onset    Cancer Mother     Heart Disease Mother     Arthritis Sister         rheumatoid    Cancer Brother     Cancer Sister     Diabetes Sister     Heart Disease Brother     High Blood Pressure Brother     High Blood Pressure Sister     Diabetes Brother        Objective:       PHYSICAL EXAM:      Vitals:   Vitals:    06/18/22 1130 06/18/22 1212 06/18/22 1241 06/18/22 1243   BP: (!) 181/77 (!) 182/79 (!) 186/71 (!) 189/74   Pulse: 71 72 73    Resp: 12 13 15    Temp:  98.1 °F (36.7 °C)     TempSrc:  Axillary     SpO2: 98%      Weight:       Height:           Physical Exam      General: Encephalopathic but protecting the airway so far,   HEENT: normocephalic, atraumatic, sclera clear, pupils equal, light reflex preserved bilaterally  Cardiovascular: RRR, no murmurs/rubs/gallops detected  Pulmonary: CTABL, no rhonchi/rales   Abdomen/GI: soft, no organomegaly, bowel sounds positive  Neuro: Encephalopathic, pupils are equal and reactive to light, moves all extremities  Skin: no rash,   Musculoskeletal: Surgical dressing on the right prosthetic knee area noted  Genitourinary: Berkowitz's catheter in place   Psych: could not assess   Lymphatic/Immunologic: No obvious bruising, no cervical lymphadenopathy    Lines: All vascular access sites are healthy with no local erythema, discharge or tenderness    Intake and output:    I/O last 3 completed shifts:  In: -   Out: 400 [Urine:400]    Lab Data:   All available labs and old records have been reviewed by me. CBC:  Recent Labs     06/16/22  2343   WBC 9.6   RBC 4.72   HGB 12.7   HCT 39.4      MCV 83.5   MCH 27.0   MCHC 32.3   RDW 16.1*        BMP:  Recent Labs     06/16/22  2343   *   K 3.8      CO2 19*   BUN 23*   CREATININE 0.9   CALCIUM 9.9   GLUCOSE 122*        Hepatic Function Panel:   Lab Results   Component Value Date    ALKPHOS 90 06/16/2022    ALT 15 06/16/2022    AST 28 06/16/2022    PROT 7.3 06/16/2022    PROT 7.2 09/18/2012    PROT 7.2 09/18/2012    BILITOT 0.9 06/16/2022    BILIDIR <0.2 03/28/2018    IBILI see below 03/28/2018    LABALBU 4.1 06/16/2022       CPK:   Lab Results   Component Value Date    CKTOTAL 64 06/16/2022     ESR:   Lab Results   Component Value Date    SEDRATE 62 (H) 06/16/2022     CRP:   Lab Results   Component Value Date    .6 (H) 06/16/2022           Imaging: All pertinent images and reports for the current visit were reviewed by me during this visit. I reviewed the chest x-ray/CT scan/MRI images and independently interpreted the findings and results today. XR KNEE RIGHT (1-2 VIEWS)   Final Result   Status post knee replacement, in normal alignment.       No acute fracture. Immediate postoperative changes. XR KNEE RIGHT (1-2 VIEWS)   Final Result   Total knee arthroplasty, with hardware in appropriate alignment. No   periprosthetic fracture or acute abnormality is identified. XR KNEE RIGHT (1-2 VIEWS)   Final Result   No acute finding of the right knee. Medications: All current and past medications were reviewed.  sodium chloride flush  5-40 mL IntraVENous 2 times per day    lisinopril  20 mg Oral Daily    daptomycin (CUBICIN) IVPB  6 mg/kg (Adjusted) IntraVENous Q24H    cefepime  2,000 mg IntraVENous Q12H    enoxaparin  40 mg SubCUTAneous Daily    acetaminophen  1,000 mg Oral TID    aspirin  81 mg Oral Daily    levETIRAcetam  500 mg Oral BID    thyroid  30 mg Oral Daily        sodium chloride 100 mL/hr at 06/18/22 1050    sodium chloride         sodium chloride, HYDROcodone 5 mg - acetaminophen, iopamidol, morphine, traMADol, sodium chloride flush, ondansetron **OR** ondansetron, polyethylene glycol, ibuprofen      Problem list:       Patient Active Problem List   Diagnosis Code    Low back pain radiating to both legs M54.50, M79.604, M79.605    Lumbar spinal stenosis M48.061    DDD (degenerative disc disease), lumbosacral M51.37    Stenosis, spinal, lumbar M48.061    Pain and swelling of right knee M25.561, M25.461    Acute inferior myocardial infarction (HCC) I21.19    Hyperlipidemia LDL goal <70 E78.5    Retroperitoneal hemorrhage R58    Essential hypertension I10    Arthritis of right knee M17.11    History of total knee arthroplasty, right-3. 6.2018 Z96.651    Traumatic hematoma of knee, right, subsequent encounter S80. 01XD    Traumatic hematoma of right knee S80. 01XA    Prosthetic joint infection ([address] T84.50XA    Seizure disorder ([address] G40.909    Fall against object W18.00XA    Sepsis ([address] A41.9    Pressure ulcer of ischium, stage 3, left (HCC) L89.323    Decreased mobility and endurance Z74.09    [facility] (hard of hearing) H91.90    History of short term memory loss Z87.898    Non-compliance with treatment Z91.19    Chronic renal disease, stage III ([facility] [[id number]] N18.30    Infection of prosthetic right knee joint (HCC) T84.53XA    Pseudomonas infection A49.8    Elevated C-reactive protein (CRP) R79.82    Elevated sed rate R70.0    Osteoarthritis M19.90    PRINCE (obstructive sleep apnea) G47.33       Please note that this chart was generated using Dragon dictation software. Although every effort was made to ensure the accuracy of this automated transcription, some errors in transcription may have occurred inadvertently. If you may need any clarification, please do not hesitate to contact me through EPIC or at the phone number provided below with my electronic signature. Any pictures or media included in this note were obtained after taking informed verbal consent from the patient and with their approval to include those in the patient's medical record.       Justine Garza MD, MPH, FACP, [facility]  6/18/2022, 12:45 PM  [facility] Infectious Disease   [address] [place] [address], [address] [place] [address]  Office: [phone]  Fax: [phone]  Clinic days:  Tuesday & Thursday

## 2022-06-18 NOTE — PLAN OF CARE
Problem: Discharge Planning  Goal: Discharge to home or other facility with appropriate resources  6/18/2022 1042 by Shikha Corrigan RN  Outcome: Progressing     Problem: Pain  Goal: Verbalizes/displays adequate comfort level or baseline comfort level  6/18/2022 1042 by Shikha Corrigan RN  Outcome: Progressing     Problem: Safety - Adult  Goal: Free from fall injury  6/18/2022 1042 by Shikha Corrigan RN  Outcome: Progressing     Problem: ABCDS Injury Assessment  Goal: Absence of physical injury  6/18/2022 1042 by Shikha Corrigan RN  Outcome: Progressing     Problem: Nutrition Deficit:  Goal: Optimize nutritional status  Outcome: Progressing

## 2022-06-18 NOTE — PROGRESS NOTES
Physical Therapy  Patient off floor for right knee surgery. Plan to evaluate tomorrow.   Kingston Gibson PT, DPT, ATC-R [id number]  [facility], OTR/L 1977

## 2022-06-19 LAB
ANION GAP SERPL CALCULATED.3IONS-SCNC: 12 MMOL/L (ref 3–16)
BUN BLDV-MCNC: 27 MG/DL (ref 7–20)
CALCIUM SERPL-MCNC: 9.3 MG/DL (ref 8.3–10.6)
CHLORIDE BLD-SCNC: 105 MMOL/L (ref 99–110)
CO2: 17 MMOL/L (ref 21–32)
CREAT SERPL-MCNC: 0.8 MG/DL (ref 0.6–1.2)
GFR AFRICAN AMERICAN: >60
GFR NON-AFRICAN AMERICAN: >60
GLUCOSE BLD-MCNC: 191 MG/DL (ref 70–99)
HCT VFR BLD CALC: 35 % (ref 36–48)
HEMOGLOBIN: 11.2 G/DL (ref 12–16)
MCH RBC QN AUTO: 26.7 PG (ref 26–34)
MCHC RBC AUTO-ENTMCNC: 32.1 G/DL (ref 31–36)
MCV RBC AUTO: 83.4 FL (ref 80–100)
PDW BLD-RTO: 15.8 % (ref 12.4–15.4)
PLATELET # BLD: 232 K/UL (ref 135–450)
PMV BLD AUTO: 8.3 FL (ref 5–10.5)
POTASSIUM SERPL-SCNC: 4 MMOL/L (ref 3.5–5.1)
RBC # BLD: 4.2 M/UL (ref 4–5.2)
SODIUM BLD-SCNC: 134 MMOL/L (ref 136–145)
WBC # BLD: 7.1 K/UL (ref 4–11)

## 2022-06-19 PROCEDURE — 6370000000 HC RX 637 (ALT 250 FOR IP): Performed by: ORTHOPAEDIC SURGERY

## 2022-06-19 PROCEDURE — 02HV33Z INSERTION OF INFUSION DEVICE INTO SUPERIOR VENA CAVA, PERCUTANEOUS APPROACH: ICD-10-PCS | Performed by: INTERNAL MEDICINE

## 2022-06-19 PROCEDURE — 97530 THERAPEUTIC ACTIVITIES: CPT

## 2022-06-19 PROCEDURE — 2580000003 HC RX 258: Performed by: ORTHOPAEDIC SURGERY

## 2022-06-19 PROCEDURE — 6360000002 HC RX W HCPCS: Performed by: ORTHOPAEDIC SURGERY

## 2022-06-19 PROCEDURE — C1751 CATH, INF, PER/CENT/MIDLINE: HCPCS

## 2022-06-19 PROCEDURE — 97166 OT EVAL MOD COMPLEX 45 MIN: CPT

## 2022-06-19 PROCEDURE — 36415 COLL VENOUS BLD VENIPUNCTURE: CPT

## 2022-06-19 PROCEDURE — 97162 PT EVAL MOD COMPLEX 30 MIN: CPT

## 2022-06-19 PROCEDURE — 97535 SELF CARE MNGMENT TRAINING: CPT

## 2022-06-19 PROCEDURE — 85027 COMPLETE CBC AUTOMATED: CPT

## 2022-06-19 PROCEDURE — 2500000003 HC RX 250 WO HCPCS: Performed by: NURSE PRACTITIONER

## 2022-06-19 PROCEDURE — 36569 INSJ PICC 5 YR+ W/O IMAGING: CPT

## 2022-06-19 PROCEDURE — 1200000000 HC SEMI PRIVATE

## 2022-06-19 PROCEDURE — 6370000000 HC RX 637 (ALT 250 FOR IP): Performed by: NURSE PRACTITIONER

## 2022-06-19 PROCEDURE — 80048 BASIC METABOLIC PNL TOTAL CA: CPT

## 2022-06-19 RX ORDER — SODIUM CHLORIDE 0.9 % (FLUSH) 0.9 %
5-40 SYRINGE (ML) INJECTION EVERY 12 HOURS SCHEDULED
Status: DISCONTINUED | OUTPATIENT
Start: 2022-06-19 | End: 2022-06-19 | Stop reason: SDUPTHER

## 2022-06-19 RX ORDER — SODIUM CHLORIDE 0.9 % (FLUSH) 0.9 %
5-40 SYRINGE (ML) INJECTION PRN
Status: DISCONTINUED | OUTPATIENT
Start: 2022-06-19 | End: 2022-06-19 | Stop reason: SDUPTHER

## 2022-06-19 RX ORDER — LIDOCAINE HYDROCHLORIDE 10 MG/ML
5 INJECTION, SOLUTION EPIDURAL; INFILTRATION; INTRACAUDAL; PERINEURAL ONCE
Status: COMPLETED | OUTPATIENT
Start: 2022-06-19 | End: 2022-06-19

## 2022-06-19 RX ORDER — SODIUM CHLORIDE 9 MG/ML
25 INJECTION, SOLUTION INTRAVENOUS PRN
Status: DISCONTINUED | OUTPATIENT
Start: 2022-06-19 | End: 2022-06-19 | Stop reason: SDUPTHER

## 2022-06-19 RX ADMIN — LIDOCAINE HYDROCHLORIDE 5 ML: 10 INJECTION, SOLUTION EPIDURAL; INFILTRATION; INTRACAUDAL; PERINEURAL at 18:51

## 2022-06-19 RX ADMIN — ASPIRIN 81 MG: 81 TABLET, CHEWABLE ORAL at 08:18

## 2022-06-19 RX ADMIN — ACETAMINOPHEN 1000 MG: 500 TABLET ORAL at 22:52

## 2022-06-19 RX ADMIN — ACETAMINOPHEN 1000 MG: 500 TABLET ORAL at 08:18

## 2022-06-19 RX ADMIN — LEVOTHYROXINE, LIOTHYRONINE 30 MG: 19; 4.5 TABLET ORAL at 08:21

## 2022-06-19 RX ADMIN — CEFEPIME HYDROCHLORIDE 2000 MG: 2 INJECTION, POWDER, FOR SOLUTION INTRAVENOUS at 00:50

## 2022-06-19 RX ADMIN — LISINOPRIL 20 MG: 20 TABLET ORAL at 08:18

## 2022-06-19 RX ADMIN — LEVETIRACETAM 500 MG: 500 TABLET, FILM COATED ORAL at 22:51

## 2022-06-19 RX ADMIN — Medication 10 ML: at 22:53

## 2022-06-19 RX ADMIN — ENOXAPARIN SODIUM 40 MG: 100 INJECTION SUBCUTANEOUS at 08:18

## 2022-06-19 RX ADMIN — LEVETIRACETAM 500 MG: 500 TABLET, FILM COATED ORAL at 08:18

## 2022-06-19 RX ADMIN — HYDROCODONE BITARTRATE AND ACETAMINOPHEN 1 TABLET: 5; 325 TABLET ORAL at 02:44

## 2022-06-19 RX ADMIN — DAPTOMYCIN 350 MG: 500 INJECTION, POWDER, LYOPHILIZED, FOR SOLUTION INTRAVENOUS at 23:25

## 2022-06-19 RX ADMIN — HYDROCODONE BITARTRATE AND ACETAMINOPHEN 1 TABLET: 5; 325 TABLET ORAL at 08:34

## 2022-06-19 ASSESSMENT — PAIN SCALES - GENERAL
PAINLEVEL_OUTOF10: 2
PAINLEVEL_OUTOF10: 0
PAINLEVEL_OUTOF10: 0
PAINLEVEL_OUTOF10: 2

## 2022-06-19 ASSESSMENT — PAIN SCALES - WONG BAKER
WONGBAKER_NUMERICALRESPONSE: 2
WONGBAKER_NUMERICALRESPONSE: 2

## 2022-06-19 NOTE — PROGRESS NOTES
Shift assessment completed, morning medication given per MAR. VSS, alert and oriented. The care plan and education has been reviewed and mutually agreed upon with the patient. Patient remains free from falls or accidental injury. Room free from clutter. All fall precautions in place. Bed in lowest position with wheels locked and alarm on, call light and belongings within reach, and door open.

## 2022-06-19 NOTE — PLAN OF CARE
Problem: Discharge Planning  Goal: Discharge to home or other facility with appropriate resources  6/18/2022 2353 by Rosanne Marks RN  Outcome: Progressing  6/18/2022 1042 by Janine Cote RN  Outcome: Progressing     Problem: Pain  Goal: Verbalizes/displays adequate comfort level or baseline comfort level  6/18/2022 2353 by Rosanne Marks RN  Outcome: Progressing  6/18/2022 1042 by Janine Cote RN  Outcome: Progressing     Problem: Safety - Adult  Goal: Free from fall injury  6/18/2022 2353 by Rosanne Marks RN  Outcome: Progressing  6/18/2022 1042 by Janine Cote RN  Outcome: Progressing     Problem: ABCDS Injury Assessment  Goal: Absence of physical injury  6/18/2022 2353 by Rosanne Marks RN  Outcome: Progressing  6/18/2022 1042 by Janine Cote RN  Outcome: Progressing     Problem: Nutrition Deficit:  Goal: Optimize nutritional status  6/18/2022 2353 by Rosanne Marks RN  Outcome: Progressing  6/18/2022 1042 by Janine Cote RN  Outcome: Progressing

## 2022-06-19 NOTE — CARE COORDINATION
Discharge Planning Assessment     discharge planner met with patient to discuss current living situation, and potential needs at the time of discharge    Demographics/Insurance verified Yes/No: Yes- [employer] Medicare    Current type of dwelling: Patient lives in a condo in [place]    Patient from ECF/SW confirmed with: N/A    Living arrangements: Patient lives alone but reports that her sister lives right next door. Level of function/Support: Patient reports that she is independent at home but has assistance from family for grocery shopping and errands. PCP: Javad Hilton DO    Last Visit to PCP: 2 months ago    DME: Madhavi Franco, hearing aid (does not use)    Active with any community resources/agencies/skilled home care: None    Medication compliance issues: Takes medication on his own but sister assist as needed. Financial issues that could impact healthcare: None    Tentative discharge plan: PT(10)/OT(13)  recommending that patient discharge to a SNF. Patient requesting that she be referred to [place]. [facility] and [facility]. Discussed and provided facilities of choice if transition to a skilled nursing facility is required at the time of discharge    Discussed with patient and/or family that on the day of discharge home tentative time of discharge will be between 10 AM and noon. Transportation at the time of discharge: Patient will need medical transport @ discharge.     Electronically signed by LUIS E Jeffers on 6/19/2022 at 12:43 PM

## 2022-06-19 NOTE — PROGRESS NOTES
Shift assessment completed. VSS. Meds given as per MAR. Sx dressing to the rt knee CDI. No c/o pain. Purewick in place. IVF on flow. On IV ATB. Call light within reach. Will continue to monitor.

## 2022-06-19 NOTE — PROGRESS NOTES
[facility] [address]   Phone: [phone]    Physical Therapy    [x] Initial Evaluation            [] Daily Treatment Note         [] Discharge Summary      Patient: Reina Albarran   : 1935   MRN: [id number]   Date of Service:  2022  Admitting Diagnosis: Infection of prosthetic right knee joint [facility])  Current Admission Summary: Patient is an 81 yo female with hx CAD/stent (), HTN, seizure disorder, hypothyroid, right TKA (2018). She has been experiencing increased pain and swelling in right knee. PCP aspirated knee  and culture grew pseudomonas. She completed antibiotic course. She presented to [facility] AT [facility] with worsening pain, swelling and drainage from right knee. : Right total knee arthroplasty with polyethylene liner exchange  Past Medical History:  has a past medical history of CAD (coronary artery disease), DDD (degenerative disc disease), lumbosacral, Decreased mobility and endurance, Hearing aid worn, History of short term memory loss, [place] (hard of hearing), [place] (hard of hearing), Hyperlipidemia, Hypertension, MI, old, Neuropathy, Non-compliance with treatment, Pressure ulcer of ischium, stage 3, left (HCC), Pressure ulcer of left buttock, stage 3 ([address] Sleep apnea, Thyroid disease, and Wears glasses. Past Surgical History:  has a past surgical history that includes Cataract removal (2010 and 2010); Bunionectomy (10/2003); Dilation and curettage of uterus; Appendectomy (); Ovary removal (); Tonsillectomy and adenoidectomy (); Breast lumpectomy (); Incontinence surgery; Colonoscopy (2014); Coronary angioplasty with stent (2017); back surgery ([id number]); joint replacement (Right, 2018); and Abscess Drainage (Right, 2018). Discharge Recommendations: Reina Albarran scored a 10/24 on the AM-PAC short mobility form.  Current research shows that an AM-PAC score of 17 or less is typically not associated with a discharge to the patient's home setting. Based on the patient's AM-PAC score and their current functional mobility deficits, it is recommended that the patient have 3-5 sessions per week of Physical Therapy at d/c to increase the patient's independence. Please see assessment section for further patient specific details. If patient discharges prior to next session this note will serve as a discharge summary. Please see below for the latest assessment towards goals. DME Required For Discharge: no DME required at discharge  Precautions/Restrictions: high fall risk, weight bearing  Weight Bearing Restrictions: weight bearing as tolerated    [x] Right Lower Extremity   Required Braces/Orthotics: no braces required  Positional Restrictions:no positional restrictions    Pre-Admission Information Patient was poor historian during interview process     Lives With: alone                     Type of Home: condo  Home Layout: one level  Home Access: . Comment: patient reports she can not recall  Bathroom Layout: walk in shower  Toilet Height: elevated height  Bathroom Equipment: . Comment: stands to shower she can not recall if she has a seat  Home Equipment: standard walker, . Comment:  patient is unable to recall weather he walker has wheels or no wheels  Transfer Assistance: Independent without use of device  Ambulation Assistance:Independent without use of device, modified independent with use of walker  ADL Assistance: independent with all ADL's  IADL Assistance: independent with homemaking tasks sister lives next door  Active : [x]?  yes             []?no  Current Employment: unemployed  Hobbies: watch tv   Recent Falls: [place] the [place] slipped and fell on wet ground    Examination   Vision:   Vision Gross Assessment: Impaired and Vision Corrective Device: wears glasses for reading  Hearing:   hard of hearing, has hearing aides, but doesn't wear them  Observation:   General Observation:  ace wrap RLE - removed during session, redness around L scapula - nursing aware  Sensation:   WFL  ROM:   (B) LE AROM WFL, R knee 0-84  Strength:   RLE WFL, minimal SLR  Decision Making: medium complexity  Clinical Presentation: evolving      Subjective  General: Denied pain at rest. Reported pain in R knee with all mobility, but denied need to alert nursing for pain interventions  Pain: 0/10  Pain Interventions: not applicable       Functional Mobility  Bed Mobility  Supine to Sit: moderate assistance  Scooting: stand by assistance  Comments:  Transfers  Sit to stand transfer: moderate assistance  Stand to sit transfer: moderate assistance  Bed to chair transfer: minimal assistance  Comments: from slightly elevated bed to walker x 4 during session. Incontinent of urine during initial stance. Total assistance provided for keli-care and brief change. Able to take 3-4 small steps with RW and minimal assistance from bed to chair. Ambulation  Ambulation not tested on this date. Distance:   Gait Mechanics:   Comments:    Stair Mobility  Stair mobility not completed on this date. Comments:  Balance  Static Sitting Balance: fair (+): maintains balance at SBA/supervision without use of UE support  Dynamic Sitting Balance: fair (+): maintains balance at SBA/supervision without use of UE support  Static Standing Balance: poor (+): requires min (A) to maintain balance  Dynamic Standing Balance: poor (+): requires min (A) to maintain balance  Comments: static standing x 3 trials with CGA-min A for balance at RW, each trial 2-3 minutes.     Other Therapeutic Interventions  See OT note for ADL task  Functional Outcomes  AM-PAC Inpatient Mobility Raw Score : 10              Cognition  Overall Cognitive Status: Impaired  Arousal/Alterness: appropriate responses to stimuli  Following Commands: follows one step commands with repetition  Attention Span: attends with cues to redirect  Memory: decreased short term memory  Safety Judgement: decreased awareness of need for assistance, decreased awareness of need for safety  Problem Solving: assistance required to generate solutions, assistance required to implement solutions  Insights: decreased awareness of deficits  Initiation: requires cues for some  Orientation:    oriented to person, oriented to place and oriented to situation  Command Following:   accurately follows one step commands    Education  Barriers To Learning: cognition  Patient Education: patient educated on goals, PT role and benefits, plan of care, precautions, weight-bearing education, general safety, transfer training, discharge recommendations  Learning Assessment:  patient verbalizes understanding, would benefit from continued reinforcement    Assessment  Activity Tolerance: increased time needed to complete tasks, pain with transfers, frequent re-orientation to situation  Impairments Requiring Therapeutic Intervention: decreased functional mobility, decreased strength, decreased safety awareness, decreased cognition, decreased endurance, decreased balance  Prognosis: good  Clinical Assessment: Patient not at baseline function and would benefit from skilled PT to address above deficits and facilitate return to baseline function. Needs frequent cues for safety and reorientation to situation. Presents at significant risk of falls and needs mod A for all mobility.     Safety Interventions: patient left in chair, chair alarm in place, call light within reach, gait belt, patient at risk for falls, telesitter in use and nurse notified    Plan  Frequency: 7 x/week  Current Treatment Recommendations: strengthening, ROM, balance training, functional mobility training, transfer training, gait training and endurance training    Goals  Patient Goals: did not state   Short Term Goals:  Time Frame: discharge  Patient will complete bed mobility at minimal assistance   Patient will complete transfers at contact guard assistance   Patient will ambulate 25 ft with use of standard walker at contact guard assistance    Therapy Session Time      Individual Group Co-treatment   Time In     0923   Time Out     1016   Minutes     53     Timed Code Treatment Minutes:  38 Minutes  Total Treatment Minutes:  53       Electronically Signed By: Rosa Saldaña PT    Thanks, Rosa Saldaña PT, DPT [id number]

## 2022-06-19 NOTE — PROGRESS NOTES
[address] [facility],[address] Department   Phone: [phone]    Occupational Therapy    [x] Initial Evaluation            [] Daily Treatment Note         [] Discharge Summary      Patient: Kylee Franco   : 1935   MRN: [id number]   Date of Service:  2022    Admitting Diagnosis:  Infection of prosthetic right knee joint [facility])  Current Admission Summary: Brief History: Patient is an 83 yo female with hx CAD/stent (), HTN, seizure disorder, hypothyroid, right TKA (2018). She has been experiencing increased pain and swelling in right knee. PCP aspirated knee  and culture grew pseudomonas. She completed antibiotic course. She presented to [facility] AT [facility] with worsening pain, swelling and drainage from right knee. She was transferred to [facility] for further management. She has been started on vancomycin and Zosyn. Past Medical History:  has a past medical history of CAD (coronary artery disease), DDD (degenerative disc disease), lumbosacral, Decreased mobility and endurance, Hearing aid worn, History of short term memory loss, [place] (hard of hearing), [place] (hard of hearing), Hyperlipidemia, Hypertension, MI, old, Neuropathy, Non-compliance with treatment, Pressure ulcer of ischium, stage 3, left (HCC), Pressure ulcer of left buttock, stage 3 ([address] Sleep apnea, Thyroid disease, and Wears glasses. Past Surgical History:  has a past surgical history that includes Cataract removal (2010 and 2010); Bunionectomy (10/2003); Dilation and curettage of uterus; Appendectomy (); Ovary removal (); Tonsillectomy and adenoidectomy (); Breast lumpectomy (); Incontinence surgery; Colonoscopy (2014); Coronary angioplasty with stent (2017); back surgery ([id number]); joint replacement (Right, 2018); and Abscess Drainage (Right, 2018). Discharge Recommendations:  Kylee Franco scored a 13/24 on the AM-PAC ADL Inpatient form.  Current research shows home  Perception:   WFL  Observation:   Incision/Scar:  right knee  Posture:    rounded shoulders  Sensation:   WFL  Proprioception:    WFL  Tone:   Normotonic  Coordination Testing:   WFL    ROM:   (B) UE AROM WFL  Strength:   (B) UE strength grossly WFL    Decision Making: medium complexity  Clinical Presentation: stable      Subjective    General: Patient up in chair and agreeable to therapy   Pain: 0/10  Pain Interventions: patient denies pain interventions           Activities of Daily Living    Basic Activities of Daily Living  General Comments: Patient completed self care seated at edge of bed needing total assist for pericare and lower body bathing, dressing, toileting and pericare   Instrumental Activities of Daily Living  No IADL completed on this date. Functional Mobility    Bed Mobility  Supine to Sit: moderate assistance  Comments:  Transfers  Stand pivot transfer: moderate assistance  Comments:  Functional Mobility:  Sitting Balance: supervision. Standing Balance: moderate assistance. Functional Mobility: rolling walker. moderate assistance.   Activity: none completed on this date, used for stand pivot to chair    Other Therapeutic Interventions      Functional Outcomes  AM-PAC Inpatient Daily Activity Raw Score: 13      Cognition  Overall Cognitive Status: Impaired  Memory: decreased recall of biographical information  Safety Judgement: decreased awareness of need for assistance  Insights: decreased awareness of deficits  Sequencing: requires cues for all  Orientation:    oriented to person, oriented to place, disoriented to time  and disoriented to situation  Command Following:   accurately follows one step commands     Education    Barriers To Learning: cognition  Patient Education: patient educated on OT role and benefits, plan of care, weight-bearing education, energy conservation, orientation, discharge recommendations  Learning Assessment:  patient will require reinforcement due to

## 2022-06-19 NOTE — PROGRESS NOTES
[facility]    6/19/2022 12:09 PM        Name: Jayla Beaver . Admitted: 6/16/2022  Primary Care Provider: Herman Rivera DO (Tel: [phone])      Chief complaint: Pain, swelling, drainage from right knee. Brief History: Patient is an 81 yo female with hx CAD/stent (2017), HTN, seizure disorder, hypothyroid, right TKA (2018). She has been experiencing increased pain and swelling in right knee. PCP aspirated knee 4/18 and culture grew pseudomonas. She completed antibiotic course. She presented to [facility] AT [facility] with worsening pain, swelling and drainage from right knee. She was transferred to [facility] for further management. She has been started on vancomycin and Zosyn. 6/18/2022: RIGHT TOTAL KNEE INCISION AND DRAINAGE WITH LINER EXCHANGE       Subjective:  Limited historian. Presently up in bedside chair. There were no acute overnight events. She has very poor short term memory, does not necessarily recall undergoing surgery yesterday. She does note knee pain but it is \"tolerable. \"     Reviewed interval ancillary notes    Current Medications  0.45 % sodium chloride infusion, Continuous  sodium chloride flush 0.9 % injection 5-40 mL, 2 times per day  0.9 % sodium chloride infusion, PRN  HYDROcodone-acetaminophen (NORCO) 5-325 MG per tablet 1 tablet, Q4H PRN  lisinopril (PRINIVIL;ZESTRIL) tablet 20 mg, Daily  iopamidol (ISOVUE-370) 76 % injection 50 mL, ONCE PRN  DAPTOmycin (CUBICIN) 350 mg in sodium chloride 0.9 % 50 mL IVPB, Q24H  cefepime (MAXIPIME) 2000 mg IVPB minibag, Q12H  enoxaparin (LOVENOX) injection 40 mg, Daily  acetaminophen (TYLENOL) tablet 1,000 mg, TID  morphine (PF) injection 2 mg, Q4H PRN  traMADol (ULTRAM) tablet 50 mg, Q4H PRN  aspirin chewable tablet 81 mg, Daily  levETIRAcetam (KEPPRA) tablet 500 mg, BID  thyroid (ARMOUR) tablet 30 mg, Daily  sodium chloride flush 0.9 % injection 5-40 mL, PRN  ondansetron (ZOFRAN-ODT) disintegrating tablet 4 mg, Q8H PRN   Or  ondansetron (ZOFRAN) injection 4 mg, Q6H PRN  polyethylene glycol (GLYCOLAX) packet 17 g, Daily PRN  ibuprofen (ADVIL;MOTRIN) tablet 400 mg, Q6H PRN        Objective:  BP (!) 156/73   Pulse 58   Temp 97.6 °F (36.4 °C) (Oral)   Resp 16   Ht 5' 5\" (1.651 m)   Wt 136 lb 12.8 oz (62.1 kg)   SpO2 100%   BMI 22.76 kg/m²     Intake/Output Summary (Last 24 hours) at 6/19/2022 1209  Last data filed at 6/19/2022 0449  Gross per 24 hour   Intake 1679 ml   Output 300 ml   Net 1379 ml      Wt Readings from Last 3 Encounters:   06/17/22 136 lb 12.8 oz (62.1 kg)   06/15/22 148 lb (67.1 kg)   04/13/22 157 lb (71.2 kg)     General:  Awake, alert, oriented to self not situation, in NAD  Skin:  Warm and dry. No unusual bruising or rash  Neck:  Supple. No JVD appreciated  Chest:  Normal effort. Clear to auscultation, no wheezes/rhonchi/rales  Cardiovascular:  RRR, normal S1/S2, no murmur/gallop/rub  Abdomen:  Soft, nontender, +bowel sounds  Extremities:  + BLE edema  Neurological: Moves all extremities, unable to lift right leg  Psychological: Normal mood and affect      Labs and Tests:  CBC:   Recent Labs     06/16/22 2343 06/19/22  0445   WBC 9.6 7.1   HGB 12.7 11.2*    232     BMP:    Recent Labs     06/16/22  2343 06/19/22  0445   * 134*   K 3.8 4.0    105   CO2 19* 17*   BUN 23* 27*   CREATININE 0.9 0.8   GLUCOSE 122* 191*     Hepatic:   Recent Labs     06/16/22  2343   AST 28   ALT 15   BILITOT 0.9   ALKPHOS 90       Xray Right Knee 6/17/2022:  No acute finding of the right knee. Xray Right Knee 6/18/2022: Total knee arthroplasty, with hardware in appropriate alignment.  No   periprosthetic fracture or acute abnormality is identified. Xray Right Knee 6/18/2022:  Status post knee replacement, in normal alignment.       No acute fracture.       Immediate postoperative changes.          Problem List  Principal Problem: Infection of prosthetic right knee joint (HCC)  Active Problems:    Pseudomonas infection    Elevated C-reactive protein (CRP)    Elevated sed rate    Osteoarthritis    PRINCE (obstructive sleep apnea)    Dementia without behavioral disturbance (HCC)    Pain and swelling of right knee    Essential hypertension    Seizure disorder (HCC)    [place] (hard of hearing)  Resolved Problems:    * No resolved hospital problems. *       Assessment & Plan:   1. Right knee prosthetic joint infection. Recent aspirate from right knee (4/2022) grew Pseudomonas and she received oral ciprofloxacin. Presented with increased pain, swelling and drainage from right knee. Started on empiric antibiotics. , sed rate 58. S/p right TKA with liner exchange 6/18, surgery cultures + MRSA. Antibiotics per ID, currently on cefepime and daptomycin (allergy to Linezolid and ANTONIO with vancomycin). CBC in am. Will need IV antibiotics on DC, okay per ID to place PICC  2. Decubitus wound left buttock. Patient sedentary, sits most of the day. Followed in [facility]. Evaluated by wound care nurse and dressing changed. 3. HTN. Reasonably controlled. Continue Lisinopril. Continue to follow. BMP in am.    4. CAD. Hx STEMI 1/2017, CATRACHITA placed to mid RCA. Denies chest pain. Continue asa and statin. 5. Hx seizure. Continue Keppra. 6. Hypothyroidism. TSH 4.06 (8/2021). Continue thyroid tablet. Disposition: PT/OT evaluated and recommend SNF on DC. Case management assisting. Diet: ADULT DIET;  Regular  Code:Full Code  DVT PPX: enoxaparin      MICHELLE Rivera - CNP   6/19/2022 12:09 PM

## 2022-06-19 NOTE — PROGRESS NOTES
Upon arrival to place PICC line assessed chart for issues related to picc placement, check for consent, and did time out with RN JOE CANO [facility]. Pt. Tolerated PICC placement well, no difficulty accessing basilic vein and 3CG technology used to verify PICC tip placement. Positive P wave with no negative deflection. Printed wave form and placed In chart.  Reported off to Ludwin Cameron

## 2022-06-19 NOTE — PLAN OF CARE
Problem: Discharge Planning  Goal: Discharge to home or other facility with appropriate resources  6/19/2022 0822 by Severiano Foster RN  Outcome: Progressing     Problem: Pain  Goal: Verbalizes/displays adequate comfort level or baseline comfort level  6/19/2022 0822 by Severiano Foster RN  Outcome: Progressing     Problem: Safety - Adult  Goal: Free from fall injury  6/19/2022 0822 by Severiano Foster RN  Outcome: Progressing     Problem: ABCDS Injury Assessment  Goal: Absence of physical injury  6/19/2022 0822 by Severiano Foster RN  Outcome: Progressing     Problem: Nutrition Deficit:  Goal: Optimize nutritional status  6/19/2022 0822 by Severiano Foster RN  Outcome: Progressing

## 2022-06-20 LAB
ANION GAP SERPL CALCULATED.3IONS-SCNC: 9 MMOL/L (ref 3–16)
BUN BLDV-MCNC: 33 MG/DL (ref 7–20)
CALCIUM SERPL-MCNC: 9.5 MG/DL (ref 8.3–10.6)
CHLORIDE BLD-SCNC: 108 MMOL/L (ref 99–110)
CO2: 19 MMOL/L (ref 21–32)
CREAT SERPL-MCNC: 0.9 MG/DL (ref 0.6–1.2)
GFR AFRICAN AMERICAN: >60
GFR NON-AFRICAN AMERICAN: 60
GLUCOSE BLD-MCNC: 144 MG/DL (ref 70–99)
HCT VFR BLD CALC: 32.4 % (ref 36–48)
HEMOGLOBIN: 10.6 G/DL (ref 12–16)
MCH RBC QN AUTO: 26.7 PG (ref 26–34)
MCHC RBC AUTO-ENTMCNC: 32.6 G/DL (ref 31–36)
MCV RBC AUTO: 81.7 FL (ref 80–100)
PDW BLD-RTO: 15.8 % (ref 12.4–15.4)
PLATELET # BLD: 260 K/UL (ref 135–450)
PMV BLD AUTO: 8.5 FL (ref 5–10.5)
POTASSIUM SERPL-SCNC: 4.2 MMOL/L (ref 3.5–5.1)
RBC # BLD: 3.96 M/UL (ref 4–5.2)
SODIUM BLD-SCNC: 136 MMOL/L (ref 136–145)
WBC # BLD: 7.5 K/UL (ref 4–11)

## 2022-06-20 PROCEDURE — 6360000002 HC RX W HCPCS: Performed by: ORTHOPAEDIC SURGERY

## 2022-06-20 PROCEDURE — 6370000000 HC RX 637 (ALT 250 FOR IP): Performed by: ORTHOPAEDIC SURGERY

## 2022-06-20 PROCEDURE — 1200000000 HC SEMI PRIVATE

## 2022-06-20 PROCEDURE — 6370000000 HC RX 637 (ALT 250 FOR IP): Performed by: NURSE PRACTITIONER

## 2022-06-20 PROCEDURE — 99024 POSTOP FOLLOW-UP VISIT: CPT | Performed by: NURSE PRACTITIONER

## 2022-06-20 PROCEDURE — 80048 BASIC METABOLIC PNL TOTAL CA: CPT

## 2022-06-20 PROCEDURE — 85027 COMPLETE CBC AUTOMATED: CPT

## 2022-06-20 PROCEDURE — 36569 INSJ PICC 5 YR+ W/O IMAGING: CPT

## 2022-06-20 PROCEDURE — 36415 COLL VENOUS BLD VENIPUNCTURE: CPT

## 2022-06-20 PROCEDURE — 2580000003 HC RX 258: Performed by: ORTHOPAEDIC SURGERY

## 2022-06-20 PROCEDURE — 97530 THERAPEUTIC ACTIVITIES: CPT

## 2022-06-20 PROCEDURE — C1751 CATH, INF, PER/CENT/MIDLINE: HCPCS

## 2022-06-20 PROCEDURE — 97110 THERAPEUTIC EXERCISES: CPT

## 2022-06-20 PROCEDURE — 99233 SBSQ HOSP IP/OBS HIGH 50: CPT | Performed by: INTERNAL MEDICINE

## 2022-06-20 PROCEDURE — 97535 SELF CARE MNGMENT TRAINING: CPT

## 2022-06-20 PROCEDURE — 97116 GAIT TRAINING THERAPY: CPT

## 2022-06-20 PROCEDURE — 02HV33Z INSERTION OF INFUSION DEVICE INTO SUPERIOR VENA CAVA, PERCUTANEOUS APPROACH: ICD-10-PCS | Performed by: INTERNAL MEDICINE

## 2022-06-20 PROCEDURE — APPNB45 APP NON BILLABLE 31-45 MINUTES: Performed by: NURSE PRACTITIONER

## 2022-06-20 RX ORDER — SODIUM CHLORIDE 9 MG/ML
25 INJECTION, SOLUTION INTRAVENOUS PRN
Status: DISCONTINUED | OUTPATIENT
Start: 2022-06-20 | End: 2022-06-20 | Stop reason: SDUPTHER

## 2022-06-20 RX ORDER — SODIUM CHLORIDE 0.9 % (FLUSH) 0.9 %
5-40 SYRINGE (ML) INJECTION PRN
Status: DISCONTINUED | OUTPATIENT
Start: 2022-06-20 | End: 2022-06-20 | Stop reason: SDUPTHER

## 2022-06-20 RX ORDER — DIPHENHYDRAMINE HCL 25 MG
25 TABLET ORAL EVERY 8 HOURS PRN
Status: DISCONTINUED | OUTPATIENT
Start: 2022-06-20 | End: 2022-06-22 | Stop reason: HOSPADM

## 2022-06-20 RX ORDER — SODIUM CHLORIDE 0.9 % (FLUSH) 0.9 %
5-40 SYRINGE (ML) INJECTION EVERY 12 HOURS SCHEDULED
Status: DISCONTINUED | OUTPATIENT
Start: 2022-06-20 | End: 2022-06-22 | Stop reason: HOSPADM

## 2022-06-20 RX ORDER — LIDOCAINE HYDROCHLORIDE 10 MG/ML
5 INJECTION, SOLUTION EPIDURAL; INFILTRATION; INTRACAUDAL; PERINEURAL ONCE
Status: DISCONTINUED | OUTPATIENT
Start: 2022-06-20 | End: 2022-06-22 | Stop reason: HOSPADM

## 2022-06-20 RX ORDER — SODIUM CHLORIDE 0.9 % (FLUSH) 0.9 %
5-40 SYRINGE (ML) INJECTION EVERY 12 HOURS SCHEDULED
Status: DISCONTINUED | OUTPATIENT
Start: 2022-06-20 | End: 2022-06-20 | Stop reason: SDUPTHER

## 2022-06-20 RX ORDER — SODIUM CHLORIDE 9 MG/ML
25 INJECTION, SOLUTION INTRAVENOUS PRN
Status: DISCONTINUED | OUTPATIENT
Start: 2022-06-20 | End: 2022-06-22 | Stop reason: HOSPADM

## 2022-06-20 RX ORDER — ASPIRIN 81 MG/1
81 TABLET ORAL 2 TIMES DAILY
Qty: 60 TABLET | Refills: 0 | Status: SHIPPED | OUTPATIENT
Start: 2022-06-20 | End: 2022-07-20

## 2022-06-20 RX ORDER — HYDROCODONE BITARTRATE AND ACETAMINOPHEN 5; 325 MG/1; MG/1
1 TABLET ORAL EVERY 6 HOURS PRN
Qty: 20 TABLET | Refills: 0 | Status: SHIPPED | OUTPATIENT
Start: 2022-06-20 | End: 2022-06-25

## 2022-06-20 RX ORDER — LIDOCAINE HYDROCHLORIDE 10 MG/ML
5 INJECTION, SOLUTION EPIDURAL; INFILTRATION; INTRACAUDAL; PERINEURAL ONCE
Status: DISCONTINUED | OUTPATIENT
Start: 2022-06-20 | End: 2022-06-20 | Stop reason: SDUPTHER

## 2022-06-20 RX ADMIN — TRAMADOL HYDROCHLORIDE 50 MG: 50 TABLET, COATED ORAL at 22:57

## 2022-06-20 RX ADMIN — ACETAMINOPHEN 1000 MG: 500 TABLET ORAL at 21:02

## 2022-06-20 RX ADMIN — ASPIRIN 81 MG: 81 TABLET, CHEWABLE ORAL at 09:35

## 2022-06-20 RX ADMIN — HYDROCODONE BITARTRATE AND ACETAMINOPHEN 1 TABLET: 5; 325 TABLET ORAL at 04:57

## 2022-06-20 RX ADMIN — ACETAMINOPHEN 1000 MG: 500 TABLET ORAL at 09:35

## 2022-06-20 RX ADMIN — LEVOTHYROXINE, LIOTHYRONINE 30 MG: 19; 4.5 TABLET ORAL at 09:36

## 2022-06-20 RX ADMIN — ENOXAPARIN SODIUM 40 MG: 100 INJECTION SUBCUTANEOUS at 09:35

## 2022-06-20 RX ADMIN — LISINOPRIL 20 MG: 20 TABLET ORAL at 09:36

## 2022-06-20 RX ADMIN — DAPTOMYCIN 350 MG: 500 INJECTION, POWDER, LYOPHILIZED, FOR SOLUTION INTRAVENOUS at 20:58

## 2022-06-20 RX ADMIN — DIPHENHYDRAMINE HCL 25 MG: 25 TABLET ORAL at 22:57

## 2022-06-20 RX ADMIN — LEVETIRACETAM 500 MG: 500 TABLET, FILM COATED ORAL at 09:36

## 2022-06-20 RX ADMIN — CEFEPIME HYDROCHLORIDE 2000 MG: 2 INJECTION, POWDER, FOR SOLUTION INTRAVENOUS at 00:49

## 2022-06-20 RX ADMIN — CEFEPIME HYDROCHLORIDE 2000 MG: 2 INJECTION, POWDER, FOR SOLUTION INTRAVENOUS at 13:01

## 2022-06-20 ASSESSMENT — PAIN SCALES - GENERAL
PAINLEVEL_OUTOF10: 6
PAINLEVEL_OUTOF10: 10
PAINLEVEL_OUTOF10: 0
PAINLEVEL_OUTOF10: 0
PAINLEVEL_OUTOF10: 3

## 2022-06-20 ASSESSMENT — PAIN DESCRIPTION - ORIENTATION
ORIENTATION: RIGHT
ORIENTATION: RIGHT

## 2022-06-20 ASSESSMENT — PAIN DESCRIPTION - LOCATION
LOCATION: KNEE
LOCATION: KNEE

## 2022-06-20 NOTE — PROGRESS NOTES
[facility]   Progress Note    CHIEF COMPLAINT/DIAGNOSIS: S/p I&D and liner exchange of RIGHT Total Knee Arthroplasty    SUBJECTIVE: The patient is sitting up in the bed; describes no pain at rest.  Acknowledges swelling. She is more clear cognitively today. OBJECTIVE  Physical    VITALS:  BP (!) 160/84   Pulse 75   Temp 97.7 °F (36.5 °C) (Oral)   Resp 16   Ht 5' 5\" (1.651 m)   Wt 136 lb 12.8 oz (62.1 kg)   SpO2 97%   BMI 22.76 kg/m²     GENERAL: Alert and oriented x3, in no acute distress. MUSCULOSKELETAL: Able to dorsi and plantarflex the ankle without issue. INCISION:  Covered with post-op dressing is c/d/I.  ROM: right knee ROM deferred. Sensory:  Intact to light touch in peroneal and tibial distributions. Vascular:   2+ DP pulses with brisk cap refill;  calf soft and nontender    Data    ALL MEDICATIONS HAVE BEEN REVIEWED    CBC: Recent Labs     06/19/22  0445 06/20/22  0423   WBC 7.1 7.5   HGB 11.2* 10.6*   HCT 35.0* 32.4*    260     BMP:   Recent Labs     06/19/22  0445 06/20/22  0423   * 136   K 4.0 4.2    108   CO2 17* 19*   BUN 27* 33*   CREATININE 0.8 0.9     INR: No results for input(s): INR in the last 72 hours. Post-op films show stable TKA components without apparent loosening or complication. ASSESSMENT:  S/p RIGHT Total Knee Arthroplasty I&D and liner exchange (6/18/22), POD#2  HTN  CAD  Seizure disorder  Dementia? Hypothyroidism  Decubitus wound left buttock    PLAN:   - WB status:  WBAT; reviewed post op precautions  - DVT prophylaxis: Lovenox as inpt; rec d/c with 30 days ASA 81mg BID  - PT/OT  - Pain Control: Norco script in chart. Due to orthopaedic surgical procedure/condition, patient may require pain medication for up to 6-8 weeks. - ID:  daptomycin and cefepime per ID. Appreciate their input  - Dispo: OK to d/c to SNF from our end when medically stable and final abx rec in place. Follow-up with Dr. Nirmal Clark in approx 3 weeks. [phone]  Future Appointments   Date Time Provider Catracho Gutierrez   6/29/2022 10:15 AM MICHELLE Chatman CNP [facility] [place] [facility]       MICHELLE Mehta CNP  6/20/2022  9:20 AM

## 2022-06-20 NOTE — PROGRESS NOTES
Shift assessment completed, patient is alert to person and self, but disoriented to place, situation, and time. VSS. Follow commands. Dressing to right knee, clean, dry and intact, pressure in the coccyx area, meplilex in place, purwick in place. Fall precautions in place , bed alarm on, bed in lowest position, call light in reach. Patient unable to mutually agree with the care plan.

## 2022-06-20 NOTE — PROGRESS NOTES
[address] [facility],[address] Msb Department   Phone: [phone]    Occupational Therapy    [] Initial Evaluation            [x] Daily Treatment Note         [] Discharge Summary      Patient: Stefani De La Rosa   : 1935   MRN: [id number]   Date of Service:  2022    Admitting Diagnosis:  Infection of prosthetic right knee joint [facility])  Current Admission Summary: Brief History: Patient is an 83 yo female with hx CAD/stent (), HTN, seizure disorder, hypothyroid, right TKA (2018). She has been experiencing increased pain and swelling in right knee. PCP aspirated knee  and culture grew pseudomonas. She completed antibiotic course. She presented to [facility] with worsening pain, swelling and drainage from right knee. She was transferred to [facility] for further management. She has been started on vancomycin and Zosyn. Past Medical History:  has a past medical history of CAD (coronary artery disease), DDD (degenerative disc disease), lumbosacral, Decreased mobility and endurance, Hearing aid worn, History of short term memory loss, [place]hard of hearing[place] (hard of hearing), Hyperlipidemia, Hypertension, MI, old, Neuropathy, Non-compliance with treatment, Pressure ulcer of ischium, stage 3, left (HCC), Pressure ulcer of left buttock, stage 3 ([address] Sleep apnea, Thyroid disease, and Wears glasses. Past Surgical History:  has a past surgical history that includes Cataract removal (2010 and 2010); Bunionectomy (10/2003); Dilation and curettage of uterus; Appendectomy (); Ovary removal (); Tonsillectomy and adenoidectomy (); Breast lumpectomy (); Incontinence surgery; Colonoscopy (2014); Coronary angioplasty with stent (2017); back surgery ([id number]); joint replacement (Right, 2018); Abscess Drainage (Right, 2018); and Revision total knee arthroplasty (Right, 2022).     Discharge Recommendations:  Stefani De La Rosa scored a 16/24 on the AM-PAC ADL Inpatient form. Current research shows that an AM-PAC score of 17 or less is typically not associated with a discharge to the patient's home setting. Based on the patient's AM-PAC score and their current ADL deficits, it is recommended that the patient have 3-5 sessions per week of Occupational Therapy at d/c to increase the patient's independence. Please see assessment section for further patient specific details. If patient discharges prior to next session this note will serve as a discharge summary. Please see below for the latest assessment towards goals. DME Required For Discharge: no DME required at discharge    Precautions/Restrictions: high fall risk  Weight Bearing Restrictions: weight bearing as tolerated    [] Right Upper Extremity  [] Left Upper Extremity [x] Right Lower Extremity  [] Left Lower Extremity     Required Braces/Orthotics: no braces required   [] Right  [] Left  Positional Restrictions:no positional restrictions      Pre-Admission Information Patient was poor historian during interview process    Lives With: alone    Type of Home: condo  Home Layout: one level  Home Access: . Comment: patient reports she can not recall  Bathroom Layout: walk in shower  Toilet Height: elevated height  Bathroom Equipment: . Comment: stands to shower she can not recall if she has a seat  Home Equipment: standard walker, .   Comment:  patient is unable to recall weather he walker has wheels or no wheels  Transfer Assistance: Independent without use of device  Ambulation Assistance:Independent without use of device, modified independent with use of walker  ADL Assistance: independent with all ADL's  IADL Assistance: independent with homemaking tasks sister lives next door  Active : [x] yes  []no  Current Employment: unemployed  Hobbies: watch tv   Recent Falls[place] slipped and fell on wet ground      Examination     Vision:   Vision Gross Assessment: WFL cheaters for reading  Hearing:   hard of hearing has hearing aids at home  Perception:   WFL  Observation:   Incision/Scar:  right knee  Posture:    rounded shoulders  Sensation:   WFL  Proprioception:    WFL  Tone:   Normotonic  Coordination Testing:   WFL    ROM:   (B) UE AROM WFL  Strength:   (B) UE strength grossly WFL    Decision Making: medium complexity  Clinical Presentation: stable      Subjective- Patient supine in bed upon entry with HOB raised, pt pleasant and agreeable to therapy session. General: Pt supine to sit SBA with HOB raised. Pt sit EOB SBA. Pt sit to stand CGA/Min A, pt stand step ~2ft to recliner with rw at [facility]. Pt stand to sit CGA. Pt with seated rest break. Pt sit to stand CGa and ambulated CGA ~32ft in room with chair follow. Pt stand to sit CGA. Pt sit to stand CGA/Min A and ambulated to sink ~8ft CGA with rw. Pt in stance in sink ~8 min for oral care/wash face/comb hair. Pt then ~2ft to toilet CGA and toilet transfer CGA and toileted CGA. Pt used grab bar. Pt ambulated ~2ft to sink and washed hands CGA. Pt then ~8ft to recliner with rw at [facility]. Pt stand to sit CGA. Call light in reach and chair alarm on. Pain: pt didn't rate pain in right knee, said she had pain when she moves  Pain Interventions: patient denies pain interventions           Activities of Daily Living    Basic Activities of Daily Living  Grooming: contact guard assistance  Toileting: contact guard assistance. Equipment: none  Instrumental Activities of Daily Living  No IADL completed on this date. Functional Mobility    Bed Mobility  Supine to Sit: stand by assistance  Comments: HOB raised and use of grab bar  Transfers  Sit to stand transfer:contact guard assistance, minimal assistance  Stand to sit transfer: contact guard assistance  Stand step transfer: contact guard assistance  Toilet transfer: contact guard assistance. Mobility technique: ambulating.   Equipment utilized: raised toilet seat with rails    Functional Mobility:  Sitting Balance: stand by assistance. Standing Balance: contact guard assistance. Functional Mobility: rolling walker. contact guard assistance. Activity: to/from bathroom, ~32 ft + ~8ft + ~2ft + ~2ft + ~8ft    Other Therapeutic Interventions      Functional Outcomes  -PAC Inpatient Daily Activity Raw Score: 16      Cognition  Overall Cognitive Status: Impaired  Memory: decreased recall of biographical information  Safety Judgement: decreased awareness of need for assistance  Insights: decreased awareness of deficits  Sequencing: requires cues for all  Orientation:    oriented to person, oriented to place and disoriented to situation (oriented to mth and yr, disoriented to day of week/date  Command Following:   accurately follows one step commands     Education    Barriers To Learning: cognition  Patient Education: patient educated on OT role and benefits, plan of care, weight-bearing education, energy conservation, orientation, transfer training, discharge recommendations  Learning Assessment:  patient will require reinforcement due to cognitive deficits    Assessment    Activity Tolerance:  Pt tolerated therapy well  Impairments Requiring Therapeutic Intervention: decreased functional mobility, decreased ADL status, decreased strength, decreased safety awareness, decreased cognition, decreased sensation, decreased balance, decreased IADL, decreased fine motor control  Prognosis: good  Clinical Assessment: Patient progressing with therapy, CGA for functional mobility and CGA/Min A for transfers. Pt CGA for toileting and grooming. Pt lives along (pt unsure if she lives on the first or second floor) and will require in patient therapy prior to discharge to home due to new onset of TKR revision. Continue with POC.   Safety Interventions: patient left in chair, chair alarm in place, call light within reach, patient at risk for falls and telesitter in use       Plan    Frequency: 7 x/week  Current Treatment Recommendations: functional mobility training, transfer training, ADL/self-care training and IADL training    Goals    Patient Goals: Patient's goal is to return home with assist of sister after completing rehabilitation to improve independence with self care and mobility     Short Term Goals:  Time Frame: discharge  Patient will complete upper body ADL at supervision - not addressed 6/20   Patient will complete lower body ADL at minimal assistance - not addressed 6/20  Patient will complete toileting at minimal assistance - goal met 6/20  Patient will complete grooming at stand by assistance - CGA goal not met 6/20  Patient will complete functional transfers at minimal assistance - goal met 6/20  Patient will complete functional mobility at contact guard assistance - goal met 6/20  Patient will complete bed mobility at contact guard assistance - goal met supine to sit continue gaol 6/20  Patient to maintain standing at contact guard assistance for 10 minutes.  - 8 min continue goal 6/20       Therapy Session Time     Individual Group Co-treatment   Time In   6476   Time Out   1018   Minutes   42        Timed Code Treatment Minutes:  Timed Code Treatment Minutes: 42 Minutes    Total Treatment Minutes:  42    Electronically Signed By. Hiram Edgar, HAYNES/L [id number]

## 2022-06-20 NOTE — PROGRESS NOTES
More redness noted on patient lower back and buttocks after antibiotics given, Dr. Luke Carey on call notified. Pt not having any itching or other signs of reaction.

## 2022-06-20 NOTE — PROGRESS NOTES
Infectious Diseases   Progress Note      Admission Date: 6/16/2022  Hospital Day: Hospital Day: 5   Attending: Rolf Boudreaux MD  Date of service: 6/20/2022     Chief complaint/ Reason for consult:     · Right knee chronic prosthetic joint infection  · Pansensitive Pseudomonas growth from right knee synovial fluid aspirate on 4/13/2022  · Buttock decubitus wounds, wound culture on 3/9/2022 was positive for Staph epidermidis and pansensitive Pseudomonas, has been following up with the wound care center  · History of right total knee arthroplasty on 3/6/2018  · Elevated CRP of 129.6  · Elevated sed rate of 62    Microbiology:        I have reviewed allavailable micro lab data and cultures    · Right knee surgical culture  - collected on 6/18/2022: in process      Antibiotics and immunizations:       Current antibiotics: All antibiotics and their doses were reviewed by me    Recent Abx Admin                   cefepime (MAXIPIME) 2000 mg IVPB minibag (mg) 2,000 mg New Bag 06/20/22 1301     2,000 mg New Bag  0049    DAPTOmycin (CUBICIN) 350 mg in sodium chloride 0.9 % 50 mL IVPB (mg) 350 mg New Bag 06/19/22 2325                  Immunization History: All immunization history was reviewed by me today. Immunization History   Administered Date(s) Administered    COVID-19, Lizette Zafar, Primary or Immunocompromised, PF, 100mcg/0.5mL 03/31/2021, 04/28/2021    DT (pediatric) 08/31/1990    Influenza Virus Vaccine 10/22/2002    Influenza, Triv, inactivated, subunit, adjuvanted, IM (Fluad 65 yrs and older) 12/11/2019    Pneumococcal Conjugate 13-valent ([id number]) 09/29/2015    Pneumococcal Polysaccharide ([id number]) 09/05/2017    Td vaccine (adult) 03/17/2010    Td, unspecified formulation 08/31/1990    Tdap (Boostrix, Adacel) 09/19/2017       Known drug allergies:      All allergies were reviewed and updated    Allergies   Allergen Reactions    Amoxicillin Hives and Rash    Linezolid Other (See Comments) While taking linezolid, pt developed progressively altered mentation & ultimately had a witnessed seizure 4/26/18. Uncertain whether these symptoms were d/t linezolid, but possible.  Prednisone Itching    Vancomycin      Rising SCr during 5/2018 admission     Coreg [Carvedilol] Diarrhea    Oxycodone-Acetaminophen Rash       Social history:     Social History:  All social andepidemiologic history was reviewed and updated by me today as needed. · Tobacco use:   reports that she has never smoked. She has never used smokeless tobacco.  · Alcohol use:   reports no history of alcohol use. · Currently lives in: [facility] [address]  ·  reports no history of drug use. COVID VACCINATION AND LAB RESULT RECORDS:     Internal Administration   First Dose COVID-19, Moderna, Primary or Immunocompromised, PF, 100mcg/0.5mL  03/31/2021   Second Dose COVID-19, Antonette , Primary or Immunocompromised, PF, 100mcg/0.5mL   04/28/2021       Last COVID Lab SARS-CoV-2, NAAT (no units)   Date Value   01/01/2022 Not Detected            Assessment:     The patient is a 82 y.o. old female who  has a past medical history of CAD (coronary artery disease), DDD (degenerative disc disease), lumbosacral (4/10/2013), Decreased mobility and endurance (2/24/2022), Hearing aid worn, History of short term memory loss (3/3/2022), [place] (hard of hearing), [place] (hard of hearing), Hyperlipidemia, Hypertension, MI, old (2017), Neuropathy, Non-compliance with treatment (6/2/2022), Pressure ulcer of ischium, stage 3, left ([address] (2/16/2022), Pressure ulcer of left buttock, stage 3 ([address] (2/16/2022), Sleep apnea, Thyroid disease, and Wears glasses.  with following problems:      · Right knee chronic prosthetic joint infection -s/p debridement and hardware retention surgery on 6/18/2022  · Pansensitive Pseudomonas growth from right knee synovial fluid aspirate on 4/13/2022-currently on broad-spectrum antibiotics  · Buttock decubitus wounds, wound culture on 3/9/2022 was positive for Staph epidermidis and pansensitive Pseudomonas, has been following up with the wound care center  · History of right total knee arthroplasty on 3/6/2018  · Elevated CRP of 129.6  · Elevated sed rate of 62  · Severe dementia-chronic and ongoing  · Degenerative disc disease  · Chronic hearing loss  · Coronary artery disease-stable  · Essential hypertension-blood pressure okay  · Obstructive sleep apnea  ·       Discussion:      The patient is afebrile. She is on IV daptomycin for MRSA coverage and IV cefepime for empiric gram-negative coverage. She does have history of pansensitive Pseudomonas infection of the right knee few months ago. The surgical cultures from this admission are growing MRSA only so far    Serum creatinine 0.9. Neurological status has improved dramatically for her. She is awake and alert today and is sitting up in the chair and is able to carry on a very meaningful conversation about her problem and treatment plan    Plan:     Diagnostic Workup:      · Continue to follow  fever curve, WBC count and blood cultures. · Continue to monitor blood counts, liver and renal function. Antimicrobials:    · Okay to place a PICC line  · Will continue IV daptomycin for MRSA coverage  · Okay to continue IV cefepime for gram-negative coverage given history of Pseudomonas infection in  the right knee in the past  · If no evidence of Pseudomonas in the surgical cultures, will plan to discontinue cefepime at discharge. Tentative antibiotic plan in that case will be as follows  · We will follow up on the culture results and clinical progress and will make further recommendations accordingly. · Continue close vitals monitoring. · Maintain good glycemic control. · Fall precautions. · Aspiration precautions. · Continue to watch for new fever or diarrhea. · DVT prophylaxis. · Discussed all above with patient and RN.     SULEMA has been updated with infusion orders as follows: Out-patient antibiotic therapy (OPAT)/ Antibiotic Infusion orders          Diagnosis: Complicated right knee prosthetic joint infection       Organism/ culture: MRSA     Name and dose of Antimicrobial: IV daptomycin 350 mg every 24 hour     Antimicrobial start date: calculated from 6/20/2022     Antimicrobial completion date planned: August 1, 2022     Lab monitoring: CBC, Chem 12, ESR, CRP, CK once a week, to be       collected every Monday or Tuesday morning until the patient is off IV  antibiotics. Fax weekly lab results to Mague Duran MD's office at        [phone]. Please maintain PICC line until the patient is on IV antibiotics. Ok to administer PICC line normal saline flushes as needed. The patient has given verbal informed consent for [facility] pharmacist, Angle Escobar to manage above antibiotic therapy for the patient after the patient's discharge from the hospital.       ** Please notify Mague Duran MD's office with any change in patient's        status, transfer out of facility or to hospital by calling [phone]           Outpatient Follow up: Due to the [place] 19 pandemic, plan for a follow-up virtual video visit in 4 weeks. Call my office at [phone] to make an appointment. Physician Signature:  Electronically signed by Mague Duran MD on                                                      6/20/22 at 4:01 PM EDT             Drug Monitoring:    · Continue monitoring for antibiotic toxicity as follows: CBC, CMP, CK  · Continue to watch for following: new or worsening fever, new hypotension, hives, lip swelling and redness or purulence at vascular access sites. I/v access Management:    · Continue to monitor i.v access sites for erythema, induration, discharge or tenderness. · As always, continue efforts to minimize tubes/lines/drains as clinically appropriate to reduce chances of line associated infections.     Patient education and counseling: · The patient was educated in detail about the side-effects of various antibiotics and things to watch for like new rashes, lip swelling, severe reaction, worsening diarrhea, break through fever etc.  · Discussed patient's condition and what to expect. All of the patient's questions were addressed in a satisfactory manner and patient verbalized understanding all instructions. Level of complexity of visit: High     TIME SPENT TODAY:     - Spent over  36 minutes on visit (including interval history, physical exam, review of data including labs, cultures, imaging, development and implementation of treatment plan and coordination of complex care). More than 50 percent of this includes face-to-face time spent with the patient for counseling and coordination of care. Thanks for allowing me to participate in your patient's care. I will sign off today, but will be available to answer any further questions or concerns that may arise during patient's stay in the hospital.      Subjective: Interval history: Interval history was obtained from chart review and  RN. She is afebrile. She is tolerating antibiotics okay. No diarrhea     REVIEW OF SYSTEMS:      Review of Systems   Constitutional: Positive for fatigue. Negative for chills, diaphoresis and unexpected weight change. HENT: Negative for congestion, ear discharge, ear pain, facial swelling, hearing loss, rhinorrhea and trouble swallowing. Eyes: Negative for photophobia, discharge, redness and visual disturbance. Respiratory: Negative for apnea, cough, choking, chest tightness, shortness of breath and stridor. Cardiovascular: Negative for chest pain and palpitations. Gastrointestinal: Negative for abdominal pain, blood in stool, diarrhea and nausea. Endocrine: Negative for polydipsia, polyphagia and polyuria. Genitourinary: Negative for difficulty urinating, dysuria, frequency, hematuria, menstrual problem and vaginal discharge. Musculoskeletal: Negative for arthralgias, joint swelling, myalgias and neck stiffness. Skin: Negative for color change and rash. Allergic/Immunologic: Negative for immunocompromised state. Neurological: Negative for dizziness, seizures, speech difficulty, light-headedness and headaches. Hematological: Negative for adenopathy. Psychiatric/Behavioral: Negative for agitation, hallucinations and suicidal ideas. Past Medical History: All past medical history reviewed today. Past Medical History:   Diagnosis Date    CAD (coronary artery disease)     Inferior STEMI; CATRACHITA to RCA    DDD (degenerative disc disease), lumbosacral 4/10/2013    Decreased mobility and endurance 2/24/2022    Hearing aid worn     bilateral    History of short term memory loss 3/3/2022    [place] (hard of hearing)     [place] (hard of hearing)     Hyperlipidemia     Hypertension     MI, old 2017    Neuropathy     bilateral lower extremities    Non-compliance with treatment 6/2/2022    Pressure ulcer of ischium, stage 3, left ([address] 2/16/2022    Pressure ulcer of left buttock, stage 3 ([address] 2/16/2022    Sleep apnea     uses mouth piece; bringing DOS 3/26/18    Thyroid disease     hypo    Wears glasses        Past Surgical History: All past surgical history was reviewed today.     Past Surgical History:   Procedure Laterality Date    ABSCESS DRAINAGE Right 03/27/2018    evacuation hematoma right knee    APPENDECTOMY  1963   [facility] SURGERY  [id number]    MILD PROCEDURE L2-3 BILATERAL     BREAST LUMPECTOMY  1986    BUNIONECTOMY  10/2003    CATARACT REMOVAL  03/23/2010 and 04/14/2010    COLONOSCOPY  6/2014    repeat 2019    CORONARY ANGIOPLASTY WITH STENT PLACEMENT  01/23/2017    INF STEMI with CATRACHITA to RCA    DILATION AND CURETTAGE OF UTERUS      INCONTINENCE SURGERY      JOINT REPLACEMENT Right 2018    right total knee replacment    OVARY REMOVAL  1963    right    REVISION TOTAL KNEE ARTHROPLASTY Right 6/18/2022 RIGHT TOTAL KNEE INCISION AND DRAINAGE WITH LINER EXCHANGE performed by Lor Reilly MD at [address]       Family History: All family history was reviewed today. Problem Relation Age of Onset    Cancer Mother     Heart Disease Mother     Arthritis Sister         rheumatoid    Cancer Brother     Cancer Sister     Diabetes Sister     Heart Disease Brother     High Blood Pressure Brother     High Blood Pressure Sister     Diabetes Brother        Objective:       PHYSICAL EXAM:      Vitals:   Vitals:    06/20/22 0020 06/20/22 0445 06/20/22 0930 06/20/22 1313   BP: (!) 159/77 (!) 160/84 (!) 149/67 123/69   Pulse: 78 75 81 77   Resp: 16 16 16 16   Temp: 98.1 °F (36.7 °C) 97.7 °F (36.5 °C) 97.9 °F (36.6 °C) 97.9 °F (36.6 °C)   TempSrc: Oral Oral Oral Oral   SpO2: 98% 97% 100% 99%   Weight:       Height:           Physical Exam  Vitals and nursing note reviewed. Constitutional:       General: She is not in acute distress. Appearance: She is well-developed. She is not diaphoretic. HENT:      Head: Normocephalic. Right Ear: External ear normal.      Left Ear: External ear normal.      Nose: Nose normal.   Eyes:      General: No scleral icterus. Right eye: No discharge. Left eye: No discharge. Conjunctiva/sclera: Conjunctivae normal.      Pupils: Pupils are equal, round, and reactive to light. Cardiovascular:      Rate and Rhythm: Normal rate and regular rhythm. Heart sounds: No murmur heard. No friction rub. Pulmonary:      Effort: Pulmonary effort is normal.      Breath sounds: No stridor. No wheezing or rales. Chest:      Chest wall: No tenderness. Abdominal:      Palpations: Abdomen is soft. There is no mass. Tenderness: There is no abdominal tenderness. There is no guarding or rebound. Musculoskeletal:         General: No tenderness. Cervical back: Normal range of motion and neck supple.    Lymphadenopathy: Cervical: No cervical adenopathy. Skin:     General: Skin is warm and dry. Findings: No erythema or rash. Comments: Surgical dressing on the right knee   Neurological:      Mental Status: She is alert and oriented to person, place, and time. Motor: No abnormal muscle tone. Psychiatric:         Judgment: Judgment normal.             Lines: All vascular access sites are healthy with no local erythema, discharge or tenderness    Intake and output:    I/O last 3 completed shifts: In: 6757 [P.O.:200; I.V.:1479]  Out: 1750 [Urine:1750]    Lab Data:   All available labs and old records have been reviewed by me. CBC:  Recent Labs     06/19/22 0445 06/20/22 0423   WBC 7.1 7.5   RBC 4.20 3.96*   HGB 11.2* 10.6*   HCT 35.0* 32.4*    260   MCV 83.4 81.7   MCH 26.7 26.7   MCHC 32.1 32.6   RDW 15.8* 15.8*        BMP:  Recent Labs     06/19/22 0445 06/20/22 0423   * 136   K 4.0 4.2    108   CO2 17* 19*   BUN 27* 33*   CREATININE 0.8 0.9   CALCIUM 9.3 9.5   GLUCOSE 191* 144*        Hepatic Function Panel:   Lab Results   Component Value Date    ALKPHOS 90 06/16/2022    ALT 15 06/16/2022    AST 28 06/16/2022    PROT 7.3 06/16/2022    PROT 7.2 09/18/2012    PROT 7.2 09/18/2012    BILITOT 0.9 06/16/2022    BILIDIR <0.2 03/28/2018    IBILI see below 03/28/2018    LABALBU 4.1 06/16/2022       CPK:   Lab Results   Component Value Date    CKTOTAL 64 06/16/2022     ESR:   Lab Results   Component Value Date    SEDRATE 62 (H) 06/16/2022     CRP:   Lab Results   Component Value Date    .6 (H) 06/16/2022           Imaging: All pertinent images and reports for the current visit were reviewed by me during this visit. I reviewed the chest x-ray/CT scan/MRI images and independently interpreted the findings and results today. XR KNEE RIGHT (1-2 VIEWS)   Final Result   Status post knee replacement, in normal alignment. No acute fracture. Immediate postoperative changes. XR KNEE RIGHT (1-2 VIEWS)   Final Result   Total knee arthroplasty, with hardware in appropriate alignment. No   periprosthetic fracture or acute abnormality is identified. XR KNEE RIGHT (1-2 VIEWS)   Final Result   No acute finding of the right knee. Medications: All current and past medications were reviewed.  sodium chloride flush  5-40 mL IntraVENous 2 times per day    lisinopril  20 mg Oral Daily    daptomycin (CUBICIN) IVPB  6 mg/kg (Adjusted) IntraVENous Q24H    cefepime  2,000 mg IntraVENous Q12H    enoxaparin  40 mg SubCUTAneous Daily    acetaminophen  1,000 mg Oral TID    aspirin  81 mg Oral Daily    thyroid  30 mg Oral Daily        sodium chloride 100 mL/hr at 06/18/22 1050    sodium chloride         sodium chloride, HYDROcodone 5 mg - acetaminophen, iopamidol, morphine, traMADol, sodium chloride flush, ondansetron **OR** ondansetron, polyethylene glycol, ibuprofen      Problem list:       Patient Active Problem List   Diagnosis Code    Low back pain radiating to both legs M54.50, M79.604, M79.605    Lumbar spinal stenosis M48.061    DDD (degenerative disc disease), lumbosacral M51.37    Stenosis, spinal, lumbar M48.061    Pain and swelling of right knee M25.561, M25.461    Acute inferior myocardial infarction (HCC) I21.19    Hyperlipidemia LDL goal <70 E78.5    Retroperitoneal hemorrhage R58    Essential hypertension I10    Arthritis of right knee M17.11    History of total knee arthroplasty, right-3. 6.2018 Z96.651    Traumatic hematoma of knee, right, subsequent encounter S80. 01XD    Traumatic hematoma of right knee S80. 01XA    Prosthetic joint infection ([address] T84.50XA    Seizure disorder ([address] G40.909    Fall against object W18.00XA    Sepsis ([address] A41.9    Pressure ulcer of ischium, stage 3, left (HCC) L89.323    Decreased mobility and endurance Z74.09    [place] (hard of hearing) H91.90    History of short term memory loss Z87.898    Non-compliance with treatment Z91.19    Chronic renal disease, stage III ([facility] [[id number]] N18.30    Infection of prosthetic right knee joint (HCC) T84.53XA    Pseudomonas infection A49.8    Elevated C-reactive protein (CRP) R79.82    Elevated sed rate R70.0    Osteoarthritis M19.90    PRINCE (obstructive sleep apnea) G47.33    Dementia without behavioral disturbance ([facility] F03.90       Please note that this chart was generated using Dragon dictation software. Although every effort was made to ensure the accuracy of this automated transcription, some errors in transcription may have occurred inadvertently. If you may need any clarification, please do not hesitate to contact me through EPIC or at the phone number provided below with my electronic signature. Any pictures or media included in this note were obtained after taking informed verbal consent from the patient and with their approval to include those in the patient's medical record.       Sara Rodriguez MD, MPH, FACP, [facility]  6/20/2022, 3:58 PM  [facility] Infectious Disease   [address] [place] [address] E Anastasiya Rodriguez, [address] [place] [address]  Office: [phone]  Fax: [phone]  Clinic days:  Tuesday & Thursday

## 2022-06-20 NOTE — PROGRESS NOTES
[facility] [address]   Phone: [phone]    Physical Therapy    [] Initial Evaluation            [x] Daily Treatment Note         [] Discharge Summary      Patient: Sathya Armstrong   : 1935   MRN: [id number]   Date of Service:  2022  Admitting Diagnosis: Infection of prosthetic right knee joint [facility])  Current Admission Summary: Patient is an 81 yo female with hx CAD/stent (), HTN, seizure disorder, hypothyroid, right TKA (2018). She has been experiencing increased pain and swelling in right knee. PCP aspirated knee  and culture grew pseudomonas. She completed antibiotic course. She presented to [facility] with worsening pain, swelling and drainage from right knee. : Right total knee arthroplasty with polyethylene liner exchange  Past Medical History:  has a past medical history of CAD (coronary artery disease), DDD (degenerative disc disease), lumbosacral, Decreased mobility and endurance, Hearing aid worn, History of short term memory loss, [place]hard of hearing[place] (hard of hearing), Hyperlipidemia, Hypertension, MI, old, Neuropathy, Non-compliance with treatment, Pressure ulcer of ischium, stage 3, left (HCC), Pressure ulcer of left buttock, stage 3 ([address] Sleep apnea, Thyroid disease, and Wears glasses. Past Surgical History:  has a past surgical history that includes Cataract removal (2010 and 2010); Bunionectomy (10/2003); Dilation and curettage of uterus; Appendectomy (); Ovary removal (); Tonsillectomy and adenoidectomy (); Breast lumpectomy (); Incontinence surgery; Colonoscopy (2014); Coronary angioplasty with stent (2017); back surgery ([id number]); joint replacement (Right, 2018); Abscess Drainage (Right, 2018); and Revision total knee arthroplasty (Right, 2022). Discharge Recommendations: Sathya Armstrong scored a 16/24 on the AM-PAC short mobility form.  Current research shows that an AM-PAC score of 17 or less is typically not associated with a discharge to the patient's home setting. Based on the patient's AM-PAC score and their current functional mobility deficits, it is recommended that the patient have 3-5 sessions per week of Physical Therapy at d/c to increase the patient's independence. Please see assessment section for further patient specific details. If patient discharges prior to next session this note will serve as a discharge summary. Please see below for the latest assessment towards goals. DME Required For Discharge: no DME required at discharge  Precautions/Restrictions: high fall risk, weight bearing  Weight Bearing Restrictions: weight bearing as tolerated    [x] Right Lower Extremity   Required Braces/Orthotics: no braces required  Positional Restrictions:no positional restrictions    Pre-Admission Information   Patient was poor historian during interview process     Lives With: alone                     Type of Home: condo  Home Layout: one level  Home Access: . Comment: patient reports she can not recall  Bathroom Layout: walk in shower  Toilet Height: elevated height  Bathroom Equipment: . Comment: stands to shower she can not recall if she has a seat  Home Equipment: standard walker, . Comment:  patient is unable to recall weather he walker has wheels or no wheels  Transfer Assistance: Independent without use of device  Ambulation Assistance:Independent without use of device, modified independent with use of walker  ADL Assistance: independent with all ADL's  IADL Assistance: independent with homemaking tasks sister lives next door  Active : [x]?  yes             []?no  Current Employment: unemployed  Hobbies: watch tv   Recent Falls: fall at the park slipped and fell on wet ground      Subjective  General: Denied pain at rest. Reported pain in R knee with all mobility, but denied need to alert nursing for pain interventions  Pain: 0/10  Pain Interventions: RN notified, declined ice pack       Functional Mobility  Bed Mobility  Supine to Sit: stand by assistance  Scooting: stand by assistance  Comments: HOB elevated with use of rail  Transfers  Sit to stand transfer: contact guard assistance, minimal assistance  Stand to sit transfer: contact guard assistance  Bed to chair transfer: contact guard assistance  Comments: Sit>stand from EOB with Min A, from recliner with CGA on 1st and 3rd trial but Min A on 2nd trial, from toilet w/ BSC with CGA. Stand>sit with CGA for all trials. Stand step transfer with RW and CGA. Ambulation  Surface:level surface  Assistive Device: rolling walker  Assistance: contact guard assistance  Distance: 2 + 32 + 8 + 2 + 2 + 8 ft  Gait Mechanics: Step-to pattern initially progressing to step through, decreased stance time (R) LE, antalgic pattern  Comments:  Pt with improved gait mechanics with further distance. Pt moves quickly and a bit impulsively with decreased safety awareness  Stair Mobility  Stair mobility not completed on this date. Comments:  Balance  Static Sitting Balance: fair (+): maintains balance at SBA/supervision without use of UE support  Dynamic Sitting Balance: fair (+): maintains balance at SBA/supervision without use of UE support  Static Standing Balance: fair (-): maintains balance at CGA with use of UE support  Dynamic Standing Balance: fair (-): maintains balance at CGA with use of UE support  Comments: SBA for sitting balance due to pt's impulsivity and decreased safety awareness. Pt impulsively reached for object on floor while standing with unilateral UE support on RW, CGA. CGA for standing balance at sink x8 min minutes with frequent unilateral UE support. Other Therapeutic Interventions  See OT note for ADL task and toileting (pt voided bladder), RN updated.    Seated exercises: (R) heel slides x10 reps, (R) AAROM LAQ x10 reps, (L) LAQ x10 reps, marches x10 (B) reps, heel/toe rocks x10 reps    Functional Outcomes  AM-PAC Inpatient Mobility Raw Score : 16              Cognition  Overall Cognitive Status: Impaired  Arousal/Alterness: appropriate responses to stimuli  Following Commands: follows one step commands with repetition  Attention Span: attends with cues to redirect  Memory: decreased short term memory  Safety Judgement: decreased awareness of need for assistance, decreased awareness of need for safety  Problem Solving: assistance required to generate solutions, assistance required to implement solutions  Insights: decreased awareness of deficits  Initiation: requires cues for some  Comments: Pt very inconsistent in reporting home information this date. Pt thinks it is Saturday despite multiple attempts to reorient in quick succession. Orientation:    alert and oriented x 4  Command Following:   accurately follows one step commands    Education  Barriers To Learning: cognition  Patient Education: patient educated on goals, PT role and benefits, plan of care, precautions, weight-bearing education, HEP, general safety, transfer training, discharge recommendations  Learning Assessment:  patient verbalizes understanding, would benefit from continued reinforcement    Assessment  Activity Tolerance: Pain with transitional movements and weight bearing, but improves immediately upon resting. Impairments Requiring Therapeutic Intervention: decreased functional mobility, decreased ROM, decreased strength, decreased safety awareness, decreased cognition, decreased endurance, decreased balance, increased pain  Prognosis: good  Clinical Assessment: Pt demonstrated improved tolerance to transfers and ambulation this date. She requires Min A initially for sit>stand transfer but progresses well within session. The patient demonstrates limited (R) quad strength and would benefit from additional strengthening.  The patient is from home alone and is unsafe to return to do decreased safety awareness and impaired independence

## 2022-06-20 NOTE — CARE COORDINATION
Discharge Planning. The SW received a call from  Marleny Villalta [phone] the admission coordinator at [facility] who stated they can ACCEPT the patient. Pre-cert has been started.      Electronically signed by LUIS E Castaneda on 6/20/2022 at 1:57 PM

## 2022-06-21 LAB
ANION GAP SERPL CALCULATED.3IONS-SCNC: 9 MMOL/L (ref 3–16)
BUN BLDV-MCNC: 31 MG/DL (ref 7–20)
CALCIUM SERPL-MCNC: 9.3 MG/DL (ref 8.3–10.6)
CHLORIDE BLD-SCNC: 111 MMOL/L (ref 99–110)
CO2: 21 MMOL/L (ref 21–32)
CREAT SERPL-MCNC: 0.9 MG/DL (ref 0.6–1.2)
GFR AFRICAN AMERICAN: >60
GFR NON-AFRICAN AMERICAN: 60
GLUCOSE BLD-MCNC: 97 MG/DL (ref 70–99)
HCT VFR BLD CALC: 29 % (ref 36–48)
HEMOGLOBIN: 9.4 G/DL (ref 12–16)
MCH RBC QN AUTO: 26.6 PG (ref 26–34)
MCHC RBC AUTO-ENTMCNC: 32.5 G/DL (ref 31–36)
MCV RBC AUTO: 81.8 FL (ref 80–100)
PDW BLD-RTO: 16.2 % (ref 12.4–15.4)
PLATELET # BLD: 251 K/UL (ref 135–450)
PMV BLD AUTO: 8.3 FL (ref 5–10.5)
POTASSIUM SERPL-SCNC: 4 MMOL/L (ref 3.5–5.1)
RBC # BLD: 3.55 M/UL (ref 4–5.2)
SODIUM BLD-SCNC: 141 MMOL/L (ref 136–145)
WBC # BLD: 6.1 K/UL (ref 4–11)

## 2022-06-21 PROCEDURE — 2580000003 HC RX 258: Performed by: ORTHOPAEDIC SURGERY

## 2022-06-21 PROCEDURE — 6370000000 HC RX 637 (ALT 250 FOR IP): Performed by: NURSE PRACTITIONER

## 2022-06-21 PROCEDURE — 1200000000 HC SEMI PRIVATE

## 2022-06-21 PROCEDURE — 6370000000 HC RX 637 (ALT 250 FOR IP): Performed by: ORTHOPAEDIC SURGERY

## 2022-06-21 PROCEDURE — 6360000002 HC RX W HCPCS: Performed by: ORTHOPAEDIC SURGERY

## 2022-06-21 PROCEDURE — APPNB45 APP NON BILLABLE 31-45 MINUTES: Performed by: NURSE PRACTITIONER

## 2022-06-21 PROCEDURE — 2580000003 HC RX 258: Performed by: NURSE PRACTITIONER

## 2022-06-21 PROCEDURE — 85027 COMPLETE CBC AUTOMATED: CPT

## 2022-06-21 PROCEDURE — 80048 BASIC METABOLIC PNL TOTAL CA: CPT

## 2022-06-21 PROCEDURE — 99024 POSTOP FOLLOW-UP VISIT: CPT | Performed by: NURSE PRACTITIONER

## 2022-06-21 PROCEDURE — 97530 THERAPEUTIC ACTIVITIES: CPT

## 2022-06-21 PROCEDURE — 36415 COLL VENOUS BLD VENIPUNCTURE: CPT

## 2022-06-21 PROCEDURE — 97535 SELF CARE MNGMENT TRAINING: CPT

## 2022-06-21 RX ORDER — DOCUSATE SODIUM 100 MG/1
100 CAPSULE, LIQUID FILLED ORAL 2 TIMES DAILY
Status: DISCONTINUED | OUTPATIENT
Start: 2022-06-21 | End: 2022-06-22 | Stop reason: HOSPADM

## 2022-06-21 RX ORDER — LIDOCAINE 4 G/G
1 PATCH TOPICAL DAILY
Qty: 3 PATCH | Refills: 0
Start: 2022-06-21

## 2022-06-21 RX ORDER — POLYETHYLENE GLYCOL 3350 17 G/17G
17 POWDER, FOR SOLUTION ORAL DAILY
Qty: 527 G | Refills: 1
Start: 2022-06-22 | End: 2022-07-22

## 2022-06-21 RX ORDER — POLYETHYLENE GLYCOL 3350 17 G/17G
17 POWDER, FOR SOLUTION ORAL DAILY
Status: DISCONTINUED | OUTPATIENT
Start: 2022-06-21 | End: 2022-06-22 | Stop reason: HOSPADM

## 2022-06-21 RX ORDER — LEVETIRACETAM 500 MG/1
500 TABLET ORAL 2 TIMES DAILY
Status: DISCONTINUED | OUTPATIENT
Start: 2022-06-21 | End: 2022-06-22 | Stop reason: HOSPADM

## 2022-06-21 RX ORDER — DOCUSATE SODIUM 100 MG/1
100 CAPSULE, LIQUID FILLED ORAL 2 TIMES DAILY
Qty: 60 CAPSULE | Refills: 0
Start: 2022-06-21

## 2022-06-21 RX ORDER — LIDOCAINE 4 G/G
1 PATCH TOPICAL DAILY
Status: DISCONTINUED | OUTPATIENT
Start: 2022-06-21 | End: 2022-06-22 | Stop reason: HOSPADM

## 2022-06-21 RX ADMIN — LEVETIRACETAM 500 MG: 500 TABLET, FILM COATED ORAL at 14:32

## 2022-06-21 RX ADMIN — ACETAMINOPHEN 1000 MG: 500 TABLET ORAL at 14:33

## 2022-06-21 RX ADMIN — ACETAMINOPHEN 1000 MG: 500 TABLET ORAL at 08:55

## 2022-06-21 RX ADMIN — POLYETHYLENE GLYCOL 3350 17 G: 17 POWDER, FOR SOLUTION ORAL at 12:28

## 2022-06-21 RX ADMIN — DIPHENHYDRAMINE HCL 25 MG: 25 TABLET ORAL at 22:06

## 2022-06-21 RX ADMIN — ENOXAPARIN SODIUM 40 MG: 100 INJECTION SUBCUTANEOUS at 08:55

## 2022-06-21 RX ADMIN — DOCUSATE SODIUM 100 MG: 100 CAPSULE, LIQUID FILLED ORAL at 21:42

## 2022-06-21 RX ADMIN — DOCUSATE SODIUM 100 MG: 100 CAPSULE, LIQUID FILLED ORAL at 12:27

## 2022-06-21 RX ADMIN — LISINOPRIL 20 MG: 20 TABLET ORAL at 08:56

## 2022-06-21 RX ADMIN — SODIUM CHLORIDE, PRESERVATIVE FREE 10 ML: 5 INJECTION INTRAVENOUS at 21:46

## 2022-06-21 RX ADMIN — DAPTOMYCIN 350 MG: 500 INJECTION, POWDER, LYOPHILIZED, FOR SOLUTION INTRAVENOUS at 22:56

## 2022-06-21 RX ADMIN — CEFEPIME HYDROCHLORIDE 2000 MG: 2 INJECTION, POWDER, FOR SOLUTION INTRAVENOUS at 00:29

## 2022-06-21 RX ADMIN — TRAMADOL HYDROCHLORIDE 50 MG: 50 TABLET, COATED ORAL at 05:37

## 2022-06-21 RX ADMIN — ACETAMINOPHEN 1000 MG: 500 TABLET ORAL at 21:45

## 2022-06-21 RX ADMIN — ASPIRIN 81 MG: 81 TABLET, CHEWABLE ORAL at 08:55

## 2022-06-21 RX ADMIN — LEVETIRACETAM 500 MG: 500 TABLET, FILM COATED ORAL at 21:41

## 2022-06-21 RX ADMIN — LEVOTHYROXINE, LIOTHYRONINE 30 MG: 19; 4.5 TABLET ORAL at 08:56

## 2022-06-21 RX ADMIN — SODIUM CHLORIDE, PRESERVATIVE FREE 10 ML: 5 INJECTION INTRAVENOUS at 08:56

## 2022-06-21 ASSESSMENT — ENCOUNTER SYMPTOMS
NAUSEA: 0
ABDOMINAL PAIN: 0
COLOR CHANGE: 0
EYE REDNESS: 0
RHINORRHEA: 0
CHOKING: 0
CHEST TIGHTNESS: 0
COUGH: 0
PHOTOPHOBIA: 0
DIARRHEA: 0
TROUBLE SWALLOWING: 0
SHORTNESS OF BREATH: 0
APNEA: 0
EYE DISCHARGE: 0
STRIDOR: 0
FACIAL SWELLING: 0
BLOOD IN STOOL: 0

## 2022-06-21 ASSESSMENT — PAIN SCALES - GENERAL
PAINLEVEL_OUTOF10: 0
PAINLEVEL_OUTOF10: 0
PAINLEVEL_OUTOF10: 5

## 2022-06-21 ASSESSMENT — PAIN DESCRIPTION - ORIENTATION: ORIENTATION: RIGHT

## 2022-06-21 ASSESSMENT — PAIN DESCRIPTION - LOCATION: LOCATION: KNEE

## 2022-06-21 NOTE — PLAN OF CARE
Problem: Discharge Planning  Goal: Discharge to home or other facility with appropriate resources  6/21/2022 0835 by Iris Howard RN  Outcome: Progressing  6/21/2022 0112 by Joyce Nuñez RN  Outcome: Progressing     Problem: Pain  Goal: Verbalizes/displays adequate comfort level or baseline comfort level  6/21/2022 0835 by Iris Howard RN  Outcome: Progressing  6/21/2022 0112 by Joyce Nuñez RN  Outcome: Progressing     Problem: Safety - Adult  Goal: Free from fall injury  6/21/2022 0835 by Iris Howard RN  Outcome: Progressing  6/21/2022 0112 by Joyce Nuñez RN  Outcome: Progressing     Problem: ABCDS Injury Assessment  Goal: Absence of physical injury  6/21/2022 0835 by Iris Howard RN  Outcome: Progressing  6/21/2022 0112 by Joyce Nuñez RN  Outcome: Progressing     Problem: Nutrition Deficit:  Goal: Optimize nutritional status  6/21/2022 0835 by Iris Howard RN  Outcome: Progressing  6/21/2022 0112 by Joyce Nuñez RN  Outcome: Progressing

## 2022-06-21 NOTE — PROGRESS NOTES
Order for PICC line per [facility] NP. Pre procedure and timeout done with pt's RN. Successful insertion of a single lumen PICC line into pt's left basilic vein. No issues gaining access or advancing guidewire/introducer/PICC line. PICC tip terminates in the SVC according to [employer] 3CG tip confirmation system. PICC was seen dropping into SVC with tip tracking technology and discernable peaked p waves were noted without negative deflection. A printout will be in pt's chart. PICC is cut at 42cm and out externally 0 cm. Single lumen flushes without resistance and draw back brisk blood return. PICC site CDI with hemostasis maintained and a biopatch applied to site. Pt instructed to stay in bed and keep arm flat and still for 30 minutes  to promote hemostasis.      Megan De Luna RN given handoff report

## 2022-06-21 NOTE — PROGRESS NOTES
[facility]    6/20/2022 1:00 PM        Name: Carlos Plascencia . Admitted: 6/16/2022  Primary Care Provider: Kim Bañuelos DO (Tel: [phone])      Chief complaint: Pain, swelling, drainage from right knee. Brief History: Patient is an 83 yo female with hx CAD/stent (2017), HTN, seizure disorder, hypothyroid, right TKA (2018). She has been experiencing increased pain and swelling in right knee. PCP aspirated knee 4/18 and culture grew pseudomonas. She completed antibiotic course. She presented to [facility] with worsening pain, swelling and drainage from right knee. She was transferred to [facility] for further management. She has been started on vancomycin and Zosyn. 6/18/2022: RIGHT TOTAL KNEE INCISION AND DRAINAGE WITH LINER EXCHANGE       Subjective:  Limited historian. Patient seen this afternoon. Presently up in bedside chair, sister and niece visiting. Offers no complaints, operative pain adequately controlled.      Reviewed interval ancillary notes    Current Medications  lidocaine PF 1 % injection 5 mL, Once  sodium chloride flush 0.9 % injection 5-40 mL, 2 times per day  0.9 % sodium chloride infusion, PRN  diphenhydrAMINE (BENADRYL) tablet 25 mg, Q8H PRN  0.45 % sodium chloride infusion, Continuous  0.9 % sodium chloride infusion, PRN  HYDROcodone-acetaminophen (NORCO) 5-325 MG per tablet 1 tablet, Q4H PRN  lisinopril (PRINIVIL;ZESTRIL) tablet 20 mg, Daily  iopamidol (ISOVUE-370) 76 % injection 50 mL, ONCE PRN  DAPTOmycin (CUBICIN) 350 mg in sodium chloride 0.9 % 50 mL IVPB, Q24H  cefepime (MAXIPIME) 2000 mg IVPB minibag, Q12H  enoxaparin (LOVENOX) injection 40 mg, Daily  acetaminophen (TYLENOL) tablet 1,000 mg, TID  morphine (PF) injection 2 mg, Q4H PRN  traMADol (ULTRAM) tablet 50 mg, Q4H PRN  aspirin chewable tablet 81 mg, Daily  thyroid (ARMOUR) tablet 30 mg, Daily  sodium chloride flush 0.9 % injection 5-40 mL, PRN  ondansetron (ZOFRAN-ODT) disintegrating tablet 4 mg, Q8H PRN   Or  ondansetron (ZOFRAN) injection 4 mg, Q6H PRN  polyethylene glycol (GLYCOLAX) packet 17 g, Daily PRN  ibuprofen (ADVIL;MOTRIN) tablet 400 mg, Q6H PRN        Objective:  /73   Pulse 84   Temp 97.8 °F (36.6 °C) (Oral)   Resp 15   Ht 5' 5\" (1.651 m)   Wt 136 lb 12.8 oz (62.1 kg)   SpO2 99%   BMI 22.76 kg/m²     Intake/Output Summary (Last 24 hours) at 6/20/2022 2011  Last data filed at 6/20/2022 0600  Gross per 24 hour   Intake --   Output 1450 ml   Net -1450 ml      Wt Readings from Last 3 Encounters:   06/17/22 136 lb 12.8 oz (62.1 kg)   06/15/22 148 lb (67.1 kg)   04/13/22 157 lb (71.2 kg)     General:  Awake, alert, oriented in NAD  Skin:  Warm and dry. No unusual bruising or rash  Neck:  Supple. No JVD appreciated  Chest:  Normal effort. Clear to auscultation, no wheezes/rhonchi/rales  Cardiovascular:  RRR, normal S1/S2, no murmur/gallop/rub  Abdomen:  Soft, nontender, +bowel sounds  Extremities:  + edema right knee, operative dressing c/d/i, sensation intact  Neurological: No focal deficits  Psychological: Normal mood and affect      Labs and Tests:  CBC:   Recent Labs     06/19/22  0445 06/20/22  0423   WBC 7.1 7.5   HGB 11.2* 10.6*    260     BMP:    Recent Labs     06/19/22  0445 06/20/22  0423   * 136   K 4.0 4.2    108   CO2 17* 19*   BUN 27* 33*   CREATININE 0.8 0.9   GLUCOSE 191* 144*     Hepatic:   No results for input(s): AST, ALT, ALB, BILITOT, ALKPHOS in the last 72 hours. Xray Right Knee 6/17/2022:  No acute finding of the right knee. Xray Right Knee 6/18/2022: Total knee arthroplasty, with hardware in appropriate alignment.  No   periprosthetic fracture or acute abnormality is identified. Xray Right Knee 6/18/2022:  Status post knee replacement, in normal alignment.       No acute fracture.       Immediate postoperative changes.          Problem List  Principal Problem:    Infection of prosthetic right knee joint (HCC)  Active Problems:    Pseudomonas infection    Elevated C-reactive protein (CRP)    Elevated sed rate    Osteoarthritis    PRINCE (obstructive sleep apnea)    Dementia without behavioral disturbance (HCC)    Receiving intravenous antibiotic treatment as outpatient    Complex care coordination    Pain and swelling of right knee    Essential hypertension    Seizure disorder ([address]    [place] (hard of hearing)  Resolved Problems:    * No resolved hospital problems. *       Assessment & Plan:   1. Right knee prosthetic joint infection. Recent aspirate from right knee (4/2022) grew Pseudomonas and she received oral ciprofloxacin. Presented with increased pain, swelling and drainage from right knee. Started on empiric antibiotics. , sed rate 58. S/p right knee I&D with liner exchange 6/18, surgery cultures + MRSA. Antibiotics per ID, currently on cefepime and daptomycin (allergy to Linezolid and ANTONIO with vancomycin). CBC in am. PICC placed 6/19. Await ID final ATB recs. 2. Decubitus wound left buttock. Patient sedentary, sits most of the day. Followed in [facility]. Evaluated by wound care nurse and dressing changed. 3. HTN. Reasonably controlled. Continue Lisinopril. Continue to follow. BMP in am.    4. CAD. Hx STEMI 1/2017, CATRACHITA placed to mid RCA. Denies chest pain. Continue asa and statin. 5. Hx seizure. Continue Keppra. 6. Hypothyroidism. TSH 4.06 (8/2021). Continue thyroid tablet. Disposition: PT/OT evaluated and recommend SNF on DC. Case management assisting, referral placed to [facility]. Will need precert. Diet: ADULT DIET;  Regular  Code:Full Code  DVT PPX: enoxaparin      MICHELLE Guerra - CNP   6/20/2022 8:11 PM

## 2022-06-21 NOTE — PROGRESS NOTES
[facility],[address] Msb Department   Phone: [phone]    Occupational Therapy    [] Initial Evaluation            [x] Daily Treatment Note         [] Discharge Summary      Patient: Tapan Jung   : 1935   MRN: [id number]   Date of Service:  2022    Admitting Diagnosis:  Infection of prosthetic right knee joint [facility])  Current Admission Summary: Brief History: Patient is an 81 yo female with hx CAD/stent (), HTN, seizure disorder, hypothyroid, right TKA (2018). She has been experiencing increased pain and swelling in right knee. PCP aspirated knee  and culture grew pseudomonas. She completed antibiotic course. She presented to [facility] with worsening pain, swelling and drainage from right knee. She was transferred to [facility] for further management. She has been started on vancomycin and Zosyn. Past Medical History:  has a past medical history of CAD (coronary artery disease), DDD (degenerative disc disease), lumbosacral, Decreased mobility and endurance, Hearing aid worn, History of short term memory loss, Elem (hard of hearing), Elem (hard of hearing), Hyperlipidemia, Hypertension, MI, old, Neuropathy, Non-compliance with treatment, Pressure ulcer of ischium, stage 3, left (HCC), Pressure ulcer of left buttock, stage 3 ([address] Sleep apnea, Thyroid disease, and Wears glasses. Past Surgical History:  has a past surgical history that includes Cataract removal (2010 and 2010); Bunionectomy (10/2003); Dilation and curettage of uterus; Appendectomy (); Ovary removal (); Tonsillectomy and adenoidectomy (); Breast lumpectomy (); Incontinence surgery; Colonoscopy (2014); Coronary angioplasty with stent (2017); back surgery ([id number]); joint replacement (Right, 2018); Abscess Drainage (Right, 2018); and Revision total knee arthroplasty (Right, 2022).     Discharge Recommendations:  Tapan Jung scored a 16/24 on the AM-PAC ADL Inpatient form. Current research shows that an AM-PAC score of 17 or less is typically not associated with a discharge to the patient's home setting. Based on the patient's AM-PAC score and their current ADL deficits, it is recommended that the patient have 3-5 sessions per week of Occupational Therapy at d/c to increase the patient's independence. Please see assessment section for further patient specific details. If patient discharges prior to next session this note will serve as a discharge summary. Please see below for the latest assessment towards goals. DME Required For Discharge: no DME required at discharge    Precautions/Restrictions: high fall risk  Weight Bearing Restrictions: weight bearing as tolerated    [] Right Upper Extremity  [] Left Upper Extremity [x] Right Lower Extremity  [] Left Lower Extremity     Required Braces/Orthotics: no braces required   [] Right  [] Left  Positional Restrictions:no positional restrictions      Pre-Admission Information Patient was poor historian during interview process    Lives With: alone    Type of Home: condo  Home Layout: one level  Home Access: . Comment: patient reports she can not recall  Bathroom Layout: walk in shower  Toilet Height: elevated height  Bathroom Equipment: . Comment: stands to shower she can not recall if she has a seat  Home Equipment: standard walker, .   Comment:  patient is unable to recall weather he walker has wheels or no wheels  Transfer Assistance: Independent without use of device  Ambulation Assistance:Independent without use of device, modified independent with use of walker  ADL Assistance: independent with all ADL's  IADL Assistance: independent with homemaking tasks sister lives next door  Active : [x] yes  []no  Current Employment: unemployed  Hobbies: watch tv   Recent Falls: fall at the park slipped and fell on wet ground      Examination     Vision:   Vision Gross Assessment: WFL cheaters for reading  Hearing:   hard of hearing has hearing aids at home  Perception:   WFL  Observation:   Incision/Scar:  right knee  Posture:    rounded shoulders  Sensation:   WFL  Proprioception:    WFL  Tone:   Normotonic  Coordination Testing:   WFL    ROM:   (B) UE AROM WFL  Strength:   (B) UE strength grossly WFL    Decision Making: medium complexity  Clinical Presentation: stable      Subjective- Patient supine in bed upon entry with HOB raised, pt sleeping with breakfast in front of her and not eaten. Pt awakened. Pt agreeable to therapy session. Pt pleasantly confused, pt asking multiple times   General: Pt supine to sit Min A to assist with RLE, with HOB raised. Pt sit EOB SBA. Pt sit to stand CGA/Min A, pt ambulated to toilet ~10ft CGA with rw. Pt toilet transfer CGA and toileted CGA. Pt donned new brief, extended time with vcs to initiate. Pt used grab bar. Pt ambulated ~2ft to sink and washed hands CGA. Pt fatigued and needing to sit. Pt stand to sit CGA on BSC at sink. Pt seated washed UB, completed oral care and combed/styled hair. Pt also donned new gown. Pt sit to stand CGA. Pt ambulated ~8ft to recliner with rw at [facility]. Pt stand to sit CGA. Call light in reach and chair alarm on. Pain: pt didn't rate pain in right knee, said she had pain when she moves  Pain Interventions: RN notified           Activities of Daily Living    Basic Activities of Daily Living  Grooming: contact guard assistance  Upper Extremity Bathing: setup assistance stand by assistance. Equipment: none  Lower Extremity Dressing: contact guard assistance. Equipment: none  Toileting: contact guard assistance. Equipment: none  Instrumental Activities of Daily Living  No IADL completed on this date.     Functional Mobility    Bed Mobility  Supine to Sit: minimal assistance  Comments: HOB raised and use of grab bar  Transfers  Sit to stand transfer:contact guard assistance, minimal assistance  Stand to sit transfer: contact guard assistance  Toilet transfer: contact guard assistance. Mobility technique: ambulating. Equipment utilized: raised toilet seat with rails    Functional Mobility:  Sitting Balance: stand by assistance. Standing Balance: contact guard assistance. Functional Mobility: rolling walker. contact guard assistance. Activity: to/from bathroom    Other Therapeutic Interventions      Functional Outcomes  AM-PAC Inpatient Daily Activity Raw Score: 16      Cognition  Overall Cognitive Status: Impaired  Arousal/Alterness: appropriate responses to stimuli, delayed responses to stimuli  Following Commands: follows one step commands with repetition, follows one step commands with increased time  Attention Span: attends with cues to redirect  Memory: decreased recall of biographical information, decreased recall of recent events  Problem Solving: decreased awareness of errors, assistance required to identify errors made  Insights: decreased awareness of deficits  Initiation: requires cues for some  Sequencing: requires cues for some  Orientation:    oriented to person, oriented to place, oriented to time and disoriented to situation  Command Following:   [facility]     Education    Barriers To Learning: cognition  Patient Education: patient educated on OT role and benefits, plan of care, weight-bearing education, energy conservation, orientation, transfer training, discharge recommendations  Learning Assessment:  patient will require reinforcement due to cognitive deficits    Assessment    Activity Tolerance:  Pt tolerated therapy fair, limited by fatigue and pain  Impairments Requiring Therapeutic Intervention: decreased functional mobility, decreased ADL status, decreased strength, decreased safety awareness, decreased cognition, decreased sensation, decreased balance, decreased IADL, decreased fine motor control  Prognosis: good  Clinical Assessment: Patient CGA for functional mobility and CGA/Min A for transfers.  Pt CGA for toileting. Pt pleasantly confused, asking multiple times \"Where is my mother? \" Pt lives along (pt unsure if she lives on the first or second floor) and will require in patient therapy prior to discharge to home due to new onset of TKR revision. Continue with POC. Safety Interventions: patient left in chair, chair alarm in place, call light within reach, patient at risk for falls and telesitter in use       Plan    Frequency: 7 x/week  Current Treatment Recommendations: functional mobility training, transfer training, ADL/self-care training and IADL training    Goals    Patient Goals: Patient's goal is to return home with assist of sister after completing rehabilitation to improve independence with self care and mobility     Short Term Goals:  Time Frame: discharge  Patient will complete upper body ADL at supervision - not addressed 6/20, Setup/SBA 6/21  Patient will complete lower body ADL at minimal assistance - not addressed 6/20, CGA brief 6/21  Patient will complete toileting at minimal assistance - goal met 6/20  Patient will complete grooming at stand by assistance - CGA goal not met 6/20  Patient will complete functional transfers at minimal assistance - goal met 6/20  Patient will complete functional mobility at contact guard assistance - goal met 6/20  Patient will complete bed mobility at contact guard assistance - goal met supine to sit continue goal 6/20, Min A 6/21  Patient to maintain standing at contact guard assistance for 10 minutes.  - 8 min continue goal 6/20, ~2 min 6/21       Therapy Session Time     Individual Group Co-treatment   Time In   1011   Time Out   1018   Minutes   42        Timed Code Treatment Minutes:  Timed Code Treatment Minutes: 42 Minutes    Total Treatment Minutes:  42    Electronically Signed By. DALLAS Thakkar/BETINA [id number]

## 2022-06-21 NOTE — PROGRESS NOTES
Patient yesterday had some increased redness on the back and the buttocks after receiving cefepime infusion  Unclear if it is truly an allergic reaction  Patient was receiving a PICC line hence could not visualize the rash  At this point have discontinued cefepime change over to meropenem and primary team can review

## 2022-06-21 NOTE — PROGRESS NOTES
Shift assessment completed, patient is alert to person and self, but disoriented to place, situation, and time. VSS. Follow commands. Dressing to right knee, clean, dry and intact, pressure in the coccyx area, meplilex in place, purwick in place. Fall precautions in place , bed alarm on, bed in lowest position, call light in reach. IBenadryl given for itching, and Tramadol for pain. Patient is quiet and sleeping in the room  Patient unable to mutually agree with the care plan.

## 2022-06-21 NOTE — PROGRESS NOTES
[facility]   Phone: [phone]    Physical Therapy    [] Initial Evaluation            [x] Daily Treatment Note         [] Discharge Summary      Patient: Miriam Gonsales   : 1935   MRN: [id number]   Date of Service:  2022  Admitting Diagnosis: Infection of prosthetic right knee joint [facility])  Current Admission Summary: Patient is an 83 yo female with hx CAD/stent (), HTN, seizure disorder, hypothyroid, right TKA (2018). She has been experiencing increased pain and swelling in right knee. PCP aspirated knee  and culture grew pseudomonas. She completed antibiotic course. She presented to [facility] with worsening pain, swelling and drainage from right knee. : Right total knee arthroplasty with polyethylene liner exchange  Past Medical History:  has a past medical history of CAD (coronary artery disease), DDD (degenerative disc disease), lumbosacral, Decreased mobility and endurance, Hearing aid worn, History of short term memory loss, [place] (hard of hearing), [place] (hard of hearing), Hyperlipidemia, Hypertension, MI, old, Neuropathy, Non-compliance with treatment, Pressure ulcer of ischium, stage 3, left (HCC), Pressure ulcer of left buttock, stage 3 ([address] Sleep apnea, Thyroid disease, and Wears glasses. Past Surgical History:  has a past surgical history that includes Cataract removal (2010 and 2010); Bunionectomy (10/2003); Dilation and curettage of uterus; Appendectomy (); Ovary removal (); Tonsillectomy and adenoidectomy (); Breast lumpectomy (); Incontinence surgery; Colonoscopy (2014); Coronary angioplasty with stent (2017); back surgery ([id number]); joint replacement (Right, 2018); Abscess Drainage (Right, 2018); and Revision total knee arthroplasty (Right, 2022). Discharge Recommendations: Miriam Gonsales scored a 15/24 on the AM-PAC short mobility form.  Current research shows that an AM-PAC score of 17 or less is typically not associated with a discharge to the patient's home setting. Based on the patient's AM-PAC score and their current functional mobility deficits, it is recommended that the patient have 3-5 sessions per week of Physical Therapy at d/c to increase the patient's independence. Please see assessment section for further patient specific details. If patient discharges prior to next session this note will serve as a discharge summary. Please see below for the latest assessment towards goals. DME Required For Discharge: no DME required at discharge  Precautions/Restrictions: high fall risk, weight bearing  Weight Bearing Restrictions: weight bearing as tolerated    [x] Right Lower Extremity   Required Braces/Orthotics: no braces required  Positional Restrictions:no positional restrictions    Pre-Admission Information   Patient was poor historian during interview process     Lives With: alone                     Type of Home: condo  Home Layout: one level  Home Access: . Comment: patient reports she can not recall  Bathroom Layout: walk in shower  Toilet Height: elevated height  Bathroom Equipment: . Comment: stands to shower she can not recall if she has a seat  Home Equipment: standard walker, . Comment:  patient is unable to recall weather he walker has wheels or no wheels  Transfer Assistance: Independent without use of device  Ambulation Assistance:Independent without use of device, modified independent with use of walker  ADL Assistance: independent with all ADL's  IADL Assistance: independent with homemaking tasks sister lives next door  Active : [x]? yes             []?no  Current Employment: unemployed  Hobbies: watch tv   Recent Falls: fall at the park slipped and fell on wet ground      Subjective  General: Pt reclined in recliner upon arrival, sleeping. Pt awoken and required encouragement for participation.  Pt states \"I just want to sleep\" however agreeable to transferring to bed with PT assist.   Pain: 0/10 (pt states 0/10 at rest however when leg rests of recliner were lowered pt stating 10/10 pain in RLE--RN notified and aware  Pain Interventions: RN notified       Functional Mobility  Bed Mobility  Sit to Supine: moderate assistance  Scooting: stand by assistance  Comments: max VC for sequencing  Transfers  Sit to stand transfer: moderate assistance  Stand to sit transfer: contact guard assistance  Comments: Pt attempted 2 stands from recliner with no success. Required Mod A for full hip clearance. Max VC for safe hand placement when preparing to sit EOB however no carryover noted. Ambulation  Surface:level surface  Assistive Device: rolling walker  Assistance: contact guard assistance  Distance: 3'  Gait Mechanics: Step-to pattern, decreased R stance time, decreased R foot clearance, narrow [place], antalgic pattern  Comments: Pt impulsive with mobility, requiring max VC for safe hand placement and walker negotiation with no carryover noted. Stair Mobility  Stair mobility not completed on this date.   Comments:  Balance  Static Sitting Balance: fair (+): maintains balance at SBA/supervision without use of UE support  Dynamic Sitting Balance: fair (+): maintains balance at SBA/supervision without use of UE support  Static Standing Balance: fair (-): maintains balance at CGA with use of UE support  Dynamic Standing Balance: fair (-): maintains balance at CGA with use of UE support  Comments:    Other Therapeutic Interventions    Functional Outcomes  AM-PAC Inpatient Mobility Raw Score : 15              Cognition  Overall Cognitive Status: Impaired  Arousal/Alterness: appropriate responses to stimuli  Following Commands: follows one step commands with repetition, follows one step commands with increased time  Attention Span: attends with cues to redirect  Memory: decreased short term memory  Safety Judgement: decreased awareness of need for assistance, decreased awareness of need for safety  Problem Solving: assistance required to generate solutions, assistance required to implement solutions  Insights: decreased awareness of deficits  Initiation: requires cues for some  Orientation:    alert and oriented x 4    Education  Barriers To Learning: cognition  Patient Education: patient educated on goals, PT role and benefits, plan of care, precautions, weight-bearing education, HEP, general safety, transfer training, discharge recommendations  Learning Assessment:  patient verbalizes understanding, would benefit from continued reinforcement, patient will require reinforcement due to cognitive deficits    Assessment  Activity Tolerance: limited due to pain  Impairments Requiring Therapeutic Intervention: decreased functional mobility, decreased ROM, decreased strength, decreased safety awareness, decreased cognition, decreased endurance, decreased balance, increased pain  Prognosis: good  Clinical Assessment: Pt requiring increased assist for transfers this date and continues to require max VC/education for safety awareness with limited carryover noted. Pt's activity tolerance and ambulation distance limited by pain this date. Pt is from home alone and is unsafe to return at this time. Pt would benefit from continued skilled PT services to safely progress tolerance to activity and independence with functional mobility.      Safety Interventions: patient left in bed, bed alarm in place, call light within reach, gait belt, patient at risk for falls, telesitter in use and nurse notified    Plan  Frequency: 7 x/week  Current Treatment Recommendations: strengthening, ROM, balance training, functional mobility training, transfer training, gait training, endurance training, home exercise program, safety education and equipment evaluation/education    Goals  Patient Goals: did not state   Short Term Goals:  Time Frame: discharge  Patient will complete bed mobility at minimal assistance - Goal met 6/20, upgrade: indep  Patient will complete transfers at contact guard assistance   Patient will ambulate 25 ft with use of standard walker at contact guard assistance   NO GOALS MET THIS DATE    Therapy Session Time      Individual Group Co-treatment   Time In 1136       Time Out 1159       Minutes 23         Timed Code Treatment Minutes:   23  Total Treatment Minutes:  23       Electronically Signed By: Nguyen Granados, [id number] [facility],[address], [address] [place] [address]

## 2022-06-21 NOTE — DISCHARGE SUMMARY
Hospital Medicine Discharge Summary    Patient ID: Mike Card      Patient's PCP: Arjun Curiel DO    Admit Date: 6/16/2022     Discharge Date:   6/21/2022    Admitting Physician: Rachel Mckeon MD     Discharge Physician: MICHELLE Paez - CNP     Discharge Diagnoses  Right knee prosthetic joint infection, positive MRSA  Decubitus wound left buttocks  Hypertension  CAD  History seizure  Hypothyroidism      Hospital Course: Patient is an 81 yo female with hx CAD/stent (2017), HTN, seizure disorder, hypothyroid, right TKA (2018). She has been experiencing increased pain and swelling in right knee. PCP aspirated knee 4/18 and culture grew pseudomonas. She completed antibiotic course. She presented to [facility] with worsening pain, swelling and drainage from right knee. She was transferred to [facility] for further management. She has been started on vancomycin and Zosyn.       6/18/2022: RIGHT TOTAL KNEE INCISION AND DRAINAGE WITH LINER EXCHANGE     1. Right knee prosthetic joint infection. Recent aspirate from right knee (4/2022) grew Pseudomonas and she received oral ciprofloxacin. Presented with increased pain, swelling and drainage from right knee. Started on empiric antibiotics. , sed rate 58. S/p right knee I&D with liner exchange 6/18, surgery cultures + MRSA, no Pseudomonas noted in surgical culture at this time. Antibiotics per ID, noted cefepime can be discontinued if surgical culture remains clear of Pseudomonas. Continue with daptomycin.   (allergy to Linezolid and ANTONIO with vancomycin). PICC placed 6/19. Follow-up with Ortho as directed. Commend aspirin 81 mg twice daily for DVT prophylaxis at discharge. 2. Decubitus wound left buttock. Patient sedentary, sits most of the day. Followed in [facility]. Evaluated by wound care nurse and dressing changed. 3. HTN. Reasonably controlled. Continue Lisinopril. Continue to follow. 4. CAD. Hx STEMI 1/2017, CATRACHITA placed to mid RCA.  Denies 06/21/2022    CALCIUM 9.3 06/21/2022         Significant Diagnostic Studies    Radiology:   XR KNEE RIGHT (1-2 VIEWS)   Final Result   Status post knee replacement, in normal alignment. No acute fracture. Immediate postoperative changes. XR KNEE RIGHT (1-2 VIEWS)   Final Result   Total knee arthroplasty, with hardware in appropriate alignment. No   periprosthetic fracture or acute abnormality is identified. XR KNEE RIGHT (1-2 VIEWS)   Final Result   No acute finding of the right knee. Consults:     IP CONSULT TO SOCIAL WORK  IP CONSULT TO ORTHOPEDIC SURGERY  IP CONSULT TO INFECTIOUS DISEASES  IP CONSULT TO SOCIAL WORK    Disposition: SNF    Condition at Discharge: Stable    Discharge Instructions/Follow-up:      Follow up with pcp in 1 week  Follow up with ID as directed  Follow up with ortho as directed    Code Status:  Full Code     Activity: activity as tolerated    Diet: cardiac diet      Discharge Medications:     Current Discharge Medication List           Details   lidocaine 4 % external patch Place 1 patch onto the skin daily Apply 1/2 patch to either side of right knee  Qty: 3 patch, Refills: 0      docusate sodium (COLACE) 100 MG capsule Take 1 capsule by mouth 2 times daily  Qty: 60 capsule, Refills: 0      polyethylene glycol (GLYCOLAX) 17 g packet Take 17 g by mouth daily  Qty: 527 g, Refills: 1      HYDROcodone-acetaminophen (NORCO) 5-325 MG per tablet Take 1 tablet by mouth every 6 hours as needed for Pain for up to 5 days.   Qty: 20 tablet, Refills: 0    Comments: Reduce doses taken as pain becomes manageable  Associated Diagnoses: Abscess of knee      aspirin EC 81 MG EC tablet Take 1 tablet by mouth 2 times daily  Qty: 60 tablet, Refills: 0              Details   dorzolamide-timolol (COSOPT) 22.3-6.8 MG/ML ophthalmic solution Place 1 drop into the left eye 2 times daily      levETIRAcetam (KEPPRA) 500 MG tablet TAKE 1 TABLET BY MOUTH TWICE A DAY  Qty: 180

## 2022-06-21 NOTE — PROGRESS NOTES
[facility] Orthopedic Surgery   Progress Note    CHIEF COMPLAINT/DIAGNOSIS: S/p I&D and liner exchange of RIGHT Total Knee Arthroplasty    SUBJECTIVE: The patient is sitting up in the chair after having worked with therapy. She denies pain. Denies new issues. Pleasantly confused. OBJECTIVE  Physical    VITALS:  BP (!) 150/76   Pulse 92   Temp 99.2 °F (37.3 °C) (Oral)   Resp 18   Ht 5' 5\" (1.651 m)   Wt 139 lb 1.6 oz (63.1 kg)   SpO2 97%   BMI 23.15 kg/m²     GENERAL: Alert and oriented to person, in no acute distress. MUSCULOSKELETAL: Able to dorsi and plantarflex the ankle without issue. INCISION:  Covered with post-op dressing is c/d/I.  ROM: right knee ROM deferred. Sensory:  Intact to light touch in peroneal and tibial distributions. Vascular:   2+ DP pulses with brisk cap refill;  calf soft and nontender    Data    ALL MEDICATIONS HAVE BEEN REVIEWED    CBC:   Recent Labs     06/19/22 0445 06/20/22  0423 06/21/22  0426   WBC 7.1 7.5 6.1   HGB 11.2* 10.6* 9.4*   HCT 35.0* 32.4* 29.0*    260 251     BMP:   Recent Labs     06/19/22 0445 06/20/22  0423 06/21/22  0426   * 136 141   K 4.0 4.2 4.0    108 111*   CO2 17* 19* 21   BUN 27* 33* 31*   CREATININE 0.8 0.9 0.9     INR: No results for input(s): INR in the last 72 hours. Post-op films show stable TKA components without apparent loosening or complication. ASSESSMENT:  S/p RIGHT Total Knee Arthroplasty I&D and liner exchange (6/18/22), POD#3  HTN  CAD  Seizure disorder  Dementia? Hypothyroidism  Decubitus wound left buttock  Acute blood loss anemia as expected    PLAN:   - WB status:  WBAT; reviewed post op precautions  - DVT prophylaxis: Lovenox as inpt; rec d/c with 30 days ASA 81mg BID  - Acute blood loss anemia as expected: 9.4/29  - PT/OT  - Pain Control: Norco script in chart. Due to orthopaedic surgical procedure/condition, patient may require pain medication for up to 6-8 weeks. - ID:  daptomycin per ID. Appreciate their input.  - Dispo: OK to d/c to SNF from our end when medically stable and final abx rec in place. Follow-up with Dr. Kailee Duarte in approx 3 weeks. [phone]  No future appointments.     Chey De León, MICHELLE - CNP  6/21/2022  9:03 AM

## 2022-06-22 VITALS
WEIGHT: 139.1 LBS | BODY MASS INDEX: 23.17 KG/M2 | OXYGEN SATURATION: 98 % | HEIGHT: 65 IN | SYSTOLIC BLOOD PRESSURE: 156 MMHG | DIASTOLIC BLOOD PRESSURE: 82 MMHG | RESPIRATION RATE: 16 BRPM | TEMPERATURE: 99.6 F | HEART RATE: 93 BPM

## 2022-06-22 LAB
ANION GAP SERPL CALCULATED.3IONS-SCNC: 9 MMOL/L (ref 3–16)
BUN BLDV-MCNC: 25 MG/DL (ref 7–20)
CALCIUM SERPL-MCNC: 9.6 MG/DL (ref 8.3–10.6)
CHLORIDE BLD-SCNC: 106 MMOL/L (ref 99–110)
CO2: 23 MMOL/L (ref 21–32)
CREAT SERPL-MCNC: 0.7 MG/DL (ref 0.6–1.2)
GFR AFRICAN AMERICAN: >60
GFR NON-AFRICAN AMERICAN: >60
GLUCOSE BLD-MCNC: 99 MG/DL (ref 70–99)
HCT VFR BLD CALC: 29.4 % (ref 36–48)
HEMOGLOBIN: 9.5 G/DL (ref 12–16)
MCH RBC QN AUTO: 26.4 PG (ref 26–34)
MCHC RBC AUTO-ENTMCNC: 32.4 G/DL (ref 31–36)
MCV RBC AUTO: 81.7 FL (ref 80–100)
PDW BLD-RTO: 15.8 % (ref 12.4–15.4)
PLATELET # BLD: 263 K/UL (ref 135–450)
PMV BLD AUTO: 7.8 FL (ref 5–10.5)
POTASSIUM SERPL-SCNC: 3.8 MMOL/L (ref 3.5–5.1)
RBC # BLD: 3.6 M/UL (ref 4–5.2)
SARS-COV-2, NAAT: NOT DETECTED
SODIUM BLD-SCNC: 138 MMOL/L (ref 136–145)
WBC # BLD: 5.9 K/UL (ref 4–11)

## 2022-06-22 PROCEDURE — 6370000000 HC RX 637 (ALT 250 FOR IP): Performed by: ORTHOPAEDIC SURGERY

## 2022-06-22 PROCEDURE — 87635 SARS-COV-2 COVID-19 AMP PRB: CPT

## 2022-06-22 PROCEDURE — 6370000000 HC RX 637 (ALT 250 FOR IP): Performed by: NURSE PRACTITIONER

## 2022-06-22 PROCEDURE — 80048 BASIC METABOLIC PNL TOTAL CA: CPT

## 2022-06-22 PROCEDURE — 6360000002 HC RX W HCPCS: Performed by: ORTHOPAEDIC SURGERY

## 2022-06-22 PROCEDURE — 36415 COLL VENOUS BLD VENIPUNCTURE: CPT

## 2022-06-22 PROCEDURE — APPNB45 APP NON BILLABLE 31-45 MINUTES: Performed by: NURSE PRACTITIONER

## 2022-06-22 PROCEDURE — 99024 POSTOP FOLLOW-UP VISIT: CPT | Performed by: NURSE PRACTITIONER

## 2022-06-22 PROCEDURE — 94760 N-INVAS EAR/PLS OXIMETRY 1: CPT

## 2022-06-22 PROCEDURE — 85027 COMPLETE CBC AUTOMATED: CPT

## 2022-06-22 RX ADMIN — ACETAMINOPHEN 1000 MG: 500 TABLET ORAL at 09:40

## 2022-06-22 RX ADMIN — ASPIRIN 81 MG: 81 TABLET, CHEWABLE ORAL at 09:40

## 2022-06-22 RX ADMIN — LISINOPRIL 20 MG: 20 TABLET ORAL at 09:40

## 2022-06-22 RX ADMIN — LEVETIRACETAM 500 MG: 500 TABLET, FILM COATED ORAL at 10:31

## 2022-06-22 RX ADMIN — LEVOTHYROXINE, LIOTHYRONINE 30 MG: 19; 4.5 TABLET ORAL at 10:31

## 2022-06-22 RX ADMIN — ENOXAPARIN SODIUM 40 MG: 100 INJECTION SUBCUTANEOUS at 09:40

## 2022-06-22 RX ADMIN — DOCUSATE SODIUM 100 MG: 100 CAPSULE, LIQUID FILLED ORAL at 09:40

## 2022-06-22 RX ADMIN — HYDROCODONE BITARTRATE AND ACETAMINOPHEN 1 TABLET: 5; 325 TABLET ORAL at 03:40

## 2022-06-22 RX ADMIN — POLYETHYLENE GLYCOL 3350 17 G: 17 POWDER, FOR SOLUTION ORAL at 09:40

## 2022-06-22 ASSESSMENT — PAIN DESCRIPTION - LOCATION: LOCATION: KNEE

## 2022-06-22 ASSESSMENT — PAIN SCALES - GENERAL
PAINLEVEL_OUTOF10: 2
PAINLEVEL_OUTOF10: 6

## 2022-06-22 NOTE — CARE COORDINATION
Discharge Plan:     Patient discharged to:    [facility] (2-RH)  [address] [place] [address]    SW/DC Planner faxed, [address] to: [phone]    Narcotic Prescriptions faxed were: yes    RN:  will call report to:   [phone] with: [facility] [phone]     time: 1400    Family advised of discharge?: Yes    HENS Submitted?:  Yes    All discharge needs met per case management.         ITA DobsonN RN    [facility]  Phone: [phone] no

## 2022-06-22 NOTE — CARE COORDINATION
Important Message from Medicare    CM presented patient with  IMM (Important Message from Medicare) letter prior to discharge. CM explained the right to appeal discharge and the process to follow. All questions answered. IMM letter signed, along with date and time. A copy was given to the patient. The original copy was placed on the patient's chart.      MURPHY Wang RN    [facility]  Phone: [phone]

## 2022-06-22 NOTE — PROGRESS NOTES
Shift assessment completed, patient is alert to person and self, but disoriented to place, situation, and time. VSS.  Follow commands. Dressing to right knee, clean, dry and intact, pressure in the coccyx area, meplilex in place, purwick in place. Fall precautions in place , bed alarm on, bed in lowest position, call light in reach. Rash and redness on patient's back, perfect serve the hospitalist before administering Cubicin, NP okay to give, gave benadryl first. Patient is quiet and sleeping in the room  History of demential, patient unable to mutually agree with the care plan.

## 2022-06-22 NOTE — PLAN OF CARE
Problem: Discharge Planning  Goal: Discharge to home or other facility with appropriate resources  Outcome: Progressing     Problem: Pain  Goal: Verbalizes/displays adequate comfort level or baseline comfort level  Outcome: Progressing     Problem: Safety - Adult  Goal: Free from fall injury  Outcome: Progressing     Problem: ABCDS Injury Assessment  Goal: Absence of physical injury  Outcome: Progressing     Problem: Nutrition Deficit:  Goal: Optimize nutritional status  Outcome: Progressing     Problem: Skin/Tissue Integrity  Goal: Absence of new skin breakdown  Description: 1. Monitor for areas of redness and/or skin breakdown  2. Assess vascular access sites hourly  3. Every 4-6 hours minimum:  Change oxygen saturation probe site  4. Every 4-6 hours:  If on nasal continuous positive airway pressure, respiratory therapy assess nares and determine need for appliance change or resting period.   Outcome: Progressing

## 2022-06-22 NOTE — DISCHARGE SUMMARY
Hospital Medicine Discharge Summary    Patient ID: Emmett Cardoso      Patient's PCP: Comfort Vanessa DO    Admit Date: 6/16/2022     Discharge Date:   6/21/2022 dc order placed, precert was approved 0/23/39, pt left facility 6/22/22    Admitting Physician: Jyoti Panchal MD     Discharge Physician: Saúl Beverly, APRN - CNP     Discharge Diagnoses  Right knee prosthetic joint infection, positive MRSA  Decubitus wound left buttocks  Hypertension  CAD  History seizure  Hypothyroidism      Hospital Course: Patient is an 81 yo female with hx CAD/stent (2017), HTN, seizure disorder, hypothyroid, right TKA (2018). She has been experiencing increased pain and swelling in right knee. PCP aspirated knee 4/18 and culture grew pseudomonas. She completed antibiotic course. She presented to [facility] with worsening pain, swelling and drainage from right knee. She was transferred to [facility] for further management. She has been started on vancomycin and Zosyn.       6/18/2022: RIGHT TOTAL KNEE INCISION AND DRAINAGE WITH LINER EXCHANGE     1. Right knee prosthetic joint infection. Recent aspirate from right knee (4/2022) grew Pseudomonas and she received oral ciprofloxacin. Presented with increased pain, swelling and drainage from right knee. Started on empiric antibiotics. , sed rate 58. S/p right knee I&D with liner exchange 6/18, surgery cultures + MRSA, no Pseudomonas noted in surgical culture at this time. Antibiotics per ID, noted cefepime can be discontinued if surgical culture remains clear of Pseudomonas. Continue with daptomycin.   (allergy to Linezolid and ANTONIO with vancomycin). PICC placed 6/19. Follow-up with Ortho as directed. Commend aspirin 81 mg twice daily for DVT prophylaxis at discharge. 2. Decubitus wound left buttock. Patient sedentary, sits most of the day. Followed in [facility]. Evaluated by wound care nurse and dressing changed. 3. HTN. Reasonably controlled. Continue Lisinopril. Continue to follow. 4. CAD. Hx STEMI 1/2017, CATRACHITA placed to mid RCA. Denies chest pain. Continue asa and statin. 5. Hx seizure. Continue Keppra. 6. Hypothyroidism. TSH 4.06 (8/2021). Continue thyroid tablet. 7. Dementia: At baseline mentation      Reviewed plan of care with patient however she has dementia.,  Denied further questions or needs. Physical Exam Performed:     BP (!) 156/82   Pulse 93   Temp 99.6 °F (37.6 °C) (Oral)   Resp 18   Ht 5' 5\" (1.651 m)   Wt 139 lb 1.6 oz (63.1 kg)   SpO2 99%   BMI 23.15 kg/m²       General appearance:  No apparent distress, appears stated age and cooperative. HEENT:  Normal cephalic, atraumatic without obvious deformity. Pupils equal, round, and reactive to light. Extra ocular muscles intact. Conjunctivae/corneas clear. Neck: Supple, with full range of motion. No jugular venous distention. Trachea midline. Respiratory:  Normal respiratory effort. Clear to auscultation, bilaterally without Rales/Wheezes/Rhonchi. Cardiovascular:  Regular rate and rhythm with normal S1/S2 without murmurs, rubs or gallops. Abdomen: Soft, non-tender, non-distended with normal bowel sounds. Musculoskeletal: Right knee dressing dry and intact. Right knee swollen   skin: Skin color, texture, turgor normal.  No rashes or lesions. Neurologic:  Neurovascularly intact without any focal sensory/motor deficits. Cranial nerves: II-XII intact, grossly non-focal.  Psychiatric:  Alert and oriented, thought content appropriate, normal insight  Capillary Refill: Brisk,< 3 seconds   Peripheral Pulses: +2 palpable, equal bilaterally       Labs:  For convenience and continuity at follow-up the following most recent labs are provided:      CBC:    Lab Results   Component Value Date    WBC 5.9 06/22/2022    HGB 9.5 06/22/2022    HCT 29.4 06/22/2022     06/22/2022       Renal:    Lab Results   Component Value Date     06/22/2022    K 3.8 06/22/2022    K 3.8 06/16/2022     06/22/2022    CO2 23 06/22/2022    BUN 25 06/22/2022    CREATININE 0.7 06/22/2022    CALCIUM 9.6 06/22/2022         Significant Diagnostic Studies    Radiology:   XR KNEE RIGHT (1-2 VIEWS)   Final Result   Status post knee replacement, in normal alignment. No acute fracture. Immediate postoperative changes. XR KNEE RIGHT (1-2 VIEWS)   Final Result   Total knee arthroplasty, with hardware in appropriate alignment. No   periprosthetic fracture or acute abnormality is identified. XR KNEE RIGHT (1-2 VIEWS)   Final Result   No acute finding of the right knee. Consults:     IP CONSULT TO SOCIAL WORK  IP CONSULT TO ORTHOPEDIC SURGERY  IP CONSULT TO INFECTIOUS DISEASES  IP CONSULT TO SOCIAL WORK    Disposition: SNF    Condition at Discharge: Stable    Discharge Instructions/Follow-up:      Follow up with pcp in 1 week  Follow up with ID as directed  Follow up with ortho as directed    Code Status:  Full Code     Activity: activity as tolerated    Diet: cardiac diet      Discharge Medications:     Current Discharge Medication List           Details   lidocaine 4 % external patch Place 1 patch onto the skin daily Apply 1/2 patch to either side of right knee  Qty: 3 patch, Refills: 0      docusate sodium (COLACE) 100 MG capsule Take 1 capsule by mouth 2 times daily  Qty: 60 capsule, Refills: 0      polyethylene glycol (GLYCOLAX) 17 g packet Take 17 g by mouth daily  Qty: 527 g, Refills: 1      HYDROcodone-acetaminophen (NORCO) 5-325 MG per tablet Take 1 tablet by mouth every 6 hours as needed for Pain for up to 5 days.   Qty: 20 tablet, Refills: 0    Comments: Reduce doses taken as pain becomes manageable  Associated Diagnoses: Abscess of knee      aspirin EC 81 MG EC tablet Take 1 tablet by mouth 2 times daily  Qty: 60 tablet, Refills: 0              Details   dorzolamide-timolol (COSOPT) 22.3-6.8 MG/ML ophthalmic solution Place 1 drop into the left eye 2 times daily      levETIRAcetam (KEPPRA) 500 MG tablet TAKE 1 TABLET BY MOUTH TWICE A DAY  Qty: 180 tablet, Refills: 1      lisinopril (PRINIVIL;ZESTRIL) 20 MG tablet TAKE ONE TABLET BY MOUTH DAILY  Qty: 90 tablet, Refills: 0    Associated Diagnoses: Essential hypertension      Balsam [place]-Castor Oil (VENELEX) OINT ointment Apply topically 2 times daily  Qty: 30 g, Refills: 0      diclofenac (VOLTAREN) 75 MG EC tablet TAKE ONE TABLET BY MOUTH TWICE A DAY WITH MEALS  Qty: 180 tablet, Refills: 2      potassium chloride (KLOR-CON M) 10 MEQ extended release tablet TAKE ONE TABLET BY MOUTH DAILY  Qty: 90 tablet, Refills: 0    Associated Diagnoses: Generalized edema      thyroid (NP THYROID) 30 MG tablet TAKE ONE TABLET BY MOUTH DAILY  Qty: 90 tablet, Refills: 3    Associated Diagnoses: Hypothyroidism, unspecified type      rosuvastatin (CRESTOR) 40 MG tablet TAKE ONE TABLET BY MOUTH DAILY  Qty: 90 tablet, Refills: 3      Lactobacillus (ACIDOPHILUS) TABS Take 1 tablet by mouth 2 times daily       Multiple Vitamins-Minerals (DAILY SHEREI MAXIMUM MULTIVITAMIN PO) Take 1 packet by mouth daily      latanoprost (XALATAN) 0.005 % ophthalmic solution Place 1 drop into the left eye nightly              Time Spent on discharge is more than 30 minutes in the examination, evaluation, counseling and review of medications and discharge plan. Signed: MICHELLE Weiss CNP   6/22/2022    The patient was seen and examined on day of discharge and this discharge summary is in conjunction with any daily progress note from day of discharge. Thank you Karen Reno DO for the opportunity to be involved in this patient's care. If you have any questions or concerns please feel free to contact me at [phone]. NOTE:  This report was transcribed using voice recognition software. Every effort was made to ensure accuracy; however, inadvertent computerized transcription errors may be present.

## 2022-06-22 NOTE — PLAN OF CARE
Problem: Discharge Planning  Goal: Discharge to home or other facility with appropriate resources  6/22/2022 0914 by Lilo Honeycutt RN  Outcome: Progressing  6/21/2022 2323 by Zachary Vaughn RN  Outcome: Progressing     Problem: Pain  Goal: Verbalizes/displays adequate comfort level or baseline comfort level  6/22/2022 0914 by Lilo Honeycutt RN  Outcome: Progressing  6/21/2022 2323 by Zachary Vaughn RN  Outcome: Progressing     Problem: Safety - Adult  Goal: Free from fall injury  6/22/2022 0914 by Lilo Honeycutt RN  Outcome: Progressing  6/21/2022 2323 by Zachary Vaughn RN  Outcome: Progressing     Problem: ABCDS Injury Assessment  Goal: Absence of physical injury  6/22/2022 0914 by Lilo Honeycutt RN  Outcome: Progressing  6/21/2022 2323 by Zachary Vaughn RN  Outcome: Progressing     Problem: Nutrition Deficit:  Goal: Optimize nutritional status  6/22/2022 0914 by Lilo Honeycutt RN  Outcome: Progressing  6/21/2022 2323 by Zachary Vaughn RN  Outcome: Progressing     Problem: Skin/Tissue Integrity  Goal: Absence of new skin breakdown  Description: 1. Monitor for areas of redness and/or skin breakdown  2. Assess vascular access sites hourly  3. Every 4-6 hours minimum:  Change oxygen saturation probe site  4. Every 4-6 hours:  If on nasal continuous positive airway pressure, respiratory therapy assess nares and determine need for appliance change or resting period.   6/22/2022 0914 by Lilo Honeycutt RN  Outcome: Progressing  6/21/2022 2323 by Zachary Vaughn RN  Outcome: Progressing

## 2022-06-22 NOTE — PROGRESS NOTES
[facility] Orthopedic Surgery   Progress Note    CHIEF COMPLAINT/DIAGNOSIS: S/p I&D and liner exchange of RIGHT Total Knee Arthroplasty    SUBJECTIVE: The patient is sitting up in the be. Denies new issues. Pleasantly confused. Apparently still awaiting pre-cert from [facility]. OBJECTIVE  Physical    VITALS:  BP (!) 182/72   Pulse 60   Temp 98 °F (36.7 °C) (Oral)   Resp 18   Ht 5' 5\" (1.651 m)   Wt 139 lb 1.6 oz (63.1 kg)   SpO2 97%   BMI 23.15 kg/m²     GENERAL: Alert and oriented to person, in no acute distress. MUSCULOSKELETAL: Able to dorsi and plantarflex the ankle without issue. INCISION:  Covered with post-op dressing is c/d/I.  ROM: right knee ROM deferred. Sensory:  Intact to light touch in peroneal and tibial distributions. Vascular:   2+ DP pulses with brisk cap refill;  calf soft and nontender    Data    ALL MEDICATIONS HAVE BEEN REVIEWED    CBC:   Recent Labs     06/20/22  0423 06/21/22  0426 06/22/22  0438   WBC 7.5 6.1 5.9   HGB 10.6* 9.4* 9.5*   HCT 32.4* 29.0* 29.4*    251 263     BMP:   Recent Labs     06/20/22  0423 06/21/22  0426 06/22/22  0438    141 138   K 4.2 4.0 3.8    111* 106   CO2 19* 21 23   BUN 33* 31* 25*   CREATININE 0.9 0.9 0.7     INR: No results for input(s): INR in the last 72 hours. Post-op films show stable TKA components without apparent loosening or complication. ASSESSMENT:  S/p RIGHT Total Knee Arthroplasty I&D and liner exchange (6/18/22), POD#4  HTN  CAD  Seizure disorder  Dementia? Hypothyroidism  Decubitus wound left buttock  Acute blood loss anemia as expected    PLAN:   - WB status:  WBAT; reviewed post op precautions  - DVT prophylaxis: Lovenox as inpt; rec d/c with 30 days ASA 81mg BID  - Acute blood loss anemia as expected: 9.5/29.4  - PT/OT  - Pain Control: Norco script in chart. Due to orthopaedic surgical procedure/condition, patient may require pain medication for up to 6-8 weeks.   - ID:  daptomycin per ID. Appreciate their input.  - Dispo: OK to d/c to SNF from our end when medically stable and final abx rec in place. Follow-up with Dr. Selam Cardenas in approx 3 weeks. [phone]  No future appointments.     MICHELLE Yan - CNP  6/22/2022  8:48 AM

## 2022-06-24 ENCOUNTER — PHARMACY VISIT (OUTPATIENT)
Dept: INFECTIOUS DISEASES | Age: 87
End: 2022-06-24

## 2022-06-24 DIAGNOSIS — T84.53XD INFECTION ASSOCIATED WITH INTERNAL RIGHT KNEE PROSTHESIS, SUBSEQUENT ENCOUNTER: Primary | ICD-10-CM

## 2022-06-24 LAB
ANAEROBIC CULTURE: ABNORMAL
ANAEROBIC CULTURE: ABNORMAL
CULTURE SURGICAL: ABNORMAL
GRAM STAIN RESULT: ABNORMAL
GRAM STAIN RESULT: ABNORMAL
ORGANISM: ABNORMAL
ORGANISM: ABNORMAL
WOUND/ABSCESS: ABNORMAL

## 2022-06-24 NOTE — PROGRESS NOTES
Dr. Elaina Pardo has placed a referral order for pharmacist to manage Outpatient Parental Antimicrobial Therapy (OPAT) pursuant the ID Collaborative Practice Agreement. Patient and/or caregiver has verbally consented to have drug therapy managed by a pharmacist. The benefits and risks of complex antimicrobial therapy including drug-specific adverse reactions and necessary follow-up were discussed with patient and/or caregiver and they are amenable to OPAT and pharmacist management. Pertinent Objective Data:     Wt Readings from Last 1 Encounters:   06/21/22 139 lb 1.6 oz (63.1 kg)      BMI Readings from Last 1 Encounters:   06/21/22 23.15 kg/m²      Serum creatinine: 0.7 mg/dL 06/22/22 0438  Estimated creatinine clearance: 54 mL/min    Lab Results   Component Value Date     06/22/2022    K 3.8 06/22/2022     06/22/2022    CO2 23 06/22/2022    BUN 25 (H) 06/22/2022    CREATININE 0.7 06/22/2022    GLUCOSE 99 06/22/2022    CALCIUM 9.6 06/22/2022    PROT 7.3 06/16/2022    LABALBU 4.1 06/16/2022    BILITOT 0.9 06/16/2022    ALKPHOS 90 06/16/2022    AST 28 06/16/2022    ALT 15 06/16/2022    LABGLOM >60 06/22/2022    GFRAA >60 06/22/2022    AGRATIO 1.3 06/16/2022    GLOB 2.9 12/10/2019       Lab Results   Component Value Date    WBC 5.9 06/22/2022    HGB 9.5 (L) 06/22/2022    HCT 29.4 (L) 06/22/2022    MCV 81.7 06/22/2022     06/22/2022    LYMPHOPCT 23.8 06/16/2022    RBC 3.60 (L) 06/22/2022    MCH 26.4 06/22/2022    MCHC 32.4 06/22/2022    RDW 15.8 (H) 06/22/2022       Lab Results   Component Value Date    .6 (H) 06/16/2022       Lab Results   Component Value Date    SEDRATE 62 (H) 06/16/2022       Lab Results   Component Value Date    CKTOTAL 64 06/16/2022       Vancomycin Rm   Date Value Ref Range Status   04/13/2018 21.9 (HH) ug/mL Final     Comment:     Trough - 10-20  Toxic  - >20.0       Vancomycin Tr   Date Value Ref Range Status   04/16/2018 11.5 10.0 - 20.0 ug/mL Final     Comment:

## 2022-06-27 DIAGNOSIS — T84.53XD INFECTION ASSOCIATED WITH INTERNAL RIGHT KNEE PROSTHESIS, SUBSEQUENT ENCOUNTER: ICD-10-CM

## 2022-06-27 LAB
BASOPHILS ABSOLUTE: ABNORMAL
BASOPHILS RELATIVE PERCENT: 0.6 %
C-REACTIVE PROTEIN: 35.5
EOSINOPHILS ABSOLUTE: 0.5 /ΜL
EOSINOPHILS RELATIVE PERCENT: 7.5 %
HCT VFR BLD CALC: 28.7 % (ref 36–46)
HEMOGLOBIN: 9.5 G/DL (ref 12–16)
LYMPHOCYTES ABSOLUTE: 1.7 /ΜL
LYMPHOCYTES RELATIVE PERCENT: 26.8 %
MCH RBC QN AUTO: 27 PG
MCHC RBC AUTO-ENTMCNC: 33.1 G/DL
MCV RBC AUTO: 81.5 FL
MONOCYTES ABSOLUTE: 0.6 /ΜL
MONOCYTES RELATIVE PERCENT: 9.9 %
NEUTROPHILS ABSOLUTE: 3.5 /ΜL
NEUTROPHILS RELATIVE PERCENT: 55.2 %
PDW BLD-RTO: 15.9 %
PLATELET # BLD: 282 K/ΜL
PMV BLD AUTO: 8.1 FL
RBC # BLD: 3.53 10^6/ΜL
SEDIMENTATION RATE, ERYTHROCYTE: 46
TOTAL CK: 21 U/L
WBC # BLD: 6.4 10^3/ML

## 2022-06-28 ENCOUNTER — TELEPHONE (OUTPATIENT)
Dept: INFECTIOUS DISEASES | Age: 87
End: 2022-06-28

## 2022-06-28 NOTE — TELEPHONE ENCOUNTER
Spoke with Everett MARTINEZ at [facility] [address]  [address] who will see if CMP was completed this week and fax to office, if not will draw tomorrow and fax to office

## 2022-07-04 LAB
BASOPHILS ABSOLUTE: 0.1 /ΜL
BASOPHILS RELATIVE PERCENT: 0.9 %
C-REACTIVE PROTEIN: 100.4
EOSINOPHILS ABSOLUTE: 0.4 /ΜL
EOSINOPHILS RELATIVE PERCENT: 4.3 %
HCT VFR BLD CALC: 28.3 % (ref 36–46)
HEMOGLOBIN: 9 G/DL (ref 12–16)
LYMPHOCYTES ABSOLUTE: 1.5 /ΜL
LYMPHOCYTES RELATIVE PERCENT: 15.9 %
MCH RBC QN AUTO: 26.6 PG
MCHC RBC AUTO-ENTMCNC: 31.9 G/DL
MCV RBC AUTO: 83.5 FL
MONOCYTES ABSOLUTE: 0.8 /ΜL
MONOCYTES RELATIVE PERCENT: 8.9 %
NEUTROPHILS ABSOLUTE: 6.4 /ΜL
NEUTROPHILS RELATIVE PERCENT: 70 %
PDW BLD-RTO: 16.3 %
PLATELET # BLD: 320 K/ΜL
PMV BLD AUTO: 8.1 FL
RBC # BLD: 3.38 10^6/ΜL
SEDIMENTATION RATE, ERYTHROCYTE: 71
TOTAL CK: 23 U/L
WBC # BLD: 9.2 10^3/ML

## 2022-07-05 ENCOUNTER — TELEPHONE (OUTPATIENT)
Dept: INFECTIOUS DISEASES | Age: 87
End: 2022-07-05

## 2022-07-05 DIAGNOSIS — T84.53XD INFECTION ASSOCIATED WITH INTERNAL RIGHT KNEE PROSTHESIS, SUBSEQUENT ENCOUNTER: ICD-10-CM

## 2022-07-05 NOTE — TELEPHONE ENCOUNTER
No CMP received with this weeks labs, spoke with Jen Irene at [place]. [address] [place] [address] and notified of need and she states patient will have it drawn tomorrow.

## 2022-07-06 ENCOUNTER — TELEPHONE (OUTPATIENT)
Dept: INFECTIOUS DISEASES | Age: 87
End: 2022-07-06

## 2022-07-06 NOTE — TELEPHONE ENCOUNTER
This should not be happening when patient is on antibiotics and it is concerning. Recommend that they send the patient back to the hospital through the ER tomorrow.

## 2022-07-07 ENCOUNTER — TELEPHONE (OUTPATIENT)
Dept: FAMILY MEDICINE CLINIC | Age: 87
End: 2022-07-07

## 2022-07-07 NOTE — TELEPHONE ENCOUNTER
Per nurse Anjelica to advise of MD note, she states patients knee redness, warmth have improved. States no increased pain and able to walk. She spoke with patients orthopedic Dr. Alma Gamble office which states drainage is normal and no concern. Will see Dr. Jeaneth Singh on 7-11.

## 2022-07-07 NOTE — TELEPHONE ENCOUNTER
Bari Spring the coordinator with [facility] called to get verbal for nursing, pt, ot and .  Please give Bari Spring a call [phone]

## 2022-07-08 ENCOUNTER — TELEPHONE (OUTPATIENT)
Dept: INFECTIOUS DISEASES | Age: 87
End: 2022-07-08

## 2022-07-08 ENCOUNTER — APPOINTMENT (OUTPATIENT)
Dept: GENERAL RADIOLOGY | Age: 87
DRG: 559 | End: 2022-07-08
Payer: MEDICARE

## 2022-07-08 ENCOUNTER — HOSPITAL ENCOUNTER (INPATIENT)
Age: 87
LOS: 5 days | Discharge: HOSPICE | DRG: 559 | End: 2022-07-13
Attending: INTERNAL MEDICINE | Admitting: INTERNAL MEDICINE
Payer: MEDICARE

## 2022-07-08 DIAGNOSIS — L03.115 CELLULITIS OF RIGHT LOWER EXTREMITY: Primary | ICD-10-CM

## 2022-07-08 DIAGNOSIS — Z51.5 HOSPICE CARE: ICD-10-CM

## 2022-07-08 LAB
A/G RATIO: 1.1 (ref 1.1–2.2)
ALBUMIN SERPL-MCNC: 3.5 G/DL (ref 3.4–5)
ALP BLD-CCNC: 181 U/L (ref 40–129)
ALT SERPL-CCNC: 20 U/L (ref 10–40)
ANION GAP SERPL CALCULATED.3IONS-SCNC: 10 MMOL/L (ref 3–16)
AST SERPL-CCNC: 22 U/L (ref 15–37)
BASOPHILS ABSOLUTE: 0 K/UL (ref 0–0.2)
BASOPHILS RELATIVE PERCENT: 0.6 %
BILIRUB SERPL-MCNC: <0.2 MG/DL (ref 0–1)
BUN BLDV-MCNC: 26 MG/DL (ref 7–20)
C-REACTIVE PROTEIN: 52.5 MG/L (ref 0–5.1)
CALCIUM SERPL-MCNC: 9.5 MG/DL (ref 8.3–10.6)
CHLORIDE BLD-SCNC: 107 MMOL/L (ref 99–110)
CO2: 22 MMOL/L (ref 21–32)
CREAT SERPL-MCNC: 0.8 MG/DL (ref 0.6–1.2)
EOSINOPHILS ABSOLUTE: 0.5 K/UL (ref 0–0.6)
EOSINOPHILS RELATIVE PERCENT: 6.9 %
GFR AFRICAN AMERICAN: >60
GFR NON-AFRICAN AMERICAN: >60
GLUCOSE BLD-MCNC: 157 MG/DL (ref 70–99)
HCT VFR BLD CALC: 25.7 % (ref 36–48)
HEMOGLOBIN: 8.5 G/DL (ref 12–16)
LACTIC ACID, SEPSIS: 1.1 MMOL/L (ref 0.4–1.9)
LYMPHOCYTES ABSOLUTE: 1.7 K/UL (ref 1–5.1)
LYMPHOCYTES RELATIVE PERCENT: 22.1 %
MCH RBC QN AUTO: 26.7 PG (ref 26–34)
MCHC RBC AUTO-ENTMCNC: 32.9 G/DL (ref 31–36)
MCV RBC AUTO: 81 FL (ref 80–100)
MONOCYTES ABSOLUTE: 0.7 K/UL (ref 0–1.3)
MONOCYTES RELATIVE PERCENT: 9.5 %
NEUTROPHILS ABSOLUTE: 4.7 K/UL (ref 1.7–7.7)
NEUTROPHILS RELATIVE PERCENT: 60.9 %
PDW BLD-RTO: 15.7 % (ref 12.4–15.4)
PLATELET # BLD: 358 K/UL (ref 135–450)
PMV BLD AUTO: 7.9 FL (ref 5–10.5)
POTASSIUM REFLEX MAGNESIUM: 4.3 MMOL/L (ref 3.5–5.1)
RBC # BLD: 3.17 M/UL (ref 4–5.2)
SEDIMENTATION RATE, ERYTHROCYTE: 62 MM/HR (ref 0–30)
SODIUM BLD-SCNC: 139 MMOL/L (ref 136–145)
TOTAL PROTEIN: 6.6 G/DL (ref 6.4–8.2)
WBC # BLD: 7.8 K/UL (ref 4–11)

## 2022-07-08 PROCEDURE — 6360000002 HC RX W HCPCS: Performed by: NURSE PRACTITIONER

## 2022-07-08 PROCEDURE — 36415 COLL VENOUS BLD VENIPUNCTURE: CPT

## 2022-07-08 PROCEDURE — 83605 ASSAY OF LACTIC ACID: CPT

## 2022-07-08 PROCEDURE — 87040 BLOOD CULTURE FOR BACTERIA: CPT

## 2022-07-08 PROCEDURE — 85025 COMPLETE CBC W/AUTO DIFF WBC: CPT

## 2022-07-08 PROCEDURE — 73560 X-RAY EXAM OF KNEE 1 OR 2: CPT

## 2022-07-08 PROCEDURE — 87186 SC STD MICRODIL/AGAR DIL: CPT

## 2022-07-08 PROCEDURE — 99285 EMERGENCY DEPT VISIT HI MDM: CPT

## 2022-07-08 PROCEDURE — 87070 CULTURE OTHR SPECIMN AEROBIC: CPT

## 2022-07-08 PROCEDURE — 2580000003 HC RX 258: Performed by: NURSE PRACTITIONER

## 2022-07-08 PROCEDURE — 87077 CULTURE AEROBIC IDENTIFY: CPT

## 2022-07-08 PROCEDURE — 86140 C-REACTIVE PROTEIN: CPT

## 2022-07-08 PROCEDURE — 87205 SMEAR GRAM STAIN: CPT

## 2022-07-08 PROCEDURE — 85652 RBC SED RATE AUTOMATED: CPT

## 2022-07-08 PROCEDURE — 1200000000 HC SEMI PRIVATE

## 2022-07-08 PROCEDURE — 87150 DNA/RNA AMPLIFIED PROBE: CPT

## 2022-07-08 PROCEDURE — 87181 SC STD AGAR DILUTION PER AGT: CPT

## 2022-07-08 PROCEDURE — 80053 COMPREHEN METABOLIC PANEL: CPT

## 2022-07-08 RX ORDER — ACETAMINOPHEN 325 MG/1
650 TABLET ORAL EVERY 4 HOURS PRN
COMMUNITY

## 2022-07-08 RX ORDER — HYDROCODONE BITARTRATE AND ACETAMINOPHEN 5; 325 MG/1; MG/1
1 TABLET ORAL EVERY 6 HOURS PRN
Status: ON HOLD | COMMUNITY
End: 2022-07-12

## 2022-07-08 RX ORDER — ONDANSETRON 4 MG/1
4 TABLET, ORALLY DISINTEGRATING ORAL EVERY 8 HOURS PRN
Status: DISCONTINUED | OUTPATIENT
Start: 2022-07-08 | End: 2022-07-13 | Stop reason: HOSPADM

## 2022-07-08 RX ORDER — SODIUM CHLORIDE 0.9 % (FLUSH) 0.9 %
5-40 SYRINGE (ML) INJECTION PRN
Status: DISCONTINUED | OUTPATIENT
Start: 2022-07-08 | End: 2022-07-13 | Stop reason: HOSPADM

## 2022-07-08 RX ORDER — POLYETHYLENE GLYCOL 3350 17 G/17G
17 POWDER, FOR SOLUTION ORAL DAILY PRN
Status: DISCONTINUED | OUTPATIENT
Start: 2022-07-08 | End: 2022-07-11

## 2022-07-08 RX ORDER — ONDANSETRON 2 MG/ML
4 INJECTION INTRAMUSCULAR; INTRAVENOUS EVERY 6 HOURS PRN
Status: DISCONTINUED | OUTPATIENT
Start: 2022-07-08 | End: 2022-07-13 | Stop reason: HOSPADM

## 2022-07-08 RX ORDER — SODIUM CHLORIDE 0.9 % (FLUSH) 0.9 %
5-40 SYRINGE (ML) INJECTION EVERY 12 HOURS SCHEDULED
Status: DISCONTINUED | OUTPATIENT
Start: 2022-07-08 | End: 2022-07-13 | Stop reason: HOSPADM

## 2022-07-08 RX ORDER — BISACODYL 10 MG
10 SUPPOSITORY, RECTAL RECTAL DAILY
COMMUNITY

## 2022-07-08 RX ORDER — SODIUM CHLORIDE 9 MG/ML
INJECTION, SOLUTION INTRAVENOUS PRN
Status: DISCONTINUED | OUTPATIENT
Start: 2022-07-08 | End: 2022-07-13 | Stop reason: HOSPADM

## 2022-07-08 RX ORDER — ENOXAPARIN SODIUM 100 MG/ML
40 INJECTION SUBCUTANEOUS DAILY
Status: DISCONTINUED | OUTPATIENT
Start: 2022-07-09 | End: 2022-07-12

## 2022-07-08 RX ADMIN — CEFEPIME HYDROCHLORIDE 2000 MG: 2 INJECTION, POWDER, FOR SOLUTION INTRAVENOUS at 22:52

## 2022-07-08 ASSESSMENT — ENCOUNTER SYMPTOMS
SHORTNESS OF BREATH: 0
DIARRHEA: 0
CHEST TIGHTNESS: 0
VOMITING: 0
NAUSEA: 0
ABDOMINAL PAIN: 0
COLOR CHANGE: 1

## 2022-07-08 ASSESSMENT — PAIN SCALES - GENERAL: PAINLEVEL_OUTOF10: 0

## 2022-07-08 ASSESSMENT — PAIN SCALES - WONG BAKER: WONGBAKER_NUMERICALRESPONSE: 0

## 2022-07-08 NOTE — ED PROVIDER NOTES
[facility]        Pt Name: Cyndy Redd  MRN: [id number]  [place] 1935  Date of evaluation: 7/8/2022  Provider: MICHELLE Trujillo CNP  PCP: Cindi Jacobs DO  Note Started: 7:20 PM EDT       SELAM. I have evaluated this patient. My supervising physician was available for consultation. CHIEF COMPLAINT       Chief Complaint   Patient presents with    Knee Pain     Pt. sent in per private EMS from [place]. [address] with report of right knee pain, NKI. HISTORY OF PRESENT ILLNESS   (Location, Timing/Onset, Context/Setting, Quality, Duration, Modifying Factors, Severity, Associated Signs and Symptoms)  Note limiting factors. Chief Complaint: Knee pain     Cyndy Redd is a 82 y.o. female who presents to the emergency department with complaints of knee swelling, erythema, and pain. Patient had recent surgery on the knee. Dressing covering wound on knee upon arrival which is saturated with serosanguineous discharge. Patient has dementia at baseline. Patient denies alleviation of pain at the facility. She is non ambulatory at this time. Patient is unable to answer or offer further history, appears forgetful at baseline, denies further complaints. She is not certain when or where her surgery was completed. Nursing Notes were all reviewed and agreed with or any disagreements were addressed in the HPI. REVIEW OF SYSTEMS    (2-9 systems for level 4, 10 or more for level 5)     Review of Systems   Constitutional: Negative for activity change, chills and fever. Respiratory: Negative for chest tightness and shortness of breath. Cardiovascular: Negative for chest pain. Gastrointestinal: Negative for abdominal pain, diarrhea, nausea and vomiting. Genitourinary: Negative for dysuria. Musculoskeletal: Positive for joint swelling and myalgias.    Skin: Positive for color change and wound. All other systems reviewed and are negative. Positives and Pertinent negatives as per HPI. Except as noted above in the ROS, all other systems were reviewed and negative.        PAST MEDICAL HISTORY     Past Medical History:   Diagnosis Date    CAD (coronary artery disease)     Inferior STEMI; CATRACHITA to RCA    DDD (degenerative disc disease), lumbosacral 4/10/2013    Decreased mobility and endurance 2/24/2022    Hearing aid worn     bilateral    History of short term memory loss 3/3/2022    [place] (hard of hearing)     [place] (hard of hearing)     Hyperlipidemia     Hypertension     MI, old 2017    Neuropathy     bilateral lower extremities    Non-compliance with treatment 6/2/2022    Pressure ulcer of ischium, stage 3, left ([address] 2/16/2022    Pressure ulcer of left buttock, stage 3 ([address] 2/16/2022    Sleep apnea     uses mouth piece; bringing DOS 3/26/18    Thyroid disease     hypo    Wears glasses          SURGICAL HISTORY     Past Surgical History:   Procedure Laterality Date    ABSCESS DRAINAGE Right 03/27/2018    evacuation hematoma right knee    APPENDECTOMY  1963    BACK SURGERY  [id number]    MILD PROCEDURE L2-3 BILATERAL     BREAST LUMPECTOMY  1986    BUNIONECTOMY  10/2003    CATARACT REMOVAL  03/23/2010 and 04/14/2010    COLONOSCOPY  6/2014    repeat 2019    CORONARY ANGIOPLASTY WITH STENT PLACEMENT  01/23/2017    INF STEMI with CATRACHITA to RCA    DILATION AND CURETTAGE OF UTERUS      INCONTINENCE SURGERY      JOINT REPLACEMENT Right 2018    right total knee replacment    OVARY REMOVAL  1963    right    REVISION TOTAL KNEE ARTHROPLASTY Right 6/18/2022    RIGHT TOTAL KNEE INCISION AND DRAINAGE WITH LINER EXCHANGE performed by Dario Posada [facility] at [facility]       Previous Medications    ASPIRIN EC 81 MG EC TABLET    Take 1 tablet by mouth 2 times daily    BALSAM PERU-CASTOR OIL (VENELEX) OINT OINTMENT    Apply topically 2 times daily    DAPTOMYCIN IV    Infuse 350 mg intravenously daily Until 8/1    DICLOFENAC (VOLTAREN) 75 MG EC TABLET    TAKE ONE TABLET BY MOUTH TWICE A DAY WITH MEALS    DOCUSATE SODIUM (COLACE) 100 MG CAPSULE    Take 1 capsule by mouth 2 times daily    DORZOLAMIDE-TIMOLOL (COSOPT) 22.3-6.8 MG/ML OPHTHALMIC SOLUTION    Place 1 drop into the left eye 2 times daily    LACTOBACILLUS (ACIDOPHILUS) TABS    Take 1 tablet by mouth 2 times daily     LATANOPROST (XALATAN) 0.005 % OPHTHALMIC SOLUTION    Place 1 drop into the left eye nightly     LEVETIRACETAM (KEPPRA) 500 MG TABLET    TAKE 1 TABLET BY MOUTH TWICE A DAY    LIDOCAINE 4 % EXTERNAL PATCH    Place 1 patch onto the skin daily Apply 1/2 patch to either side of right knee    LISINOPRIL (PRINIVIL;ZESTRIL) 20 MG TABLET    TAKE ONE TABLET BY MOUTH DAILY    MULTIPLE VITAMINS-MINERALS (DAILY SHERIE MAXIMUM MULTIVITAMIN PO)    Take 1 packet by mouth daily    POLYETHYLENE GLYCOL (GLYCOLAX) 17 G PACKET    Take 17 g by mouth daily    POTASSIUM CHLORIDE (KLOR-CON M) 10 MEQ EXTENDED RELEASE TABLET    TAKE ONE TABLET BY MOUTH DAILY    ROSUVASTATIN (CRESTOR) 40 MG TABLET    TAKE ONE TABLET BY MOUTH DAILY    THYROID (NP THYROID) 30 MG TABLET    TAKE ONE TABLET BY MOUTH DAILY         ALLERGIES     Amoxicillin, Linezolid, Prednisone, Vancomycin, Coreg [carvedilol], and Oxycodone-acetaminophen    FAMILYHISTORY       Family History   Problem Relation Age of Onset    Cancer Mother     Heart Disease Mother     Arthritis Sister         rheumatoid    Cancer Brother     Cancer Sister     Diabetes Sister     Heart Disease Brother     High Blood Pressure Brother     High Blood Pressure Sister     Diabetes Brother           SOCIAL HISTORY       Social History     Tobacco Use    Smoking status: Never Smoker    Smokeless tobacco: Never Used    Tobacco comment: encouraged to never smoke    Vaping Use    Vaping Use: Never used   Substance Use Topics    Alcohol use: No    Drug use: No       SCREENINGS    Edgar Coma Scale  Eye Opening: Spontaneous  Best Verbal Response: Confused  Best Motor Response: Obeys commands  Edgar Coma Scale Score: 14        PHYSICAL EXAM    (up to 7 for level 4, 8 or more for level 5)     ED Triage Vitals [07/08/22 1849]   BP Temp Temp Source Heart Rate Resp SpO2 Height Weight   (!) 193/75 98.7 °F (37.1 °C) Oral 95 18 98 % 5' 5\" (1.651 m) 175 lb (79.4 kg)       Physical Exam  Vitals and nursing note reviewed. Constitutional:       Appearance: She is well-developed. She is not diaphoretic. HENT:      Head: Normocephalic and atraumatic. Right Ear: External ear normal.      Left Ear: External ear normal.   Eyes:      General:         Right eye: No discharge. Left eye: No discharge. Neck:      Vascular: No JVD. Cardiovascular:      Rate and Rhythm: Normal rate and regular rhythm. Pulses: Normal pulses. Heart sounds: Normal heart sounds. Pulmonary:      Effort: Pulmonary effort is normal. No respiratory distress. Breath sounds: Normal breath sounds. Musculoskeletal:         General: Swelling and tenderness present. Cervical back: Normal range of motion and neck supple. Right knee: Erythema present. Decreased range of motion. Tenderness present. Normal pulse. Skin:     General: Skin is warm and dry. Coloration: Skin is not pale. Findings: Erythema present. Neurological:      Mental Status: She is alert and oriented to person, place, and time.    Psychiatric:         Behavior: Behavior normal.         DIAGNOSTIC RESULTS   LABS:    Labs Reviewed   CBC WITH AUTO DIFFERENTIAL - Abnormal; Notable for the following components:       Result Value    RBC 3.17 (*)     Hemoglobin 8.5 (*)     Hematocrit 25.7 (*)     RDW 15.7 (*)     All other components within normal limits   COMPREHENSIVE METABOLIC PANEL W/ REFLEX TO MG FOR LOW K - Abnormal; Notable for the following components:    Glucose 157 (*)     BUN 26 (*)     Alkaline Phosphatase 181 (*)     All other components within normal limits   C-REACTIVE PROTEIN - Abnormal; Notable for the following components:    CRP 52.5 (*)     All other components within normal limits   SEDIMENTATION RATE - Abnormal; Notable for the following components:    Sed Rate 62 (*)     All other components within normal limits   CULTURE, BLOOD 1   CULTURE, BLOOD 2   CULTURE, WOUND    Narrative:     ORDER#: [id number]                          ORDERED BY: Dante Gonzalez  SOURCE: Leg right knee                     COLLECTED:  07/08/22 19:10  ANTIBIOTICS AT [place].:                      RECEIVED :  07/08/22 19:45   LACTATE, SEPSIS   LACTATE, SEPSIS       When ordered only abnormal lab results are displayed. All other labs were within normal range or not returned as of this dictation. EKG: When ordered, EKG's are interpreted by the Emergency Department Physician in the absence of a cardiologist.  Please see their note for interpretation of EKG. RADIOLOGY:   Non-plain film images such as CT, Ultrasound and MRI are read by the radiologist. Plain radiographic images are visualized and preliminarily interpreted by the ED Provider with the below findings:        Interpretation per the Radiologist below, if available at the time of this note:    XR KNEE RIGHT (1-2 VIEWS)   Final Result   Total right knee replacement which is unchanged with no acute bony   abnormality. Diffuse osteopenia. Moderate soft tissue swelling anterior to the knee and a moderate   suprapatellar effusion. No results found.         PROCEDURES   Unless otherwise noted below, none     Procedures    CRITICAL CARE TIME       CONSULTS:  IP CONSULT TO ORTHOPEDIC SURGERY      EMERGENCY DEPARTMENT COURSE and DIFFERENTIAL DIAGNOSIS/MDM:   Vitals:    Vitals:    07/08/22 1849   BP: (!) 193/75   Pulse: 95   Resp: 18   Temp: 98.7 °F (37.1 °C)   TempSrc: Oral   SpO2: 98%   Weight: 175 lb (79.4 kg)   Height: 5' 5\" (1.651 m) Patient was given the following medications:  Medications   vancomycin 1000 mg IVPB in 250 mL D5W addavial (has no administration in time range)   cefepime (MAXIPIME) 2000 mg IVPB minibag (has no administration in time range)         Is this patient to be included in the SEP-1 Core Measure due to severe sepsis or septic shock? No   Exclusion criteria - the patient is NOT to be included for SEP-1 Core Measure due to:  2+ SIRS criteria are not met    Briefly, this is an 29-year-old female with past medical history of arthritis, spinal stenosis, renal disease, and myocardial infarction. Presents to the emergency department today with complaints of right knee pain. Patient is from a facility and unsure of her medical history. Swelling, erythema, and excessive drainage coming from wound on knee. Following previous notes, the patient was hospitalized 6/18/2022 and had a right total knee incision and drainage with liner exchange. The patient was followed closely by infectious disease during hospitalization and discharged to nursing home, she was due to return home this Sunday with home health care and IV antibiotics. Family requested the patient to be seen in the emergency department because the leg appears more reddened with copious discharge, warm to touch. On 4/13/2022, the patient was seen in her family medicine doctor's office and that day of note, the patient did have fluid in the right knee joint, he went on to report that the knee had been replaced years ago. I did consult Ortho, they are agreeable to see the patient tomorrow, admit to medicine. XR KNEE RIGHT (1-2 VIEWS) (Final result)  Result time 07/08/22 20:18:31  Final result by Rui Mosley MD (07/08/22 20:18:31)                Impression:    Total right knee replacement which is unchanged with no acute bony   abnormality. Diffuse osteopenia.      Moderate soft tissue swelling anterior to the knee and a moderate   suprapatellar effusion. Antibiotics were ordered. Labs as reported above. Patient and family updated regarding plan. FINAL IMPRESSION      1. Cellulitis of right lower extremity          DISPOSITION/PLAN   DISPOSITION Admitted 07/08/2022 10:20:11 PM      PATIENT REFERRED TO:  No follow-up provider specified.     DISCHARGE MEDICATIONS:  New Prescriptions    No medications on file       DISCONTINUED MEDICATIONS:  Discontinued Medications    No medications on file              (Please note that portions of this note were completed with a voice recognition program.  Efforts were made to edit the dictations but occasionally words are mis-transcribed.)    MICHELLE Ortega CNP (electronically signed)            MICHELLE Ortega CNP  07/08/22 [id number]

## 2022-07-08 NOTE — TELEPHONE ENCOUNTER
Received call from [facility]. [facility], patients knee is more reddened, warm to touch than yesterday. Family and patient would like patient to be seen in ER. Per Dr. Claudeen Fought previous message this is his recommendation.  They state patient was due to go home with [place] [id number] for Iv antibiotics on Sunday and they will update staff on patient

## 2022-07-09 LAB
FERRITIN: 308.3 NG/ML (ref 15–150)
IRON SATURATION: 15 % (ref 15–50)
IRON: 27 UG/DL (ref 37–145)
TOTAL IRON BINDING CAPACITY: 185 UG/DL (ref 260–445)
VANCOMYCIN RANDOM: 18 UG/ML

## 2022-07-09 PROCEDURE — 82728 ASSAY OF FERRITIN: CPT

## 2022-07-09 PROCEDURE — 82607 VITAMIN B-12: CPT

## 2022-07-09 PROCEDURE — 99231 SBSQ HOSP IP/OBS SF/LOW 25: CPT | Performed by: ORTHOPAEDIC SURGERY

## 2022-07-09 PROCEDURE — 80202 ASSAY OF VANCOMYCIN: CPT

## 2022-07-09 PROCEDURE — 36415 COLL VENOUS BLD VENIPUNCTURE: CPT

## 2022-07-09 PROCEDURE — 1200000000 HC SEMI PRIVATE

## 2022-07-09 PROCEDURE — 6360000002 HC RX W HCPCS: Performed by: INTERNAL MEDICINE

## 2022-07-09 PROCEDURE — 2580000003 HC RX 258: Performed by: NURSE PRACTITIONER

## 2022-07-09 PROCEDURE — 6360000002 HC RX W HCPCS: Performed by: NURSE PRACTITIONER

## 2022-07-09 PROCEDURE — 82746 ASSAY OF FOLIC ACID SERUM: CPT

## 2022-07-09 PROCEDURE — 83540 ASSAY OF IRON: CPT

## 2022-07-09 PROCEDURE — 6370000000 HC RX 637 (ALT 250 FOR IP): Performed by: INTERNAL MEDICINE

## 2022-07-09 PROCEDURE — 6370000000 HC RX 637 (ALT 250 FOR IP)

## 2022-07-09 PROCEDURE — 2580000003 HC RX 258: Performed by: INTERNAL MEDICINE

## 2022-07-09 PROCEDURE — 83550 IRON BINDING TEST: CPT

## 2022-07-09 PROCEDURE — 6370000000 HC RX 637 (ALT 250 FOR IP): Performed by: NURSE PRACTITIONER

## 2022-07-09 RX ORDER — BISACODYL 10 MG
10 SUPPOSITORY, RECTAL RECTAL DAILY PRN
Status: DISCONTINUED | OUTPATIENT
Start: 2022-07-09 | End: 2022-07-13 | Stop reason: HOSPADM

## 2022-07-09 RX ORDER — DORZOLAMIDE HCL 20 MG/ML
1 SOLUTION/ DROPS OPHTHALMIC 2 TIMES DAILY
Status: DISCONTINUED | OUTPATIENT
Start: 2022-07-09 | End: 2022-07-13 | Stop reason: HOSPADM

## 2022-07-09 RX ORDER — DIPHENHYDRAMINE HCL 25 MG
25 TABLET ORAL EVERY 8 HOURS PRN
Status: DISCONTINUED | OUTPATIENT
Start: 2022-07-09 | End: 2022-07-13 | Stop reason: HOSPADM

## 2022-07-09 RX ORDER — SODIUM CHLORIDE 9 MG/ML
INJECTION, SOLUTION INTRAVENOUS
Status: DISPENSED
Start: 2022-07-09 | End: 2022-07-10

## 2022-07-09 RX ORDER — TIMOLOL MALEATE 5 MG/ML
1 SOLUTION/ DROPS OPHTHALMIC 2 TIMES DAILY
Status: DISCONTINUED | OUTPATIENT
Start: 2022-07-09 | End: 2022-07-13 | Stop reason: HOSPADM

## 2022-07-09 RX ORDER — ROSUVASTATIN CALCIUM 40 MG/1
40 TABLET, COATED ORAL NIGHTLY
Status: DISCONTINUED | OUTPATIENT
Start: 2022-07-09 | End: 2022-07-13 | Stop reason: HOSPADM

## 2022-07-09 RX ORDER — DIPHENHYDRAMINE HCL 25 MG
TABLET ORAL
Status: COMPLETED
Start: 2022-07-09 | End: 2022-07-09

## 2022-07-09 RX ORDER — POLYETHYLENE GLYCOL 3350 17 G/17G
17 POWDER, FOR SOLUTION ORAL DAILY
Status: DISCONTINUED | OUTPATIENT
Start: 2022-07-09 | End: 2022-07-13 | Stop reason: HOSPADM

## 2022-07-09 RX ORDER — LACTOBACILLUS RHAMNOSUS GG 10B CELL
1 CAPSULE ORAL 2 TIMES DAILY
Status: DISCONTINUED | OUTPATIENT
Start: 2022-07-09 | End: 2022-07-13 | Stop reason: HOSPADM

## 2022-07-09 RX ORDER — DORZOLAMIDE HYDROCHLORIDE AND TIMOLOL MALEATE 20; 5 MG/ML; MG/ML
1 SOLUTION/ DROPS OPHTHALMIC 2 TIMES DAILY
Status: DISCONTINUED | OUTPATIENT
Start: 2022-07-09 | End: 2022-07-09 | Stop reason: CLARIF

## 2022-07-09 RX ORDER — LISINOPRIL 20 MG/1
20 TABLET ORAL DAILY
Status: DISCONTINUED | OUTPATIENT
Start: 2022-07-09 | End: 2022-07-13 | Stop reason: HOSPADM

## 2022-07-09 RX ORDER — LEVOTHYROXINE AND LIOTHYRONINE 19; 4.5 UG/1; UG/1
30 TABLET ORAL DAILY
Status: DISCONTINUED | OUTPATIENT
Start: 2022-07-09 | End: 2022-07-13 | Stop reason: HOSPADM

## 2022-07-09 RX ORDER — DOCUSATE SODIUM 100 MG/1
100 CAPSULE, LIQUID FILLED ORAL 2 TIMES DAILY
Status: DISCONTINUED | OUTPATIENT
Start: 2022-07-09 | End: 2022-07-13 | Stop reason: HOSPADM

## 2022-07-09 RX ORDER — LEVETIRACETAM 500 MG/1
500 TABLET ORAL 2 TIMES DAILY
Status: DISCONTINUED | OUTPATIENT
Start: 2022-07-09 | End: 2022-07-13 | Stop reason: HOSPADM

## 2022-07-09 RX ORDER — LATANOPROST 50 UG/ML
1 SOLUTION/ DROPS OPHTHALMIC NIGHTLY
Status: DISCONTINUED | OUTPATIENT
Start: 2022-07-09 | End: 2022-07-13 | Stop reason: HOSPADM

## 2022-07-09 RX ADMIN — DIPHENHYDRAMINE HYDROCHLORIDE: 25 TABLET ORAL at 02:20

## 2022-07-09 RX ADMIN — CEFEPIME HYDROCHLORIDE 2000 MG: 2 INJECTION, POWDER, FOR SOLUTION INTRAVENOUS at 21:19

## 2022-07-09 RX ADMIN — LISINOPRIL 20 MG: 20 TABLET ORAL at 08:10

## 2022-07-09 RX ADMIN — ROSUVASTATIN CALCIUM 40 MG: 40 TABLET, FILM COATED ORAL at 21:17

## 2022-07-09 RX ADMIN — TIMOLOL MALEATE 1 DROP: 5 SOLUTION OPHTHALMIC at 08:17

## 2022-07-09 RX ADMIN — LATANOPROST 1 DROP: 50 SOLUTION OPHTHALMIC at 21:16

## 2022-07-09 RX ADMIN — LEVOTHYROXINE, LIOTHYRONINE 30 MG: 19; 4.5 TABLET ORAL at 08:15

## 2022-07-09 RX ADMIN — VANCOMYCIN HYDROCHLORIDE 1000 MG: 1 INJECTION, POWDER, LYOPHILIZED, FOR SOLUTION INTRAVENOUS at 00:59

## 2022-07-09 RX ADMIN — CEFEPIME HYDROCHLORIDE 2000 MG: 2 INJECTION, POWDER, FOR SOLUTION INTRAVENOUS at 12:56

## 2022-07-09 RX ADMIN — TIMOLOL MALEATE 1 DROP: 5 SOLUTION OPHTHALMIC at 21:16

## 2022-07-09 RX ADMIN — ENOXAPARIN SODIUM 40 MG: 100 INJECTION SUBCUTANEOUS at 09:00

## 2022-07-09 RX ADMIN — Medication 1 CAPSULE: at 08:09

## 2022-07-09 RX ADMIN — Medication 10 ML: at 08:10

## 2022-07-09 RX ADMIN — DORZOLAMIDE HYDROCHLORIDE 1 DROP: 20 SOLUTION/ DROPS OPHTHALMIC at 21:16

## 2022-07-09 RX ADMIN — DORZOLAMIDE HYDROCHLORIDE 1 DROP: 20 SOLUTION/ DROPS OPHTHALMIC at 08:17

## 2022-07-09 RX ADMIN — DOCUSATE SODIUM 100 MG: 100 CAPSULE, LIQUID FILLED ORAL at 21:17

## 2022-07-09 RX ADMIN — DOCUSATE SODIUM 100 MG: 100 CAPSULE, LIQUID FILLED ORAL at 08:09

## 2022-07-09 RX ADMIN — LEVETIRACETAM 500 MG: 500 TABLET, FILM COATED ORAL at 08:10

## 2022-07-09 RX ADMIN — Medication 1 CAPSULE: at 21:17

## 2022-07-09 RX ADMIN — Medication 10 ML: at 21:16

## 2022-07-09 RX ADMIN — DIPHENHYDRAMINE HYDROCHLORIDE 25 MG: 25 TABLET ORAL at 21:16

## 2022-07-09 RX ADMIN — Medication 10 ML: at 02:15

## 2022-07-09 RX ADMIN — DIPHENHYDRAMINE HCL: 25 TABLET ORAL at 02:20

## 2022-07-09 RX ADMIN — LEVETIRACETAM 500 MG: 500 TABLET, FILM COATED ORAL at 21:17

## 2022-07-09 ASSESSMENT — PAIN SCALES - GENERAL
PAINLEVEL_OUTOF10: 0

## 2022-07-09 NOTE — PROGRESS NOTES
Nutrition Note    RECOMMENDATIONS  1. PO Diet: NPO  2. ONS: begin Ensure Enlive BID when diet adv  3. Nutrition Support: n/a     NUTRITION ASSESSMENT   Nutrition evalution triggered for wound. Pt with Stage III to buttocks. Currently NPO. Pt sleeping upon RD visit, will begin oral nutrition supplement when diet advanced.  Nutrition Related Findings: disoriented to place & situation; RLE +1 edema; active BS   Wounds: Stage III   Nutrition Education:  Education not indicated    Nutrition Goals: PO intake 75% or greater (when diet advanced)     MALNUTRITION ASSESSMENT      Malnutrition Status: Insufficient data      NUTRITION DIAGNOSIS   · Increased nutrient needs related to increase demand for energy/nutrients as evidenced by wounds      CURRENT NUTRITION THERAPIES  Diet NPO Exceptions are: Sips of Water with Meds, Sips of Clear Liquids     PO Intake: NPO   PO Supplement Intake:NPO      ANTHROPOMETRICS   Current Height: 5' 5\" (165.1 cm)   Current Weight: 156 lb 4.9 oz (70.9 kg)     Ideal Body Weight (IBW): 125 lbs  (57 kg)        BMI: 26      COMPARATIVE STANDARDS  Energy (kcal):  1420 - 1775     Protein (g):  74 - 103       Fluid (mL/day):  1420 - 1775    The patient will be monitored per nutrition standards of care. Consult dietitian if additional nutrition interventions are needed prior to RD reassessment.      Miquel Sevilla RD, LD    Contact: [phone]

## 2022-07-09 NOTE — PROGRESS NOTES
Dry dressing to right knee changed. New 4x4 gauze, ABD pad, and ACE wrap placed to right knee. Mepilex foam dressing placed to left buttock pressure injury. Wound consult placed. Dressing to PICC changed. Pt tolerated well.

## 2022-07-09 NOTE — PROGRESS NOTES
Patient seen and examined by one of her partners earlier today. I agree with their assessment and plan and will continue to follow while in the hospital.  ID consulted. Ortho recommendations noted. Continue IV antibiotics.      Jenny Angelo MD

## 2022-07-09 NOTE — CONSULTS
PATIENT NAME:                     Fátima Wallis  YOB: 1935   MEDICAL RECORD#         [id number]  SURGEON:                 Curtis Kenny MD      CHIEF COMPLAINT: right knee pain. History:Ms. Fátima Wallis is a patient who was admitted last night with concerns of continued drainage from her right knee. The patient is a well-known patient of Dr. Sandra Molina. From the report of the emergency room, the family was not satisfied with the continued drainage from her knee so they presented to the emergency room. 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 The patient was taken to surgery by Dr. Sandra Molina and underwent irrigation and debridement of the right knee with a polyethylene exchange. The patient has been on antibiotics. She initially went to a rehab center and then ultimately returned home. The patient denies any other injuries or issues. This a consult from Deedee Shrestha MD for right knee pain and drainage. PAST MEDICAL HISTORY:   Past Medical History:   Diagnosis Date    CAD (coronary artery disease)     Inferior STEMI; CATRACHITA to RCA    DDD (degenerative disc disease), lumbosacral 4/10/2013    Decreased mobility and endurance 2/24/2022    Hearing aid worn     bilateral    History of short term memory loss 3/3/2022    [place] (hard of hearing)     [place] (hard of hearing)     Hyperlipidemia     Hypertension     MI, old 2017    Neuropathy     bilateral lower extremities    Non-compliance with treatment 6/2/2022    Pressure ulcer of ischium, stage 3, left ([address] 2/16/2022    Pressure ulcer of left buttock, stage 3 ([address] 2/16/2022    Sleep apnea     uses mouth piece; bringing DOS 3/26/18    Thyroid disease     hypo    Wears glasses         MEDICATIONS: The patient's medication reconciliation form was extensively reviewed and noted.      ALLERGIES:   Allergies   Allergen Reactions    Amoxicillin Hives and Rash    Linezolid Other (See Comments)     While taking linezolid, pt developed progressively altered mentation & ultimately had a witnessed seizure 4/26/18. Uncertain whether these symptoms were d/t linezolid, but possible.  Prednisone Itching    Vancomycin      Rising SCr during 5/2018 admission     Coreg [Carvedilol] Diarrhea    Oxycodone-Acetaminophen Rash        SOCIAL HISTORY:   Social History     Socioeconomic History    Marital status: Single     Spouse name: Not on file    Number of children: Not on file    Years of education: Not on file    Highest education level: Not on file   Occupational History    Occupation: formerly secreteary   Tobacco Use    Smoking status: Never Smoker    Smokeless tobacco: Never Used    Tobacco comment: encouraged to never smoke    Vaping Use    Vaping Use: Never used   Substance and Sexual Activity    Alcohol use: No    Drug use: No    Sexual activity: Not Currently   Other Topics Concern    Not on file   Social History Narrative    Not on file     Social Determinants of Health     Financial Resource Strain:     Difficulty of Paying Living Expenses: Not on file   Food Insecurity:     Worried About [address] in the Last Year: Not on file    [address] in the Last Year: Not on file   Transportation Needs:     Lack of Transportation (Medical): Not on file    Lack of Transportation (Non-Medical):  Not on file   Physical Activity:     Days of Exercise per Week: Not on file    Minutes of Exercise per Session: Not on file   Stress:     Feeling of Stress : Not on file   Social Connections:     Frequency of Communication with Friends and Family: Not on file    Frequency of Social Gatherings with Friends and Family: Not on file    Attends Faith Services: Not on file    Active Member of Clubs or Organizations: Not on file    Attends Club or Organization Meetings: Not on file    Marital Status: Not on file   Intimate Partner Violence:     Fear of Current or Ex-Partner: Not on file    Emotionally Abused: Not on file    Physically Abused: Not on file    Sexually Abused: Not on file   Housing Stability:     Unable to Pay for Housing in the Last Year: Not on file    Number of [place] in the Last Year: Not on file    Unstable Housing in the Last Year: Not on file        PAST SURGICAL HISTORY:   Past Surgical History:   Procedure Laterality Date    ABSCESS DRAINAGE Right 03/27/2018    evacuation hematoma right knee    APPENDECTOMY  1963    BACK SURGERY  [id number]    MILD PROCEDURE L2-3 BILATERAL    [address]  10/2003    CATARACT REMOVAL  03/23/2010 and 04/14/2010    COLONOSCOPY  6/2014    repeat 2019   Lorena Sheri  01/23/2017    INF STEMI with CATRACHITA to RCA   [address] Right 2018    right total knee replacment    OVARY REMOVAL  1963    right    REVISION TOTAL KNEE ARTHROPLASTY Right 6/18/2022    RIGHT TOTAL KNEE INCISION AND DRAINAGE WITH LINER EXCHANGE performed by Carlin Grimaldo MD at [address]: Negative for fever, chills or night sweats. Review of   systems is negative except as mentioned in the history of present illness. FAMILY MEDICAL HISTORY:   Family History   Problem Relation Age of Onset   [facility] Cancer Mother     Heart Disease Mother     Arthritis Sister         rheumatoid    Cancer Brother     Cancer Sister     Diabetes Sister     Heart Disease Brother     High Blood Pressure Brother     High Blood Pressure Sister     Diabetes Brother          Physical: Ms. Miriam Gonsales appears well, she is in no apparent distress, she demonstrates appropriate mood & affect. She is alert. The patient does seem pleasantly confused. The patient is holding her right knee in a semiflexed position. She is neurovascularly intact distally. She has no pain with range of motion of her right hip. She has moderate pain with range of motion of the right knee. There is moderate swelling of the right knee. There is a area of active drainage that measures approximately 3 mm x 2 mm proximally. There is no clear area of fluctuance. There is no evidence of DVT. Comparing the knee to the images from yesterday, there is an improvement with regards to the erythema and swelling. Imaging:   X-Ray: 2 views of the right knee obtained yesterday in the emergency room were extensively reviewed. There is evidence of a cemented total knee with lysis around the baseplate of the tibia. There is diffuse osteopenia. IMPRESSION: Chronically infected right total knee arthroplasty        Plan: At this time, dressing changes may be continued to the right knee and antibiotics continued per infectious disease. The patient may return to her prior living environment and ultimately follow-up with Dr. Mikayla Cagle in his office next week. Discussion of the definitive course/treatment then can be performed. The options would be two-stage revision with a antibiotic spacer. Another option would be extensive debridement, prosthetic removal and placement of an articulating cemented low demand total knee arthroplasty with an all polytibia. This could be performed with the tentative plan of a 1 stage revision knowing that recurrence certainly could occur. Considering this low demand patient, extensive debridement with 1 stage revision may be the best option. This will be deferred to Dr. Mikayla Cagle.

## 2022-07-09 NOTE — H&P
Hospital Medicine History & Physical      PCP: Tyler Gomez DO    Date of Admission: 7/8/2022    Date of Service: Pt seen/examined on 07/09/22 and Admitted to Inpatient with expected LOS greater than two midnights due to medical therapy. Chief Complaint:  Right knee pain and swelling       History Of Present Illness: Briana Shields is 82 y.o. female who presented with complaint of right knee pain and swelling. Symptom onset was acute for a time period of few days. The severity is described as severe. The course of his symptoms over time is worsening. The symptoms improved with none and worsened with none. The patient's symptom is associated with right knee and leg redness . Briana Shields is 82 y.o. female with history of CAD/stent (2017), HTN, seizure disorder, hypothyroid, right TKA (2018). She has been experiencing increased pain and swelling in right knee. PCP aspirated knee 4/18 and culture grew pseudomonas. She completed antibiotic course. She presented to [facility] with worsening pain, swelling and drainage from right knee. She was transferred to [facility] for further management. She has been started on vancomycin and Zosyn.      She was hospitalized here 6/16 - 6/21.    6/18/2022: RIGHT TOTAL KNEE INCISION AND DRAINAGE WITH LINER EXCHANGE     He now presents to the ER with complaint of right knee pain and swelling and eythema. It has been ongoing for the past few days. She had no fever.      Past Medical History:          Diagnosis Date    CAD (coronary artery disease)     Inferior STEMI; CATRACHITA to RCA    DDD (degenerative disc disease), lumbosacral 4/10/2013    Decreased mobility and endurance 2/24/2022    Hearing aid worn     bilateral    History of short term memory loss 3/3/2022    Eyak (hard of hearing)     Eyak (hard of hearing)     Hyperlipidemia     Hypertension     MI, old 2017    Neuropathy     bilateral lower extremities    Non-compliance with treatment 6/2/2022    Pressure ulcer of ischium, stage 3, left ([address] 2/16/2022    Pressure ulcer of left buttock, stage 3 ([address] 2/16/2022    Sleep apnea     uses mouth piece; bringing DOS 3/26/18    Thyroid disease     hypo    Wears glasses        Past Surgical History:          Procedure Laterality Date    ABSCESS DRAINAGE Right 03/27/2018    evacuation hematoma right knee    APPENDECTOMY  1963    BACK SURGERY  [id number]    MILD PROCEDURE L2-3 BILATERAL     BREAST LUMPECTOMY  1986    BUNIONECTOMY  10/2003    CATARACT REMOVAL  03/23/2010 and 04/14/2010    COLONOSCOPY  6/2014    repeat 2019    CORONARY ANGIOPLASTY WITH STENT PLACEMENT  01/23/2017    INF STEMI with CATRACHITA to RCA    DILATION AND CURETTAGE OF UTERUS      INCONTINENCE SURGERY      JOINT REPLACEMENT Right 2018    right total knee replacment   [address]    right    REVISION TOTAL KNEE ARTHROPLASTY Right 6/18/2022    RIGHT TOTAL KNEE INCISION AND DRAINAGE WITH LINER EXCHANGE performed by Fabian Franco MD at [address]       Medications Prior to Admission:      Prior to Admission medications    Medication Sig Start Date End Date Taking? Authorizing Provider   acetaminophen (TYLENOL) 325 MG tablet Take 650 mg by mouth every 4 hours as needed for Pain   Yes Historical Provider, MD   bisacodyl (DULCOLAX) 10 MG suppository Place 10 mg rectally daily   Yes Historical Provider, MD   HYDROcodone-acetaminophen (NORCO) 5-325 MG per tablet Take 1 tablet by mouth every 6 hours as needed for Pain.    Yes Historical Provider, MD   DAPTOMYCIN IV Infuse 350 mg intravenously daily Until 8/1    Historical Provider, MD   lidocaine 4 % external patch Place 1 patch onto the skin daily Apply 1/2 patch to either side of right knee 6/21/22   MICHELLE Higgins CNP   docusate sodium (COLACE) 100 MG capsule Take 1 capsule by mouth 2 times daily 6/21/22   MICHELLE Higgins CNP   polyethylene glycol (GLYCOLAX) 17 g packet Take 17 g by mouth daily 6/22/22 7/22/22  Kandy Burris Robbie, APRN - CNP   aspirin EC 81 MG EC tablet Take 1 tablet by mouth 2 times daily  Patient taking differently: Take 81 mg by mouth 2 times daily Take twice daily until 7/24/22, then reduce to daily. 6/20/22 7/20/22  Sheela Gutierrez APRN - CNP   dorzolamide-timolol (COSOPT) 22.3-6.8 MG/ML ophthalmic solution Place 1 drop into the left eye 2 times daily    Historical Provider, MD   levETIRAcetam (KEPPRA) 500 MG tablet TAKE 1 TABLET BY MOUTH TWICE A DAY 5/11/22   Serge Davenport DO   lisinopril (PRINIVIL;ZESTRIL) 20 MG tablet TAKE ONE TABLET BY MOUTH DAILY 1/23/22   Lior Dubose DO   Balsam [place]-Castor Oil (VENELEX) OINT ointment Apply topically 2 times daily 1/3/22   Sabina Brandt MD   diclofenac (VOLTAREN) 75 MG EC tablet TAKE ONE TABLET BY MOUTH TWICE A DAY WITH MEALS  Patient taking differently: Take 75 mg by mouth 2 times daily as needed TAKE ONE TABLET BY MOUTH TWICE A DAY WITH MEALS 12/31/21   Serge Davenport DO   potassium chloride (KLOR-CON M) 10 MEQ extended release tablet TAKE ONE TABLET BY MOUTH DAILY 9/2/21   Nikki Travis DO   thyroid (NP THYROID) 30 MG tablet TAKE ONE TABLET BY MOUTH DAILY 8/31/21   Serge Davenport DO   rosuvastatin (CRESTOR) 40 MG tablet TAKE ONE TABLET BY MOUTH DAILY 4/1/21   Brandi Guzman MD   Lactobacillus (ACIDOPHILUS) TABS Take 1 tablet by mouth 2 times daily     Historical Provider, MD   Multiple Vitamins-Minerals (DAILY SHERIE MAXIMUM MULTIVITAMIN PO) Take 1 packet by mouth daily    Historical Provider, MD   latanoprost (XALATAN) 0.005 % ophthalmic solution Place 1 drop into the left eye nightly  2/1/18   Historical Provider, MD       Allergies:  Amoxicillin, Linezolid, Prednisone, Vancomycin, Coreg [carvedilol], and Oxycodone-acetaminophen    Social History:      The patient currently lives at home    TOBACCO:   reports that she has never smoked.  She has never used smokeless tobacco.  ETOH:   reports no history of alcohol use.  E-cigarette/Vaping     Questions Responses    E-cigarette/Vaping Use Never User    Start Date     Passive Exposure No    Quit Date     Counseling Given Yes    Comments             Family History:      Reviewed and negative in regards to presenting illness/complaint. Problem Relation Age of Onset    Cancer Mother     Heart Disease Mother     Arthritis Sister         rheumatoid    Cancer Brother     Cancer Sister     Diabetes Sister     Heart Disease Brother     High Blood Pressure Brother     High Blood Pressure Sister     Diabetes Brother        REVIEW OF SYSTEMS COMPLETED:   Pertinent positives as noted in the HPI. All other systems reviewed and negative. PHYSICAL EXAM PERFORMED:    BP (!) 161/79   Pulse 92   Temp 98.6 °F (37 °C) (Oral)   Resp 17   Ht 5' 5\" (1.651 m)   Wt 156 lb 4.9 oz (70.9 kg)   SpO2 96%   BMI 26.01 kg/m²     General appearance:  No apparent distress, appears stated age and cooperative. HEENT:  Normal cephalic, atraumatic without obvious deformity. Pupils equal, round, and reactive to light. Extra ocular muscles intact. Conjunctivae/corneas clear. Neck: Supple, with full range of motion. No jugular venous distention. Trachea midline. Respiratory:  Normal respiratory effort. Clear to auscultation, bilaterally without Rales/Wheezes/Rhonchi. Cardiovascular:  Regular rate and rhythm with normal S1/S2 without murmurs, rubs or gallops. Abdomen: Soft, non-tender, non-distended with normal bowel sounds. Musculoskeletal:  No clubbing, cyanosis or edema bilaterally. Full range of motion without deformity. Skin: Skin color, texture, turgor normal.  No rashes or lesions. Neurologic:  Neurovascularly intact without any focal sensory/motor deficits.  Cranial nerves: II-XII intact, grossly non-focal.  Psychiatric:  Alert and oriented, thought content appropriate, normal insight  Capillary Refill: Brisk,3 seconds, normal  Peripheral Pulses: +2 palpable, equal bilaterally Labs:     Recent Labs     07/08/22 2002   WBC 7.8   HGB 8.5*   HCT 25.7*        Recent Labs     07/08/22 2002      K 4.3      CO2 22   BUN 26*   CREATININE 0.8   CALCIUM 9.5     Recent Labs     07/08/22 2002   AST 22   ALT 20   BILITOT <0.2   ALKPHOS 181*     No results for input(s): INR in the last 72 hours. No results for input(s): Elizabeth Highman in the last 72 hours. Urinalysis:      Lab Results   Component Value Date/Time    NITRU POSITIVE 01/01/2022 02:45 PM    WBCUA 18 01/01/2022 02:45 PM    BACTERIA 4+ 01/01/2022 02:45 PM    RBCUA 42 01/01/2022 02:45 PM    BLOODU NEG 02/18/2022 10:08 AM    BLOODU LARGE 01/01/2022 02:45 PM    SPECGRAV 1.025 02/18/2022 10:08 AM    SPECGRAV >1.030 01/01/2022 02:45 PM    GLUCOSEU NEG 02/18/2022 10:08 AM    GLUCOSEU Negative 01/01/2022 02:45 PM       Radiology:       XR KNEE RIGHT (1-2 VIEWS)   Final Result   Total right knee replacement which is unchanged with no acute bony   abnormality. Diffuse osteopenia. Moderate soft tissue swelling anterior to the knee and a moderate   suprapatellar effusion. Consults:    IP CONSULT TO ORTHOPEDIC SURGERY  IP CONSULT TO PHARMACY  IP CONSULT TO INFECTIOUS DISEASES    ASSESSMENT:    Principal Problem:    Infection of prosthetic right knee joint ([address]  Active Problems:    Chronic renal disease, stage III ([address] [910976]    Pseudomonas infection    Dementia without behavioral disturbance ([address]    Essential hypertension    Prosthetic joint infection ([address]    Seizure disorder (HCC)    Pressure ulcer of ischium, stage 3, left ([facility])  Resolved Problems:    * No resolved hospital problems. *        PLAN:    1. Right knee prosthetic joint infection. Recent aspirate from right knee (4/2022) grew Pseudomonas and she received oral ciprofloxacin. S/p right knee I&D with liner exchange 6/18, surgery cultures + MRSA, no Pseudomonas noted in surgical culture at this time.   Discharged recently on 6/21 with daptomycin.   (allergy to Linezolid and ANTONIO with vancomycin). PICC placed 6/19. Now return with worsening infection. Consult ID and orthopedics. IV cefepime and IV vancomycin ordered. Plan to discharge on ASA BID for DVT ppx. 2. Decubitus wound left buttock. Patient sedentary, sits most of the day. Followed in [facility]. Consult wound care nurse and dressing changed.    3. HTN. Reasonably controlled. Continue Lisinopril. Continue to follow. 4. CAD. Hx STEMI 1/2017, CATRACHITA placed to mid RCA. Denies chest pain. Continue asa and statin. 5. Hx seizure. Continue Keppra. 6. Hypothyroidism. TSH 4.06 (8/2021). Continue thyroid tablet. 7. Dementia: At baseline mentation      DVT Prophylaxis: Lovenox  Diet: Diet NPO Exceptions are: Sips of Water with Meds, Sips of Clear Liquids  Code Status: Full Code    PT/OT Eval Status: PT/OT consult is not ordered     Dispo - Admit as inpatient        Ana Lilia Amin MD    Thank you Arjun Curiel DO for the opportunity to be involved in this patient's care. If you have any questions or concerns please feel free to contact me at [phone].

## 2022-07-09 NOTE — PROGRESS NOTES
Patient resting quietly this shift. VSS, alert to self. Patient reporting intermittent mild pain. Patient is from rehab facility, per family she was supposed to return home alone on Sunday which the family stated that they were not prepared for. Patient is to return home upon discharge. Fall precautions in place, skin precautions in place. PICC intact, IV abx therapy.

## 2022-07-09 NOTE — PLAN OF CARE
Problem: Discharge Planning  Goal: Discharge to home or other facility with appropriate resources  Outcome: Progressing  Flowsheets (Taken 7/8/2022 [id number] by Ilana Anne RN)  Discharge to home or other facility with appropriate resources:   Identify barriers to discharge with patient and caregiver   Arrange for needed discharge resources and transportation as appropriate   Identify discharge learning needs (meds, wound care, etc)   Arrange for interpreters to assist at discharge as needed   Refer to discharge planning if patient needs post-hospital services based on physician order or complex needs related to functional status, cognitive ability or social support system     Problem: Safety - Adult  Goal: Free from fall injury  Outcome: Progressing     Problem: Pain  Goal: Verbalizes/displays adequate comfort level or baseline comfort level  Outcome: Progressing

## 2022-07-09 NOTE — PROGRESS NOTES
Admission and assessment completed. Pt resting in bed, vitals stable. Lani Picket in place for incontinence. Dry dressing in place to right knee. Wound culture sent in ER. Right knee red, swollen. Patient denies pain at this time. Patient oriented to self and time, disoriented to place and situation. Camera placed for additional safety. Bed alarm on.

## 2022-07-09 NOTE — PROGRESS NOTES
4 Eyes Skin Assessment     NAME:  Genet Maier  YOB: 1935  MEDICAL RECORD NUMBER:  [id number]    The patient is being assess for  Admission    I agree that 2 RN's have performed a thorough Head to Toe Skin Assessment on the patient. ALL assessment sites listed below have been assessed. Areas assessed by both nurses:    Head, Face, Ears, Shoulders, Back, Chest, Arms, Elbows, Hands, Sacrum. Buttock, Coccyx, Ischium and Legs. Feet and Heels        Does the Patient have a Wound? Yes wound(s) were present on assessment.  LDA wound assessment was Initiated and completed        Av Prevention initiated:  Yes   Wound Care Orders initiated:  No    Pressure Injury (Stage 3,4, Unstageable, DTI, NWPT, and Complex wounds) if present place referral/consult order under [de-identified] Yes    New and Established Ostomies if present place consult order under : NA      Nurse 1 eSignature: Electronically signed by Chay Hanson RN on 7/9/22 at 12:35 AM EDT    **SHARE this note so that the co-signing nurse is able to place an eSignature**    Nurse 2 eSignature: Electronically signed by Екатерина Finn RN on 7/9/22 at 12:43 AM EDT

## 2022-07-09 NOTE — PROGRESS NOTES
Clinical Pharmacy Note: Pharmacy to Dose Vancomycin    Isidoro Murcia is a 82 y.o. female started on Vancomycin for cellulitis; consult received from Dr. Suzette Nettles to manage therapy. Also receiving the following antibiotics: cefepime. Goal AUC: 400-600 mg/L*hr  Goal Trough Level: 10-20 mcg/mL    Assessment/Plan:  A 1000 mg loading dose was given on 7/09/2022 at 0229  Initiate vancomycin 1250 mg IV every 24 hours. Bayesian modeling predicts an AUC of 475 mg/L*hr and a trough of 13.9 mcg/mL at steady state concentration. A vancomycin random level has been ordered for 7/09/2022 at noon  Changes in regimen will be determined based on culture results, renal function, and clinical response. Pharmacy will continue to monitor and adjust regimen as necessary. Allergies:  Amoxicillin, Linezolid, Prednisone, Vancomycin, Coreg [carvedilol], and Oxycodone-acetaminophen     Recent Labs     07/08/22 2002   CREATININE 0.8       Recent Labs     07/08/22 2002   WBC 7.8       Ht Readings from Last 1 Encounters:   07/08/22 5' 5\" (1.651 m)        Wt Readings from Last 1 Encounters:   07/09/22 156 lb 4.9 oz (70.9 kg)         Estimated Creatinine Clearance: 51 mL/min (based on SCr of 0.8 mg/dL).       Thank you for the consult,    Dalila Klinefelter, PharmD, [facility]

## 2022-07-10 LAB
A/G RATIO: 1.2 (ref 1.1–2.2)
ALBUMIN SERPL-MCNC: 3.5 G/DL (ref 3.4–5)
ALP BLD-CCNC: 145 U/L (ref 40–129)
ALT SERPL-CCNC: 19 U/L (ref 10–40)
ANION GAP SERPL CALCULATED.3IONS-SCNC: 9 MMOL/L (ref 3–16)
AST SERPL-CCNC: 20 U/L (ref 15–37)
BASOPHILS ABSOLUTE: 0 K/UL (ref 0–0.2)
BASOPHILS RELATIVE PERCENT: 0.3 %
BILIRUB SERPL-MCNC: 0.4 MG/DL (ref 0–1)
BUN BLDV-MCNC: 22 MG/DL (ref 7–20)
C-REACTIVE PROTEIN: 51.1 MG/L (ref 0–5.1)
CALCIUM SERPL-MCNC: 9 MG/DL (ref 8.3–10.6)
CHLORIDE BLD-SCNC: 105 MMOL/L (ref 99–110)
CO2: 23 MMOL/L (ref 21–32)
CREAT SERPL-MCNC: 0.8 MG/DL (ref 0.6–1.2)
EOSINOPHILS ABSOLUTE: 1.1 K/UL (ref 0–0.6)
EOSINOPHILS RELATIVE PERCENT: 17.3 %
FOLATE: >20 NG/ML (ref 4.78–24.2)
GFR AFRICAN AMERICAN: >60
GFR NON-AFRICAN AMERICAN: >60
GLUCOSE BLD-MCNC: 120 MG/DL (ref 70–99)
HCT VFR BLD CALC: 27.4 % (ref 36–48)
HEMOGLOBIN: 8.9 G/DL (ref 12–16)
LYMPHOCYTES ABSOLUTE: 1.1 K/UL (ref 1–5.1)
LYMPHOCYTES RELATIVE PERCENT: 17.2 %
MCH RBC QN AUTO: 26.2 PG (ref 26–34)
MCHC RBC AUTO-ENTMCNC: 32.4 G/DL (ref 31–36)
MCV RBC AUTO: 80.8 FL (ref 80–100)
MONOCYTES ABSOLUTE: 0.5 K/UL (ref 0–1.3)
MONOCYTES RELATIVE PERCENT: 7.9 %
NEUTROPHILS ABSOLUTE: 3.6 K/UL (ref 1.7–7.7)
NEUTROPHILS RELATIVE PERCENT: 57.3 %
PDW BLD-RTO: 16 % (ref 12.4–15.4)
PLATELET # BLD: 313 K/UL (ref 135–450)
PMV BLD AUTO: 7.4 FL (ref 5–10.5)
POTASSIUM SERPL-SCNC: 3.8 MMOL/L (ref 3.5–5.1)
RBC # BLD: 3.39 M/UL (ref 4–5.2)
REPORT: NORMAL
SEDIMENTATION RATE, ERYTHROCYTE: 55 MM/HR (ref 0–30)
SODIUM BLD-SCNC: 137 MMOL/L (ref 136–145)
TOTAL PROTEIN: 6.5 G/DL (ref 6.4–8.2)
VITAMIN B-12: 552 PG/ML (ref 211–911)
WBC # BLD: 6.2 K/UL (ref 4–11)

## 2022-07-10 PROCEDURE — 99223 1ST HOSP IP/OBS HIGH 75: CPT | Performed by: INTERNAL MEDICINE

## 2022-07-10 PROCEDURE — 85025 COMPLETE CBC W/AUTO DIFF WBC: CPT

## 2022-07-10 PROCEDURE — 80053 COMPREHEN METABOLIC PANEL: CPT

## 2022-07-10 PROCEDURE — 1200000000 HC SEMI PRIVATE

## 2022-07-10 PROCEDURE — 6370000000 HC RX 637 (ALT 250 FOR IP): Performed by: INTERNAL MEDICINE

## 2022-07-10 PROCEDURE — 2580000003 HC RX 258: Performed by: INTERNAL MEDICINE

## 2022-07-10 PROCEDURE — 6370000000 HC RX 637 (ALT 250 FOR IP): Performed by: NURSE PRACTITIONER

## 2022-07-10 PROCEDURE — 6360000002 HC RX W HCPCS: Performed by: INTERNAL MEDICINE

## 2022-07-10 PROCEDURE — 86140 C-REACTIVE PROTEIN: CPT

## 2022-07-10 PROCEDURE — 85652 RBC SED RATE AUTOMATED: CPT

## 2022-07-10 RX ORDER — ASPIRIN 81 MG/1
81 TABLET ORAL DAILY
Status: DISCONTINUED | OUTPATIENT
Start: 2022-07-10 | End: 2022-07-13 | Stop reason: HOSPADM

## 2022-07-10 RX ORDER — NIFEDIPINE 30 MG/1
30 TABLET, EXTENDED RELEASE ORAL DAILY
Status: DISCONTINUED | OUTPATIENT
Start: 2022-07-10 | End: 2022-07-13 | Stop reason: HOSPADM

## 2022-07-10 RX ORDER — FERROUS SULFATE 325(65) MG
325 TABLET ORAL
Status: DISCONTINUED | OUTPATIENT
Start: 2022-07-11 | End: 2022-07-13 | Stop reason: HOSPADM

## 2022-07-10 RX ADMIN — LISINOPRIL 20 MG: 20 TABLET ORAL at 09:59

## 2022-07-10 RX ADMIN — LEVETIRACETAM 500 MG: 500 TABLET, FILM COATED ORAL at 10:10

## 2022-07-10 RX ADMIN — DOCUSATE SODIUM 100 MG: 100 CAPSULE, LIQUID FILLED ORAL at 09:59

## 2022-07-10 RX ADMIN — ASPIRIN 81 MG: 81 TABLET, COATED ORAL at 13:38

## 2022-07-10 RX ADMIN — DAPTOMYCIN 450 MG: 500 INJECTION, POWDER, LYOPHILIZED, FOR SOLUTION INTRAVENOUS at 13:36

## 2022-07-10 RX ADMIN — ROSUVASTATIN CALCIUM 40 MG: 40 TABLET, FILM COATED ORAL at 21:23

## 2022-07-10 RX ADMIN — DOCUSATE SODIUM 100 MG: 100 CAPSULE, LIQUID FILLED ORAL at 21:23

## 2022-07-10 RX ADMIN — NIFEDIPINE 30 MG: 30 TABLET, FILM COATED, EXTENDED RELEASE ORAL at 13:39

## 2022-07-10 RX ADMIN — TIMOLOL MALEATE 1 DROP: 5 SOLUTION OPHTHALMIC at 10:08

## 2022-07-10 RX ADMIN — Medication 1 CAPSULE: at 09:59

## 2022-07-10 RX ADMIN — DIPHENHYDRAMINE HYDROCHLORIDE 25 MG: 25 TABLET ORAL at 21:23

## 2022-07-10 RX ADMIN — CEFEPIME HYDROCHLORIDE 2000 MG: 2 INJECTION, POWDER, FOR SOLUTION INTRAVENOUS at 10:07

## 2022-07-10 RX ADMIN — DORZOLAMIDE HYDROCHLORIDE 1 DROP: 20 SOLUTION/ DROPS OPHTHALMIC at 21:22

## 2022-07-10 RX ADMIN — LEVETIRACETAM 500 MG: 500 TABLET, FILM COATED ORAL at 21:23

## 2022-07-10 RX ADMIN — LEVOTHYROXINE, LIOTHYRONINE 30 MG: 19; 4.5 TABLET ORAL at 10:08

## 2022-07-10 RX ADMIN — Medication 10 ML: at 10:08

## 2022-07-10 RX ADMIN — Medication 1 CAPSULE: at 21:23

## 2022-07-10 RX ADMIN — Medication 10 ML: at 21:23

## 2022-07-10 RX ADMIN — DORZOLAMIDE HYDROCHLORIDE 1 DROP: 20 SOLUTION/ DROPS OPHTHALMIC at 10:08

## 2022-07-10 RX ADMIN — LATANOPROST 1 DROP: 50 SOLUTION OPHTHALMIC at 21:22

## 2022-07-10 RX ADMIN — VANCOMYCIN HYDROCHLORIDE 1250 MG: 10 INJECTION, POWDER, LYOPHILIZED, FOR SOLUTION INTRAVENOUS at 05:11

## 2022-07-10 RX ADMIN — ENOXAPARIN SODIUM 40 MG: 100 INJECTION SUBCUTANEOUS at 09:59

## 2022-07-10 RX ADMIN — TIMOLOL MALEATE 1 DROP: 5 SOLUTION OPHTHALMIC at 21:22

## 2022-07-10 ASSESSMENT — PAIN SCALES - GENERAL
PAINLEVEL_OUTOF10: 0

## 2022-07-10 NOTE — CARE COORDINATION
Per Oriana velarde/[facility]. Healthy, patient was skilled and was expected to be discharged from the facility on 7/9/22. If patient will need skilled services at discharge and possibly return to [facility].  Healthy, patient will need pre-cert and a COVID test.

## 2022-07-10 NOTE — CONSULTS
Infectious Diseases Inpatient Consult Note      Reason for Consult:  Right  TKA infection on IV antibiotics      Requesting Physician:  Rajesh Dillard     Primary Care Physician:  Debra Rodriguez DO    History Obtained From:  Epic    CHIEF COMPLAINT:     Chief Complaint   Patient presents with    Knee Pain     Pt. sent in per private EMS from [place]. [address] with report of right knee pain, NKI. HISTORY OF PRESENT ILLNESS:  82 y.o. woman with a past medical history of coronary artery disease hearing impairment, neuropathy, pressure ulcer of the sacral area, with a history of left total knee arthroplasty infection with sinus drainage. Patient underwent right total knee arthroplasty incision and drainage and polyethylene liner exchange by Dr. Ivis Humphrey on 6/18/22 at [facility].  She was evaluated by infectious disease was discharged on IV daptomycin based on the cultures through PICC line. Operative culture from 6/18 was positive for MRSA. She also had knee fluid aspirate as outpatient in April 2022 and was positive for Pseudomonas its not clear if she was sent out on IV Cefepime. She is now admitted from nursing facility secondary to increased drainage and pain in the right knee with some swelling.            Past Medical History:    Past Medical History:   Diagnosis Date    CAD (coronary artery disease)     Inferior STEMI; CATRACHITA to RCA    DDD (degenerative disc disease), lumbosacral 4/10/2013    Decreased mobility and endurance 2/24/2022    Hearing aid worn     bilateral    History of short term memory loss 3/3/2022    [place] (hard of hearing)     [place] (hard of hearing)     Hyperlipidemia     Hypertension     MI, old 2017    Neuropathy     bilateral lower extremities    Non-compliance with treatment 6/2/2022    Pressure ulcer of ischium, stage 3, left ([address] 2/16/2022    Pressure ulcer of left buttock, stage 3 ([address] 2/16/2022    Sleep apnea     uses mouth piece; bringing DOS 3/26/18    Thyroid disease     hypo    Wears glasses        Past Surgical History:    Past Surgical History:   Procedure Laterality Date    ABSCESS DRAINAGE Right 03/27/2018    evacuation hematoma right knee    APPENDECTOMY  1963    BACK SURGERY  [id number]    MILD PROCEDURE L2-3 BILATERAL     BREAST LUMPECTOMY  1986    BUNIONECTOMY  10/2003    CATARACT REMOVAL  03/23/2010 and 04/14/2010    COLONOSCOPY  6/2014    repeat 2019    CORONARY ANGIOPLASTY WITH STENT PLACEMENT  01/23/2017    INF STEMI with CATRACHITA to RCA   [address]      JOINT REPLACEMENT Right 2018    right total knee replacment    OVARY REMOVAL  1963    right    REVISION TOTAL KNEE ARTHROPLASTY Right 6/18/2022    RIGHT TOTAL KNEE INCISION AND DRAINAGE WITH LINER EXCHANGE performed by Soumya Angulo MD at [address]       Current Medications:    Outpatient Medications Marked as Taking for the 7/8/22 encounter [facility] Encounter)   Medication Sig Dispense Refill    acetaminophen (TYLENOL) 325 MG tablet Take 650 mg by mouth every 4 hours as needed for Pain      bisacodyl (DULCOLAX) 10 MG suppository Place 10 mg rectally daily      HYDROcodone-acetaminophen (NORCO) 5-325 MG per tablet Take 1 tablet by mouth every 6 hours as needed for Pain.          Allergies:  Amoxicillin, Linezolid, Prednisone, Vancomycin, Coreg [carvedilol], and Oxycodone-acetaminophen    Immunizations :   Immunization History   Administered Date(s) Administered    COVID-19, MODERNA BLUE border, Primary or Immunocompromised, (age 12y+), IM, 100 mcg/0.5mL 03/31/2021, 04/28/2021    DT (pediatric) 08/31/1990    Influenza Virus Vaccine 10/22/2002    Influenza, Triv, inactivated, subunit, adjuvanted, IM (Fluad 65 yrs and older) 12/11/2019    Pneumococcal Conjugate 13-valent ([id number]) 09/29/2015    Pneumococcal Polysaccharide ([id number]) 09/05/2017    Td vaccine (adult) 03/17/2010  Td, unspecified formulation 08/31/1990    Tdap (Boostrix, Adacel) 09/19/2017         Social History:     Social History     Tobacco Use    Smoking status: Never Smoker    Smokeless tobacco: Never Used    Tobacco comment: encouraged to never smoke    Vaping Use    Vaping Use: Never used   Substance Use Topics    Alcohol use: No    Drug use: No     Social History     Tobacco Use   Smoking Status Never Smoker   Smokeless Tobacco Never Used   Tobacco Comment    encouraged to never smoke       Family History   Problem Relation Age of Onset    Cancer Mother     Heart Disease Mother     Arthritis Sister         rheumatoid    Cancer Brother     Cancer Sister     Diabetes Sister     Heart Disease Brother     High Blood Pressure Brother     High Blood Pressure Sister     Diabetes Brother           REVIEW OF SYSTEMS:     Not possible due to pt factors .      PHYSICAL EXAM:      Vitals:    BP (!) 185/75   Pulse 73   Temp 97.7 °F (36.5 °C) (Oral)   Resp 16   Ht 5' 5\" (1.651 m)   Wt 156 lb 4.9 oz (70.9 kg)   SpO2 98%   BMI 26.01 kg/m²     General Appearance: alert,in  Some  acute distress, +  pallor, no icterus confused   Skin: warm and dry, no rash or erythema  Head: normocephalic and atraumatic  Eyes: pupils equal, round, and reactive to light, conjunctivae normal  ENT: tympanic membrane, external ear and ear canal normal bilaterally, nose without deformity, nasal mucosa and turbinates normal without polyps  Neck: supple and non-tender without mass, no thyromegaly  no cervical lymphadenopathy  Pulmonary/Chest: clear to auscultation bilaterally- no wheezes, rales or rhonchi, normal air movement, no respiratory distress  Cardiovascular: normal rate, regular rhythm, normal S1 and S2, no murmurs, rubs, clicks, or gallops, no carotid bruits  Abdomen: soft, non-tender, non-distended, normal bowel sounds, no masses or organomegaly  Extremities: no cyanosis, clubbing or edema  Musculoskeletal: normal range of PROTEINU >=300 01/01/2022 02:45 PM    UROBILINOGEN 1.0 01/01/2022 02:45 PM    NITRU POSITIVE 01/01/2022 02:45 PM    LEUKOCYTESUR TRACE 02/18/2022 10:08 AM    LEUKOCYTESUR MODERATE 01/01/2022 02:45 PM    LABMICR YES 01/01/2022 02:45 PM    URINETYPE NotGiven 01/01/2022 02:45 PM      Urine Microscopic:   Lab Results   Component Value Date/Time    BACTERIA 4+ 01/01/2022 02:45 PM    COMU see below 01/01/2022 02:45 PM    WBCUA 18 01/01/2022 02:45 PM    RBCUA 42 01/01/2022 02:45 PM    EPIU 16 01/01/2022 02:45 PM     Urine Reflex to Culture:   Lab Results   Component Value Date/Time    URRFLXCULT Not Indicated 02/27/2018 09:08 AM         MICRO: cultures reviewed and updated by me     Procedure Component Value Units Date/Time   Culture, Wound [[id number]] (Abnormal) Collected: 07/08/22 1910   Order Status: Completed Specimen: Leg Updated: 07/10/22 1128    Gram Stain Result 1+ WBC's (Polymorphonuclear)   No organisms seen     Organism Pseudomonas aeruginosa Abnormal     WOUND/ABSCESS --    Light growth   Sensitivity to follow    Narrative:     ORDER#: [id number]                          ORDERED BY: Cheko Lewis   SOURCE: Leg right knee                     COLLECTED:  07/08/22 19:10   [facility]. :                      RECEIVED :  07/09/22 03:34   Culture, Blood 2 [[id number]] Collected: 07/08/22 2251   Order Status: Completed Specimen: Blood Updated: 07/09/22 2315    Culture, Blood 2 No Growth to date.  Any change in status will be called. Narrative:     ORDER#: [id number]                          ORDERED BY: Cheko Salts   SOURCE: Blood                              COLLECTED:  07/08/22 22:51   [facility]. :                      RECEIVED :  07/09/22 10:58   If child <=2 yrs old please draw pediatric bottle. ~Blood Culture #2   Culture, Blood 1 [[id number]] Collected: 07/08/22 2000   Order Status: Completed Specimen: Blood Updated: 07/09/22 2015    Blood Culture, Routine No Growth to date.  Any change in status will be called. Narrative:     ORDER#: [id number]                          ORDERED BY: Rebecca William   SOURCE: Blood                              COLLECTED:  07/08/22 20:00   [facility]. :                      RECEIVED :  07/09/22 03:15   If child <=2 yrs old please draw pediatric bottle. ~Blood Culture 1          4 Result Notes    Component 4/13/22 1138   Gram Stain Result No Epithelial Cells seen   4+ WBC's (Polymorphonuclear)   No organisms seen    Organism Pseudomonas aeruginosa Abnormal     Body Fluid Culture, Sterile Light growth    Resulting Agency [email or web address]          Susceptibility      Pseudomonas aeruginosa (1)    Antibiotic Interpretation Microscan  Method Status    cefepime Sensitive <=2 mcg/mL BACTERIAL SUSCEPTIBILITY PANEL BY MIR     ciprofloxacin Sensitive <=1 mcg/mL BACTERIAL SUSCEPTIBILITY PANEL BY MIR     gentamicin Sensitive <=4 mcg/mL BACTERIAL SUSCEPTIBILITY PANEL BY MIR     meropenem Sensitive <=1 mcg/mL BACTERIAL SUSCEPTIBILITY PANEL BY MIR     piperacillin-tazobactam Sensitive <=16 mcg/mL BACTERIAL SUSCEPTIBILITY PANEL BY MIR     tobramycin Sensitive <=4 mcg/mL BACTERIAL SUSCEPTIBILITY PANEL BY MIR     levofloxacin Sensitive 0.5 mcg/mL BACTERIAL SUSCEPTIBILITY PANEL BY E-TEST           Narrative  Performed by: [email or web address]  ORDER#: [id number]                          ORDERED BY: Chago Wick   SOURCE: Aspirate Knee                      COLLECTED:  04/13/22 11:38   [facility]. :                      RECEIVED :  04/13/22 13:29      Specimen Collected: 04/13/22 11:38 Last Resulted: 04/17/22 06:18                      Culture, Blood 2 [[id number]] Collected: 07/08/22 2251   Order Status: Completed Specimen: Blood Updated: 07/09/22 2315    Culture, Blood 2 No Growth to date.  Any change in status will be called.    Narrative:     ORDER#: [id number]                          ORDERED BY: Rebecca William   SOURCE: Blood                              COLLECTED:  07/08/22 22:51   [facility] :                      RECEIVED :  07/09/22 10:58   If child <=2 yrs old please draw pediatric bottle. ~Blood Culture #2   Culture, Blood 1 [[id number]] Collected: 07/08/22 2000   Order Status: Completed Specimen: Blood Updated: 07/09/22 2015    Blood Culture, Routine No Growth to date.  Any change in status will be called. Narrative:     ORDER#: [id number]                          ORDERED BY: Adriel Sommers   SOURCE: Blood                              COLLECTED:  07/08/22 20:00   [facility] :                      RECEIVED :  07/09/22 03:15   If child <=2 yrs old please draw pediatric bottle. ~Blood Culture 1   Culture, Wound [[id number]] Collected: 07/08/22 1910   Order Status: Completed Specimen: Leg Updated: 07/09/22 1211    WOUND/ABSCESS Growth too young. further report to follow    Gram Stain Result 1+ WBC's (Polymorphonuclear)   No organisms seen    Narrative:     ORDER#: [id number]                          ORDERED BY: Adriel Sommers   SOURCE: Leg right knee                     COLLECTED:  07/08/22 19:10   [facility] :                      RECEIVED :  07/09/22 03:34         Blood Culture:   Lab Results   Component Value Date/Time    [facility]  07/08/2022 08:00 PM     No Growth to date. Any change in status will be called. Suellen Multani  07/08/2022 10:51 PM     No Growth to date. Any change in status will be called. Viral Culture:    Lab Results   Component Value Date/Time    COVID19 Not Detected 06/22/2022 11:47 AM     Urine Culture: No results for input(s): LABURIN in the last 72 hours.     Scheduled Meds:   [START ON 7/11/2022] vancomycin  1,000 mg IntraVENous Q24H    cefepime  2,000 mg IntraVENous Q12H    levETIRAcetam  500 mg Oral BID    lactobacillus  1 capsule Oral BID    rosuvastatin  40 mg Oral Nightly    lisinopril  20 mg Oral Daily    polyethylene glycol  17 g Oral Daily    docusate sodium  100 mg Oral BID    thyroid  30 mg Oral Daily    latanoprost  1 drop Left Eye Nightly    dorzolamide  1 drop Left Eye BID    timolol  1 drop Left Eye BID    sodium chloride flush  5-40 mL IntraVENous 2 times per day    enoxaparin  40 mg SubCUTAneous Daily       Continuous Infusions:   sodium chloride         PRN Meds:  diphenhydrAMINE, bisacodyl, sodium chloride flush, sodium chloride, ondansetron **OR** ondansetron, polyethylene glycol    Imaging:   XR KNEE RIGHT (1-2 VIEWS)   Final Result   Total right knee replacement which is unchanged with no acute bony   abnormality. Diffuse osteopenia. Moderate soft tissue swelling anterior to the knee and a moderate   suprapatellar effusion. All pertinent images and reports for the current Hospitalization were reviewed by me. IMPRESSION:    Patient Active Problem List   Diagnosis    Low back pain radiating to both legs    Lumbar spinal stenosis    DDD (degenerative disc disease), lumbosacral    Stenosis, spinal, lumbar    Pain and swelling of right knee    Acute inferior myocardial infarction ([address]    Hyperlipidemia LDL goal <70    Retroperitoneal hemorrhage    Essential hypertension    Arthritis of right knee    History of total knee arthroplasty, right-3. 6.2018    Traumatic hematoma of knee, right, subsequent encounter    Traumatic hematoma of right knee    Prosthetic joint infection ([address]    Seizure disorder ([address]    Fall against object    Sepsis ([address]    Pressure ulcer of ischium, stage 3, left (HCC)    Decreased mobility and endurance    [place] (hard of hearing)    History of short term memory loss    Non-compliance with treatment    Chronic renal disease, stage III ([address] [[id number]]    Infection of prosthetic right knee joint (HCC)    Pseudomonas infection    Elevated C-reactive protein (CRP)    Elevated sed rate    Osteoarthritis    PRINCE (obstructive sleep apnea)    Dementia without behavioral disturbance (HCC)    Receiving intravenous antibiotic treatment as outpatient    Complex care coordination       Rt TKA infection   S/p  Right total knee arthroplasty with polyethylene liner exchange by Maddy Whittaker on 6/18/22  OR cx from  6/18/22 with MRSA  Previous Rt TKA knee fluid aspiration from 4/1/22 with Pseudomonas  Was receiving IV Abx at NH on IV Daptomycin  Now with worsening pain and Sinus tract with Drainage+  Wound cx on this admit with Pseudomonas  ESR  55  CRP  51.5   PICC line in place  Sinus tract  Seizures  History  Pressure ulcer of the ischium       Unfortunately she has more symptoms and likely now from Pseudomonas and MRSA is being covered by IV Daptomycin its not clear if she was receiving IV Cefepime - this was restarted here now and will likely need to cont both IV abx for now and followed by  Oral suppression     Given her age and medical status not sure is she would be able to undergo repeat ID and 2 stage procedure we may have to accept the sinus tract and control the infection rather than cure some times       Labs, Microbiology, Radiology and pertinent results from current hospitalization and care every where were reviewed by me as a part of the consultation. PLAN :  1. D/C IV Vancomycin previously creat elevation ? 2. IV Daptomycin x 450 mg q 24 hrs  3. IV Cefepime  x2 gm q 12 hrs  4. PICC in place  5. We need to clarify plan from Ortho  6. If she is not a candidate for 2 stage procedure may cont IV abx until clinical improvement and suppress long term  May be could use Quinolone for long term suppression will cover Pseudomonas and MRSA        Discussed with patient/Family and Nursing   Risk of Complications/Morbidity: High      · Illness(es)/ Infection present that pose threat to bodily function. · There is potential for severe exacerbation of infection/side effects of treatment. · Therapy requires intensive monitoring for antimicrobial agent toxicity.   Thanks for allowing me to participate in your patient's care please call me with any questions or concerns.     Dr. Elida Thomson MD  [facility] Physician  Phone: [phone]   Fax : [phone]

## 2022-07-10 NOTE — CONSULTS
Clinical Pharmacy Note: Pharmacy to Dose Vancomycin    Vancomycin Day: 2  Current Dosing Regimen: 1250 mg q 24 hrs  Dosing Method: Bayesian Modeling    Random: 18    Recent Labs     07/08/22  2002 07/10/22  0411   BUN 26* 22*       Recent Labs     07/08/22  2002 07/10/22  0411   CREATININE 0.8 0.8       Recent Labs     07/08/22  2002 07/10/22  0411   WBC 7.8 6.2         Intake/Output Summary (Last 24 hours) at 7/10/2022 0804  Last data filed at 7/9/2022 2245  Gross per 24 hour   Intake 665 ml   Output 550 ml   Net 115 ml         Ht Readings from Last 1 Encounters:   07/09/22 5' 5\" (1.651 m)        Wt Readings from Last 1 Encounters:   07/09/22 156 lb 4.9 oz (70.9 kg)         Body mass index is 26.01 kg/m². Estimated Creatinine Clearance: 51 mL/min (based on SCr of 0.8 mg/dL). Assessment/Plan:  Vancomycin level is supratherapeutic. Level was drawn appropriately in respect to last dose given. Decrease vancomycin regimen to 1g every 24 hours. Bayesian Modeling predicts an AUC of 511 mg/L*hr and trough of 14 mg/L. A vancomycin random level has been ordered on 7/11 at 0600 for follow-up. Changes in regimen will be determined based on culture results, renal function, and clinical response. Pharmacy will continue to monitor and adjust regimen as necessary.     Thank you for the consult,    Catherine MancusoD, BCPS

## 2022-07-10 NOTE — PLAN OF CARE
Problem: Skin/Tissue Integrity  Goal: Absence of new skin breakdown  Description: 1. Monitor for areas of redness and/or skin breakdown  2. Assess vascular access sites hourly  3. Every 4-6 hours minimum:  Change oxygen saturation probe site  4. Every 4-6 hours:  If on nasal continuous positive airway pressure, respiratory therapy assess nares and determine need for appliance change or resting period.   Outcome: Progressing     Problem: Safety - Adult  Goal: Free from fall injury  Outcome: Progressing     Problem: Discharge Planning  Goal: Discharge to home or other facility with appropriate resources  Outcome: Progressing     Problem: Pain  Goal: Verbalizes/displays adequate comfort level or baseline comfort level  Outcome: Progressing     Problem: Nutrition Deficit:  Goal: Optimize nutritional status  Outcome: Progressing

## 2022-07-10 NOTE — CARE COORDINATION
Presented from [facility]. Call placed to determine if patient is long term or skilled. If patient is skilled, they will require pre-cert to return to facility.

## 2022-07-10 NOTE — PROGRESS NOTES
[facility]    7/10/2022 12:52 PM        Name: Miriam Gonsales . Admitted: 7/8/2022  Primary Care Provider: Lino Obregon DO (Tel: [phone])      Chief complaint: Pain, swelling, drainage from right knee. Brief History:   Miriam Gonsales is 82 y.o. female with history of CAD/stent (2017), HTN, seizure disorder, hypothyroid, right TKA (2018) who presented with complaints of right knee pain and swelling. She had been experiencing increasing redness, pain, drainage and swelling in right knee & leg. PCP aspirated knee 4/18 and culture grew pseudomonas and completed a course of Ciprofloxacin. She presented to [facility] with worsening pain, swelling and drainage from right knee and was transferred to [facility] for further management on 6/16/22. She had right total knee incision and drainage with liner exchange on 6/18/2022 and was discharged to SNF on 6/22/22 with a PICC line and IV daptomycin 350 mg every 24 hours until August 1, 2022. She presented again from rehab with similar complaints ongoing for the past few days without fever or chills. Apparently family remain concerned about continued drainage from the knee and was brought in for further management. Subjective:   Patient seen and examined. No interval events. Denies any complaints. No pain, fever or chills but remains confused.       Reviewed interval ancillary notes    Current Medications  DAPTOmycin (CUBICIN) 450 mg in sodium chloride 0.9 % 50 mL IVPB, Q24H  NIFEdipine (PROCARDIA XL) extended release tablet 30 mg, Daily  diphenhydrAMINE (BENADRYL) tablet 25 mg, Q8H PRN  cefepime (MAXIPIME) 2000 mg IVPB minibag, Q12H  levETIRAcetam (KEPPRA) tablet 500 mg, BID  lactobacillus (CULTURELLE) capsule 1 capsule, BID  rosuvastatin (CRESTOR) tablet 40 mg, Nightly  lisinopril (PRINIVIL;ZESTRIL) tablet 20 mg, Daily  polyethylene glycol (GLYCOLAX) packet 17 g, Daily  docusate sodium (COLACE) capsule 100 mg, BID  bisacodyl (DULCOLAX) suppository 10 mg, Daily PRN  thyroid (ARMOUR) tablet 30 mg, Daily  latanoprost (XALATAN) 0.005 % ophthalmic solution 1 drop, Nightly  dorzolamide (TRUSOPT) 2 % ophthalmic solution 1 drop, BID  timolol (TIMOPTIC) 0.5 % ophthalmic solution 1 drop, BID  sodium chloride flush 0.9 % injection 5-40 mL, 2 times per day  sodium chloride flush 0.9 % injection 5-40 mL, PRN  0.9 % sodium chloride infusion, PRN  enoxaparin (LOVENOX) injection 40 mg, Daily  ondansetron (ZOFRAN-ODT) disintegrating tablet 4 mg, Q8H PRN   Or  ondansetron (ZOFRAN) injection 4 mg, Q6H PRN  polyethylene glycol (GLYCOLAX) packet 17 g, Daily PRN        Objective:  BP (!) 185/75   Pulse 73   Temp 97.7 °F (36.5 °C) (Oral)   Resp 16   Ht 5' 5\" (1.651 m)   Wt 156 lb 4.9 oz (70.9 kg)   SpO2 98%   BMI 26.01 kg/m²     Intake/Output Summary (Last 24 hours) at 7/10/2022 1252  Last data filed at 7/10/2022 0830  Gross per 24 hour   Intake 425 ml   Output 550 ml   Net -125 ml      Wt Readings from Last 3 Encounters:   07/09/22 156 lb 4.9 oz (70.9 kg)   06/21/22 139 lb 1.6 oz (63.1 kg)   06/15/22 148 lb (67.1 kg)     General:  Awake, alert, oriented to self not situation. Elderly, lethargic and frail. Skin:  Warm and dry. No unusual bruising or rash  Neck:  Supple. No JVD appreciated  Chest:  Normal effort. Clear to auscultation, no wheezes/rhonchi/rales  Cardiovascular:  RRR, normal S1/S2, no murmur/gallop/rub  Abdomen:  Soft, nontender, +bowel sounds  Extremities:  + BLE edema worse on the right. Ace wrap on the right knee soaked with serosanguineous discharge. Erythema on the right leg, tenderness.   Neurological: Moves all extremities, unable to lift right leg  Psychological: Normal mood and affect      Labs and Tests:  CBC:   Recent Labs     07/08/22  2002 07/10/22  0411   WBC 7.8 6.2   HGB 8.5* 8.9*    313     BMP:    Recent Labs     07/08/22 2002 07/10/22  0411    137   K 4.3 3.8    105   CO2 22 23   BUN 26* 22*   CREATININE 0.8 0.8   GLUCOSE 157* 120*     Hepatic:   Recent Labs     07/08/22  2002 07/10/22  0411   AST 22 20   ALT 20 19   BILITOT <0.2 0.4   ALKPHOS 181* 145*       Xray Right Knee 6/17/2022:  No acute finding of the right knee. Xray Right Knee 6/18/2022: Total knee arthroplasty, with hardware in appropriate alignment.  No   periprosthetic fracture or acute abnormality is identified. Xray Right Knee 6/18/2022:  Status post knee replacement, in normal alignment.       No acute fracture.       Immediate postoperative changes. X-ray of the right knee 7/8/2022: Total right knee replacement which is unchanged with no acute bony   abnormality.       Diffuse osteopenia.       Moderate soft tissue swelling anterior to the knee and a moderate   suprapatellar effusion. Problem List  Principal Problem:    Infection of prosthetic right knee joint [facility])  Active Problems:    Chronic renal disease, stage III ([facility]) [233798]    Pseudomonas infection    Dementia without behavioral disturbance ([facility])    Essential hypertension    Prosthetic joint infection ([facility])    Seizure disorder ([facility])    Pressure ulcer of ischium, stage 3, left ([facility])  Resolved Problems:    * No resolved hospital problems. *       Assessment & Plan:   Chronic right knee prosthetic joint infection:  Recent aspirate from right knee (4/2022) grew Pseudomonas and she received oral ciprofloxacin. s/p right TKA I&D with liner exchange 6/18. Surgery cultures + MRSA. Discharged on IV daptomycin. Ortho reconsulted: Recommend outpatient follow-up with Dr. Dario Posada in his office next week for definitive treatment. Continue dressing changes and IV antibiotics. ID consult pending. Decubitus wound left buttock POA:  Patient sedentary, sits most of the day. Followed in [facility]. Wound care nurse consulted.       Uncontrolled HTN: Monitor BP on

## 2022-07-10 NOTE — PROGRESS NOTES
Bath: patient was bathed today   Linen Change: linens changed   Ice Water: fresh ice water given     [place] [address] [place] for safety      Fall sign posted, fall risk socks on, fall bracelet on, all alarms activated. Call light within reach and belongings at bedside, no needs at this time.

## 2022-07-10 NOTE — PROGRESS NOTES
Shift assessment complete. Alert, oriented to self, disoriented x3 and forgetful. Neuro WNL. Distal pulses intact. Moderate drainage from right knee. New dressing applied to right knee with 4x4, ABD pad, kerlix and ace wrap. Affected extremity elevated. Dressing changed to wound on left buttock. Abx infusing & scheduled night medications administered (see MAR). Denies pain or discomfort. Safety precautions in place and call light within reach.

## 2022-07-11 ENCOUNTER — TELEPHONE (OUTPATIENT)
Dept: ORTHOPEDIC SURGERY | Age: 87
End: 2022-07-11

## 2022-07-11 LAB
A/G RATIO: 1.1 (ref 1.1–2.2)
ALBUMIN SERPL-MCNC: 3.4 G/DL (ref 3.4–5)
ALP BLD-CCNC: 135 U/L (ref 40–129)
ALT SERPL-CCNC: 18 U/L (ref 10–40)
ANION GAP SERPL CALCULATED.3IONS-SCNC: 7 MMOL/L (ref 3–16)
AST SERPL-CCNC: 18 U/L (ref 15–37)
BASOPHILS ABSOLUTE: 0 K/UL (ref 0–0.2)
BASOPHILS RELATIVE PERCENT: 0.3 %
BILIRUB SERPL-MCNC: 0.5 MG/DL (ref 0–1)
BUN BLDV-MCNC: 19 MG/DL (ref 7–20)
C-REACTIVE PROTEIN: 36.4 MG/L (ref 0–5.1)
CALCIUM SERPL-MCNC: 9.3 MG/DL (ref 8.3–10.6)
CHLORIDE BLD-SCNC: 108 MMOL/L (ref 99–110)
CO2: 24 MMOL/L (ref 21–32)
CREAT SERPL-MCNC: 0.8 MG/DL (ref 0.6–1.2)
EOSINOPHILS ABSOLUTE: 1.5 K/UL (ref 0–0.6)
EOSINOPHILS RELATIVE PERCENT: 24.3 %
GFR AFRICAN AMERICAN: >60
GFR NON-AFRICAN AMERICAN: >60
GLUCOSE BLD-MCNC: 109 MG/DL (ref 70–99)
GRAM STAIN RESULT: ABNORMAL
HCT VFR BLD CALC: 27.4 % (ref 36–48)
HEMOGLOBIN: 9.1 G/DL (ref 12–16)
LYMPHOCYTES ABSOLUTE: 1.1 K/UL (ref 1–5.1)
LYMPHOCYTES RELATIVE PERCENT: 19 %
MCH RBC QN AUTO: 26.6 PG (ref 26–34)
MCHC RBC AUTO-ENTMCNC: 33.1 G/DL (ref 31–36)
MCV RBC AUTO: 80.3 FL (ref 80–100)
MONOCYTES ABSOLUTE: 0.6 K/UL (ref 0–1.3)
MONOCYTES RELATIVE PERCENT: 9.6 %
NEUTROPHILS ABSOLUTE: 2.8 K/UL (ref 1.7–7.7)
NEUTROPHILS RELATIVE PERCENT: 46.8 %
ORGANISM: ABNORMAL
PDW BLD-RTO: 15.6 % (ref 12.4–15.4)
PLATELET # BLD: 300 K/UL (ref 135–450)
PMV BLD AUTO: 7.7 FL (ref 5–10.5)
POTASSIUM SERPL-SCNC: 3.8 MMOL/L (ref 3.5–5.1)
RBC # BLD: 3.41 M/UL (ref 4–5.2)
SEDIMENTATION RATE, ERYTHROCYTE: 53 MM/HR (ref 0–30)
SODIUM BLD-SCNC: 139 MMOL/L (ref 136–145)
TOTAL CK: 16 U/L (ref 26–192)
TOTAL PROTEIN: 6.4 G/DL (ref 6.4–8.2)
WBC # BLD: 6 K/UL (ref 4–11)
WOUND/ABSCESS: ABNORMAL

## 2022-07-11 PROCEDURE — APPNB45 APP NON BILLABLE 31-45 MINUTES: Performed by: NURSE PRACTITIONER

## 2022-07-11 PROCEDURE — 80053 COMPREHEN METABOLIC PANEL: CPT

## 2022-07-11 PROCEDURE — 6370000000 HC RX 637 (ALT 250 FOR IP): Performed by: INTERNAL MEDICINE

## 2022-07-11 PROCEDURE — 85652 RBC SED RATE AUTOMATED: CPT

## 2022-07-11 PROCEDURE — 82550 ASSAY OF CK (CPK): CPT

## 2022-07-11 PROCEDURE — 99233 SBSQ HOSP IP/OBS HIGH 50: CPT | Performed by: INTERNAL MEDICINE

## 2022-07-11 PROCEDURE — 86140 C-REACTIVE PROTEIN: CPT

## 2022-07-11 PROCEDURE — 99024 POSTOP FOLLOW-UP VISIT: CPT | Performed by: NURSE PRACTITIONER

## 2022-07-11 PROCEDURE — 1200000000 HC SEMI PRIVATE

## 2022-07-11 PROCEDURE — 2580000003 HC RX 258: Performed by: INTERNAL MEDICINE

## 2022-07-11 PROCEDURE — 6360000002 HC RX W HCPCS: Performed by: INTERNAL MEDICINE

## 2022-07-11 PROCEDURE — 85025 COMPLETE CBC W/AUTO DIFF WBC: CPT

## 2022-07-11 PROCEDURE — 6370000000 HC RX 637 (ALT 250 FOR IP): Performed by: NURSE PRACTITIONER

## 2022-07-11 RX ADMIN — LEVETIRACETAM 500 MG: 500 TABLET, FILM COATED ORAL at 08:23

## 2022-07-11 RX ADMIN — LISINOPRIL 20 MG: 20 TABLET ORAL at 08:22

## 2022-07-11 RX ADMIN — ROSUVASTATIN CALCIUM 40 MG: 40 TABLET, FILM COATED ORAL at 21:54

## 2022-07-11 RX ADMIN — NIFEDIPINE 30 MG: 30 TABLET, FILM COATED, EXTENDED RELEASE ORAL at 08:22

## 2022-07-11 RX ADMIN — DIPHENHYDRAMINE HYDROCHLORIDE 25 MG: 25 TABLET ORAL at 11:47

## 2022-07-11 RX ADMIN — Medication 1 CAPSULE: at 21:54

## 2022-07-11 RX ADMIN — ASPIRIN 81 MG: 81 TABLET, COATED ORAL at 08:22

## 2022-07-11 RX ADMIN — ENOXAPARIN SODIUM 40 MG: 100 INJECTION SUBCUTANEOUS at 08:23

## 2022-07-11 RX ADMIN — DORZOLAMIDE HYDROCHLORIDE 1 DROP: 20 SOLUTION/ DROPS OPHTHALMIC at 21:53

## 2022-07-11 RX ADMIN — TIMOLOL MALEATE 1 DROP: 5 SOLUTION OPHTHALMIC at 21:53

## 2022-07-11 RX ADMIN — CEFEPIME HYDROCHLORIDE 2000 MG: 2 INJECTION, POWDER, FOR SOLUTION INTRAVENOUS at 01:47

## 2022-07-11 RX ADMIN — DOCUSATE SODIUM 100 MG: 100 CAPSULE, LIQUID FILLED ORAL at 08:22

## 2022-07-11 RX ADMIN — TIMOLOL MALEATE 1 DROP: 5 SOLUTION OPHTHALMIC at 08:31

## 2022-07-11 RX ADMIN — LEVOTHYROXINE, LIOTHYRONINE 30 MG: 19; 4.5 TABLET ORAL at 08:30

## 2022-07-11 RX ADMIN — LATANOPROST 1 DROP: 50 SOLUTION OPHTHALMIC at 21:53

## 2022-07-11 RX ADMIN — DORZOLAMIDE HYDROCHLORIDE 1 DROP: 20 SOLUTION/ DROPS OPHTHALMIC at 08:31

## 2022-07-11 RX ADMIN — POLYETHYLENE GLYCOL 3350 17 G: 17 POWDER, FOR SOLUTION ORAL at 08:26

## 2022-07-11 RX ADMIN — LEVETIRACETAM 500 MG: 500 TABLET, FILM COATED ORAL at 21:54

## 2022-07-11 RX ADMIN — DOCUSATE SODIUM 100 MG: 100 CAPSULE, LIQUID FILLED ORAL at 21:54

## 2022-07-11 RX ADMIN — DIPHENHYDRAMINE HYDROCHLORIDE 25 MG: 25 TABLET ORAL at 21:54

## 2022-07-11 RX ADMIN — Medication 10 ML: at 21:56

## 2022-07-11 RX ADMIN — FERROUS SULFATE TAB 325 MG (65 MG ELEMENTAL FE) 325 MG: 325 (65 FE) TAB at 08:23

## 2022-07-11 RX ADMIN — Medication 1 CAPSULE: at 08:23

## 2022-07-11 ASSESSMENT — ENCOUNTER SYMPTOMS
CHOKING: 0
EYE DISCHARGE: 0
NAUSEA: 0
DIARRHEA: 0
PHOTOPHOBIA: 0
STRIDOR: 0
COUGH: 0
CHEST TIGHTNESS: 0
RHINORRHEA: 0
COLOR CHANGE: 0
ABDOMINAL PAIN: 0
APNEA: 0
BLOOD IN STOOL: 0
EYE REDNESS: 0
FACIAL SWELLING: 0
TROUBLE SWALLOWING: 0
SHORTNESS OF BREATH: 0

## 2022-07-11 ASSESSMENT — PAIN SCALES - GENERAL
PAINLEVEL_OUTOF10: 0

## 2022-07-11 NOTE — CONSULTS
to participate in the care of this patient. HISTORY     CC: AMS  HPI: The patient is a 82 y.o. female with past medical history of coronary artery disease hearing impairment, neuropathy, pressure ulcer of the sacral area, with a history of left total knee arthroplasty infection with I&D. Patient underwent right total knee arthroplasty incision and drainage and polyethylene liner exchange by Dr. Lainey Phan on 6/18/22 at [facility].  She was evaluated by infectious disease was discharged on IV daptomycin based on the cultures through PICC line. Operative culture from 6/18 was positive for MRSA. She also had knee fluid aspirate as outpatient in April 2022 and was positive for Pseudomonas its not clear if she was sent out on IV Cefepime. She is now admitted from nursing facility secondary to increased drainage and pain in the right knee with some swelling. Palliative Medicine SymptomScreening/ROS:    Review of Systems   Unable to perform ROS: Mental status change   Musculoskeletal: Positive for arthralgias and joint swelling. Patient unable to complete full ROS due to current cognitive status. Information that is obtained from nursing and chart. Palliative Performance Scale:     [] 60%  Amb reduced; Sig dz. Can't do hobbies/housework; Intake normal or reduced, Occasional assist; LOC full/confusion   [x] 50%  Mainly sit/lie; Extensive disease. Mainly assist, Intake normal or reduced; Occasional assist; LOC full/confusion   [] 40%  Mainly in bed; Extensive disease; Mainly assist; Intake normal or reduced; Occasional assist; LOC full/confusion   [] 30%  Bed bound, Extensive disease; Total care; Intake reduced; LOC full/confusion   [] 20%  Bed bound; Extensive disease; Total care; Intake minimal; Drowsy/coma   [] 10%  Bed bound; Extensive disease;  Total care; Mouth care only; Drowsy/coma   []  0%   Death       Home med list and hospital medications reviewed in chart as of 7/11/2022

## 2022-07-11 NOTE — PLAN OF CARE
Problem: Safety - Adult  Goal: Free from fall injury  Outcome: Progressing     Problem: Pain  Goal: Verbalizes/displays adequate comfort level or baseline comfort level  Outcome: Progressing     Problem: Nutrition Deficit:  Goal: Optimize nutritional status  Outcome: Progressing     Problem: Skin/Tissue Integrity  Goal: Absence of new skin breakdown  Description: 1. Monitor for areas of redness and/or skin breakdown  2. Assess vascular access sites hourly  3. Every 4-6 hours minimum:  Change oxygen saturation probe site  4. Every 4-6 hours:  If on nasal continuous positive airway pressure, respiratory therapy assess nares and determine need for appliance change or resting period.   Outcome: Progressing

## 2022-07-11 NOTE — TELEPHONE ENCOUNTER
General Question     Subject: [id number] [address] Dwarf  Patient and /or Facility Request: Sina Ritchie  Contact Number: [phone]    ARUN @ [facility] CALLING TO CANCEL APPOINTMENT. PATIENT HAD APPOINTMENT SCHEDULED FOR TODAY. PATIENT HAS BEEN  IN [facility] July 8, 2022.

## 2022-07-11 NOTE — PROGRESS NOTES
Morning assessment completed, patient denies needs at this time, call light in reach and bed alarm on. Will continue to monitor. The care plan and education has been reviewed and mutually agreed upon with the patient. Educated patient on the potential for falls during their hospital stay. Reviewed current fall safety protocol. Reviewed indication for use of bed alarm. Patient verbalized understanding. Dressing to right leg knee changed. Serosanguinous drainage noted. RLE red and swollen. The care plan and education has been reviewed and mutually agreed upon with the patient. 11:48 AM   Patient itching. Arms are red and warm and itching. Spoke with medical. Hold abx. For now. Benadryl given. Hospice consult pending. 2:35 PM  Patient attempting to get out of bed. Assisted patient to the bathroom with the steady. Patient now up int he chair with chair alarm on and call light in reach. Chair alarm on and call light in reach. Itching and warms on arms improved. 5:31 PM  Patient back to bed with bed alarm on and call light in reach dressing changed to wound on coccyx and under left cheek.

## 2022-07-11 NOTE — PROGRESS NOTES
[facility] Orthopedic Surgery   Progress Note    CHIEF COMPLAINT/DIAGNOSIS: S/p I&D and liner exchange of RIGHT Total Knee Arthroplasty    SUBJECTIVE: The patient is sitting up in the bed. Reports minimal knee pain when promtped. Pleasantly confused. OBJECTIVE  Physical    VITALS:  BP (!) 157/74   Pulse 74   Temp 97.2 °F (36.2 °C) (Oral)   Resp 17   Ht 5' 5\" (1.651 m)   Wt 156 lb 4.9 oz (70.9 kg)   SpO2 94%   BMI 26.01 kg/m²     GENERAL: Alert and oriented to person, in no acute distress. MUSCULOSKELETAL: Able to dorsi and plantarflex the ankle without issue. INCISION:  Healed. There is small pinhole on the lateral aspect of the proximal knee with small amount of serosanguinous drainage. ROM: right knee ROM 0-90 with minimal discomfort. Sensory:  Intact to light touch in peroneal and tibial distributions. Vascular:   2+ DP pulses with brisk cap refill;  calf soft and nontender    Data    ALL MEDICATIONS HAVE BEEN REVIEWED    CBC:   Recent Labs     07/08/22  2002 07/10/22  0411 07/11/22  0503   WBC 7.8 6.2 6.0   HGB 8.5* 8.9* 9.1*   HCT 25.7* 27.4* 27.4*    313 300     BMP:   Recent Labs     07/08/22  2002 07/10/22  0411 07/11/22  0503    137 139   K 4.3 3.8 3.8    105 108   CO2 22 23 24   BUN 26* 22* 19   CREATININE 0.8 0.8 0.8     INR: No results for input(s): INR in the last 72 hours. Post-op films show stable TKA components without apparent loosening or complication. ASSESSMENT:  S/p RIGHT Total Knee Arthroplasty I&D and liner exchange (6/18/22)  HTN  CAD  Seizure disorder  Dementia  Hypothyroidism  Decubitus wound left buttock  Acute blood loss anemia as expected    PLAN:   Difficult problem. Chronic prosthetic knee infection with new fluid draining from sinus with healed incision. The plan is to avoid further surgery if at all possible.   Hopeful that with renewed abx therapy she can proceed without another procedure which would either be a one or two stage revision which was be immensely difficult for her to recover from. Palliative care is on board. POA in agreement with above plan. - WB status:  WBAT  - DVT prophylaxis: Lovenox as inpt; one more week ASA 81mg BID post-operative prophylaxis  - Acute blood loss anemia as expected: 9.1/27.4  - PT/OT  - Pain Control: current regimen  - ID:  daptomycin/cefepimeper ID. Appreciate their input.  - Dispo: OK to d/c to SNF from our end when medically stable and final abx rec in place. Follow-up with Dr. Leesa Li in approx 3 weeks.   [phone]  Future Appointments   Date Time Provider Department Center   7/11/2022  3:45 PM Fabian Franco MD W ORTHO [facility]   7/14/2022 11:20 AM Nicole Dyson MD FF INFCT DIS [facility]       MICHELLE Mehta - CNP  7/11/2022  10:24 AM

## 2022-07-11 NOTE — PROGRESS NOTES
[facility] PROGRESS NOTE    7/11/2022 6:18 PM        Name: Yahaira Desai . Admitted: 7/8/2022  Primary Care Provider: Wendy Mosher DO (Tel: [phone])      Chief complaint: Pain, swelling, drainage from right knee. Brief History:   Yahaira Desai is 82 y.o. female with history of CAD/stent (2017), HTN, seizure disorder, hypothyroid, right TKA (2018) who presented with complaints of right knee pain and swelling. She had been experiencing increasing redness, pain, drainage and swelling in right knee & leg. PCP aspirated knee 4/18 and culture grew pseudomonas and completed a course of Ciprofloxacin. She presented to [facility] with worsening pain, swelling and drainage from right knee and was transferred to [facility] for further management on 6/16/22. She had right total knee incision and drainage with liner exchange on 6/18/2022 and was discharged to SNF on 6/22/22 with a PICC line and IV daptomycin 350 mg every 24 hours until August 1, 2022. She presented again from rehab with similar complaints ongoing for the past few days without fever or chills. Apparently family remain concerned about continued drainage from the knee and was brought in for further management. Subjective:   Patient seen and examined. No interval events. Denies any complaints. No pain, fever or chills but remains confused.       Reviewed interval ancillary notes    Current Medications  DAPTOmycin (CUBICIN) 450 mg in sodium chloride 0.9 % 50 mL IVPB, Q24H  NIFEdipine (PROCARDIA XL) extended release tablet 30 mg, Daily  aspirin EC tablet 81 mg, Daily  ferrous sulfate (IRON 325) tablet 325 mg, Daily with breakfast  diphenhydrAMINE (BENADRYL) tablet 25 mg, Q8H PRN  cefepime (MAXIPIME) 2000 mg IVPB minibag, Q12H  levETIRAcetam (KEPPRA) tablet 500 mg, BID  lactobacillus (CULTURELLE) capsule 1 capsule, BID  rosuvastatin (CRESTOR) tablet 40 mg, Nightly  lisinopril (PRINIVIL;ZESTRIL) tablet 20 mg, Daily  polyethylene glycol (GLYCOLAX) packet 17 g, Daily  docusate sodium (COLACE) capsule 100 mg, BID  bisacodyl (DULCOLAX) suppository 10 mg, Daily PRN  thyroid (ARMOUR) tablet 30 mg, Daily  latanoprost (XALATAN) 0.005 % ophthalmic solution 1 drop, Nightly  dorzolamide (TRUSOPT) 2 % ophthalmic solution 1 drop, BID  timolol (TIMOPTIC) 0.5 % ophthalmic solution 1 drop, BID  sodium chloride flush 0.9 % injection 5-40 mL, 2 times per day  sodium chloride flush 0.9 % injection 5-40 mL, PRN  0.9 % sodium chloride infusion, PRN  enoxaparin (LOVENOX) injection 40 mg, Daily  ondansetron (ZOFRAN-ODT) disintegrating tablet 4 mg, Q8H PRN   Or  ondansetron (ZOFRAN) injection 4 mg, Q6H PRN        Objective:  /71   Pulse 81   Temp 98 °F (36.7 °C) (Oral)   Resp 16   Ht 5' 5\" (1.651 m)   Wt 156 lb 4.9 oz (70.9 kg)   SpO2 95%   BMI 26.01 kg/m²     Intake/Output Summary (Last 24 hours) at 7/11/2022 1818  Last data filed at 7/11/2022 0405  Gross per 24 hour   Intake 500 ml   Output --   Net 500 ml      Wt Readings from Last 3 Encounters:   07/09/22 156 lb 4.9 oz (70.9 kg)   06/21/22 139 lb 1.6 oz (63.1 kg)   06/15/22 148 lb (67.1 kg)     General:  Awake, alert, oriented to self but not situation. Elderly, lethargic and frail. Skin:  Warm and dry. No unusual bruising or rash  Neck:  Supple. No JVD appreciated  Chest:  Normal effort. Clear to auscultation, no wheezes/rhonchi/rales  Cardiovascular:  RRR, normal S1/S2, no murmur/gallop/rub  Abdomen:  Soft, nontender, +bowel sounds  Extremities:  + BLE edema worse on the right. Ace wrap on the right knee soaked with serosanguineous discharge. Erythema on the right leg, tenderness.   Neurological: Moves all extremities, unable to lift right leg  Psychological: Normal mood and affect      Labs and Tests:  CBC:   Recent Labs     07/08/22  2002 07/10/22  0411 07/11/22  0503   WBC 7.8 6.2 6.0   HGB 8.5* 8.9* 9.1*    313 300     BMP:    Recent Labs     07/08/22  2002 07/10/22  0411 07/11/22  0503    137 139   K 4.3 3.8 3.8    105 108   CO2 22 23 24   BUN 26* 22* 19   CREATININE 0.8 0.8 0.8   GLUCOSE 157* 120* 109*     Hepatic:   Recent Labs     07/08/22  2002 07/10/22  0411 07/11/22  0503   AST 22 20 18   ALT 20 19 18   BILITOT <0.2 0.4 0.5   ALKPHOS 181* 145* 135*       Xray Right Knee 6/17/2022:  No acute finding of the right knee. Xray Right Knee 6/18/2022: Total knee arthroplasty, with hardware in appropriate alignment.  No   periprosthetic fracture or acute abnormality is identified. Xray Right Knee 6/18/2022:  Status post knee replacement, in normal alignment.       No acute fracture.       Immediate postoperative changes. X-ray of the right knee 7/8/2022: Total right knee replacement which is unchanged with no acute bony   abnormality.       Diffuse osteopenia.       Moderate soft tissue swelling anterior to the knee and a moderate   suprapatellar effusion. Problem List  Principal Problem:    Infection of prosthetic right knee joint [facility])  Active Problems:    Chronic renal disease, stage III ([facility] [address] [968842]    Pseudomonas infection    Severe dementia ([facility] [address]    Cellulitis of right lower extremity    PICC (peripherally inserted central catheter) in place    Coronary artery disease due to lipid rich plaque    Essential hypertension    Prosthetic joint infection ([facility])    Seizure disorder ([facility])    Pressure ulcer of ischium, stage 3, left ([facility])  Resolved Problems:    * No resolved hospital problems. *       Assessment & Plan:   Chronic right knee prosthetic joint infection:  Recent aspirate from right knee (4/2022) grew Pseudomonas and she received oral ciprofloxacin. s/p right TKA I&D with liner exchange 6/18. Surgery cultures + MRSA. Discharged on IV daptomycin.     Ortho reconsulted: Recommend outpatient follow-up with Dr. Yolanda George in his office next week for definitive treatment. Continue WBAT, dressing changes and IV antibiotics. ID consulted: On daptomycin and cefepime. I agree with their recommendation that patient would not be a good surgical candidate to undergo further revision surgeries. Palliative care consulted: Appreciate input. Family agree with hospice at this time. Consult placed. Decubitus wound left buttock POA:  Patient sedentary, sits most of the day. Followed in Wound Clinic. Wound care nurse consulted. HTN: Monitor BP on nifedipine and lisinopril.      CAD. Hx STEMI 1/2017, CATRACHITA placed to mid RCA. Continue aspirin and statin. Hx seizure. Continue Keppra. Hypothyroidism. TSH 4.06 (8/2021). Continue thyroid. Anemia:  Work-up consistent with ACD. Monitor H&H and transfuse as needed. Goal > 8 g/dL. Diet: ADULT DIET; Regular; Low Fat/Low Chol/High Fiber/2 gm Na  ADULT ORAL NUTRITION SUPPLEMENT; Lunch, Dinner; Standard High Calorie/High Protein Oral Supplement  Code:[id number]  DVT PPX: enoxaparin    PT OT: Pending. Disposition: Pending hospice evaluation.     Mario Bingham MD   7/11/2022 6:18 PM

## 2022-07-11 NOTE — DISCHARGE INSTR - COC
Continuity of Care Form    Patient Name: Nohemy White   :  1935  MRN:  [id number]    Admit date:  2022  Discharge date:  ***    Code Status Order: DNR-CC   Advance Directives:      Admitting Physician:  Cathleen Langley MD  PCP: Yara Walker DO    Discharging Nurse: [facility] Unit/Room#: 2AL-7593/8783-65  Discharging Unit Phone Number: ***    Emergency Contact:   Extended Emergency Contact Information  Primary Emergency Contact: [address] Phone: [phone]  Work Phone: [phone]  Mobile Phone: [phone]  Relation: Niece/Nephew  Secondary Emergency Contact: [address] Phone: [phone]  Relation: Niece/Nephew    Past Surgical History:  Past Surgical History:   Procedure Laterality Date    ABSCESS DRAINAGE Right 2018    evacuation hematoma right knee    [facility]  [id number]    MILD PROCEDURE L2-3 BILATERAL     BREAST LUMPECTOMY  1986    BUNIONECTOMY  10/2003    CATARACT REMOVAL  2010 and 2010    COLONOSCOPY  2014    repeat 2019    CORONARY ANGIOPLASTY WITH STENT PLACEMENT  2017    [facility]    right total knee replacment    OVARY REMOVAL  1963    right    REVISION TOTAL KNEE ARTHROPLASTY Right 2022    RIGHT TOTAL KNEE INCISION AND DRAINAGE WITH LINER EXCHANGE performed by Jeffry Gallo MD at [address]       Immunization History:   Immunization History   Administered Date(s) Administered    COVID-19, [address] [facility] border, Primary or Immunocompromised, (age 12y+), IM, 100 mcg/0.5mL 2021, 2021    DT (pediatric) 1990    Influenza Virus Vaccine 10/22/2002    Influenza, Triv, inactivated, subunit, adjuvanted, IM (Fluad 65 yrs and older) 2019    Pneumococcal Conjugate 13-valent (Sorqlsi06) 2015    Pneumococcal Polysaccharide (Qnfocdffc35) 09/05/2017    Td vaccine (adult) 03/17/2010    Td, unspecified formulation 08/31/1990    Tdap (Boostrix, Adacel) 09/19/2017       Active Problems:  Patient Active Problem List   Diagnosis Code    Low back pain radiating to both legs M54.50, M79.604, M79.605    Lumbar spinal stenosis M48.061    DDD (degenerative disc disease), lumbosacral M51.37    Stenosis, spinal, lumbar M48.061    Pain and swelling of right knee M25.561, M25.461    Acute inferior myocardial infarction (HCC) I21.19    Hyperlipidemia LDL goal <70 E78.5    Retroperitoneal hemorrhage R58    Essential hypertension I10    Arthritis of right knee M17.11    History of total knee arthroplasty, right-3. 6.2018 Z96.651    Traumatic hematoma of knee, right, subsequent encounter S80. 01XD    Traumatic hematoma of right knee S80. 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.50XA    Seizure disorder ([address] G40.909    Fall against object W18.00XA    Sepsis ([address] A41.9    Pressure ulcer of ischium, stage 3, left ([facility]) L89.323    Decreased mobility and endurance Z74.09    [place] (hard of hearing) H91.90    History of short term memory loss Z87.898    Non-compliance with treatment Z91.19    Chronic renal disease, stage III ([address] [135034] N18.30    Infection of prosthetic right knee joint ([facility]) T84.53XA    Pseudomonas infection A49.8    Elevated C-reactive protein (CRP) R79.82    Elevated sed rate R70.0    Osteoarthritis M19.90    PRINCE (obstructive sleep apnea) G47.33    Dementia without behavioral disturbance ([facility]) F03.90    Receiving intravenous antibiotic treatment as outpatient Z79.2    Complex care coordination Z71.89       Isolation/Infection:   Isolation            Contact          Patient Infection Status       Infection Onset Added Last Indicated Last Indicated By Review Planned Expiration Resolved Resolved By    MRSA 06/18/22 06/19/22 06/18/22 Culture, Tissue        Resolved    COVID-19 (Rule Out) 12/31/21 12/31/21 01/01/22 COVID-19, Rapid (Ordered) 01/01/22 Rule-Out Test Resulted            Nurse Assessment:  Last Vital Signs: /65   Pulse 74   Temp 97 °F (36.1 °C) (Oral)   Resp 16   Ht 5' 5\" (1.651 m)   Wt 156 lb 4.9 oz (70.9 kg)   SpO2 96%   BMI 26.01 kg/m²     Last documented pain score (0-10 scale): Pain Level: 0  Last Weight:   Wt Readings from Last 1 Encounters:   07/09/22 156 lb 4.9 oz (70.9 kg)     Mental Status:  {IP PT MENTAL STATUS:20030}    IV Access:  { SULEMA IV ACCESS:539321970}    Nursing Mobility/ADLs:  Walking   {CHP DME YBBI:835953626}  Transfer  {CHP DME TUYC:889297703}  Bathing  {CHP DME LPGB:023372755}  Dressing  {CHP DME SZWE:358553215}  Toileting  {CHP DME MRMA:257818479}  Feeding  {P DME IYDX:254649687}  Med Admin  {CHP DME SQWE:357232764}  Med Delivery   { SULEMA MED Delivery:458206295}    Wound Care Documentation and Therapy:  Wound 07/09/22 Buttocks Left; Lower (Active)   Wound Etiology Pressure Stage 3 07/11/22 0835   Dressing Status Clean;Dry; Intact; New dressing applied 07/11/22 0835   Dressing/Treatment Packing; Foam 07/11/22 0835   Drainage Amount Small 07/11/22 0835   Drainage Description Purulent 07/11/22 0835   Bianca-wound Assessment Non-blanchable erythema 07/11/22 0835   Number of days: 2       Incision 06/18/22 Leg Right (Active)   Wound Image    07/09/22 0000   Dressing Status New dressing applied; Intact;Dry;Clean 07/11/22 0835   Dressing/Treatment Ace wrap;Dry dressing;Roll gauze 07/11/22 0835   Closure Open to air 07/11/22 0835   Drainage Amount Moderate 07/11/22 0835   Drainage Description Serosanguinous 07/11/22 0835   Bianca-incision Assessment Warm;Non-blanchable erythema 07/11/22 0835   Number of days: 23        Elimination:  Continence:    Bowel: {YES / HB:14780}  Bladder: {YES / MM:55665}  Urinary Catheter: {Urinary Catheter:327282772}   Colostomy/Ileostomy/Ileal Conduit: {YES / JS:19984}       Date of Last BM: ***    Intake/Output Summary (Last 24 hours) at 7/11/2022 1455  Last data filed at 7/11/2022 prosthetic joint infection       Organism/ culture: MRSA, Pseudomonas     Name and dose of Antimicrobial: IV daptomycin 450 mg every 24 hour, IV cefepime 2 g every 12 hours     Antimicrobial start date: calculated from 7/11/2022     Antimicrobial completion date planned: August 12, 2022     Lab monitoring: CBC, Chem 12, ESR, CRP, CK once a week, to be       collected every Monday or Tuesday morning until the patient is off IV  antibiotics. Fax weekly lab results to Ricco Healy MD's office at        [phone]. Please maintain PICC line until the patient is on IV antibiotics. Ok to administer PICC line normal saline flushes as needed. The patient has given verbal informed consent for [facility] pharmacist, Dejah Umana to manage above antibiotic therapy for the patient after the patient's discharge from the hospital.       ** Please notify Ricco Healy MD's office with any change in patient's        status, transfer out of facility or to hospital by calling [phone]           Outpatient Follow up: Due to the [place] 19 pandemic, plan for a follow-up virtual video visit in 4 weeks. Call my office at [phone] to make an appointment. Physician Signature:  Electronically signed by Ricco Healy MD on                                                      7/11/22 at 2:41 PM EDT         Physician Certification: I certify the above information and transfer of Stefani De La Rosa  is necessary for the continuing treatment of the diagnosis listed and that she requires {Admit to Appropriate Level of Care:81651} for {GREATER/LESS:468122271} 30 days.      Update Admission H&P: {CHP DME Changes in ATWQO:780411746}    PHYSICIAN SIGNATURE:  {Esignature:296104454}

## 2022-07-12 ENCOUNTER — TELEPHONE (OUTPATIENT)
Dept: FAMILY MEDICINE CLINIC | Age: 87
End: 2022-07-12

## 2022-07-12 LAB
A/G RATIO: 1 (ref 1.1–2.2)
ALBUMIN SERPL-MCNC: 3.1 G/DL (ref 3.4–5)
ALP BLD-CCNC: 131 U/L (ref 40–129)
ALT SERPL-CCNC: 14 U/L (ref 10–40)
ANION GAP SERPL CALCULATED.3IONS-SCNC: 9 MMOL/L (ref 3–16)
AST SERPL-CCNC: 17 U/L (ref 15–37)
BASOPHILS ABSOLUTE: 0 K/UL (ref 0–0.2)
BASOPHILS RELATIVE PERCENT: 0.3 %
BILIRUB SERPL-MCNC: 0.3 MG/DL (ref 0–1)
BLOOD CULTURE, ROUTINE: NORMAL
BUN BLDV-MCNC: 17 MG/DL (ref 7–20)
C-REACTIVE PROTEIN: 30.6 MG/L (ref 0–5.1)
CALCIUM SERPL-MCNC: 9.2 MG/DL (ref 8.3–10.6)
CHLORIDE BLD-SCNC: 109 MMOL/L (ref 99–110)
CO2: 22 MMOL/L (ref 21–32)
CREAT SERPL-MCNC: 0.7 MG/DL (ref 0.6–1.2)
CULTURE, BLOOD 2: ABNORMAL
CULTURE, BLOOD 2: ABNORMAL
EOSINOPHILS ABSOLUTE: 1.7 K/UL (ref 0–0.6)
EOSINOPHILS RELATIVE PERCENT: 26 %
GFR AFRICAN AMERICAN: >60
GFR NON-AFRICAN AMERICAN: >60
GLUCOSE BLD-MCNC: 90 MG/DL (ref 70–99)
HCT VFR BLD CALC: 28 % (ref 36–48)
HEMOGLOBIN: 9 G/DL (ref 12–16)
LYMPHOCYTES ABSOLUTE: 1.3 K/UL (ref 1–5.1)
LYMPHOCYTES RELATIVE PERCENT: 20.8 %
MCH RBC QN AUTO: 26 PG (ref 26–34)
MCHC RBC AUTO-ENTMCNC: 32.1 G/DL (ref 31–36)
MCV RBC AUTO: 81.1 FL (ref 80–100)
MONOCYTES ABSOLUTE: 0.7 K/UL (ref 0–1.3)
MONOCYTES RELATIVE PERCENT: 11.2 %
NEUTROPHILS ABSOLUTE: 2.7 K/UL (ref 1.7–7.7)
NEUTROPHILS RELATIVE PERCENT: 41.7 %
ORGANISM: ABNORMAL
ORGANISM: ABNORMAL
PDW BLD-RTO: 15.5 % (ref 12.4–15.4)
PLATELET # BLD: 280 K/UL (ref 135–450)
PMV BLD AUTO: 8 FL (ref 5–10.5)
POTASSIUM SERPL-SCNC: 3.5 MMOL/L (ref 3.5–5.1)
RBC # BLD: 3.46 M/UL (ref 4–5.2)
SEDIMENTATION RATE, ERYTHROCYTE: 44 MM/HR (ref 0–30)
SODIUM BLD-SCNC: 140 MMOL/L (ref 136–145)
TOTAL PROTEIN: 6.2 G/DL (ref 6.4–8.2)
WBC # BLD: 6.4 K/UL (ref 4–11)

## 2022-07-12 PROCEDURE — 85652 RBC SED RATE AUTOMATED: CPT

## 2022-07-12 PROCEDURE — 80053 COMPREHEN METABOLIC PANEL: CPT

## 2022-07-12 PROCEDURE — 6370000000 HC RX 637 (ALT 250 FOR IP): Performed by: INTERNAL MEDICINE

## 2022-07-12 PROCEDURE — 99232 SBSQ HOSP IP/OBS MODERATE 35: CPT | Performed by: INTERNAL MEDICINE

## 2022-07-12 PROCEDURE — 86140 C-REACTIVE PROTEIN: CPT

## 2022-07-12 PROCEDURE — 97166 OT EVAL MOD COMPLEX 45 MIN: CPT

## 2022-07-12 PROCEDURE — 97535 SELF CARE MNGMENT TRAINING: CPT

## 2022-07-12 PROCEDURE — 6360000002 HC RX W HCPCS: Performed by: INTERNAL MEDICINE

## 2022-07-12 PROCEDURE — 97530 THERAPEUTIC ACTIVITIES: CPT

## 2022-07-12 PROCEDURE — 1200000000 HC SEMI PRIVATE

## 2022-07-12 PROCEDURE — 85025 COMPLETE CBC W/AUTO DIFF WBC: CPT

## 2022-07-12 PROCEDURE — 2580000003 HC RX 258: Performed by: INTERNAL MEDICINE

## 2022-07-12 PROCEDURE — 6370000000 HC RX 637 (ALT 250 FOR IP): Performed by: NURSE PRACTITIONER

## 2022-07-12 PROCEDURE — 97161 PT EVAL LOW COMPLEX 20 MIN: CPT

## 2022-07-12 RX ADMIN — TIMOLOL MALEATE 1 DROP: 5 SOLUTION OPHTHALMIC at 20:29

## 2022-07-12 RX ADMIN — ENOXAPARIN SODIUM 40 MG: 100 INJECTION SUBCUTANEOUS at 08:54

## 2022-07-12 RX ADMIN — LEVETIRACETAM 500 MG: 500 TABLET, FILM COATED ORAL at 08:53

## 2022-07-12 RX ADMIN — FERROUS SULFATE TAB 325 MG (65 MG ELEMENTAL FE) 325 MG: 325 (65 FE) TAB at 08:53

## 2022-07-12 RX ADMIN — LEVOTHYROXINE, LIOTHYRONINE 30 MG: 19; 4.5 TABLET ORAL at 09:00

## 2022-07-12 RX ADMIN — DORZOLAMIDE HYDROCHLORIDE 1 DROP: 20 SOLUTION/ DROPS OPHTHALMIC at 09:02

## 2022-07-12 RX ADMIN — LEVETIRACETAM 500 MG: 500 TABLET, FILM COATED ORAL at 20:29

## 2022-07-12 RX ADMIN — Medication 1 CAPSULE: at 20:29

## 2022-07-12 RX ADMIN — ASPIRIN 81 MG: 81 TABLET, COATED ORAL at 08:53

## 2022-07-12 RX ADMIN — DOCUSATE SODIUM 100 MG: 100 CAPSULE, LIQUID FILLED ORAL at 20:29

## 2022-07-12 RX ADMIN — NIFEDIPINE 30 MG: 30 TABLET, FILM COATED, EXTENDED RELEASE ORAL at 08:54

## 2022-07-12 RX ADMIN — Medication 1 CAPSULE: at 08:54

## 2022-07-12 RX ADMIN — LISINOPRIL 20 MG: 20 TABLET ORAL at 08:53

## 2022-07-12 RX ADMIN — DOCUSATE SODIUM 100 MG: 100 CAPSULE, LIQUID FILLED ORAL at 08:54

## 2022-07-12 RX ADMIN — POLYETHYLENE GLYCOL 3350 17 G: 17 POWDER, FOR SOLUTION ORAL at 09:06

## 2022-07-12 RX ADMIN — DIPHENHYDRAMINE HYDROCHLORIDE 25 MG: 25 TABLET ORAL at 20:29

## 2022-07-12 RX ADMIN — ROSUVASTATIN CALCIUM 40 MG: 40 TABLET, FILM COATED ORAL at 20:29

## 2022-07-12 RX ADMIN — Medication 10 ML: at 20:29

## 2022-07-12 RX ADMIN — TIMOLOL MALEATE 1 DROP: 5 SOLUTION OPHTHALMIC at 09:01

## 2022-07-12 RX ADMIN — LATANOPROST 1 DROP: 50 SOLUTION OPHTHALMIC at 20:30

## 2022-07-12 RX ADMIN — DORZOLAMIDE HYDROCHLORIDE 1 DROP: 20 SOLUTION/ DROPS OPHTHALMIC at 20:29

## 2022-07-12 ASSESSMENT — PAIN SCALES - GENERAL
PAINLEVEL_OUTOF10: 0
PAINLEVEL_OUTOF10: 0

## 2022-07-12 NOTE — PROGRESS NOTES
[facility]    7/12/2022 7:27 PM        Name: Marc August . Admitted: 7/8/2022  Primary Care Provider: Grace Mora DO (Tel: [phone])      Chief complaint: Pain, swelling, drainage from right knee. Brief History:   Marc August is 82 y.o. female with history of CAD/stent (2017), HTN, seizure disorder, hypothyroid, right TKA (2018) who presented with complaints of right knee pain and swelling. She had been experiencing increasing redness, pain, drainage and swelling in right knee & leg. PCP aspirated knee 4/18 and culture grew pseudomonas and completed a course of Ciprofloxacin. She presented to [facility] with worsening pain, swelling and drainage from right knee and was transferred to [facility] for further management on 6/16/22. She had right total knee incision and drainage with liner exchange on 6/18/2022 and was discharged to SNF on 6/22/22 with a PICC line and IV daptomycin 350 mg every 24 hours until August 1, 2022. She presented again from rehab with similar complaints ongoing for the past few days without fever or chills. Apparently family remain concerned about continued drainage from the knee and was brought in for further management. Subjective:   Patient seen and examined. She has developed a diffuse rash. Family has met with hospice and wants patients abx discontinued and for her to go home with hospice.       Reviewed interval ancillary notes    Current Medications  NIFEdipine (PROCARDIA XL) extended release tablet 30 mg, Daily  aspirin EC tablet 81 mg, Daily  ferrous sulfate (IRON 325) tablet 325 mg, Daily with breakfast  diphenhydrAMINE (BENADRYL) tablet 25 mg, Q8H PRN  levETIRAcetam (KEPPRA) tablet 500 mg, BID  lactobacillus (CULTURELLE) capsule 1 capsule, BID  rosuvastatin (CRESTOR) tablet 40 mg, Nightly  lisinopril (PRINIVIL;ZESTRIL) tablet 20 mg, Daily  polyethylene glycol (GLYCOLAX) packet 17 g, Daily  docusate sodium (COLACE) capsule 100 mg, BID  bisacodyl (DULCOLAX) suppository 10 mg, Daily PRN  thyroid (ARMOUR) tablet 30 mg, Daily  latanoprost (XALATAN) 0.005 % ophthalmic solution 1 drop, Nightly  dorzolamide (TRUSOPT) 2 % ophthalmic solution 1 drop, BID  timolol (TIMOPTIC) 0.5 % ophthalmic solution 1 drop, BID  sodium chloride flush 0.9 % injection 5-40 mL, 2 times per day  sodium chloride flush 0.9 % injection 5-40 mL, PRN  0.9 % sodium chloride infusion, PRN  ondansetron (ZOFRAN-ODT) disintegrating tablet 4 mg, Q8H PRN   Or  ondansetron (ZOFRAN) injection 4 mg, Q6H PRN        Objective:  /62   Pulse 82   Temp 98.1 °F (36.7 °C) (Oral)   Resp 15   Ht 5' 5\" (1.651 m)   Wt 156 lb 4.9 oz (70.9 kg)   SpO2 95%   BMI 26.01 kg/m²     Intake/Output Summary (Last 24 hours) at 7/12/2022 1927  Last data filed at 7/11/2022 2145  Gross per 24 hour   Intake 200 ml   Output --   Net 200 ml      Wt Readings from Last 3 Encounters:   07/09/22 156 lb 4.9 oz (70.9 kg)   06/21/22 139 lb 1.6 oz (63.1 kg)   06/15/22 148 lb (67.1 kg)     General:  Awake, alert, oriented to self but not situation. Skin:  Warm and dry. No unusual bruising or rash  Neck:  Supple. No JVD appreciated  Chest:  Normal effort. Clear to auscultation, no wheezes/rhonchi/rales  Cardiovascular:  RRR, normal S1/S2, no murmur/gallop/rub  Abdomen:  Soft, nontender, +bowel sounds  Extremities:  + BLE edema worse on the right. Ace wrap on the right knee soaked with serosanguineous discharge. Erythema on the right leg, tenderness.   Neurological: Moves all extremities, unable to lift right leg  Psychological: Normal mood and affect      Labs and Tests:  CBC:   Recent Labs     07/10/22  0411 07/11/22  0503 07/12/22  0706   WBC 6.2 6.0 6.4   HGB 8.9* 9.1* 9.0*    300 280     BMP:    Recent Labs     07/10/22  0411 07/11/22  0503 07/12/22  0640    139 140   K 3.8 3.8 3.5    108 109   CO2 23 24 22 BUN 22* 19 17   CREATININE 0.8 0.8 0.7   GLUCOSE 120* 109* 90     Hepatic:   Recent Labs     07/10/22  0411 07/11/22  0503 07/12/22  0640   AST 20 18 17   ALT 19 18 14   BILITOT 0.4 0.5 0.3   ALKPHOS 145* 135* 131*       Xray Right Knee 6/17/2022:  No acute finding of the right knee. Xray Right Knee 6/18/2022: Total knee arthroplasty, with hardware in appropriate alignment.  No   periprosthetic fracture or acute abnormality is identified. Xray Right Knee 6/18/2022:  Status post knee replacement, in normal alignment.       No acute fracture.       Immediate postoperative changes. X-ray of the right knee 7/8/2022: Total right knee replacement which is unchanged with no acute bony   abnormality.       Diffuse osteopenia.       Moderate soft tissue swelling anterior to the knee and a moderate   suprapatellar effusion. Problem List  Principal Problem:    Infection of prosthetic right knee joint [facility])  Active Problems:    Chronic renal disease, stage III ([facility]) [168900]    Pseudomonas infection    Dementia without behavioral disturbance ([facility])    Cellulitis of right lower extremity    PICC (peripherally inserted central catheter) in place    Coronary artery disease due to lipid rich plaque    Essential hypertension    Prosthetic joint infection ([facility])    Seizure disorder ([facility])    Pressure ulcer of ischium, stage 3, left ([facility])  Resolved Problems:    * No resolved hospital problems. *       Assessment & Plan: 1. Chronic right knee prosthetic joint infection-Recent aspirate from right knee (4/2022) grew Pseudomonas and she received oral ciprofloxacin. s/p right TKA I&D with liner exchange 6/18. Surgery cultures + MRSA. Discharged on IV daptomycin. Patient poor candidate or surgical candidate for further revision. Family requested hospice and discontinuation of abx. She will be going home with hospice on Wednesday afternoon. 2.Decubitus wound left buttock POA:    3.  Diffuse rash-suspect drug induced rash. Bendadryl prn.     4. HTN: Monitor BP on nifedipine and lisinopril. 5. Hx seizure. Continue Keppra. Diet: ADULT DIET;  Regular; Low Fat/Low Chol/High Fiber/2 gm Na  ADULT ORAL NUTRITION SUPPLEMENT; Lunch, Dinner; Standard High Calorie/High Protein Oral Supplement  Code:DNR-CC        Disposition: home tomorrow with hospice    Cris Fish PA-C   7/12/2022 7:27 PM

## 2022-07-12 NOTE — PLAN OF CARE
Problem: Discharge Planning  Goal: Discharge to home or other facility with appropriate resources  Outcome: Progressing  Flowsheets (Taken 7/11/2022 2145)  Discharge to home or other facility with appropriate resources: Identify barriers to discharge with patient and caregiver     Problem: Skin/Tissue Integrity  Goal: Absence of new skin breakdown  Description: 1. Monitor for areas of redness and/or skin breakdown  2. Assess vascular access sites hourly  3. Every 4-6 hours minimum:  Change oxygen saturation probe site  4. Every 4-6 hours:  If on nasal continuous positive airway pressure, respiratory therapy assess nares and determine need for appliance change or resting period.   Outcome: Progressing     Problem: Safety - Adult  Goal: Free from fall injury  Outcome: Progressing  Flowsheets (Taken 7/12/2022 0222)  Free From Fall Injury: Instruct family/caregiver on patient safety     Problem: Pain  Goal: Verbalizes/displays adequate comfort level or baseline comfort level  Outcome: Progressing     Problem: Nutrition Deficit:  Goal: Optimize nutritional status  Outcome: Progressing     Problem: ABCDS Injury Assessment  Goal: Absence of physical injury  Outcome: Progressing  Flowsheets (Taken 7/12/2022 0222)  Absence of Physical Injury: Implement safety measures based on patient assessment

## 2022-07-12 NOTE — PROGRESS NOTES
Occupational/Physical Therapy    Late entry for 7/11. Patient held this date, awaiting palliative/hospice consult.  Thank you,  Echo Christianson OTR/L [id number]

## 2022-07-12 NOTE — PROGRESS NOTES
[facility]   Phone: [phone]    Physical Therapy    [x] Initial Evaluation            [] Daily Treatment Note         [] Discharge Summary      Patient: Kylee Franco   : 1935   MRN: [id number]   Date of Service:  2022  Admitting Diagnosis: Infection of prosthetic right knee joint [facility])  Current Admission Summary: The pt was admitted with drainage from R knee. History of infection in R knee, palliative care consult. Past Medical History:  has a past medical history of CAD (coronary artery disease), DDD (degenerative disc disease), lumbosacral, Decreased mobility and endurance, Hearing aid worn, History of short term memory loss, [place] (hard of hearing[place] (hard of hearing), Hyperlipidemia, Hypertension, MI, old, Neuropathy, Non-compliance with treatment, Pressure ulcer of ischium, stage 3, left (HCC), Pressure ulcer of left buttock, stage 3 ([address] Sleep apnea, Thyroid disease, and Wears glasses. Past Surgical History:  has a past surgical history that includes Cataract removal (2010 and 2010); Bunionectomy (10/2003); Dilation and curettage of uterus; Appendectomy (); Ovary removal (); Tonsillectomy and adenoidectomy (); Breast lumpectomy (); Incontinence surgery; Colonoscopy (2014); Coronary angioplasty with stent (2017); back surgery ([id number]); joint replacement (Right, 2018); Abscess Drainage (Right, 2018); and Revision total knee arthroplasty (Right, 2022). Discharge Recommendations: Kylee Franco scored a 17/24 on the AM-PAC short mobility form. Current research shows that an AM-PAC score of 17 or less is typically not associated with a discharge to the patient's home setting. Based on the patient's AM-PAC score and their current functional mobility deficits, it is recommended that the patient have 3-5 sessions per week of Physical Therapy at d/c to increase the patient's independence. Please see assessment section for further patient specific details. If patient discharges prior to next session this note will serve as a discharge summary. Please see below for the latest assessment towards goals. DME Required For Discharge: DME to be determined at next level of care  Precautions/Restrictions: high fall risk, weight bearing  Weight Bearing Restrictions: weight bearing as tolerated  [] Right Upper Extremity  [] Left Upper Extremity [] Right Lower Extremity  [] Left Lower Extremity     Required Braces/Orthotics: no braces required   [] Right  [] Left  Positional Restrictions:no positional restrictions    Pre-Admission Information   Lives With: alone                     Type of Home: condo  Home Layout: one level  Home Access: .  Comment: patient reports she can not recall  Beth Pérez in shower  Toilet Height: elevated height  Bathroom Equipment: . Breana Netters: stands to shower she can not recall if she has a seat  Home Equipment: standard walker, .  Comment:  patient is unable to recall weather he walker has wheels or no wheels  Transfer Assistance: Independent without use of device  Ambulation Assistance:Independent without use of device, modified independent with use of walker  ADL Assistance: independent with all ADL's  IADL Assistance: independent with homemaking tasks sister lives next door  Active : [x]? ?? yes             []? ??no  Current Employment: unemployed  Hobbies: watch tv   Recent Falls: [place] the [place] slipped and fell on wet ground     Since last admission 6/2022 pt has been at [facility]. Pt stated she is unsure if she is working with therapy there an is unsure of her assistance levels there.       Examination   Vision:   Vision Gross Assessment: Impaired and Vision Corrective Device: wears glasses for reading  Hearing:   hard of hearing -pt stated she does not wear her hearing aids  Observation:   General Observation:  bloody drainage noted on R knee wrapping, RN aware  Posture:   Good  Sensation:   WFL  Proprioception:    WFL  Tone:   Normotonic  Coordination Testing:   WFL    ROM:   (B) LE AROM WFL  Strength:   (B) LE strength grossly WFL  grossly at least 3+/5, formal MMT held this date  Decision Making: medium complexity  Clinical Presentation: evolving      Subjective  General: pt reported R knee pain at \"medium\", pt found supine in bed, pt repeatedly stated \"I want to just die\"   Pain: 5/10. Location: R knee  Pain Interventions: patient denies pain interventions and repositioned        Functional Mobility  Bed Mobility  Supine to Sit: stand by assistance  Scooting: stand by assistance  Comments:  Transfers  Sit to stand transfer: contact guard assistance  Stand to sit transfer: minimal assistance  Comments:  Ambulation  Assistive Device: rolling walker  Assistance: contact guard assistance  Distance: 10' to bathroom, 30' in room   Gait Mechanics: step to gait pattern, slow ronan, decreased step length, good use of RW  Comments:    Stair Mobility  Stair mobility not completed on this date. Comments:  Wheelchair Mobility:  No w/c mobility completed on this date. Comments:  Balance  Static Sitting Balance: fair (+): maintains balance at SBA/supervision without use of UE support  Dynamic Sitting Balance: fair (+): maintains balance at SBA/supervision without use of UE support  Static Standing Balance: fair (-): maintains balance at CGA with use of UE support  Dynamic Standing Balance: fair (-): maintains balance at CGA with use of UE support  Comments: The pt was able to complete pericare sitting on the toilet with supervision, SBA to stand at the sink for handwashing, CGA for gait with RW     Other Therapeutic Interventions    Functional Outcomes  AM-PAC Inpatient Mobility Raw Score : 17              Cognition  Comments: pt repeatedly asked \"where am I? \" and \"Why am I here again\", \"Where did I used to live? Who took care of me?\".  Pt was unable to retain orientation information. Orientation:    oriented to person, oriented to time, disoriented to place and disoriented to situation  Command Following:   accurately follows one step commands    Education  Barriers To Learning: cognition  Patient Education: patient educated on PT role and benefits, plan of care, orientation  Learning Assessment:  patient will require reinforcement due to cognitive deficits    Assessment  Activity Tolerance: Decreased by pain and emotional state  Impairments Requiring Therapeutic Intervention: decreased strength, decreased cognition, decreased endurance, decreased balance, increased pain  Prognosis: fair  Clinical Assessment: The pt presents with decreased strength, balance and activity tolerance due to ongoing knee infection. She has poor cognition and repeatedly stated she wishes she would die. The pt is able to ambulate 30' with RW and CGA. She is unsafe to return home alone. Safety Interventions: patient left in chair, chair alarm in place, call light within reach and nurse notified    Plan  Frequency: 3-5 x/per week  Current Treatment Recommendations: strengthening, balance training, functional mobility training, transfer training, gait training, endurance training, neuromuscular re-education and safety education    Goals  Patient Goals: \"I want to just die\"    Short Term Goals:  Time Frame:  To be met prior to Dc   Patient will complete bed mobility at [facility] independent   Patient will complete transfers at supervision   Patient will ambulate 75 ft with use of rolling walker at stand by assistance    Therapy Session Time      Individual Group Co-treatment   Time In     0818   Time Out     0845   Minutes     27     Timed Code Treatment Minutes:  12 Minutes  Total Treatment Minutes:  27       Electronically Signed By: Radha Severino, PT DPT [id number]

## 2022-07-12 NOTE — PROGRESS NOTES
Pleasantly confused overnight, easily redirected and very cooperative with care. Ambulating SBA with walker, only needs small boost from comode to standing, able to brush teeth and perform other ADLs without difficulty. The care plan and education has been reviewed and mutually agreed upon with the patient. Patient remains free from falls. All fall precautions in place. Yellow blanket at bedside, yellow bracelet on patient. SAFE sign on door. Bed and chair alarms being used. Bed in lowest position. Will monitor.      Electronically signed by Toyin Blakely RN on 7/12/2022 at 2:27 AM

## 2022-07-12 NOTE — PROGRESS NOTES
Infectious Diseases   Progress Note      Admission Date: 7/8/2022  Hospital Day: Hospital Day: 5   Attending: Kain Wang MD  Date of service: 7/12/2022     Chief complaint/ Reason for consult:     · Right knee prosthetic joint infection  · History of total right knee arthroplasty with polyethylene exchange on 6/18/2022, culture was positive for MRSA  · Prior right total knee arthroplasty fluid from 4/1/2022 positive for Pseudomonas  · Elevated sed rate   · Elevated CRP  · PICC line in place    Microbiology:        I have reviewed allavailable micro lab data and cultures    · Blood culture (1/2) - collected on 7/8/2022: Staph epidermidis  · Right knee wound culture  - collected on 7/8/2022: Pseudomonas    Susceptibility      Pseudomonas aeruginosa (1)    Antibiotic Interpretation Microscan  Method Status    cefepime Sensitive 4 mcg/mL BACTERIAL SUSCEPTIBILITY PANEL BY MIR     ciprofloxacin Sensitive <=1 mcg/mL BACTERIAL SUSCEPTIBILITY PANEL BY MIR     gentamicin Sensitive <=4 mcg/mL BACTERIAL SUSCEPTIBILITY PANEL BY MIR     meropenem Sensitive <=1 mcg/mL BACTERIAL SUSCEPTIBILITY PANEL BY MIR     piperacillin-tazobactam Sensitive <=16 mcg/mL BACTERIAL SUSCEPTIBILITY PANEL BY MIR     tobramycin Sensitive <=4 mcg/mL BACTERIAL SUSCEPTIBILITY PANEL BY MIR     levofloxacin Intermediate 2 ug/ml BACTERIAL SUSCEPTIBILITY PANEL BY E-TEST          Antibiotics and immunizations:       Current antibiotics: All antibiotics and their doses were reviewed by me    Recent Abx Admin      No antibiotic orders with administrations found. Immunization History: All immunization history was reviewed by me today.     Immunization History   Administered Date(s) Administered    COVID-19, MODERNA BLUE border, Primary or Immunocompromised, (age 12y+), IM, 100 mcg/0.5mL 03/31/2021, 04/28/2021    DT (pediatric) 08/31/1990    Influenza Virus Vaccine 10/22/2002    Influenza, Triv, inactivated, subunit, adjuvanted, IM (Fluad 65 yrs and older) 12/11/2019    Pneumococcal Conjugate 13-valent ([id number]) 09/29/2015    Pneumococcal Polysaccharide ([id number]) 09/05/2017    Td vaccine (adult) 03/17/2010    Td, unspecified formulation 08/31/1990    Tdap (Boostrix, Adacel) 09/19/2017       Known drug allergies: All allergies were reviewed and updated    Allergies   Allergen Reactions    Amoxicillin Hives and Rash    Linezolid Other (See Comments)     While taking linezolid, pt developed progressively altered mentation & ultimately had a witnessed seizure 4/26/18. Uncertain whether these symptoms were d/t linezolid, but possible.  Prednisone Itching    Vancomycin      Rising SCr during 5/2018 admission     Coreg [Carvedilol] Diarrhea    Oxycodone-Acetaminophen Rash       Social history:     Social History:  All social andepidemiologic history was reviewed and updated by me today as needed. · Tobacco use:   reports that she has never smoked. She has never used smokeless tobacco.  · Alcohol use:   reports no history of alcohol use. · Currently lives in: [address] [place] [address] Drive  ·  reports no history of drug use.      COVID VACCINATION AND LAB RESULT RECORDS:     Internal Administration   First Dose COVID-19, MODERNA BLUE border, Primary or Immunocompromised, (age 12y+), IM, 100 mcg/0.5mL  03/31/2021   Second Dose COVID-19, MODERNA BLUE border, Primary or Immunocompromised, (age 12y+), IM, 100 mcg/0.5mL   04/28/2021       Last COVID Lab SARS-CoV-2, NAAT (no units)   Date Value   06/22/2022 Not Detected            Assessment:     The patient is a 82 y.o. old female who  has a past medical history of CAD (coronary artery disease), DDD (degenerative disc disease), lumbosacral (4/10/2013), Decreased mobility and endurance (2/24/2022), Hearing aid worn, History of short term memory loss (3/3/2022), [place] (hard of hearing[place] (hard of hearing), Hyperlipidemia, Hypertension, MI, old (2017), Neuropathy, Non-compliance with treatment (6/2/2022), Pressure ulcer of ischium, stage 3, left ([address] (2/16/2022), Pressure ulcer of left buttock, stage 3 ([address] (2/16/2022), Sleep apnea, Thyroid disease, and Wears glasses. with following problems:    · Right knee prosthetic joint infection-covered with daptomycin and cefepime  · History of total right knee arthroplasty with polyethylene exchange on 6/18/2022, culture was positive for MRSA  · Prior right total knee arthroplasty fluid from 4/1/2022 positive for Pseudomonas-covered with cefepime  · Elevated sed rate   · Elevated CRP  · PICC line in place  · Severe dementia-ongoing  · Degenerative joint disease-stable  · Chronic hearing loss  · Coronary artery disease  · Essential hypertension  · Obstructive sleep apnea      Discussion:      She is afebrile. She is on IV daptomycin and IV cefepime. She seems to be tolerating antibiotics okay. Serum creatinine 0.7. Liver functions are okay. 1 set of blood culture from 7/8/2022 was positive for Staph epidermidis. It was likely contaminant from the skin and should be covered regardless by daptomycin    Plan:     Diagnostic Workup:      · Continue to follow  fever curve, WBC count and blood cultures. · Continue to monitor blood counts, liver and renal function. Antimicrobials:    · Continue IV daptomycin at current dose for MRSA coverage  · Continue IV cefepime for Pseudomonas coverage  · Plan is to continue both antibiotics until August 11 at this time and then reassess  · Recommend oral probiotic twice daily while on antibiotic  · Recommend follow-up video visit with me in around 4 weeks  · Hospice discussions are in progress  · Continue close vitals monitoring. · Maintain good glycemic control. · Fall precautions. · Aspiration precautions. · Continue to watch for new fever or diarrhea. · DVT prophylaxis. · Discussed all above with patient and RN.       Drug Monitoring:    · Continue monitoring for antibiotic toxicity as follows: CBC, CMP   · Continue to watch for following: new or worsening fever, new hypotension, hives, lip swelling and redness or purulence at vascular access sites. I/v access Management:    · Continue to monitor i.v access sites for erythema, induration, discharge or tenderness. · As always, continue efforts to minimize tubes/lines/drains as clinically appropriate to reduce chances of line associated infections. Patient education and counseling:        · The patient was educated in detail about the side-effects of various antibiotics and things to watch for like new rashes, lip swelling, severe reaction, worsening diarrhea, break through fever etc.  · Discussed patient's condition and what to expect. All of the patient's questions were addressed in a satisfactory manner and patient verbalized understanding all instructions. Thanks for allowing me to participate in your patient's care. I will sign off today, but will be available to answer any further questions or concerns that may arise during patient's stay in the hospital.          Subjective: Interval history: Interval history was obtained from chart review and patient/ RN. The patient is on IV daptomycin and cefepime. She seems to be tolerating antibiotics okay. No diarrhea. Diarrhea     REVIEW OF SYSTEMS:     Review of Systems   Constitutional: Positive for fatigue. Negative for chills, diaphoresis and unexpected weight change. HENT: Negative for congestion, ear discharge, ear pain, facial swelling, hearing loss, rhinorrhea and trouble swallowing. Eyes: Negative for photophobia, discharge, redness and visual disturbance. Respiratory: Negative for apnea, cough, choking, chest tightness, shortness of breath and stridor. Cardiovascular: Negative for chest pain and palpitations. Gastrointestinal: Negative for abdominal pain, blood in stool, diarrhea and nausea. Endocrine: Negative for polydipsia, polyphagia and polyuria.    Genitourinary: Negative for difficulty urinating, dysuria, frequency, hematuria, menstrual problem and vaginal discharge. Musculoskeletal: Positive for arthralgias (Rt knee). Negative for joint swelling, myalgias and neck stiffness. Skin: Negative for color change and rash. Allergic/Immunologic: Negative for immunocompromised state. Neurological: Negative for dizziness, seizures, speech difficulty, light-headedness and headaches. Hematological: Negative for adenopathy. Psychiatric/Behavioral: Negative for agitation, hallucinations and suicidal ideas. *      Past Medical History: All past medical history reviewed today. Past Medical History:   Diagnosis Date    CAD (coronary artery disease)     Inferior STEMI; CATRACHITA to RCA    DDD (degenerative disc disease), lumbosacral 4/10/2013    Decreased mobility and endurance 2/24/2022    Hearing aid worn     bilateral    History of short term memory loss 3/3/2022    [place] (hard of hearing)     [place] (hard of hearing)     Hyperlipidemia     Hypertension     MI, old 2017    Neuropathy     bilateral lower extremities    Non-compliance with treatment 6/2/2022    Pressure ulcer of ischium, stage 3, left ([address] 2/16/2022    Pressure ulcer of left buttock, stage 3 ([address] 2/16/2022    Sleep apnea     uses mouth piece; bringing DOS 3/26/18    Thyroid disease     hypo    Wears glasses        Past Surgical History: All past surgical history was reviewed today.     Past Surgical History:   Procedure Laterality Date    ABSCESS DRAINAGE Right 03/27/2018    evacuation hematoma right knee    APPENDECTOMY  1963   [facility] SURGERY  [id number]    MILD PROCEDURE L2-3 BILATERAL     BREAST LUMPECTOMY  1986    BUNIONECTOMY  10/2003    CATARACT REMOVAL  03/23/2010 and 04/14/2010    COLONOSCOPY  6/2014    repeat 2019    CORONARY ANGIOPLASTY WITH STENT PLACEMENT  01/23/2017    INF STEMI with CATRACHITA to RCA    DILATION AND CURETTAGE OF UTERUS      INCONTINENCE SURGERY      JOINT REPLACEMENT Right 2018    right total knee replacment    OVARY REMOVAL  1963    right    REVISION TOTAL KNEE ARTHROPLASTY Right 6/18/2022    RIGHT TOTAL KNEE INCISION AND DRAINAGE WITH LINER EXCHANGE performed by Ira Crisitna MD at [address]       Family History: All family history was reviewed today. Problem Relation Age of Onset    Cancer Mother     Heart Disease Mother     Arthritis Sister         rheumatoid    Cancer Brother     Cancer Sister     Diabetes Sister     Heart Disease Brother     High Blood Pressure Brother     High Blood Pressure Sister     Diabetes Brother        Objective:       PHYSICAL EXAM:      Vitals:   Vitals:    07/11/22 1740 07/11/22 2145 07/12/22 0037 07/12/22 0852   BP: 129/71 (!) 145/72 (!) 145/65 125/62   Pulse: 81 78 73 82   Resp: 16 16 16 15   Temp: 98 °F (36.7 °C) 98.5 °F (36.9 °C) 98.2 °F (36.8 °C) 98.1 °F (36.7 °C)   TempSrc: Oral Oral Oral Oral   SpO2: 95% 95% 95% 95%   Weight:       Height:           Physical Exam  Vitals and nursing note reviewed. Constitutional:       General: She is not in acute distress. Appearance: She is well-developed. She is not diaphoretic. HENT:      Head: Normocephalic. Right Ear: External ear normal.      Left Ear: External ear normal.      Nose: Nose normal.   Eyes:      General: No scleral icterus. Right eye: No discharge. Left eye: No discharge. Conjunctiva/sclera: Conjunctivae normal.      Pupils: Pupils are equal, round, and reactive to light. Cardiovascular:      Rate and Rhythm: Normal rate and regular rhythm. Heart sounds: No murmur heard. No friction rub. Pulmonary:      Effort: Pulmonary effort is normal.      Breath sounds: No stridor. No wheezing or rales. Chest:      Chest wall: No tenderness. Abdominal:      Palpations: Abdomen is soft. There is no mass. Tenderness: There is no abdominal tenderness. There is no guarding or rebound. Musculoskeletal:         General: Swelling (Rt knee) present. No tenderness. Cervical back: Normal range of motion and neck supple. Lymphadenopathy:      Cervical: No cervical adenopathy. Skin:     General: Skin is warm and dry. Findings: No erythema or rash. Neurological:      Mental Status: She is alert and oriented to person, place, and time. Motor: No abnormal muscle tone. Psychiatric:         Judgment: Judgment normal.         Lines and drains: All vascular access sites are healthy with no local erythema, discharge or tenderness. Intake and output:    I/O last 3 completed shifts: In: 700 [P.O.:700]  Out: -     Lab Data:   All available labs and old records have been reviewed by me. CBC:  Recent Labs     07/10/22  0411 07/11/22  0503 07/12/22  0706   WBC 6.2 6.0 6.4   RBC 3.39* 3.41* 3.46*   HGB 8.9* 9.1* 9.0*   HCT 27.4* 27.4* 28.0*    300 280   MCV 80.8 80.3 81.1   MCH 26.2 26.6 26.0   MCHC 32.4 33.1 32.1   RDW 16.0* 15.6* 15.5*        BMP:  Recent Labs     07/10/22  0411 07/11/22  0503 07/12/22  0640    139 140   K 3.8 3.8 3.5    108 109   CO2 23 24 22   BUN 22* 19 17   CREATININE 0.8 0.8 0.7   CALCIUM 9.0 9.3 9.2   GLUCOSE 120* 109* 90        Hepatic Function Panel:   Lab Results   Component Value Date/Time    ALKPHOS 131 07/12/2022 06:40 AM    ALT 14 07/12/2022 06:40 AM    AST 17 07/12/2022 06:40 AM    PROT 6.2 07/12/2022 06:40 AM    PROT 7.2 09/18/2012 01:45 PM    PROT 7.2 09/18/2012 01:45 PM    BILITOT 0.3 07/12/2022 06:40 AM    BILIDIR <0.2 03/28/2018 04:35 AM    IBILI see below 03/28/2018 04:35 AM    LABALBU 3.1 07/12/2022 06:40 AM       CPK:   Lab Results   Component Value Date    CKTOTAL 16 (L) 07/11/2022     ESR:   Lab Results   Component Value Date    SEDRATE 44 (H) 07/12/2022     CRP:   Lab Results   Component Value Date    CRP 30.6 (H) 07/12/2022           Imaging:     All pertinent images and reports for the current visit were reviewed by me during this visit. I reviewed the chest x-ray/CT scan/MRI images and independently interpreted the findings and results today. XR KNEE RIGHT (1-2 VIEWS)   Final Result   Total right knee replacement which is unchanged with no acute bony   abnormality. Diffuse osteopenia. Moderate soft tissue swelling anterior to the knee and a moderate   suprapatellar effusion. Medications: All current and past medications were reviewed.  daptomycin (CUBICIN) IVPB  6 mg/kg IntraVENous Q24H    NIFEdipine  30 mg Oral Daily    aspirin  81 mg Oral Daily    ferrous sulfate  325 mg Oral Daily with breakfast    cefepime  2,000 mg IntraVENous Q12H    levETIRAcetam  500 mg Oral BID    lactobacillus  1 capsule Oral BID    rosuvastatin  40 mg Oral Nightly    lisinopril  20 mg Oral Daily    polyethylene glycol  17 g Oral Daily    docusate sodium  100 mg Oral BID    thyroid  30 mg Oral Daily    latanoprost  1 drop Left Eye Nightly    dorzolamide  1 drop Left Eye BID    timolol  1 drop Left Eye BID    sodium chloride flush  5-40 mL IntraVENous 2 times per day    enoxaparin  40 mg SubCUTAneous Daily        sodium chloride         diphenhydrAMINE, bisacodyl, sodium chloride flush, sodium chloride, ondansetron **OR** ondansetron      Problem list:       Patient Active Problem List   Diagnosis Code    Low back pain radiating to both legs M54.50, M79.604, M79.605    Lumbar spinal stenosis M48.061    DDD (degenerative disc disease), lumbosacral M51.37    Stenosis, spinal, lumbar M48.061    Pain and swelling of right knee M25.561, M25.461    Acute inferior myocardial infarction (HCC) I21.19    Hyperlipidemia LDL goal <70 E78.5    Retroperitoneal hemorrhage R58    Essential hypertension I10    Arthritis of right knee M17.11    History of total knee arthroplasty, right-3. 6.2018 Z96.651    Traumatic hematoma of knee, right, subsequent encounter S80. 01XD    Traumatic hematoma of right knee S80. [place]  Prosthetic joint infection ([facility] T84.50XA    Seizure disorder ([address] G40.909    Fall against object W18.00XA    Sepsis ([facility] A41.9    Pressure ulcer of ischium, stage 3, left (HCC) L89.323    Decreased mobility and endurance Z74.09    [place] (hard of hearing) H91.90    History of short term memory loss Z87.898    Non-compliance with treatment Z91.19    Chronic renal disease, stage III ([facility] [853953] N18.30    Infection of prosthetic right knee joint (HCC) T84.53XA    Pseudomonas infection A49.8    Elevated C-reactive protein (CRP) R79.82    Elevated sed rate R70.0    Osteoarthritis M19.90    PRINCE (obstructive sleep apnea) G47.33    Severe dementia (HCC) F03.90    Receiving intravenous antibiotic treatment as outpatient Z79.2    Complex care coordination Z71.89    Cellulitis of right lower extremity L03.115    PICC (peripherally inserted central catheter) in place Z45.2    Coronary artery disease due to lipid rich plaque I25.10, I25.83       Please note that this chart was generated using Dragon dictation software. Although every effort was made to ensure the accuracy of this automated transcription, some errors in transcription may have occurred inadvertently. If you may need any clarification, please do not hesitate to contact me through EPIC or at the phone number provided below with my electronic signature. Any pictures or media included in this note were obtained after taking informed verbal consent from the patient and with their approval to include those in the patient's medical record.       Wilmer Weber MD, MPH, FACP, FID  7/12/2022, 2:44 PM  [facility]   [address] [place] [address], [address] [place] [address]  Office: [phone]  Fax: [phone]  Clinic days:  Tuesday & Thursday

## 2022-07-12 NOTE — PROGRESS NOTES
11:09 AM  Assessment complete. Patient up in chair after working with therapy. Chair alarm on and call light in reach. Dressing to right knee changed. Hospice here to meet with family. The care plan and education has been reviewed and mutually agreed upon with the patient. 12:52 PM  Family spoke with hospice nurse today. They hope to take patient home with hospice. They do not wish to continue with IV abx.   2:58 PM  Patient assisted to bathroom and back to bed with bed alarm on and call light in reach.

## 2022-07-12 NOTE — PROGRESS NOTES
[facility] [address]   Phone: [phone]    Occupational Therapy    [x] Initial Evaluation            [] Daily Treatment Note         [] Discharge Summary      Patient: Kylee Franco   : 1935   MRN: [id number]   Date of Service:  2022    Admitting Diagnosis:  Infection of prosthetic right knee joint [facility])  Current Admission Summary:  noted R joint periprosthetic infection,  admitted and I&D completed. Pt readmitted for drainage and infection in R knee. Past Medical History:  has a past medical history of CAD (coronary artery disease), DDD (degenerative disc disease), lumbosacral, Decreased mobility and endurance, Hearing aid worn, History of short term memory loss, [place] (hard of hearing), [place] (hard of hearing), Hyperlipidemia, Hypertension, MI, old, Neuropathy, Non-compliance with treatment, Pressure ulcer of ischium, stage 3, left (HCC), Pressure ulcer of left buttock, stage 3 ([address] Sleep apnea, Thyroid disease, and Wears glasses. Past Surgical History:  has a past surgical history that includes Cataract removal (2010 and 2010); Bunionectomy (10/2003); Dilation and curettage of uterus; Appendectomy (); Ovary removal (); Tonsillectomy and adenoidectomy (); Breast lumpectomy (); Incontinence surgery; Colonoscopy (2014); Coronary angioplasty with stent (2017); back surgery ([id number]); joint replacement (Right, 2018); Abscess Drainage (Right, 2018); and Revision total knee arthroplasty (Right, 2022). Discharge Recommendations: Kylee Franco scored a 15/24 on the AM-PAC ADL Inpatient form. Current research shows that an AM-PAC score of 17 or less is typically not associated with a discharge to the patient's home setting.  Based on the patient's AM-PAC score and their current ADL deficits, it is recommended that the patient have 3-5 sessions per week of Occupational Therapy at d/c to increase the patient's independence. Please see assessment section for further patient specific details. If patient discharges prior to next session this note will serve as a discharge summary. Please see below for the latest assessment towards goals. DME Required For Discharge: DME to be determined at next level of care, DME to be determined pending patient progress    Precautions/Restrictions: high fall risk  Weight Bearing Restrictions: weight bearing as tolerated  [] Right Upper Extremity  [] Left Upper Extremity [x] Right Lower Extremity  [] Left Lower Extremity     Required Braces/Orthotics: no braces required   [] Right  [] Left  Positional Restrictions:no positional restrictions    Lives With: alone                     Type of Home: condo  Home Layout: one level  Home Access: .  Comment: patient reports she can not recall  Gumaro Sera in shower  Toilet Height: elevated height  Bathroom Equipment: . Chang Guevara: stands to shower she can not recall if she has a seat  Home Equipment: standard walker, .  Comment:  patient is unable to recall weather he walker has wheels or no wheels  Transfer Assistance: Independent without use of device  Ambulation Assistance:Independent without use of device, modified independent with use of walker  ADL Assistance: independent with all ADL's  IADL Assistance: independent with homemaking tasks sister lives next door  Active : [x]? ? yes             []? ?no  Current Employment: unemployed  Hobbies: watch tv   Recent Falls: [place] the park slipped and fell on wet ground    Since last admission 6/2022 pt has been at [facility]. Examination   Vision:   Vision Gross Assessment: Impaired and Vision Corrective Device: wears glasses for reading  Hearing:   hard of hearing  Perception:   Initiation: cues to initiate tasks  Observation:   General Observation:  noted bloody drainage on ace wrap R LE. RN notified.  increased swelling of R LE  Posture:   Rounded shoulders, posterior pelvic tilt in chair, pt repositioned with therapists assist  Sensation:   Unable to assess d/t cognition  Tone:   Normotonic  Coordination Testing:   Coordination and Movement Description: fine motor impairments    ROM:   (L) Shoulder: decreased shoulder extension B, unable to wipe buttocks independently     (R) Shoulder: decreased shoulder extension  Strength:   Formal MMT held secondary to d/t cognition    Decision Making: medium complexity  Clinical Presentation: evolving      Subjective  General: pt asleep upon arrival, agreeable to OT eval with encouragement. Reporting pain in   Pain: Pain rating taken based on observed faces and behaviors  Pain Interventions: RN notified        Activities of Daily Living  Basic Activities of Daily Living  Feeding: setup assistance  Grooming: setup assistance requires verbal cueing Increased time to complete task  Lower Extremity Dressing: moderate assistance. Equipment: none  Toileting: moderate assistance. Equipment: none   Pt walked to/from bathroom with RW and CGA. Complaining of R knee pain. VCs for sequence of tasks. Pt able to urinate. Assist for LB dressing and keli care. VCs for grooming    Instrumental Activities of Daily Living  No IADL completed on this date. Functional Mobility  Bed Mobility  Supine to Sit: stand by assistance  Scooting: stand by assistance  Comments: HOB elevated, VCs  Transfers  Sit to stand transfer:contact guard assistance  Stand to sit transfer: minimal assistance  Toilet transfer: minimal assistance. Mobility technique: ambulating. Equipment utilized: grab [employer]  Comments: VCs to step back to ADL surface prior to sitting  Functional Mobility:  Sitting Balance: supervision. Standing Balance: contact guard assistance. Functional Mobility: rolling walker. contact guard assistance.   Activity: to/from bathroom, in room to door and back to chair, increased time, occassionally running RW into environmental items in room    Other Therapeutic Interventions    Functional Outcomes  AM-PAC Inpatient Daily Activity Raw Score: 15    Cognition  Overall Cognitive Status: Impaired  Following Commands: follows one step commands with repetition, follows one step commands with increased time  Attention Span: difficulty dividing attention  Memory: decreased recall of recent events, decreased short term memory  Safety Judgement: decreased awareness of need for safety  Sequencing: requires cues for some   Pt with baseline dementia. Disoriented to place. Asking multiple times where she was  Orientation:    disoriented to place and disoriented to situation  Command Following:   accurately follows one step commands     Education  Barriers To Learning: cognition and hearing  Patient Education: patient educated on plan of care, orientation  Learning Assessment:  patient will require reinforcement due to cognitive deficits    Assessment  Activity Tolerance: tolerated tx well  Impairments Requiring Therapeutic Intervention: decreased functional mobility, decreased ADL status, decreased balance, increased pain  Prognosis: fair  Clinical Assessment: pt with sharp declined since April. Was living at home alone, currently at [facility]. Pt not at baseline and would benefit from ongoing skilled OT services in order to return to [facility].  Pt being followed by palliative care  Safety Interventions: patient left in chair, chair alarm in place, call light within reach, gait belt, patient at risk for falls, telesitter in use and nurse notified    Plan  Frequency: 3-5 x/per week  Current Treatment Recommendations: strengthening, balance training, functional mobility training, transfer training, ADL/self-care training, cognitive reorientation and pain management    Goals  Patient Goals: pt did not state, reports she wants to die    Short Term Goals:  Time Frame: discharge  Bed mobility supervision  Functional ADL transfer SBA with RW  Functional mobility with with RW and SBA  UB ADLs set up assist with min VCs  LB ADLs Na  tolieting Na    Therapy Session Time     Individual Group Co-treatment   Time In    0818   Time Out    0846   Minutes    28        Timed Code Treatment Minutes:   13 minutes  Total Treatment Minutes:  28 minutes       Electronically Signed By: Orestes Connors, [address], OTR/L [id number]

## 2022-07-12 NOTE — TELEPHONE ENCOUNTER
N called inquiring if Dr Gloria Johnson will follow for hospice care.  Patient is discharging from the hospital tomorrow 7/13/22

## 2022-07-13 VITALS
SYSTOLIC BLOOD PRESSURE: 145 MMHG | BODY MASS INDEX: 26.04 KG/M2 | RESPIRATION RATE: 16 BRPM | DIASTOLIC BLOOD PRESSURE: 71 MMHG | TEMPERATURE: 98.8 F | HEART RATE: 77 BPM | OXYGEN SATURATION: 97 % | WEIGHT: 156.31 LBS | HEIGHT: 65 IN

## 2022-07-13 LAB
A/G RATIO: 1.2 (ref 1.1–2.2)
ALBUMIN SERPL-MCNC: 3.3 G/DL (ref 3.4–5)
ALP BLD-CCNC: 125 U/L (ref 40–129)
ALT SERPL-CCNC: 16 U/L (ref 10–40)
ANION GAP SERPL CALCULATED.3IONS-SCNC: 7 MMOL/L (ref 3–16)
AST SERPL-CCNC: 19 U/L (ref 15–37)
BILIRUB SERPL-MCNC: <0.2 MG/DL (ref 0–1)
BUN BLDV-MCNC: 16 MG/DL (ref 7–20)
CALCIUM SERPL-MCNC: 9.4 MG/DL (ref 8.3–10.6)
CHLORIDE BLD-SCNC: 104 MMOL/L (ref 99–110)
CO2: 25 MMOL/L (ref 21–32)
CREAT SERPL-MCNC: 0.7 MG/DL (ref 0.6–1.2)
GFR AFRICAN AMERICAN: >60
GFR NON-AFRICAN AMERICAN: >60
GLUCOSE BLD-MCNC: 99 MG/DL (ref 70–99)
POTASSIUM SERPL-SCNC: 3.5 MMOL/L (ref 3.5–5.1)
SODIUM BLD-SCNC: 136 MMOL/L (ref 136–145)
TOTAL PROTEIN: 6.1 G/DL (ref 6.4–8.2)

## 2022-07-13 PROCEDURE — 80053 COMPREHEN METABOLIC PANEL: CPT

## 2022-07-13 PROCEDURE — 6370000000 HC RX 637 (ALT 250 FOR IP): Performed by: INTERNAL MEDICINE

## 2022-07-13 PROCEDURE — 97530 THERAPEUTIC ACTIVITIES: CPT

## 2022-07-13 PROCEDURE — 2580000003 HC RX 258: Performed by: INTERNAL MEDICINE

## 2022-07-13 PROCEDURE — 97116 GAIT TRAINING THERAPY: CPT

## 2022-07-13 PROCEDURE — 97110 THERAPEUTIC EXERCISES: CPT

## 2022-07-13 PROCEDURE — 6370000000 HC RX 637 (ALT 250 FOR IP): Performed by: CLINICAL NURSE SPECIALIST

## 2022-07-13 RX ORDER — NIFEDIPINE 30 MG/1
30 TABLET, EXTENDED RELEASE ORAL DAILY
Qty: 30 TABLET | Refills: 1 | Status: SHIPPED | OUTPATIENT
Start: 2022-07-14

## 2022-07-13 RX ORDER — TRAMADOL HYDROCHLORIDE 50 MG/1
50 TABLET ORAL EVERY 4 HOURS PRN
Status: DISCONTINUED | OUTPATIENT
Start: 2022-07-13 | End: 2022-07-13 | Stop reason: HOSPADM

## 2022-07-13 RX ORDER — TRAMADOL HYDROCHLORIDE 50 MG/1
50 TABLET ORAL EVERY 6 HOURS PRN
Qty: 28 TABLET | Refills: 0 | Status: SHIPPED | OUTPATIENT
Start: 2022-07-13 | End: 2022-08-10 | Stop reason: SDUPTHER

## 2022-07-13 RX ADMIN — LEVOTHYROXINE, LIOTHYRONINE 30 MG: 19; 4.5 TABLET ORAL at 10:10

## 2022-07-13 RX ADMIN — DORZOLAMIDE HYDROCHLORIDE 1 DROP: 20 SOLUTION/ DROPS OPHTHALMIC at 10:09

## 2022-07-13 RX ADMIN — FERROUS SULFATE TAB 325 MG (65 MG ELEMENTAL FE) 325 MG: 325 (65 FE) TAB at 10:15

## 2022-07-13 RX ADMIN — DOCUSATE SODIUM 100 MG: 100 CAPSULE, LIQUID FILLED ORAL at 10:03

## 2022-07-13 RX ADMIN — LISINOPRIL 20 MG: 20 TABLET ORAL at 10:03

## 2022-07-13 RX ADMIN — ASPIRIN 81 MG: 81 TABLET, COATED ORAL at 10:03

## 2022-07-13 RX ADMIN — Medication 1 CAPSULE: at 10:03

## 2022-07-13 RX ADMIN — NIFEDIPINE 30 MG: 30 TABLET, FILM COATED, EXTENDED RELEASE ORAL at 10:04

## 2022-07-13 RX ADMIN — Medication 10 ML: at 10:19

## 2022-07-13 RX ADMIN — TRAMADOL HYDROCHLORIDE 50 MG: 50 TABLET, COATED ORAL at 10:13

## 2022-07-13 RX ADMIN — TIMOLOL MALEATE 1 DROP: 5 SOLUTION OPHTHALMIC at 10:10

## 2022-07-13 RX ADMIN — LEVETIRACETAM 500 MG: 500 TABLET, FILM COATED ORAL at 10:03

## 2022-07-13 ASSESSMENT — ENCOUNTER SYMPTOMS
EYE DISCHARGE: 0
APNEA: 0
DIARRHEA: 0
CHOKING: 0
TROUBLE SWALLOWING: 0
STRIDOR: 0
CHEST TIGHTNESS: 0
PHOTOPHOBIA: 0
SHORTNESS OF BREATH: 0
FACIAL SWELLING: 0
ABDOMINAL PAIN: 0
COUGH: 0
EYE REDNESS: 0
NAUSEA: 0
COLOR CHANGE: 0
BLOOD IN STOOL: 0
RHINORRHEA: 0

## 2022-07-13 ASSESSMENT — PAIN SCALES - GENERAL
PAINLEVEL_OUTOF10: 0
PAINLEVEL_OUTOF10: 0

## 2022-07-13 NOTE — PROGRESS NOTES
PALLIATIVE MEDICINE PROGRESS NOTE     Patient name:Agueda Francis    OLMAN:[id number] :1935  Room/Bed:[facility]4464/4464-01    LOS: 5 days        ASSESSMENT/RECOMMENDATIONS     82 y.o. female with AMS and debility     Symptom Management:  AMS- pt is oriented to self only unsure of baseline  Debility- Pt has been at SNF prior she was living alone at home per family she has rapidly declined the last 3 months  Leg pain- ordered Tramadol 50mg Q 4 hrs  Goals of Care-DNRCC plan to DC today at 130 pm with HOC. Reached out to niece they have no additional questions or concerns.       Patient/Family Goals of Care :      Pt does not appear to have capacity to make decisions. Talked to her Blanco Bragg they have seen a significant decline the last 3 months and she is asking to talk to Hospice stating that her infection is not fixable so \"why can't we just keep her comfortable\" per niece pt has been stating for years that she doesn't want aggressive Tx that she is ready to die if its her time. Updated to [facility] and made referral to [facility] pt will benefit from stay at [place] [address].        [facility] plan to DC today at 130 pm with HOC. Reached out to niece they have no additional questions or concerns.       Disposition/Discharge Plan:   pending     Advance Directives:  Surrogate Decision Maker: Ally/great niece  Code status:  DNR-CC     Case discussed with: patient, floor RN, Ally, Dr Matthew Gomez  Thank you for allowing us to participate in the care of this patient. SUBJECTIVE     Chief Complaint: leg pain    Last 24 hours:   Pt is stable overall she is c/o increased leg pain today    ROS:    Review of Systems   Unable to perform ROS: Mental status change   Musculoskeletal: Positive for arthralgias and joint swelling.              OBJECTIVE   BP (!) 145/71   Pulse 77   Temp 98.8 °F (37.1 °C) (Axillary)   Resp 16   Ht 5' 5\" (1.651 m)   Wt 156 lb 4.9 oz (70.9 kg)   SpO2 97%   BMI 26.01 kg/m²   I/O last 3 completed shifts: In: 200 [P.O.:200]  Out: -   No intake/output data recorded. Physical Exam  Constitutional:       Appearance: She is ill-appearing. HENT:      Head: Normocephalic and atraumatic. Nose: No congestion. Mouth/Throat:      Mouth: Mucous membranes are dry. Eyes:      General: No scleral icterus. Pupils: Pupils are equal, round, and reactive to light. Cardiovascular:      Rate and Rhythm: Normal rate. Heart sounds: No murmur heard. No friction rub. No gallop. Pulmonary:      Effort: No respiratory distress. Abdominal:      Palpations: Abdomen is soft. Musculoskeletal:      Cervical back: Normal range of motion. Right lower leg: Edema present. Left lower leg: Edema present. Skin:     General: Skin is warm and dry. Coloration: Skin is pale. Neurological:      General: No focal deficit present. Mental Status: She is alert. She is disoriented. Motor: Weakness present.                Total time: 35 minutes  >50% of time spent counseling patient at bedside or POA/family member if applicable , reviewing information and discussing care, coordinating with care team       Signed By: Electronically signed by MICHELLE Lynn CNP on 7/13/2022 at 10:07 AM   Palliative Medicine   [phone]    July 13, 2022

## 2022-07-13 NOTE — PROGRESS NOTES
Nutrition Note    RECOMMENDATIONS  1. PO Diet: Recommend liberalizing diet as pt is discharging with hospice   2. ONS: Continue current ONS    NUTRITION ASSESSMENT   Pt triggered for follow-up. Currently on 2 gm Na cardiac restricted diet. Pt is oriented x1. Per RN, pt is discharging today with hospice. Observed all of breakfast tray consumed. Documented intakes ranging from 0-100% throughout admission. No further nutritional concerns at this time. RD will continue to monitor and remains available if there were to be a change in status.  Nutrition Related Findings: LBM 7/12, BS+. Oriented x1. +2 RLE, +1 LLE edema.  Wounds: Stage III,Surgical Incision   Nutrition Education:  Education not indicated,Education not appropriate    Nutrition Goals: other (specify) (tolerate pleasure feeds as desired)     MALNUTRITION ASSESSMENT   Malnutrition Status: Insufficient data    NUTRITION DIAGNOSIS   No nutrition diagnosis at this time     CURRENT NUTRITION THERAPIES  ADULT ORAL NUTRITION SUPPLEMENT; Lunch, Dinner; Standard High Calorie/High Protein Oral Supplement  ADULT DIET; Regular     PO Intake: Unable to assess   PO Supplement Intake:None Ordered    ANTHROPOMETRICS   Current Height: 5' 5\" (165.1 cm)   Current Weight: 156 lb 4.9 oz (70.9 kg)     Ideal Body Weight (IBW): 125 lbs  (57 kg)        BMI: 26    COMPARATIVE STANDARDS  Energy (kcal):  1420 - 1775     Protein (g):  74 - 103       Fluid (mL/day):  8298 - 6445    The patient will be monitored per nutrition standards of care. Consult dietitian if additional nutrition interventions are needed prior to RD reassessment.      Roman Raza MS, RD, LD    Contact: [phone]

## 2022-07-13 NOTE — PROGRESS NOTES
Please see assessment section for further patient specific details. If patient discharges prior to next session this note will serve as a discharge summary. Please see below for the latest assessment towards goals. DME Required For Discharge: DME to be determined at next level of care  Precautions/Restrictions: high fall risk, weight bearing  Weight Bearing Restrictions: weight bearing as tolerated  [] Right Upper Extremity  [] Left Upper Extremity [] Right Lower Extremity  [] Left Lower Extremity     Required Braces/Orthotics: no braces required   [] Right  [] Left  Positional Restrictions:no positional restrictions    Pre-Admission Information   Lives With: alone                     Type of Home: condo  Home Layout: one level  Home Access: .  Comment: patient reports she can not recall  Ruiz Avalos in shower  Toilet Height: elevated height  Bathroom Equipment: . Poonam Loud: stands to shower she can not recall if she has a seat  Home Equipment: standard walker, .  Comment:  patient is unable to recall weather he walker has wheels or no wheels  Transfer Assistance: Independent without use of device  Ambulation Assistance:Independent without use of device, modified independent with use of walker  ADL Assistance: independent with all ADL's  IADL Assistance: independent with homemaking tasks sister lives next door  Active : [x]? ?? yes             []? ??no  Current Employment: unemployed  Hobbies: watch tv   Recent Falls: fall at the park slipped and fell on wet ground     Since last admission 6/2022 pt has been at [facility]. Pt stated she is unsure if she is working with therapy there an is unsure of her assistance levels there. Subjective  General: pt supine in bed upon arrival. Pt reports no pain at rest, just itching in R knee. Upon movement pt reports \"medium\" R knee pain.  Pt asked, \"How old am I?\", upon telling patient 82 yrs old she replied, \"No wonder I feel old, a lot of people are dead by now. \"   Pain: 5/10. Location: R knee  Pain Interventions: patient denies pain interventions and repositioned        Functional Mobility  Bed Mobility  Supine to Sit: stand by assistance  Scooting: stand by assistance  Comments:  Transfers  Sit to stand transfer: contact guard assistance  Stand to sit transfer: minimal assistance  Comments: decreased eccentric control   Ambulation  Assistive Device: rolling walker  Assistance: contact guard assistance  Distance: 10' x 2+ 15' x 1  Gait Mechanics: step to gait pattern, slow ronan, decreased step length, good use of RW  Comments:    Stair Mobility  Stair mobility not completed on this date. Comments:  Wheelchair Mobility:  No w/c mobility completed on this date. Comments:  Balance  Static Sitting Balance: fair (+): maintains balance at SBA/supervision without use of UE support  Dynamic Sitting Balance: fair (+): maintains balance at SBA/supervision without use of UE support  Static Standing Balance: fair (-): maintains balance at CGA with use of UE support  Dynamic Standing Balance: fair (-): maintains balance at CGA with use of UE support  Comments: The pt was able to complete pericare sitting on the toilet with supervision, SBA to stand at the sink for handwashing, CGA for gait with RW     Other Therapeutic Interventions  Assisted patient to bathroom, supervision for pericare and assisted patient with doffing dirty brief and donning clean brief. Therapist had to prompt patient to wash her hands. Pt also brushed her teeth with set up by PT. Seated exercises: x 20  B APs, x 15 B LAQs (notes medium pain in R knee), x 15 B marches, x 20 adduction squeeze  Seated reaching x 10 ipsilaterally and x 10 contralaterally    Functional Outcomes  AM-PAC Inpatient Mobility Raw Score : 17              Cognition  Comments: pt repeatedly asked \"where am I? \" and \"Why am I here again\", \"Do I have any siblings still alive\", \"Why does my knee hurt? \", \"Did I eat breakfast this morning? \". Pt was unable to retain orientation information. Orientation:    oriented to person, oriented to time, disoriented to place and disoriented to situation  Command Following:   accurately follows one step commands    Education  Barriers To Learning: cognition  Patient Education: patient educated on PT role and benefits, plan of care, orientation  Learning Assessment:  patient will require reinforcement due to cognitive deficits    Assessment  Activity Tolerance: Decreased by pain and emotional state  Impairments Requiring Therapeutic Intervention: decreased strength, decreased cognition, decreased endurance, decreased balance, increased pain  Prognosis: fair  Clinical Assessment: Pt demonstrates decreased cognition and decreased insight into deficits. Pt pleasant and willing to participate in therapy session. Pt requires CGA for all functional mobility secondary to decreased safety. Pt continues to present with decreased strength, balance and activity tolerance due to ongoing knee infection. She has poor cognition and repeatedly stated she wishes she would die. She is unsafe to return home alone. Safety Interventions: patient left in chair, chair alarm in place, call light within reach and nurse notified    Plan  Frequency: 3-5 x/per week  Current Treatment Recommendations: strengthening, balance training, functional mobility training, transfer training, gait training, endurance training, neuromuscular re-education and safety education    Goals  Patient Goals: \"I want to just die\"    Short Term Goals:  Time Frame:  To be met prior to Dc   Patient will complete bed mobility at [facility] independent   Patient will complete transfers at supervision   Patient will ambulate 75 ft with use of rolling walker at stand by assistance   No goals met this date 7/13    Therapy Session Time      Individual Group Co-treatment   Time In 1105       Time Out 1148       Minutes 43         Timed

## 2022-07-13 NOTE — PLAN OF CARE

## 2022-07-14 ENCOUNTER — TELEPHONE (OUTPATIENT)
Dept: FAMILY MEDICINE CLINIC | Age: 87
End: 2022-07-14

## 2022-07-14 NOTE — TELEPHONE ENCOUNTER
Sj Birmingham from [facility] phoned asking if Dr Gloria Johnson would like to stop pt's Lennis Bunkers    Dr Gloria Johnson stated yes Lennis Bunkers can be stopped    Sj Birmingham informed

## 2022-07-25 NOTE — DISCHARGE SUMMARY
[facility]    [facility] Demographics    Patient. Jayla Beaver  Date of Birth. 1935  MRN. [id number]     Primary care provider. Herman Rivera DO  (Tel: [phone])    Admit date: 7/8/2022    Discharge date (blank if same as Note Date): 7/13/2022  Note Date: 7/25/2022     Reason for Hospitalization. Chief Complaint   Patient presents with    Knee Pain     Pt. sent in per private EMS from [place] [id number]. [address] with report of right knee pain, NKI. Significant Findings. 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 *       Problems and results from this hospitalization that need follow up. None     Invasive procedures and treatments. None     [facility] Course. Patient is an 83 yo female who presented to hospital with right knee pain and swelling. She had just been hospitalized from June 16 June 21 and at that time underwent a right total knee I&D with linear exchange. She has continued to have drainage from the knee. Patient was admitted and started on IV antibiotics. Infectious disease as well as orthopedic surgery was consulted. Patient family wanted to avoid any further surgeries. She has had a significant decline physically and cognitively in the past few months. Palliative care was consulted. Family requested meeting with hospice.   They ultimately decided for patient to be discharged home with hospice of [place] without any antibiotics. Consults. IP CONSULT TO ORTHOPEDIC SURGERY  IP CONSULT TO INFECTIOUS DISEASES  IP CONSULT TO PALLIATIVE CARE  IP CONSULT TO HOSPICE  IP CONSULT TO DIETITIAN    Physical examination on discharge day. BP (!) 145/71   Pulse 77   Temp 98.8 °F (37.1 °C) (Axillary)   Resp 16   Ht 5' 5\" (1.651 m)   Wt 156 lb 4.9 oz (70.9 kg)   SpO2 97%   BMI 26.01 kg/m²   General appearance. Alert. Looks comfortable. HEENT. Sclera clear. Moist mucus membranes. Cardiovascular. Regular rate and rhythm, normal S1, S2. No murmur. Respiratory. Not using accessory muscles. Clear to auscultation bilaterally, no wheeze. Gastrointestinal. Abdomen soft, non-tender, not distended, normal bowel sounds  Neurology. Facial symmetry. No speech deficits. Moving all extremities equally. oriented to self  Extremities. No edema in lower extremities. Skin. Ace bandage on right leg    Condition at time of discharge guarded    Medication instructions provided to patient at discharge. Medication List        START taking these medications      NIFEdipine 30 MG extended release tablet  Commonly known as: PROCARDIA XL  Take 1 tablet by mouth daily            CHANGE how you take these medications      aspirin EC 81 MG EC tablet  Take 1 tablet by mouth 2 times daily  What changed: additional instructions     diclofenac 75 MG EC tablet  Commonly known as: VOLTAREN  TAKE ONE TABLET BY MOUTH TWICE A DAY WITH MEALS  What changed: See the new instructions.             CONTINUE taking these medications      acetaminophen 325 MG tablet  Commonly known as: TYLENOL     bisacodyl 10 MG suppository  Commonly known as: DULCOLAX     DAILY SHERIE MAXIMUM MULTIVITAMIN PO     docusate sodium 100 MG capsule  Commonly known as: COLACE  Take 1 capsule by mouth 2 times daily     dorzolamide-timolol 22.3-6.8 MG/ML ophthalmic solution  Commonly known as: COSOPT latanoprost 0.005 % ophthalmic solution  Commonly known as: XALATAN     levETIRAcetam 500 MG tablet  Commonly known as: KEPPRA  TAKE 1 TABLET BY MOUTH TWICE A DAY     lidocaine 4 % external patch  Place 1 patch onto the skin daily Apply 1/2 patch to either side of right knee     lisinopril 20 MG tablet  Commonly known as: PRINIVIL;ZESTRIL  TAKE ONE TABLET BY MOUTH DAILY     potassium chloride 10 MEQ extended release tablet  Commonly known as: KLOR-CON M  TAKE ONE TABLET BY MOUTH DAILY     rosuvastatin 40 MG tablet  Commonly known as: CRESTOR  TAKE ONE TABLET BY MOUTH DAILY     thyroid 30 MG tablet  Commonly known as: NP Thyroid  TAKE ONE TABLET BY MOUTH DAILY            STOP taking these medications      Acidophilus Tabs     DAPTOMYCIN IV            ASK your doctor about these medications      polyethylene glycol 17 g packet  Commonly known as: GLYCOLAX  Take 17 g by mouth daily  Ask about: Should I take this medication?     traMADol 50 MG tablet  Commonly known as: ULTRAM  Take 1 tablet by mouth every 6 hours as needed for Pain for up to 7 days. Ask about: Should I take this medication? Where to Get Your Medications        You can get these medications from any pharmacy    Bring a paper prescription for each of these medications  NIFEdipine 30 MG extended release tablet  traMADol 50 MG tablet         Discharge recommendations given to patient. Disposition. Home with hospice  Activity. activity as tolerated  Diet: No diet orders on file      Spent 40 minutes in discharge process. Signed:   Wicho White PA-C     7/25/2022 9:24 AM

## 2022-08-10 ENCOUNTER — TELEPHONE (OUTPATIENT)
Dept: FAMILY MEDICINE CLINIC | Age: 87
End: 2022-08-10

## 2022-08-10 DIAGNOSIS — Z51.5 HOSPICE CARE: ICD-10-CM

## 2022-08-10 RX ORDER — TRAMADOL HYDROCHLORIDE 50 MG/1
50 TABLET ORAL EVERY 6 HOURS PRN
Qty: 120 TABLET | Refills: 0 | Status: SHIPPED | OUTPATIENT
Start: 2022-08-10 | End: 2022-08-10 | Stop reason: SDUPTHER

## 2022-08-10 NOTE — TELEPHONE ENCOUNTER
[employer] and gave verbal order. Noreen verbalized understanding.    Prescription cancelled to [facility].

## 2022-08-12 RX ORDER — DICLOFENAC SODIUM 75 MG/1
TABLET, DELAYED RELEASE ORAL
Qty: 180 TABLET | Refills: 1 | Status: SHIPPED | OUTPATIENT
Start: 2022-08-12

## 2022-08-18 ENCOUNTER — TELEPHONE (OUTPATIENT)
Dept: FAMILY MEDICINE CLINIC | Age: 87
End: 2022-08-18

## 2022-08-18 NOTE — TELEPHONE ENCOUNTER
Left  for Gabriel Jamison informing her of Dr. Marylynn Bernheim message. Advised her to call back with any questions or concerns.

## 2022-08-22 DIAGNOSIS — E03.9 HYPOTHYROIDISM, UNSPECIFIED TYPE: ICD-10-CM

## 2022-08-22 RX ORDER — LEVOTHYROXINE AND LIOTHYRONINE 19; 4.5 UG/1; UG/1
TABLET ORAL
Qty: 90 TABLET | Refills: 1 | Status: SHIPPED | OUTPATIENT
Start: 2022-08-22

## 2022-08-30 ENCOUNTER — TELEPHONE (OUTPATIENT)
Dept: FAMILY MEDICINE CLINIC | Age: 87
End: 2022-08-30

## 2022-08-30 NOTE — TELEPHONE ENCOUNTER
Kendra, [address], requesting verbal order for Zofran Q8H PRN N/V and Compazine 10 mg Q6H PRN N/V.

## 2022-08-30 NOTE — TELEPHONE ENCOUNTER
Spoke with Chata Graham from [facility] [address]. She states they like to have 2 options if one does not work for patient.

## 2022-08-31 NOTE — TELEPHONE ENCOUNTER
Spoke with Bautista Medina and gave verbal order for both nausea medications. Kendra verbalized understanding.

## 2024-10-25 NOTE — PROGRESS NOTES
"Subjective   Patient ID: Elsa Rojo is a 71 y.o. female who presents for Follow-up (Patient is here for a follow up. ).    Pt is here to follow-up and has few concerns.  She noted a \"lump\" left proximal foot,no pain,able to walk,sometimes smaller in size.  Her  recently  after a vu with cancer, she has good support from her friend and her children , she she has been grieving having close crying spells , she has been taking Ativan 1 tablet daily , she self stopped trazodone 100 mg because it was not effective,, she pushed herself to exercise and walk regularly.she started Lexapro 10 mg 3 days ago,she continue to need to take Lorazepam,was finally able to sleep last night.  She did have 1 episode of \"not feeling well \"after she walk she came home on a cold day ,she felt little sick in the stomach nauseated, resolved spontaneously denies any shortness of breath or dizziness, but was shortly after her  passed away, no recurrence of that episode.she is here to go over her carotid ultrasound result.  She is known to have a elevated cardiac calcium score seen in past by cardiologist and had negative cardiac workup , she has been exercising regularly and denies exertional chest pain or shortness of breath.  She also noted over the last 6 months that whenever she need to urinate,\" she had a hard time getting her urine out and looks like her brain does not talk to her bladder\", she denies any dysuria or blood in her urine.  She has poor appetite and lost around 8 pounds since her 's death  she has HTN, thyroid nodule and mild minimal plaque carotid deposit on the right side  Last colonoscopy 2021, next in 5 years which would be 28.  Last mammogram 2023.  Last DEXA 2022  She is a former smoker quit in , denies any cough or shortness of breath.  She is also breast cancer survivor was diagnosed in .         Review of Systems   Respiratory: Negative.     Cardiovascular: " Negative.    Musculoskeletal:         As HPI.       Objective   /69 (BP Location: Left arm, Patient Position: Sitting, BP Cuff Size: Adult)   Pulse 58   Temp 36.8 °C (98.2 °F) (Temporal)   Wt 65 kg (143 lb 6.4 oz)   SpO2 98%   BMI 23.15 kg/m²     Physical Exam  Constitutional:       Appearance: Normal appearance.   HENT:      Head: Normocephalic and atraumatic.   Eyes:      Extraocular Movements: Extraocular movements intact.      Pupils: Pupils are equal, round, and reactive to light.   Cardiovascular:      Rate and Rhythm: Normal rate and regular rhythm.      Heart sounds: Normal heart sounds.   Pulmonary:      Effort: Pulmonary effort is normal.      Breath sounds: Normal breath sounds. No wheezing or rhonchi.   Abdominal:      General: Abdomen is flat.   Musculoskeletal:      Cervical back: Normal range of motion and neck supple.      Right lower leg: No edema.      Left lower leg: No edema.      Comments: Left foot:soft mass left proximal foot anterior to her left ankle,non tender to palpation.   Skin:     General: Skin is warm.   Neurological:      General: No focal deficit present.      Mental Status: She is alert and oriented to person, place, and time.   Psychiatric:         Mood and Affect: Mood normal.         Behavior: Behavior normal.         Assessment/Plan   Problem List Items Addressed This Visit             ICD-10-CM    Hyperlipidemia E78.5     Stable on statin,continue low fat diet.         Benign essential hypertension I10     Stable on current medication.         CAD (coronary artery disease), native coronary artery I25.10     Advised to see Dr Emanuel,cardiologist.  Continue statin.         Osteopenia M85.80     Continue ca,vit D and weight bearing exercise.  Due for DEXA next year         Depression, recurrent (CMS-HCC) - Primary F33.9     PHQ9 done today,score 13  Continue Lexapro.  Continue Lorazepam,can be habit forming,advised to take infrequently,lately needed 1-2 times  inactivated, subunit, adjuvanted, IM (Fluad 65 yrs and older) 12/11/2019    Pneumococcal Conjugate 13-valent (Onhontx24) 09/29/2015    Pneumococcal Polysaccharide (Cpzyjlfrv10) 09/05/2017    Td vaccine (adult) 03/17/2010    Td, unspecified formulation 08/31/1990    Tdap (Boostrix, Adacel) 09/19/2017       Known drug allergies: All allergies were reviewed and updated    Allergies   Allergen Reactions    Amoxicillin Hives and Rash    Linezolid Other (See Comments)     While taking linezolid, pt developed progressively altered mentation & ultimately had a witnessed seizure 4/26/18. Uncertain whether these symptoms were d/t linezolid, but possible.  Prednisone Itching    Vancomycin      Rising SCr during 5/2018 admission     Coreg [Carvedilol] Diarrhea    Oxycodone-Acetaminophen Rash       Social history:     Social History:  All social andepidemiologic history was reviewed and updated by me today as needed. · Tobacco use:   reports that she has never smoked. She has never used smokeless tobacco.  · Alcohol use:   reports no history of alcohol use. · Currently lives in: [address] [place] [address] Drive  ·  reports no history of drug use.      COVID VACCINATION AND LAB RESULT RECORDS:     Internal Administration   First Dose COVID-19, MODERNA TARIQ border, Primary or Immunocompromised, (age 12y+), IM, 100 mcg/0.5mL  03/31/2021   Second Dose COVID-19, MODERNA BLUE border, Primary or Immunocompromised, (age 12y+), IM, 100 mcg/0.5mL   04/28/2021       Last COVID Lab SARS-CoV-2, NAAT (no units)   Date Value   06/22/2022 Not Detected            Assessment:     The patient is a 82 y.o. old female who  has a past medical history of CAD (coronary artery disease), DDD (degenerative disc disease), lumbosacral (4/10/2013), Decreased mobility and endurance (2/24/2022), Hearing aid worn, History of short term memory loss (3/3/2022), [place] (hard of hearing), [place] (hard of hearing), Hyperlipidemia, Hypertension, MI, old (2017), Neuropathy, Non-compliance with treatment (6/2/2022), Pressure ulcer of ischium, stage 3, left ([address] (2/16/2022), Pressure ulcer of left buttock, stage 3 ([address] (2/16/2022), Sleep apnea, Thyroid disease, and Wears glasses. with following problems:    · Right knee prosthetic joint infection   · History of total right knee arthroplasty with polyethylene exchange on 6/18/2022, culture was positive for MRSA  · Prior right total knee arthroplasty fluid from 4/1/2022 positive for Pseudomonas  · Elevated sed rate   · Elevated CRP  · PICC line in place  · Severe dementia  · Degenerative joint disease  · Chronic hearing loss  · Coronary artery disease  · Essential hypertension  · Obstructive sleep apnea      Discussion:      The patient is afebrile. She is on IV daptomycin and IV cefepime. Serum creatinine 0.8. White cell count 6000. Right knee wound culture from this admission is positive for Pseudomonas, which is levofloxacin intermediate    Prior surgical culture from last month were positive for MRSA on 6/18/2022, which is covered with daptomycin. The patient has been evaluated by orthopedics. No plans for any further surgical intervention at this time    Plan:     Diagnostic Workup:    · Will order CK level for the patient has patient is on daptomycin  · Continue to follow  fever curve, WBC count and blood cultures. · Continue to monitor blood counts, liver and renal function. Antimicrobials:    · Will  Continue IV daptomycin 450 mg every 24 hour for MRSA coverage  · Will continue IV cefepime 2 g every 12 hour for Pseudomonas coverage  · Plan to continue both antibiotics for another 4 weeks  · We will follow up on the culture results and clinical progress and will make further recommendations accordingly. · Continue close vitals monitoring. · Maintain good glycemic control. · Fall precautions. Aspiration precautions. · Continue to watch for new fever or diarrhea. · DVT prophylaxis.   · Discussed daily.  Follow up in 2 months,continue counseling and grief support group meeting.         Other insomnia G47.09     No response to trazodone.  Continue lexapro and Lorazepam.         Microscopic hematuria R31.29     Urinalysis was normal,advised to see uroGyn for further evaluation of her urinary symptoms,she wants to wait for now,will increase oral water intake and treat her anxiety and will call for referral if symptoms persist.         Bereavement Z63.4    Mass of left foot R22.42     It could be a cyst,will check XRAY and refer back to her podiatrist Dr Martin.         Foot mass, left R22.42    Relevant Orders    XR foot left 3+ views     Other Visit Diagnoses         Codes    Depression with anxiety     F41.8    Relevant Medications    escitalopram (Lexapro) 10 mg tablet                all above with patient and RN. SULEMA has been updated with infusion orders as follows: Out-patient antibiotic therapy (OPAT)/ Antibiotic Infusion orders          Diagnosis: Right knee prosthetic joint infection       Organism/ culture: MRSA, Pseudomonas     Name and dose of Antimicrobial: IV daptomycin 450 mg every 24 hour, IV cefepime 2 g every 12 hours     Antimicrobial start date: calculated from 7/11/2022     Antimicrobial completion date planned: August 12, 2022     Lab monitoring: CBC, Chem 12, ESR, CRP, CK once a week, to be       collected every Monday or Tuesday morning until the patient is off IV  antibiotics. Fax weekly lab results to Nati Eagle MD's office at        [phone]. Please maintain PICC line until the patient is on IV antibiotics. Ok to administer PICC line normal saline flushes as needed. The patient has given verbal informed consent for Carlin REYNA pharmacist, [facility] to manage above antibiotic therapy for the patient after the patient's discharge from the hospital.       ** Please notify Nati Eagle MD's office with any change in patient's        status, transfer out of facility or to hospital by calling [phone]           Outpatient Follow up: Due to the [place] 19 pandemic, plan for a follow-up virtual video visit in 4 weeks. Call my office at [phone] to make an appointment. Physician Signature:  Electronically signed by Nati Eagle MD on                                                      7/11/22 at 2:41 PM EDT             Drug Monitoring:    · Continue monitoring for antibiotic toxicity as follows: CBC, CMP, CK  · Continue to watch for following: new or worsening fever, new hypotension, hives, lip swelling and redness or purulence at vascular access sites. I/v access Management:    · Continue to monitor i.v access sites for erythema, induration, discharge or tenderness.    · As always, continue efforts to minimize tubes/lines/drains as clinically appropriate to reduce chances of line associated infections. Patient education and counseling:        · The patient was educated in detail about the side-effects of various antibiotics and things to watch for like new rashes, lip swelling, severe reaction, worsening diarrhea, break through fever etc.  · Discussed patient's condition and what to expect. All of the patient's questions were addressed in a satisfactory manner and patient verbalized understanding all instructions. Level of complexity of visit: High     Risk of Complications/Morbidity: High     · Illness(es)/ Infection present that pose threat to life/bodily function. · There is potential for severe exacerbation of infection/side effects of treatment. · Therapy requires intensive monitoring for antimicrobial agent toxicity. Thank you for involving me in the care of your patient. I will continue to follow. If you have anyadditional questions, please do not hesitate to contact me. Subjective: Interval history: Interval history was obtained from chart review and patient/ RN. The patient is afebrile. She has some pain in right knee. No fever. No diarrhea. REVIEW OF SYSTEMS:     Review of Systems   Constitutional: Positive for fatigue. Negative for chills, diaphoresis and unexpected weight change. HENT: Negative for congestion, ear discharge, ear pain, facial swelling, hearing loss, rhinorrhea and trouble swallowing. Eyes: Negative for photophobia, discharge, redness and visual disturbance. Respiratory: Negative for apnea, cough, choking, chest tightness, shortness of breath and stridor. Cardiovascular: Negative for chest pain and palpitations. Gastrointestinal: Negative for abdominal pain, blood in stool, diarrhea and nausea. Endocrine: Negative for polydipsia, polyphagia and polyuria.    Genitourinary: Negative for difficulty urinating, dysuria, frequency, hematuria, menstrual problem and vaginal discharge. Musculoskeletal: Negative for arthralgias, joint swelling, myalgias and neck stiffness. Skin: Negative for color change and rash. Allergic/Immunologic: Negative for immunocompromised state. Neurological: Negative for dizziness, seizures, speech difficulty, light-headedness and headaches. Hematological: Negative for adenopathy. Psychiatric/Behavioral: Negative for agitation, hallucinations and suicidal ideas. Past Medical History: All past medical history reviewed today. Past Medical History:   Diagnosis Date    CAD (coronary artery disease)     Inferior STEMI; CATRACHITA to RCA    DDD (degenerative disc disease), lumbosacral 4/10/2013    Decreased mobility and endurance 2/24/2022    Hearing aid worn     bilateral    History of short term memory loss 3/3/2022    [place] (hard of hearing)     [place] (hard of hearing)     Hyperlipidemia     Hypertension     MI, old 2017    Neuropathy     bilateral lower extremities    Non-compliance with treatment 6/2/2022    Pressure ulcer of ischium, stage 3, left ([address] 2/16/2022    Pressure ulcer of left buttock, stage 3 ([address] 2/16/2022    Sleep apnea     uses mouth piece; bringing DOS 3/26/18    Thyroid disease     hypo    Wears glasses        Past Surgical History: All past surgical history was reviewed today.     Past Surgical History:   Procedure Laterality Date    ABSCESS DRAINAGE Right 03/27/2018    evacuation hematoma right knee    APPENDECTOMY  1963   [facility]  [id number]    MILD PROCEDURE L2-3 BILATERAL     BREAST LUMPECTOMY  1986    BUNIONECTOMY  10/2003    CATARACT REMOVAL  03/23/2010 and 04/14/2010    COLONOSCOPY  6/2014    repeat 2019    CORONARY ANGIOPLASTY WITH STENT PLACEMENT  01/23/2017    INF STEMI with CATRACHITA to RCA    DILATION AND CURETTAGE OF UTERUS      INCONTINENCE SURGERY      JOINT REPLACEMENT Right 2018    right total knee replacment    OVARY REMOVAL  1963    right    REVISION TOTAL KNEE ARTHROPLASTY Right 6/18/2022    RIGHT TOTAL KNEE INCISION AND DRAINAGE WITH LINER EXCHANGE performed by Dany Molina MD at [address]       Family History: All family history was reviewed today. Problem Relation Age of Onset    Cancer Mother     Heart Disease Mother     Arthritis Sister         rheumatoid    Cancer Brother     Cancer Sister     Diabetes Sister     Heart Disease Brother     High Blood Pressure Brother     High Blood Pressure Sister     Diabetes Brother        Objective:       PHYSICAL EXAM:      Vitals:   Vitals:    07/11/22 0145 07/11/22 0500 07/11/22 0800 07/11/22 1333   BP: (!) 147/67 (!) 147/71 (!) 157/74 124/65   Pulse: 78 72 74 74   Resp: 18 18 17 16   Temp: 97.8 °F (36.6 °C) 97.6 °F (36.4 °C) 97.2 °F (36.2 °C) 97 °F (36.1 °C)   TempSrc: Oral Oral Oral Oral   SpO2: 96% 95% 94% 96%   Weight:       Height:           Physical Exam  Vitals and nursing note reviewed. Constitutional:       General: She is not in acute distress. Appearance: She is well-developed. She is not diaphoretic. HENT:      Head: Normocephalic. Right Ear: External ear normal.      Left Ear: External ear normal.      Nose: Nose normal.   Eyes:      General: No scleral icterus. Right eye: No discharge. Left eye: No discharge. Conjunctiva/sclera: Conjunctivae normal.      Pupils: Pupils are equal, round, and reactive to light. Cardiovascular:      Rate and Rhythm: Normal rate and regular rhythm. Heart sounds: No murmur heard. No friction rub. Pulmonary:      Effort: Pulmonary effort is normal.      Breath sounds: No stridor. No wheezing or rales. Chest:      Chest wall: No tenderness. Abdominal:      Palpations: Abdomen is soft. There is no mass. Tenderness: There is no abdominal tenderness. There is no guarding or rebound. Musculoskeletal:         General: No tenderness.       Cervical back: Normal range of motion and neck supple. Lymphadenopathy:      Cervical: No cervical adenopathy. Skin:     General: Skin is warm and dry. Findings: No erythema or rash. Neurological:      Mental Status: She is alert and oriented to person, place, and time. Motor: No abnormal muscle tone. Psychiatric:         Judgment: Judgment normal.            Lines and drains: All vascular access sites are healthy with no local erythema, discharge or tenderness. Intake and output:    I/O last 3 completed shifts: In: 36 [P.O.:920; I.V.:5]  Out: -     Lab Data:   All available labs and old records have been reviewed by me. CBC:  Recent Labs     07/08/22  2002 07/10/22  0411 07/11/22  0503   WBC 7.8 6.2 6.0   RBC 3.17* 3.39* 3.41*   HGB 8.5* 8.9* 9.1*   HCT 25.7* 27.4* 27.4*    313 300   MCV 81.0 80.8 80.3   MCH 26.7 26.2 26.6   MCHC 32.9 32.4 33.1   RDW 15.7* 16.0* 15.6*        BMP:  Recent Labs     07/08/22  2002 07/10/22  0411 07/11/22  0503    137 139   K 4.3 3.8 3.8    105 108   CO2 22 23 24   BUN 26* 22* 19   CREATININE 0.8 0.8 0.8   CALCIUM 9.5 9.0 9.3   GLUCOSE 157* 120* 109*        Hepatic Function Panel:   Lab Results   Component Value Date/Time    ALKPHOS 135 07/11/2022 05:03 AM    ALT 18 07/11/2022 05:03 AM    AST 18 07/11/2022 05:03 AM    PROT 6.4 07/11/2022 05:03 AM    PROT 7.2 09/18/2012 01:45 PM    PROT 7.2 09/18/2012 01:45 PM    BILITOT 0.5 07/11/2022 05:03 AM    BILIDIR <0.2 03/28/2018 04:35 AM    IBILI see below 03/28/2018 04:35 AM    LABALBU 3.4 07/11/2022 05:03 AM       CPK:   Lab Results   Component Value Date    CKTOTAL 23 07/04/2022     ESR:   Lab Results   Component Value Date    SEDRATE 53 (H) 07/11/2022     CRP:   Lab Results   Component Value Date    CRP 36.4 (H) 07/11/2022           Imaging: All pertinent images and reports for the current visit were reviewed by me during this visit.   I reviewed the chest x-ray/CT scan/MRI images and independently interpreted the findings and results today. XR KNEE RIGHT (1-2 VIEWS)   Final Result   Total right knee replacement which is unchanged with no acute bony   abnormality. Diffuse osteopenia. Moderate soft tissue swelling anterior to the knee and a moderate   suprapatellar effusion. Medications: All current and past medications were reviewed.  daptomycin (CUBICIN) IVPB  6 mg/kg IntraVENous Q24H    NIFEdipine  30 mg Oral Daily    aspirin  81 mg Oral Daily    ferrous sulfate  325 mg Oral Daily with breakfast    cefepime  2,000 mg IntraVENous Q12H    levETIRAcetam  500 mg Oral BID    lactobacillus  1 capsule Oral BID    rosuvastatin  40 mg Oral Nightly    lisinopril  20 mg Oral Daily    polyethylene glycol  17 g Oral Daily    docusate sodium  100 mg Oral BID    thyroid  30 mg Oral Daily    latanoprost  1 drop Left Eye Nightly    dorzolamide  1 drop Left Eye BID    timolol  1 drop Left Eye BID    sodium chloride flush  5-40 mL IntraVENous 2 times per day    enoxaparin  40 mg SubCUTAneous Daily        sodium chloride         diphenhydrAMINE, bisacodyl, sodium chloride flush, sodium chloride, ondansetron **OR** ondansetron, polyethylene glycol      Problem list:       Patient Active Problem List   Diagnosis Code    Low back pain radiating to both legs M54.50, M79.604, M79.605    Lumbar spinal stenosis M48.061    DDD (degenerative disc disease), lumbosacral M51.37    Stenosis, spinal, lumbar M48.061    Pain and swelling of right knee M25.561, M25.461    Acute inferior myocardial infarction (HCC) I21.19    Hyperlipidemia LDL goal <70 E78.5    Retroperitoneal hemorrhage R58    Essential hypertension I10    Arthritis of right knee M17.11    History of total knee arthroplasty, right-3. 6.2018 Z96.651    Traumatic hematoma of knee, right, subsequent encounter S80. 01XD    Traumatic hematoma of right knee S80. 01XA    Prosthetic joint infection ([address] T84.50XA    Seizure disorder ([address] G40.909    Fall

## (undated) DEVICE — STERILE TOTAL KNEE DRAPE PACK: Brand: CARDINAL HEALTH

## (undated) DEVICE — SUTURE MCRYL + SZ 3-0 L27IN ABSRB UD PS1 L24MM 3/8 CIR REV MCP936H

## (undated) DEVICE — 2108 SERIES SAGITTAL BLADE (19.5 X 1.27 X 90.0MM)

## (undated) DEVICE — SUTURE VCRL + SZ 0 L18IN ABSRB UD L36MM CT-1 1/2 CIR VCP840D

## (undated) DEVICE — APPLICATOR MEDICATED 26 CC SOLUTION HI LT ORNG CHLORAPREP

## (undated) DEVICE — COVER LT HNDL BLU PLAS

## (undated) DEVICE — DRESSING CNTCT 4X12IN IS THERABOND 3D

## (undated) DEVICE — DECANTER FLD 9IN ST BG FOR ASEP TRNSF OF FLD

## (undated) DEVICE — HANDPIECE SET WITH HIGH FLOW TIP AND SUCTION TUBE: Brand: INTERPULSE

## (undated) DEVICE — COVER,MAYO STAND,XL,STERILE: Brand: MEDLINE

## (undated) DEVICE — SYSTEM SKIN CLSR 22CM DERMBND PRINEO

## (undated) DEVICE — DRAPE,U/ SHT,SPLIT,PLAS,STERIL: Brand: MEDLINE

## (undated) DEVICE — SUTURE STRATAFIX SZ 1 L14IN ABSRB VLT L36MM MO-4 TAPERPOINT SXPD2B400

## (undated) DEVICE — 3M™ STERI-STRIP™ REINFORCED ADHESIVE SKIN CLOSURES, R1547, 1/2 IN X 4 IN (12 MM X 100 MM), 6 STRIPS/ENVELOPE: Brand: 3M™ STERI-STRIP™

## (undated) DEVICE — SYRINGE MED 30ML STD CLR PLAS LUERLOCK TIP N CTRL DISP

## (undated) DEVICE — Device: Brand: JELCO

## (undated) DEVICE — 450 ML BOTTLE OF 0.05% CHLORHEXIDINE GLUCONATE IN 99.95% STERILE WATER FOR IRRIGATION, USP AND APPLICATOR.: Brand: IRRISEPT ANTIMICROBIAL WOUND LAVAGE

## (undated) DEVICE — SUTURE VCRL + SZ 2-0 L18IN ABSRB UD CT1 L36MM 1/2 CIR VCP839D

## (undated) DEVICE — BAG WND LAV 1L CLR ETH ACET ACID SOD ACETT BENZALKONIUM CHL

## (undated) DEVICE — SPONGE LAP W18XL18IN WHT COT 4 PLY FLD STRUNG RADPQ DISP ST

## (undated) DEVICE — DEVICE POS W4INXL5YD KNEE SURG FOAM PD SELF ADH WRP DEMAYO

## (undated) DEVICE — RECIPROCATING BLADE, DOUBLE SIDED, OFFSET  (70.0 X 0.64 X 12.6MM)

## (undated) DEVICE — 3 BONE CEMENT MIXER: Brand: MIXEVAC

## (undated) DEVICE — ELECTRODE PT RET AD L9FT HI MOIST COND ADH HYDRGEL CORDED

## (undated) DEVICE — PADDING UNDERCAST W4INXL4YD 100% COT CRIMPED FINISH WBRL II

## (undated) DEVICE — FAIRFIELD KNEE LF

## (undated) DEVICE — SHEET,DRAPE,53X77,STERILE: Brand: MEDLINE

## (undated) DEVICE — TOTAL BASIC PK